# Patient Record
Sex: FEMALE | Race: BLACK OR AFRICAN AMERICAN | NOT HISPANIC OR LATINO | Employment: FULL TIME | URBAN - METROPOLITAN AREA
[De-identification: names, ages, dates, MRNs, and addresses within clinical notes are randomized per-mention and may not be internally consistent; named-entity substitution may affect disease eponyms.]

---

## 2017-02-20 ENCOUNTER — ALLSCRIPTS OFFICE VISIT (OUTPATIENT)
Dept: OTHER | Facility: OTHER | Age: 37
End: 2017-02-20

## 2017-03-01 ENCOUNTER — ALLSCRIPTS OFFICE VISIT (OUTPATIENT)
Dept: OTHER | Facility: OTHER | Age: 37
End: 2017-03-01

## 2017-03-01 DIAGNOSIS — R10.2 PELVIC AND PERINEAL PAIN: ICD-10-CM

## 2017-03-03 LAB
CHLAMYDIA TRACHOMATIS BY MOL. METHOD (HISTORICAL): NOT DETECTED
GC BY MOL. METHOD (HISTORICAL): NOT DETECTED
TRICHOMONAS (HISTORICAL): NOT DETECTED

## 2017-03-05 LAB — CULT RSLT GENITAL (HISTORICAL): ABNORMAL

## 2017-03-21 ENCOUNTER — HOSPITAL ENCOUNTER (OUTPATIENT)
Dept: RADIOLOGY | Facility: HOSPITAL | Age: 37
Discharge: HOME | End: 2017-03-21
Payer: COMMERCIAL

## 2017-03-21 DIAGNOSIS — R10.2 PELVIC AND PERINEAL PAIN: ICD-10-CM

## 2017-03-21 PROCEDURE — 76830 TRANSVAGINAL US NON-OB: CPT

## 2017-03-21 PROCEDURE — 76856 US EXAM PELVIC COMPLETE: CPT

## 2017-06-03 ENCOUNTER — HOSPITAL ENCOUNTER (EMERGENCY)
Facility: HOSPITAL | Age: 37
Discharge: HOME | End: 2017-06-03
Attending: EMERGENCY MEDICINE | Admitting: EMERGENCY MEDICINE
Payer: COMMERCIAL

## 2017-06-03 ENCOUNTER — APPOINTMENT (EMERGENCY)
Dept: RADIOLOGY | Facility: HOSPITAL | Age: 37
End: 2017-06-03
Payer: COMMERCIAL

## 2017-06-03 VITALS
BODY MASS INDEX: 27.64 KG/M2 | OXYGEN SATURATION: 98 % | SYSTOLIC BLOOD PRESSURE: 132 MMHG | HEART RATE: 80 BPM | TEMPERATURE: 98.1 F | DIASTOLIC BLOOD PRESSURE: 89 MMHG | WEIGHT: 156 LBS | HEIGHT: 63 IN | RESPIRATION RATE: 18 BRPM

## 2017-06-03 DIAGNOSIS — R10.31 RIGHT LOWER QUADRANT ABDOMINAL PAIN: Primary | ICD-10-CM

## 2017-06-03 LAB
ALBUMIN SERPL BCP-MCNC: 3.7 G/DL (ref 3.5–5)
ALP SERPL-CCNC: 61 U/L (ref 46–116)
ALT SERPL W P-5'-P-CCNC: 12 U/L (ref 12–78)
ANION GAP SERPL CALCULATED.3IONS-SCNC: 7 MMOL/L (ref 4–13)
AST SERPL W P-5'-P-CCNC: 9 U/L (ref 5–45)
BASOPHILS # BLD AUTO: 0.02 THOUSANDS/ΜL (ref 0–0.1)
BASOPHILS NFR BLD AUTO: 0 % (ref 0–1)
BILIRUB SERPL-MCNC: 0.21 MG/DL (ref 0.2–1)
BUN SERPL-MCNC: 11 MG/DL (ref 5–25)
CALCIUM SERPL-MCNC: 8.6 MG/DL (ref 8.3–10.1)
CHLORIDE SERPL-SCNC: 107 MMOL/L (ref 100–108)
CO2 SERPL-SCNC: 30 MMOL/L (ref 21–32)
CREAT SERPL-MCNC: 0.81 MG/DL (ref 0.6–1.3)
EOSINOPHIL # BLD AUTO: 0.04 THOUSAND/ΜL (ref 0–0.61)
EOSINOPHIL NFR BLD AUTO: 1 % (ref 0–6)
ERYTHROCYTE [DISTWIDTH] IN BLOOD BY AUTOMATED COUNT: 13.7 % (ref 11.6–15.1)
GFR SERPL CREATININE-BSD FRML MDRD: >60 ML/MIN/1.73SQ M
GLUCOSE SERPL-MCNC: 94 MG/DL (ref 65–140)
HCT VFR BLD AUTO: 33.5 % (ref 34.8–46.1)
HGB BLD-MCNC: 10.8 G/DL (ref 11.5–15.4)
LIPASE SERPL-CCNC: 131 U/L (ref 73–393)
LYMPHOCYTES # BLD AUTO: 2.67 THOUSANDS/ΜL (ref 0.6–4.47)
LYMPHOCYTES NFR BLD AUTO: 49 % (ref 14–44)
MCH RBC QN AUTO: 26.2 PG (ref 26.8–34.3)
MCHC RBC AUTO-ENTMCNC: 32.2 G/DL (ref 31.4–37.4)
MCV RBC AUTO: 81 FL (ref 82–98)
MONOCYTES # BLD AUTO: 0.37 THOUSAND/ΜL (ref 0.17–1.22)
MONOCYTES NFR BLD AUTO: 7 % (ref 4–12)
NEUTROPHILS # BLD AUTO: 2.38 THOUSANDS/ΜL (ref 1.85–7.62)
NEUTS SEG NFR BLD AUTO: 43 % (ref 43–75)
NRBC BLD AUTO-RTO: 0 /100 WBCS
PLATELET # BLD AUTO: 238 THOUSANDS/UL (ref 149–390)
PMV BLD AUTO: 10.2 FL (ref 8.9–12.7)
POTASSIUM SERPL-SCNC: 3.7 MMOL/L (ref 3.5–5.3)
PROT SERPL-MCNC: 7.3 G/DL (ref 6.4–8.2)
RBC # BLD AUTO: 4.12 MILLION/UL (ref 3.81–5.12)
SODIUM SERPL-SCNC: 144 MMOL/L (ref 136–145)
WBC # BLD AUTO: 5.48 THOUSAND/UL (ref 4.31–10.16)

## 2017-06-03 PROCEDURE — 80053 COMPREHEN METABOLIC PANEL: CPT

## 2017-06-03 PROCEDURE — 85025 COMPLETE CBC W/AUTO DIFF WBC: CPT

## 2017-06-03 PROCEDURE — 83690 ASSAY OF LIPASE: CPT | Performed by: EMERGENCY MEDICINE

## 2017-06-03 PROCEDURE — 96361 HYDRATE IV INFUSION ADD-ON: CPT

## 2017-06-03 PROCEDURE — 96374 THER/PROPH/DIAG INJ IV PUSH: CPT

## 2017-06-03 PROCEDURE — 74176 CT ABD & PELVIS W/O CONTRAST: CPT

## 2017-06-03 PROCEDURE — 36415 COLL VENOUS BLD VENIPUNCTURE: CPT

## 2017-06-03 PROCEDURE — 99284 EMERGENCY DEPT VISIT MOD MDM: CPT

## 2017-06-03 RX ORDER — DIPHENHYDRAMINE HYDROCHLORIDE 50 MG/ML
50 INJECTION INTRAMUSCULAR; INTRAVENOUS ONCE
Status: DISCONTINUED | OUTPATIENT
Start: 2017-06-03 | End: 2017-06-03

## 2017-06-03 RX ORDER — KETOROLAC TROMETHAMINE 30 MG/ML
15 INJECTION, SOLUTION INTRAMUSCULAR; INTRAVENOUS ONCE
Status: COMPLETED | OUTPATIENT
Start: 2017-06-03 | End: 2017-06-03

## 2017-06-03 RX ORDER — IBUPROFEN 600 MG/1
600 TABLET ORAL EVERY 6 HOURS PRN
Qty: 40 TABLET | Refills: 0 | Status: SHIPPED | OUTPATIENT
Start: 2017-06-03 | End: 2018-02-23

## 2017-06-03 RX ADMIN — KETOROLAC TROMETHAMINE 15 MG: 30 INJECTION, SOLUTION INTRAMUSCULAR at 03:39

## 2017-06-03 RX ADMIN — SODIUM CHLORIDE 1000 ML: 0.9 INJECTION, SOLUTION INTRAVENOUS at 03:39

## 2018-01-10 NOTE — RESULT NOTES
Verified Results  (1) T4, FREE [id number] 05:24PM Effie Zaldivar     Test Name Result Flag Reference   T4,FREE 0 84 ng/dL  0 76-1 46

## 2018-01-10 NOTE — MISCELLANEOUS
Provider Comments  Provider Comments:   Dear Jhoana Zaldivar,    You missed an appointment on August 24, 2016 at 8:00AM  We understand that many situations arise that occasionally prevents patients from keeping scheduled appointments  It is the policy of [facility] that patients notify us 24 hours in advance if unable to keep a scheduled appointment  Missed appointments jeopardize strong physician-patient relationships  The appointment you missed could have easily been made available to another patient if you had contacted us to cancel  We like to accommodate all of our patients, but when patients miss an appointment it prevents us from being able to help everyone  In the future, we request at least 24 hours notice of cancellation so we can make your appointment available to someone else in need         Sincerely,    The Physicians and Staff of [facility]        Signatures   Electronically signed by : YESSENIA Live ; Aug 24 2016  8:38AM EST                       (Author)

## 2018-01-11 NOTE — PROGRESS NOTES
SEP 8 2016         RE: Eagle Rogers                                 To: [facility]   MR#: [id number]                                    [address]   : 1980                                   [place], [address] [place] [address]   ENC: [id number]                             Fax: [phone]   (Exam #: [id number])      The LMP of this 39year old,  G12, P3-0-9-3 patient was unknown, her   working ABY is MAR 23 2017 and the current gestational age is 16 weeks 4   days by 1st Trimester Sono  A sonographic examination was performed on SEP   8 2016 using real time equipment  The ultrasound examination was performed   using abdominal technique  The patient has a BMI of 33 1  Her blood   pressure today was 119/79  Theodore Ink has no complaints today  She denies recurrence of vaginal bleeding   and denies abdominal pain  Multiple longitudinal and transverse sections   revealed a wills intrauterine pregnancy  Cardiac motion was not observed  INDICATIONS      first trimester bleeding      Exam Types      Level I      MEASUREMENTS (* Included In Average GA)      CRL              5 2 cm        11 weeks 5 days*      THE AVERAGE GESTATIONAL AGE is 11 weeks 5 days +/- 7 days  ANATOMY COMMENTS      Transabdominal sonography reveals a single nonviable fetus  Absence of   fetal heart rate activity is verified on real-time evaluation and pulse   Doppler exam  No obvious fetal abnormality is suspected in a very limited   evaluation secondary to the early gestational age  There is no suspicion   of a subchorionic bleed  The uterine contour appears normal  There is no   suspicion of a uterine myoma  Free fluid is not identified in the   posterior cul-de-sac  There is no adnexal mass seen  The findings are   consistent with a missed   ADNEXA      The left ovary appeared normal and measured 2 9 x 3 3 x 3 2 cm with a   volume of 16 0 cc   The right ovary appeared normal and measured 2 9 x 3 1   x 1 9 cm with a volume of 8 9 cc  IMPRESSION      Cervantes IUP   11 weeks and 5 days by this ultrasound  (ABY=MAR 25 2017)   12 weeks and 4 days by 1st Tri Sono  (ABY=MAR 19 2017)   Heart movement not seen      GENERAL COMMENT      Today's ultrasound findings and suggested follow-up were discussed in   detail with [facility] and by phone with Dr Kiran Carrasco  As per Dr Hamilton Rojas   instructions, [facility] will call today to set up an office appointment to   discuss management options, including the possibility of a D & E procedure  The face to face time, in addition to time spent discussing ultrasound   results, was 10 minutes, greater than 50% of which was spent during   counseling and coordination of care  DEBORAH Gutierrez M D     Maternal-Fetal Medicine   Electronically signed 09/08/16 10:57

## 2018-01-12 NOTE — MISCELLANEOUS
Provider Comments  Provider Comments:   PT NO SHOWED TODAY      Signatures   Electronically signed by : Liv Angelo, ; Oct 17 2016  3:17PM EST                       (Author)

## 2018-01-14 VITALS
HEIGHT: 63 IN | BODY MASS INDEX: 31.01 KG/M2 | WEIGHT: 175 LBS | SYSTOLIC BLOOD PRESSURE: 134 MMHG | DIASTOLIC BLOOD PRESSURE: 82 MMHG

## 2018-01-15 NOTE — MISCELLANEOUS
Provider Comments  Provider Comments:   pt was a no show today      Signatures   Electronically signed by : Rachael Brown, ; Oct 26 2016  1:28PM EST                       (Author)

## 2018-01-15 NOTE — MISCELLANEOUS
Provider Comments  Provider Comments:   pt was a no show for appt today      Signatures   Electronically signed by : Jaida Alejandro, ; Feb 20 2017  2:39PM EST                       (Author)

## 2018-01-15 NOTE — PROGRESS NOTES
AUG 23 2016         RE: Crystal Marcelo                                 To: [facility]   MR#: [id number]                                    [address]   : 1980                                   [place], [address]   ENCRosary Lied: [phone]   (Exam #: [id number])      The LMP of this 39year old,  G12, P3-0-9-3 patient was unknown, her   working ABY is MAR 23 2017 and the current gestational age is 9 weeks 2   days by 1st Trimester Sono  A sonographic examination was performed on AUG   23 2016 using real time equipment  The patient has a BMI of 32 9  Her   blood pressure today was 133/86  Valisa complains of intermittent bright red vaginal bleeding, accompanied   by occasional lower abdominal cramping  Multiple longitudinal and   transverse sections revealed a wills intrauterine pregnancy with the   fetus in variable presentation  The placenta is anterior in implantation  Cardiac motion was observed at 175 bpm       INDICATIONS      first trimester bleeding      Exam Types      Level I      RESULTS      Fetus # 1 of 1   Variable presentation   Fetal growth appeared normal      MEASUREMENTS (* Included In Average GA)      CRL              3 1 cm        9 weeks 6 days *      THE AVERAGE GESTATIONAL AGE is 9 weeks 6 days +/- 5 days  ANATOMY COMMENTS      Anatomic detail is extremely limited at this gestational age  However, a   discrete fetal pole with cardiac and fetal body motion was documented  Limb buds were documented as well  The yolk sac was clearly seen, and the   gestational sac is normal in appearance and located in the fundus of the   uterus  No gross abnormalities were noted on this examination  Free fluid   is not seen in the posterior cul-de-sac  There is no suspicion of a   subchorionic bleed  ADNEXA      The left ovary appeared normal and measured 2 7 x 2 7 x 2 9 cm with a   volume of 11 1 cc   A small anechoic left ovarian cyst is noted  The right   ovary was not visualized  IMPRESSION      Cervantes IUP   9 weeks and 6 days by this ultrasound  (ABY=MAR 22 2017)   10 weeks and 2 days by 1st Tri Sono  (ABY=MAR 19 2017)   Variable presentation   Fetal growth appeared normal   Regular fetal heart rate of 175 bpm   Anterior placenta      GENERAL COMMENT      Today's ultrasound findings and suggested follow-up were discussed in   detail with Mary Ellenregla  She will notify your office with an increased in the   amount of vaginal bleeding or lower abdominal cramping  She will return to   the [facility]  in 2 weeks for late first trimester ultrasound   evaluation and genetic screening  The face to face time, in addition to time spent discussing ultrasound   results, was 10 minutes, greater than 50% of which was spent during   counseling and coordination of care  YESSENIA Berg     Maternal-Fetal Medicine   Electronically signed 08/23/16 11:34

## 2018-02-16 ENCOUNTER — TRANSCRIBE ORDERS (OUTPATIENT)
Dept: LAB | Facility: HOSPITAL | Age: 38
End: 2018-02-16

## 2018-02-16 ENCOUNTER — TRANSCRIBE ORDERS (OUTPATIENT)
Dept: OBGYN CLINIC | Facility: CLINIC | Age: 38
End: 2018-02-16

## 2018-02-16 ENCOUNTER — TELEPHONE (OUTPATIENT)
Dept: OBGYN CLINIC | Facility: CLINIC | Age: 38
End: 2018-02-16

## 2018-02-16 ENCOUNTER — APPOINTMENT (OUTPATIENT)
Dept: LAB | Facility: HOSPITAL | Age: 38
End: 2018-02-16
Payer: COMMERCIAL

## 2018-02-16 DIAGNOSIS — Z34.90 PREGNANCY, UNSPECIFIED GESTATIONAL AGE: Primary | ICD-10-CM

## 2018-02-16 DIAGNOSIS — Z34.90 PREGNANCY, UNSPECIFIED GESTATIONAL AGE: ICD-10-CM

## 2018-02-16 LAB
ABO GROUP BLD: NORMAL
B-HCG SERPL-ACNC: <2 MIU/ML
BLD GP AB SCN SERPL QL: NEGATIVE
RH BLD: POSITIVE
SPECIMEN EXPIRATION DATE: NORMAL

## 2018-02-16 PROCEDURE — 84702 CHORIONIC GONADOTROPIN TEST: CPT

## 2018-02-16 PROCEDURE — 86900 BLOOD TYPING SEROLOGIC ABO: CPT

## 2018-02-16 PROCEDURE — 36415 COLL VENOUS BLD VENIPUNCTURE: CPT

## 2018-02-16 PROCEDURE — 86850 RBC ANTIBODY SCREEN: CPT

## 2018-02-16 PROCEDURE — 86901 BLOOD TYPING SEROLOGIC RH(D): CPT

## 2018-02-16 NOTE — TELEPHONE ENCOUNTER
Per Dr Jonh Deras send for type & screen and HCG quant STAT  King Omero is aware and is going for bw as soon as she gets out of work at 3  Going to [facility] and I will follow up with her before the end of the day

## 2018-02-16 NOTE — TELEPHONE ENCOUNTER
Pt reports pain on R side, pain has gotten really, really bad in last week and half  Did not stay, but know is coming and is more severe  Did not take UPT

## 2018-02-23 ENCOUNTER — HOSPITAL ENCOUNTER (EMERGENCY)
Facility: HOSPITAL | Age: 38
Discharge: HOME | End: 2018-02-23
Attending: EMERGENCY MEDICINE | Admitting: EMERGENCY MEDICINE
Payer: COMMERCIAL

## 2018-02-23 ENCOUNTER — TRANSCRIBE ORDERS (OUTPATIENT)
Dept: OBGYN CLINIC | Facility: CLINIC | Age: 38
End: 2018-02-23

## 2018-02-23 ENCOUNTER — TELEPHONE (OUTPATIENT)
Dept: OBGYN CLINIC | Facility: CLINIC | Age: 38
End: 2018-02-23

## 2018-02-23 ENCOUNTER — APPOINTMENT (EMERGENCY)
Dept: RADIOLOGY | Facility: HOSPITAL | Age: 38
End: 2018-02-23
Payer: COMMERCIAL

## 2018-02-23 VITALS
DIASTOLIC BLOOD PRESSURE: 78 MMHG | RESPIRATION RATE: 12 BRPM | HEART RATE: 48 BPM | SYSTOLIC BLOOD PRESSURE: 148 MMHG | OXYGEN SATURATION: 100 %

## 2018-02-23 DIAGNOSIS — R10.9 ABDOMINAL PAIN: Primary | ICD-10-CM

## 2018-02-23 DIAGNOSIS — R10.2 PELVIC PAIN: Primary | ICD-10-CM

## 2018-02-23 LAB
ALBUMIN SERPL BCP-MCNC: 3.7 G/DL (ref 3.5–5)
ALP SERPL-CCNC: 48 U/L (ref 46–116)
ALT SERPL W P-5'-P-CCNC: 12 U/L (ref 12–78)
ANION GAP SERPL CALCULATED.3IONS-SCNC: 5 MMOL/L (ref 4–13)
APTT PPP: 26 SECONDS (ref 23–35)
AST SERPL W P-5'-P-CCNC: 39 U/L (ref 5–45)
BACTERIA UR QL AUTO: ABNORMAL /HPF
BASOPHILS # BLD AUTO: 0.02 THOUSANDS/ΜL (ref 0–0.1)
BASOPHILS NFR BLD AUTO: 1 % (ref 0–1)
BILIRUB SERPL-MCNC: 0.4 MG/DL (ref 0.2–1)
BILIRUB UR QL STRIP: NEGATIVE
BUN SERPL-MCNC: 9 MG/DL (ref 5–25)
CALCIUM SERPL-MCNC: 8.4 MG/DL (ref 8.3–10.1)
CHLORIDE SERPL-SCNC: 109 MMOL/L (ref 100–108)
CLARITY UR: CLEAR
CO2 SERPL-SCNC: 26 MMOL/L (ref 21–32)
COLOR UR: YELLOW
CREAT SERPL-MCNC: 0.62 MG/DL (ref 0.6–1.3)
EOSINOPHIL # BLD AUTO: 0.03 THOUSAND/ΜL (ref 0–0.61)
EOSINOPHIL NFR BLD AUTO: 1 % (ref 0–6)
ERYTHROCYTE [DISTWIDTH] IN BLOOD BY AUTOMATED COUNT: 16.3 % (ref 11.6–15.1)
GFR SERPL CREATININE-BSD FRML MDRD: 131 ML/MIN/1.73SQ M
GLUCOSE SERPL-MCNC: 73 MG/DL (ref 65–140)
GLUCOSE UR STRIP-MCNC: NEGATIVE MG/DL
HCT VFR BLD AUTO: 29.5 % (ref 34.8–46.1)
HGB BLD-MCNC: 8.9 G/DL (ref 11.5–15.4)
HGB UR QL STRIP.AUTO: ABNORMAL
HYALINE CASTS #/AREA URNS LPF: ABNORMAL /LPF
INR PPP: 1.05 (ref 0.86–1.16)
KETONES UR STRIP-MCNC: ABNORMAL MG/DL
LEUKOCYTE ESTERASE UR QL STRIP: NEGATIVE
LYMPHOCYTES # BLD AUTO: 1.7 THOUSANDS/ΜL (ref 0.6–4.47)
LYMPHOCYTES NFR BLD AUTO: 45 % (ref 14–44)
MCH RBC QN AUTO: 21.9 PG (ref 26.8–34.3)
MCHC RBC AUTO-ENTMCNC: 30.2 G/DL (ref 31.4–37.4)
MCV RBC AUTO: 73 FL (ref 82–98)
MONOCYTES # BLD AUTO: 0.24 THOUSAND/ΜL (ref 0.17–1.22)
MONOCYTES NFR BLD AUTO: 6 % (ref 4–12)
NEUTROPHILS # BLD AUTO: 1.77 THOUSANDS/ΜL (ref 1.85–7.62)
NEUTS SEG NFR BLD AUTO: 47 % (ref 43–75)
NITRITE UR QL STRIP: NEGATIVE
NON-SQ EPI CELLS URNS QL MICRO: ABNORMAL /HPF
NRBC BLD AUTO-RTO: 0 /100 WBCS
PH UR STRIP.AUTO: 7 [PH] (ref 4.5–8)
PLATELET # BLD AUTO: 306 THOUSANDS/UL (ref 149–390)
PMV BLD AUTO: 10 FL (ref 8.9–12.7)
POTASSIUM SERPL-SCNC: 5.1 MMOL/L (ref 3.5–5.3)
PROT SERPL-MCNC: 7.3 G/DL (ref 6.4–8.2)
PROT UR STRIP-MCNC: ABNORMAL MG/DL
PROTHROMBIN TIME: 13.7 SECONDS (ref 12.1–14.4)
RBC # BLD AUTO: 4.06 MILLION/UL (ref 3.81–5.12)
RBC #/AREA URNS AUTO: ABNORMAL /HPF
SODIUM SERPL-SCNC: 140 MMOL/L (ref 136–145)
SP GR UR STRIP.AUTO: 1.02 (ref 1–1.03)
UROBILINOGEN UR QL STRIP.AUTO: 1 E.U./DL
WBC # BLD AUTO: 3.76 THOUSAND/UL (ref 4.31–10.16)
WBC #/AREA URNS AUTO: ABNORMAL /HPF

## 2018-02-23 PROCEDURE — 85025 COMPLETE CBC W/AUTO DIFF WBC: CPT | Performed by: EMERGENCY MEDICINE

## 2018-02-23 PROCEDURE — 74176 CT ABD & PELVIS W/O CONTRAST: CPT

## 2018-02-23 PROCEDURE — 85730 THROMBOPLASTIN TIME PARTIAL: CPT | Performed by: EMERGENCY MEDICINE

## 2018-02-23 PROCEDURE — 96375 TX/PRO/DX INJ NEW DRUG ADDON: CPT

## 2018-02-23 PROCEDURE — 96374 THER/PROPH/DIAG INJ IV PUSH: CPT

## 2018-02-23 PROCEDURE — 80053 COMPREHEN METABOLIC PANEL: CPT | Performed by: EMERGENCY MEDICINE

## 2018-02-23 PROCEDURE — 36415 COLL VENOUS BLD VENIPUNCTURE: CPT | Performed by: EMERGENCY MEDICINE

## 2018-02-23 PROCEDURE — 81001 URINALYSIS AUTO W/SCOPE: CPT

## 2018-02-23 PROCEDURE — 85610 PROTHROMBIN TIME: CPT | Performed by: EMERGENCY MEDICINE

## 2018-02-23 PROCEDURE — 96361 HYDRATE IV INFUSION ADD-ON: CPT

## 2018-02-23 PROCEDURE — 99284 EMERGENCY DEPT VISIT MOD MDM: CPT

## 2018-02-23 RX ORDER — NAPROXEN 500 MG/1
500 TABLET ORAL 2 TIMES DAILY WITH MEALS
Qty: 30 TABLET | Refills: 0 | Status: SHIPPED | OUTPATIENT
Start: 2018-02-23 | End: 2018-07-17

## 2018-02-23 RX ORDER — KETOROLAC TROMETHAMINE 30 MG/ML
15 INJECTION, SOLUTION INTRAMUSCULAR; INTRAVENOUS ONCE
Status: COMPLETED | OUTPATIENT
Start: 2018-02-23 | End: 2018-02-23

## 2018-02-23 RX ADMIN — SODIUM CHLORIDE 1000 ML: 0.9 INJECTION, SOLUTION INTRAVENOUS at 13:16

## 2018-02-23 RX ADMIN — HYDROMORPHONE HYDROCHLORIDE 1 MG: 1 INJECTION, SOLUTION INTRAMUSCULAR; INTRAVENOUS; SUBCUTANEOUS at 15:10

## 2018-02-23 RX ADMIN — KETOROLAC TROMETHAMINE 15 MG: 30 INJECTION, SOLUTION INTRAMUSCULAR at 13:19

## 2018-02-23 NOTE — ED NOTES
PT indicated that she was evaluated "here last Sunday  They game me some medication and told me to take it easy  I was also suppose to see my doctor, but I didn't make it" No notes indicate that pt was seen within the hospital network within the last 7 days        Justo Dimas RN  02/23/18 5109

## 2018-02-23 NOTE — ED NOTES
PT notified that they will have to find a ride home due to receiving the pain medication        Eagle Jimenez RN  02/23/18 3627

## 2018-02-23 NOTE — ED ATTENDING ATTESTATION
Liliane Perla MD, saw and evaluated the patient  I have discussed the patient with the resident/non-physician practitioner and agree with the resident's/non-physician practitioner's findings, Plan of Care, and MDM as documented in the resident's/non-physician practitioner's note, except where noted  All available labs and Radiology studies were reviewed  At this point I agree with the current assessment done in the Emergency Department  I have conducted an independent evaluation of this patient a history and physical is as follows:    Patient presents with several weeks of progressively worsening RLQ/R flank pain  Pain initially intermittent but then became constant  Patient also reports that she lost 35lbs in the last 2 months  No additional complaints  Exam: AAOx3, mild distress, RRR, CTA, RLQ TTP  A/P: RLQ pain  Will check labs, urine, and treat pain  Will CT A/P to evaluate      Critical Care Time  CritCare Time    Procedures

## 2018-02-23 NOTE — TELEPHONE ENCOUNTER
Reviewed with Dr Lali Panda, going for pelvic us and will be calling back to schedule appoint with Dr Major Litten on Monday per her request

## 2018-02-23 NOTE — DISCHARGE INSTRUCTIONS

## 2018-02-23 NOTE — ED PROVIDER NOTES
History  Chief Complaint   Patient presents with    Flank Pain     Pt c/o right sided flank pain which is gwetting worse  Pt states seen here last  for same  Pt also states lost 35 pounds in last two months     39-year-old female presenting to the ER today with a chief complaint of right-sided flank pain  States she has had the symptoms for the past 3 weeks  Start to the right flank and radiates the right lower quadrant  Pain has had intermittent symptoms for 3 weeks  Has taken Tylenol with little relief  Came to the ER today when symptoms worsened          History provided by:  Patient   used: No    Abdominal Pain   Pain location:  RLQ and R flank  Pain quality: sharp    Pain radiates to:  RLQ  Pain severity:  Mild  Onset quality:  Gradual  Duration:  3 weeks  Timing:  Constant  Progression:  Worsening  Chronicity:  New  Relieved by:  Nothing  Worsened by:  Nothing  Associated symptoms: nausea    Associated symptoms: no chills, no constipation, no diarrhea, no dysuria, no fatigue, no fever, no hematuria, no vaginal bleeding, no vaginal discharge and no vomiting        None       Past Medical History:   Diagnosis Date    Fetal growth problem suspected but not found     RESOLVED: 3/1/17    First trimester bleeding     RESOLVED:3/1/17    Miscarriage     Missed      RESOLVED:3/1/17    Poor fetal growth affecting management of mother     RESOLVED:3/1/17    Recurrent pregnancy loss, antepartum condition or complication     LPTDEXMU:47    Spontaneous      RESOLVED:3/1/17    Supraventricular tachycardia ([address] )     by history       Past Surgical History:   Procedure Laterality Date    DILATION AND CURETTAGE OF UTERUS      ,     DILATION AND CURETTAGE OF UTERUS      FOR SPONTANEOUS ; X5 [id number]    INDUCED       LAPAROSCOPIC TUBAL LIGATION Right 2016    Procedure: LAPAROSCOPIC TUBAL LIGATION ;  Surgeon: Vinita Canas MD; Location: [facility];  Service:     VA SURG RX MISSED ABORTN,1ST TRI N/A 9/12/2016    Procedure: DILATATION AND EVACUATION (D&E) (MISSED AB); Surgeon: Luis Eaton MD;  Location: [facility];  Service: Gynecology    SALPINGOOPHORECTOMY Left     TUBAL LIGATION Right        Family History   Problem Relation Age of Onset    Arthritis Mother     Depression Mother     Hypertension Mother     Cancer Father      I have reviewed and agree with the history as documented  Social History   Substance Use Topics    Smoking status: Never Smoker    Smokeless tobacco: Never Used    Alcohol use No      Comment: OCCASIONAL ALCOHOL USE AS PER ALLSCRIPTS        Review of Systems   Constitutional: Negative  Negative for appetite change, chills, diaphoresis, fatigue and fever  HENT: Negative  Eyes: Negative  Respiratory: Negative  Cardiovascular: Negative  Gastrointestinal: Positive for abdominal pain and nausea  Negative for blood in stool, constipation, diarrhea and vomiting  Endocrine: Negative  Genitourinary: Negative for decreased urine volume, difficulty urinating, dyspareunia, dysuria, flank pain, frequency, hematuria, pelvic pain, urgency, vaginal bleeding, vaginal discharge and vaginal pain  Musculoskeletal: Negative  Skin: Negative  Allergic/Immunologic: Negative  Neurological: Negative  Psychiatric/Behavioral: Negative  All other systems reviewed and are negative  Physical Exam  ED Triage Vitals [02/23/18 1230]   Temp Pulse Respirations Blood Pressure SpO2   -- 64 20 170/77 100 %      Temp src Heart Rate Source Patient Position - Orthostatic VS BP Location FiO2 (%)   -- -- -- -- --      Pain Score       --           Orthostatic Vital Signs  Vitals:    02/23/18 1230 02/23/18 1330   BP: 170/77 148/78   Pulse: 64 (!) 48       Physical Exam   Constitutional: She is oriented to person, place, and time  She appears well-developed and well-nourished     HENT:   Head: Normocephalic and atraumatic  Right Ear: External ear normal    Left Ear: External ear normal    Nose: Nose normal    Mouth/Throat: Oropharynx is clear and moist    Eyes: Conjunctivae and EOM are normal  Pupils are equal, round, and reactive to light  Neck: Normal range of motion  Neck supple  No JVD present  No tracheal deviation present  No thyromegaly present  Cardiovascular: Normal rate, regular rhythm, normal heart sounds and intact distal pulses  Exam reveals no gallop and no friction rub  No murmur heard  Pulmonary/Chest: Effort normal and breath sounds normal  No stridor  No respiratory distress  She has no wheezes  She has no rales  She exhibits no tenderness  Abdominal: Soft  Bowel sounds are normal  She exhibits no distension and no mass  There is tenderness in the right lower quadrant  There is CVA tenderness  There is no rebound and no guarding  No hernia  Musculoskeletal: Normal range of motion  She exhibits no edema, tenderness or deformity  Lymphadenopathy:     She has no cervical adenopathy  Neurological: She is alert and oriented to person, place, and time  She has normal reflexes  She displays normal reflexes  No cranial nerve deficit  She exhibits normal muscle tone  Coordination normal    Skin: Skin is warm  No rash noted  No erythema  No pallor  Psychiatric: She has a normal mood and affect  Her behavior is normal  Judgment and thought content normal    Nursing note and vitals reviewed        ED Medications  Medications   sodium chloride 0 9 % bolus 1,000 mL (1,000 mL Intravenous New Bag 2/23/18 1316)   ketorolac (TORADOL) injection 15 mg (15 mg Intravenous Given 2/23/18 1319)   HYDROmorphone (DILAUDID) injection 1 mg (1 mg Intravenous Given 2/23/18 1510)       Diagnostic Studies  Results Reviewed     Procedure Component Value Units Date/Time    Protime-INR [[id number]]  (Normal) Resulted:  02/23/18 1447    Lab Status:  Final result Specimen:  Blood from Arm, Right Updated:  02/23/18 1447     Protime 13 7 seconds      INR 1 05    APTT [[id number]]  (Normal) Resulted:  02/23/18 1447    Lab Status:  Final result Specimen:  Blood from Arm, Right Updated:  02/23/18 1447     PTT 26 seconds     Narrative: Therapeutic Heparin Range = 60-90 seconds    Comprehensive metabolic panel [[id number]]  (Abnormal) Collected:  02/23/18 1319    Lab Status:  Final result Specimen:  Blood from Arm, Right Updated:  02/23/18 1402     Sodium 140 mmol/L      Potassium 5 1 mmol/L      Chloride 109 (H) mmol/L      CO2 26 mmol/L      Anion Gap 5 mmol/L      BUN 9 mg/dL      Creatinine 0 62 mg/dL      Glucose 73 mg/dL      Calcium 8 4 mg/dL      AST 39 U/L      ALT 12 U/L      Alkaline Phosphatase 48 U/L      Total Protein 7 3 g/dL      Albumin 3 7 g/dL      Total Bilirubin 0 40 mg/dL      eGFR 131 ml/min/1 73sq m     Narrative:         National Kidney Disease Education Program recommendations are as follows:  GFR calculation is accurate only with a steady state creatinine  Chronic Kidney disease less than 60 ml/min/1 73 sq  meters  Kidney failure less than 15 ml/min/1 73 sq  meters      CBC and differential [[id number]]  (Abnormal) Collected:  02/23/18 1319    Lab Status:  Final result Specimen:  Blood from Arm, Right Updated:  02/23/18 1338     WBC 3 76 (L) Thousand/uL      RBC 4 06 Million/uL      Hemoglobin 8 9 (L) g/dL      Hematocrit 29 5 (L) %      MCV 73 (L) fL      MCH 21 9 (L) pg      MCHC 30 2 (L) g/dL      RDW 16 3 (H) %      MPV 10 0 fL      Platelets 164 Thousands/uL      nRBC 0 /100 WBCs      Neutrophils Relative 47 %      Lymphocytes Relative 45 (H) %      Monocytes Relative 6 %      Eosinophils Relative 1 %      Basophils Relative 1 %      Neutrophils Absolute 1 77 (L) Thousands/µL      Lymphocytes Absolute 1 70 Thousands/µL      Monocytes Absolute 0 24 Thousand/µL      Eosinophils Absolute 0 03 Thousand/µL      Basophils Absolute 0 02 Thousands/µL     TSH [[id number]]     Lab Status:  No result Specimen: Blood     Urine Microscopic [[id number]]  (Abnormal) Collected:  02/23/18 1305    Lab Status:  Final result Specimen:  Urine from Urine, Clean Catch Updated:  02/23/18 1330     RBC, UA Innumerable (A) /hpf      WBC, UA 2-4 (A) /hpf      Epithelial Cells Moderate (A) /hpf      Bacteria, UA Occasional /hpf      Hyaline Casts, UA None Seen /lpf     POCT urinalysis dipstick [[id number]]     Lab Status:  No result Specimen:  Urine     POCT pregnancy, urine [[id number]]     Lab Status:  No result     ED Urine Macroscopic [[id number]]  (Abnormal) Collected:  02/23/18 1305    Lab Status:  Final result Specimen:  Urine Updated:  02/23/18 1259     Color, UA Yellow     Clarity, UA Clear     pH, UA 7 0     Leukocytes, UA Negative     Nitrite, UA Negative     Protein, UA 30 (1+) (A) mg/dl      Glucose, UA Negative mg/dl      Ketones, UA 15 (1+) (A) mg/dl      Urobilinogen, UA 1 0 E U /dl      Bilirubin, UA Negative     Blood, UA Large (A)     Specific [place], UA 1 025    Narrative:       CLINITEK RESULT                 CT abdomen pelvis wo contrast   Final Result by Graeme Kohler MD (02/23 1529)      Prominent sized right ovary could relate to a corpus luteum  Focal dilatation of small bowel loops in the left upper abdomen measuring up to 3 2 cm with air-fluid levels could relate to enteritis  5-6 mm nodular identified in the right lung base could relate to subsegmental atelectasis  Based on current Fleischner Society 2017 Guidelines on incidental pulmonary nodule, no routine follow-up is needed if the patient is considered low risk for lung    cancer  If the patient is considered high risk for lung cancer, 12 month follow-up non-contrast chest CT is recommended           Workstation performed: [id number]               Procedures  Procedures      Phone Consults  ED Phone Contact    ED Course  ED Course as of Feb 23 1637 Fri Feb 23, 2018   1315 Blood, UA: (!) Large   1349 Hemoglobin: (!) 8 9   1349 Bacteria, UA: Occasional MDM  Number of Diagnoses or Management Options  Abdominal pain: new and requires workup     Amount and/or Complexity of Data Reviewed  Clinical lab tests: ordered and reviewed  Tests in the radiology section of CPT®: ordered and reviewed  Tests in the medicine section of CPT®: reviewed and ordered  Decide to obtain previous medical records or to obtain history from someone other than the patient: yes  Independent visualization of images, tracings, or specimens: yes    Patient Progress  Patient progress: stable    CritCare Time    Disposition  Final diagnoses:   Abdominal pain     Time reflects when diagnosis was documented in both MDM as applicable and the Disposition within this note     Time User Action Codes Description Comment    2/23/2018  4:07 PM Ani Keene Add [R10 9] Abdominal pain       ED Disposition     ED Disposition Condition Comment    Discharge  Rylee Eduardo discharge to home/self care  Condition at discharge: Good        Follow-up Information     Follow up With Specialties Details Why Contact Info    Arsalan Velasquez PA-C Internal Medicine Schedule an appointment as soon as possible for a visit  [phone]   [address] [facility]  [phone]          Patient's Medications   Discharge Prescriptions    NAPROXEN (NAPROSYN) 500 MG TABLET    Take 1 tablet (500 mg total) by mouth 2 (two) times a day with meals       Start Date: 2/23/2018 End Date: --       Order Dose: 500 mg       Quantity: 30 tablet    Refills: 0     No discharge procedures on file  ED Provider  Attending physically available and evaluated Rylee Eduardo I managed the patient along with the ED Attending      Electronically Signed by         Neal Briscoe DO  02/23/18 3069

## 2018-02-26 ENCOUNTER — TELEPHONE (OUTPATIENT)
Dept: OBGYN CLINIC | Facility: CLINIC | Age: 38
End: 2018-02-26

## 2018-02-26 ENCOUNTER — HOSPITAL ENCOUNTER (OUTPATIENT)
Dept: RADIOLOGY | Facility: HOSPITAL | Age: 38
Discharge: HOME | End: 2018-02-26
Attending: OBSTETRICS & GYNECOLOGY
Payer: COMMERCIAL

## 2018-02-26 DIAGNOSIS — R10.2 PELVIC PAIN: ICD-10-CM

## 2018-02-26 PROCEDURE — 76856 US EXAM PELVIC COMPLETE: CPT

## 2018-03-01 NOTE — TELEPHONE ENCOUNTER
Pt is aware she does not have any ovarian cysts; however, she does have some small fibroids that would not need to be surgically removed    Has appt with [place] to review per her request

## 2018-03-25 ENCOUNTER — HOSPITAL ENCOUNTER (EMERGENCY)
Facility: HOSPITAL | Age: 38
Discharge: HOME | End: 2018-03-25
Attending: EMERGENCY MEDICINE | Admitting: EMERGENCY MEDICINE
Payer: COMMERCIAL

## 2018-03-25 VITALS
DIASTOLIC BLOOD PRESSURE: 85 MMHG | HEART RATE: 81 BPM | SYSTOLIC BLOOD PRESSURE: 143 MMHG | WEIGHT: 150 LBS | OXYGEN SATURATION: 100 % | BODY MASS INDEX: 26.58 KG/M2 | TEMPERATURE: 98.5 F | RESPIRATION RATE: 18 BRPM | HEIGHT: 63 IN

## 2018-03-25 DIAGNOSIS — N93.9 VAGINAL BLEEDING: Primary | ICD-10-CM

## 2018-03-25 DIAGNOSIS — R10.9 ABDOMINAL PAIN: ICD-10-CM

## 2018-03-25 LAB
ANION GAP SERPL CALCULATED.3IONS-SCNC: 6 MMOL/L (ref 4–13)
BACTERIA UR QL AUTO: ABNORMAL /HPF
BASOPHILS # BLD AUTO: 0.02 THOUSANDS/ΜL (ref 0–0.1)
BASOPHILS NFR BLD AUTO: 1 % (ref 0–1)
BILIRUB UR QL STRIP: ABNORMAL
BUN SERPL-MCNC: 9 MG/DL (ref 5–25)
CALCIUM SERPL-MCNC: 8.4 MG/DL (ref 8.3–10.1)
CHLORIDE SERPL-SCNC: 108 MMOL/L (ref 100–108)
CLARITY UR: ABNORMAL
CO2 SERPL-SCNC: 27 MMOL/L (ref 21–32)
COLOR UR: ABNORMAL
CREAT SERPL-MCNC: 0.76 MG/DL (ref 0.6–1.3)
EOSINOPHIL # BLD AUTO: 0.04 THOUSAND/ΜL (ref 0–0.61)
EOSINOPHIL NFR BLD AUTO: 1 % (ref 0–6)
ERYTHROCYTE [DISTWIDTH] IN BLOOD BY AUTOMATED COUNT: 16.5 % (ref 11.6–15.1)
EXT PREG TEST URINE: NEGATIVE
GFR SERPL CREATININE-BSD FRML MDRD: 114 ML/MIN/1.73SQ M
GLUCOSE SERPL-MCNC: 80 MG/DL (ref 65–140)
GLUCOSE UR STRIP-MCNC: NEGATIVE MG/DL
HCT VFR BLD AUTO: 31.9 % (ref 34.8–46.1)
HGB BLD-MCNC: 9.5 G/DL (ref 11.5–15.4)
HGB UR QL STRIP.AUTO: ABNORMAL
HYALINE CASTS #/AREA URNS LPF: ABNORMAL /LPF
KETONES UR STRIP-MCNC: ABNORMAL MG/DL
LEUKOCYTE ESTERASE UR QL STRIP: NEGATIVE
LYMPHOCYTES # BLD AUTO: 1.26 THOUSANDS/ΜL (ref 0.6–4.47)
LYMPHOCYTES NFR BLD AUTO: 44 % (ref 14–44)
MCH RBC QN AUTO: 21.5 PG (ref 26.8–34.3)
MCHC RBC AUTO-ENTMCNC: 29.8 G/DL (ref 31.4–37.4)
MCV RBC AUTO: 72 FL (ref 82–98)
MONOCYTES # BLD AUTO: 0.16 THOUSAND/ΜL (ref 0.17–1.22)
MONOCYTES NFR BLD AUTO: 6 % (ref 4–12)
NEUTROPHILS # BLD AUTO: 1.38 THOUSANDS/ΜL (ref 1.85–7.62)
NEUTS SEG NFR BLD AUTO: 48 % (ref 43–75)
NITRITE UR QL STRIP: NEGATIVE
NON-SQ EPI CELLS URNS QL MICRO: ABNORMAL /HPF
NRBC BLD AUTO-RTO: 0 /100 WBCS
PH UR STRIP.AUTO: 6 [PH] (ref 4.5–8)
PLATELET # BLD AUTO: 324 THOUSANDS/UL (ref 149–390)
PMV BLD AUTO: 9.7 FL (ref 8.9–12.7)
POTASSIUM SERPL-SCNC: 3.4 MMOL/L (ref 3.5–5.3)
PROT UR STRIP-MCNC: ABNORMAL MG/DL
RBC # BLD AUTO: 4.42 MILLION/UL (ref 3.81–5.12)
RBC #/AREA URNS AUTO: ABNORMAL /HPF
SODIUM SERPL-SCNC: 141 MMOL/L (ref 136–145)
SP GR UR STRIP.AUTO: >=1.03 (ref 1–1.03)
TSH SERPL DL<=0.05 MIU/L-ACNC: 0.45 UIU/ML (ref 0.36–3.74)
UROBILINOGEN UR QL STRIP.AUTO: 2 E.U./DL
WBC # BLD AUTO: 2.86 THOUSAND/UL (ref 4.31–10.16)
WBC #/AREA URNS AUTO: ABNORMAL /HPF

## 2018-03-25 PROCEDURE — 81025 URINE PREGNANCY TEST: CPT | Performed by: EMERGENCY MEDICINE

## 2018-03-25 PROCEDURE — 84443 ASSAY THYROID STIM HORMONE: CPT | Performed by: EMERGENCY MEDICINE

## 2018-03-25 PROCEDURE — 80048 BASIC METABOLIC PNL TOTAL CA: CPT | Performed by: EMERGENCY MEDICINE

## 2018-03-25 PROCEDURE — 99284 EMERGENCY DEPT VISIT MOD MDM: CPT

## 2018-03-25 PROCEDURE — 81001 URINALYSIS AUTO W/SCOPE: CPT

## 2018-03-25 PROCEDURE — 81002 URINALYSIS NONAUTO W/O SCOPE: CPT | Performed by: EMERGENCY MEDICINE

## 2018-03-25 PROCEDURE — 87491 CHLMYD TRACH DNA AMP PROBE: CPT | Performed by: EMERGENCY MEDICINE

## 2018-03-25 PROCEDURE — 36415 COLL VENOUS BLD VENIPUNCTURE: CPT | Performed by: EMERGENCY MEDICINE

## 2018-03-25 PROCEDURE — 96361 HYDRATE IV INFUSION ADD-ON: CPT

## 2018-03-25 PROCEDURE — 87591 N.GONORRHOEAE DNA AMP PROB: CPT | Performed by: EMERGENCY MEDICINE

## 2018-03-25 PROCEDURE — 85025 COMPLETE CBC W/AUTO DIFF WBC: CPT | Performed by: EMERGENCY MEDICINE

## 2018-03-25 PROCEDURE — 96374 THER/PROPH/DIAG INJ IV PUSH: CPT

## 2018-03-25 RX ORDER — KETOROLAC TROMETHAMINE 30 MG/ML
15 INJECTION, SOLUTION INTRAMUSCULAR; INTRAVENOUS ONCE
Status: COMPLETED | OUTPATIENT
Start: 2018-03-25 | End: 2018-03-25

## 2018-03-25 RX ADMIN — SODIUM CHLORIDE 1000 ML: 0.9 INJECTION, SOLUTION INTRAVENOUS at 10:50

## 2018-03-25 RX ADMIN — KETOROLAC TROMETHAMINE 15 MG: 30 INJECTION, SOLUTION INTRAMUSCULAR at 10:47

## 2018-03-25 NOTE — ED ATTENDING ATTESTATION
Saman Royal MD, saw and evaluated the patient  I have discussed the patient with the resident/non-physician practitioner and agree with the resident's/non-physician practitioner's findings, Plan of Care, and MDM as documented in the resident's/non-physician practitioner's note, except where noted  All available labs and Radiology studies were reviewed  At this point I agree with the current assessment done in the Emergency Department  I have conducted an independent evaluation of this patient including a focused history and a physical exam       A 39-year-old female, history of uterine fibroids, presenting to the emergency department for evaluation of suprapubic abdominal discomfort and vaginal bleeding  Patient started with crampy suprapubic abdominal discomfort yesterday, and later in the day started to have vaginal bleeding  Patient reports her last menses was 2 weeks ago so this was abnormal for her  Patient reports that has been heavier than a typical menses  Patient reports some intermittent lightheadedness  No fever  No dysuria  Ten systems reviewed and negative except as noted in the history of present illness  The patient is resting comfortably on a stretcher in no acute respiratory distress  The patient appears nontoxic  HEENT reveals moist mucous membranes  Head is normocephalic and atraumatic  Conjunctiva and sclera are normal  Neck is nontender and supple with full range of motion to flexion, extension, lateral rotation  No meningismus appreciated  No masses are appreciated  Lungs are clear to auscultation bilaterally without any wheezes, rales or rhonchi  Heart is regular rate and rhythm without any murmurs, rubs or gallops  Abdomen is soft and nontender without any rebound or guarding  Extremities appear grossly normal without any significant arthropathy  Patient is awake, alert, and oriented x3  The patient has normal interaction  Motor is 5 out of 5      Assessment and plan: 42-year-old female with uterine fibroids presenting with crampy abdominal pain and heavy vaginal bleeding that is dysfunctional uterine bleeding  Patient will undergo evaluation with CBC for anemia, medication management with NSAIDs, and TSH  Labs Reviewed   CBC AND DIFFERENTIAL - Abnormal        Result Value Ref Range Status    WBC 2 86 (*) 4 31 - 10 16 Thousand/uL Final    RBC 4 42  3 81 - 5 12 Million/uL Final    Hemoglobin 9 5 (*) 11 5 - 15 4 g/dL Final    Hematocrit 31 9 (*) 34 8 - 46 1 % Final    MCV 72 (*) 82 - 98 fL Final    MCH 21 5 (*) 26 8 - 34 3 pg Final    MCHC 29 8 (*) 31 4 - 37 4 g/dL Final    RDW 16 5 (*) 11 6 - 15 1 % Final    MPV 9 7  8 9 - 12 7 fL Final    Platelets 621  677 - 390 Thousands/uL Final    nRBC 0  /100 WBCs Final    Neutrophils Relative 48  43 - 75 % Final    Lymphocytes Relative 44  14 - 44 % Final    Monocytes Relative 6  4 - 12 % Final    Eosinophils Relative 1  0 - 6 % Final    Basophils Relative 1  0 - 1 % Final    Neutrophils Absolute 1 38 (*) 1 85 - 7 62 Thousands/µL Final    Lymphocytes Absolute 1 26  0 60 - 4 47 Thousands/µL Final    Monocytes Absolute 0 16 (*) 0 17 - 1 22 Thousand/µL Final    Eosinophils Absolute 0 04  0 00 - 0 61 Thousand/µL Final    Basophils Absolute 0 02  0 00 - 0 10 Thousands/µL Final   BASIC METABOLIC PANEL - Abnormal     Sodium 141  136 - 145 mmol/L Final    Potassium 3 4 (*) 3 5 - 5 3 mmol/L Final    Chloride 108  100 - 108 mmol/L Final    CO2 27  21 - 32 mmol/L Final    Anion Gap 6  4 - 13 mmol/L Final    BUN 9  5 - 25 mg/dL Final    Creatinine 0 76  0 60 - 1 30 mg/dL Final    Comment: Standardized to IDMS reference method    Glucose 80  65 - 140 mg/dL Final    Comment:   If the patient is fasting, the ADA then defines impaired fasting glucose as > 100 mg/dL and diabetes as > or equal to 123 mg/dL  Specimen collection should occur prior to Sulfasalazine administration due to the potential for falsely depressed results   Specimen collection should occur prior to Sulfapyridine administration due to the potential for falsely elevated results  Calcium 8 4  8 3 - 10 1 mg/dL Final    eGFR 114  ml/min/1 73sq m Final    Narrative:     National Kidney Disease Education Program recommendations are as follows:  GFR calculation is accurate only with a steady state creatinine  Chronic Kidney disease less than 60 ml/min/1 73 sq  meters  Kidney failure less than 15 ml/min/1 73 sq  meters  URINE MICROSCOPIC - Abnormal     RBC, UA Innumerable (*) None Seen, 0-5 /hpf Final    WBC, UA 4-10 (*) None Seen, 0-5, 5-55, 5-65 /hpf Final    Epithelial Cells Moderate (*) None Seen, Occasional /hpf Final    Bacteria, UA None Seen  None Seen, Occasional /hpf Final    Hyaline Casts, UA 10-25 (*) None Seen /lpf Final   ED URINE MACROSCOPIC - Abnormal     Color, UA Bloody   Final    Clarity, UA Cloudy   Final    pH, UA 6 0  4 5 - 8 0 Final    Leukocytes, UA Negative  Negative Final    Nitrite, UA Negative  Negative Final    Protein,  (2+) (*) Negative mg/dl Final    Glucose, UA Negative  Negative mg/dl Final    Ketones, UA Trace (*) Negative mg/dl Final    Urobilinogen, UA 2 0 (*) 0 2, 1 0 E U /dl E U /dl Final    Bilirubin, UA Interference- unable to analyze (*) Negative Final    Comment: The dipstick result may be falsely positive do to interfering substances  We recommend reliance upon serum bilirubin, liver & kidney function tests to guide patient care if clinically indicated  Blood, UA Large (*) Negative Final    Specific Gravity, UA >=1 030  1 003 - 1 030 Final    Narrative:     CLINITEK RESULT   TSH, 3RD GENERATION WITH T4 REFLEX - Normal    TSH 3RD GENERATON 0 453  0 358 - 3 740 uIU/mL Final    Narrative:     Patients undergoing fluorescein dye angiography may retain small amounts of fluorescein in the body for 48-72 hours post procedure  Samples containing fluorescein can produce falsely depressed TSH values   If the patient had this procedure,a specimen should be resubmitted post fluorescein clearance            The recommended reference ranges for TSH during pregnancy are as follows:  First trimester 0 1 to 2 5 uIU/mL  Second trimester  0 2 to 3 0 uIU/mL  Third trimester 0 3 to 3 0 uIU/m     POCT URINALYSIS DIPSTICK - Normal   POCT PREGNANCY, URINE - Normal    EXT PREG TEST UR (Ref: Negative) Negative   Final   CHLAMYDIA /GC AMPLIFIED DNA       No orders to display

## 2018-03-25 NOTE — ED PROVIDER NOTES
History  Chief Complaint   Patient presents with    Vaginal Bleeding     Pt reports getting period twice this month and notes heavier bleeding  Pt states bleeding started yesterday     Patient is a 16-year-old female with a history of left ovarian and left lobe into removal presents for vaginal bleeding and lower abdominal pain  Patient says that yesterday afternoon she developed vaginal bleeding that has been heavier than her normal periods  She says she has had to replace her pad 8 times with the last day which is different from her usual 3  Patient had her menstrual period from 3-5 to 3-8  She says that her periods are usually regular in nature  She says that she is having suprapubic as well as right lower quadrant pain  She says that she normally has this cramping pain with her periods, however, this is more intense than usual   Patient was seen at [facility] ED for similar abdominal pain last month  At that time she had negative labs as well as a negative CT of the abdomen  Patient followed up with her Ob who sent her for an ultrasound of her pelvic organs  That ultrasound found what was likely to be subserosal fibroids  She has not been taking anything for the pain  Patient admits to lightheadedness when she stands up  Patient denies fevers, chills, change in vision, headache, chest pain, shortness of breath, nausea, vomiting, vaginal discharge, diarrhea, melena, or hematochezia  Of note patient admits to 30 lb of weight loss" over the past 3 months  Prior to Admission Medications   Prescriptions Last Dose Informant Patient Reported?  Taking?   naproxen (NAPROSYN) 500 mg tablet   No No   Sig: Take 1 tablet (500 mg total) by mouth 2 (two) times a day with meals      Facility-Administered Medications: None       Past Medical History:   Diagnosis Date    Fetal growth problem suspected but not found     RESOLVED: 3/1/17    First trimester bleeding     RESOLVED:3/1/17    Miscarriage     Missed      RESOLVED:3/1/17    Poor fetal growth affecting management of mother     RESOLVED:3/1/17    Recurrent pregnancy loss, antepartum condition or complication     [id number]:02    Spontaneous      RESOLVED:3/1/17    Supraventricular tachycardia ([address] )     by history       Past Surgical History:   Procedure Laterality Date    DILATION AND CURETTAGE OF UTERUS      ,     DILATION AND CURETTAGE OF UTERUS      FOR SPONTANEOUS ; X5 [id number]    INDUCED   2010    LAPAROSCOPIC TUBAL LIGATION Right 2016    Procedure: LAPAROSCOPIC TUBAL LIGATION ;  Surgeon: Gail Dobson MD;  Location: [facility];  Service:     OR SURG RX MISSED ABORTN,1ST TRI N/A 2016    Procedure: DILATATION AND EVACUATION (D&E) (MISSED AB); Surgeon: Gail Dobson MD;  Location: [facility];  Service: Gynecology    SALPINGOOPHORECTOMY Left     TUBAL LIGATION Right        Family History   Problem Relation Age of Onset    Arthritis Mother     Depression Mother     Hypertension Mother     Cancer Father      I have reviewed and agree with the history as documented  Social History   Substance Use Topics    Smoking status: Never Smoker    Smokeless tobacco: Never Used    Alcohol use No        Review of Systems   Constitutional: Positive for appetite change (Decreased appetite over the last few months)  Negative for chills, diaphoresis and fever  HENT: Negative for congestion, sinus pressure, sore throat and trouble swallowing  Eyes: Negative for pain, discharge and itching  Respiratory: Negative for cough, chest tightness, shortness of breath and wheezing  Cardiovascular: Negative for chest pain, palpitations and leg swelling  Gastrointestinal: Positive for abdominal pain ( suprapubic and right lower quadrant)  Negative for abdominal distention, blood in stool, diarrhea, nausea and vomiting  Endocrine: Negative for polyphagia and polyuria     Genitourinary: Positive for vaginal bleeding  Negative for difficulty urinating, dysuria, flank pain, hematuria, pelvic pain and vaginal discharge  Musculoskeletal: Negative for arthralgias and back pain  Skin: Negative for color change and rash  Neurological: Positive for light-headedness  Negative for dizziness, syncope, weakness and headaches  Physical Exam  ED Triage Vitals [03/25/18 1002]   Temperature Pulse Respirations Blood Pressure SpO2   98 5 °F (36 9 °C) 67 19 134/78 100 %      Temp Source Heart Rate Source Patient Position - Orthostatic VS BP Location FiO2 (%)   Oral Monitor Lying Right arm --      Pain Score       7           Orthostatic Vital Signs  Vitals:    03/25/18 1002 03/25/18 1209   BP: 134/78 143/85   Pulse: 67 81   Patient Position - Orthostatic VS: Lying Lying       Physical Exam   Constitutional: She is oriented to person, place, and time  She appears well-developed and well-nourished  No distress  HENT:   Head: Normocephalic and atraumatic  Eyes: Conjunctivae are normal  Pupils are equal, round, and reactive to light  Right eye exhibits no discharge  Left eye exhibits no discharge  No scleral icterus  Neck: Neck supple  Cardiovascular: Normal rate, regular rhythm and normal heart sounds  Exam reveals no gallop and no friction rub  No murmur heard  Pulmonary/Chest: Effort normal and breath sounds normal  No respiratory distress  She has no wheezes  Abdominal: Soft  She exhibits no distension  There is tenderness (Suprapubic and right lower quadrant)  There is no guarding  Genitourinary: There is no rash, tenderness or lesion on the right labia  There is no rash, tenderness or lesion on the left labia  Uterus is tender  Cervix exhibits motion tenderness  Cervix exhibits no discharge and no friability  Right adnexum displays tenderness  Right adnexum displays no mass and no fullness  Left adnexum displays no mass, no tenderness and no fullness  There is bleeding in the vagina   No erythema in the vagina  No foreign body in the vagina  No signs of injury around the vagina  Musculoskeletal: Normal range of motion  She exhibits no edema, tenderness or deformity  Lymphadenopathy:     She has no cervical adenopathy  Neurological: She is alert and oriented to person, place, and time  No cranial nerve deficit or sensory deficit  Skin: Skin is warm and dry  No rash noted  Psychiatric: She has a normal mood and affect  Nursing note and vitals reviewed        ED Medications  Medications   sodium chloride 0 9 % bolus 1,000 mL (1,000 mL Intravenous New Bag 3/25/18 1050)   ketorolac (TORADOL) injection 15 mg (15 mg Intravenous Given 3/25/18 1047)       Diagnostic Studies  Results Reviewed     Procedure Component Value Units Date/Time    Urine Microscopic [[id number]]  (Abnormal) Collected:  03/25/18 1226    Lab Status:  Final result Specimen:  Urine from Urine, Clean Catch Updated:  03/25/18 1250     RBC, UA Innumerable (A) /hpf      WBC, UA 4-10 (A) /hpf      Epithelial Cells Moderate (A) /hpf      Bacteria, UA None Seen /hpf      Hyaline Casts, UA 10-25 (A) /lpf     POCT pregnancy, urine [[id number]]  (Normal) Resulted:  03/25/18 1227    Lab Status:  Final result Updated:  03/25/18 1227     EXT PREG TEST UR (Ref: Negative) Negative    POCT urinalysis dipstick [[id number]]  (Normal) Resulted:  03/25/18 1227    Lab Status:  Final result Specimen:  Urine Updated:  03/25/18 1227    ED Urine Macroscopic [[id number]]  (Abnormal) Collected:  03/25/18 1226    Lab Status:  Final result Specimen:  Urine Updated:  03/25/18 1225     Color, UA Bloody     Clarity, UA Cloudy     pH, UA 6 0     Leukocytes, UA Negative     Nitrite, UA Negative     Protein,  (2+) (A) mg/dl      Glucose, UA Negative mg/dl      Ketones, UA Trace (A) mg/dl      Urobilinogen, UA 2 0 (A) E U /dl      Bilirubin, UA Interference- unable to analyze (A)     Blood, UA Large (A)     Specific Gravity, UA >=1 030    Narrative:       CLINITEK RESULT    Chlamydia/GC amplified DNA by PCR [[id number]] Collected:  03/25/18 1129    Lab Status: In process Specimen:  Cervix Updated:  03/25/18 6792    Basic metabolic panel [[id number]]  (Abnormal) Collected:  03/25/18 1051    Lab Status:  Final result Specimen:  Blood from Arm, Right Updated:  03/25/18 1130     Sodium 141 mmol/L      Potassium 3 4 (L) mmol/L      Chloride 108 mmol/L      CO2 27 mmol/L      Anion Gap 6 mmol/L      BUN 9 mg/dL      Creatinine 0 76 mg/dL      Glucose 80 mg/dL      Calcium 8 4 mg/dL      eGFR 114 ml/min/1 73sq m     Narrative:         National Kidney Disease Education Program recommendations are as follows:  GFR calculation is accurate only with a steady state creatinine  Chronic Kidney disease less than 60 ml/min/1 73 sq  meters  Kidney failure less than 15 ml/min/1 73 sq  meters  TSH, 3rd generation with T4 reflex [[id number]]  (Normal) Collected:  03/25/18 1051    Lab Status:  Final result Specimen:  Blood from Arm, Right Updated:  03/25/18 1130     TSH 3RD GENERATON 0 453 uIU/mL     Narrative:         Patients undergoing fluorescein dye angiography may retain small amounts of fluorescein in the body for 48-72 hours post procedure  Samples containing fluorescein can produce falsely depressed TSH values  If the patient had this procedure,a specimen should be resubmitted post fluorescein clearance            The recommended reference ranges for TSH during pregnancy are as follows:  First trimester 0 1 to 2 5 uIU/mL  Second trimester  0 2 to 3 0 uIU/mL  Third trimester 0 3 to 3 0 uIU/m      CBC and differential [[id number]]  (Abnormal) Collected:  03/25/18 1051    Lab Status:  Final result Specimen:  Blood from Arm, Right Updated:  03/25/18 1114     WBC 2 86 (L) Thousand/uL      RBC 4 42 Million/uL      Hemoglobin 9 5 (L) g/dL      Hematocrit 31 9 (L) %      MCV 72 (L) fL      MCH 21 5 (L) pg      MCHC 29 8 (L) g/dL      RDW 16 5 (H) %      MPV 9 7 fL      Platelets 173 Thousands/uL nRBC 0 /100 WBCs      Neutrophils Relative 48 %      Lymphocytes Relative 44 %      Monocytes Relative 6 %      Eosinophils Relative 1 %      Basophils Relative 1 %      Neutrophils Absolute 1 38 (L) Thousands/µL      Lymphocytes Absolute 1 26 Thousands/µL      Monocytes Absolute 0 16 (L) Thousand/µL      Eosinophils Absolute 0 04 Thousand/µL      Basophils Absolute 0 02 Thousands/µL                  No orders to display         Procedures  Procedures      Phone Consults  ED Phone Contact    ED Course  ED Course as of Mar 25 1325   Sun Mar 25, 2018   1208 Told patient that we are waiting on a urine for disposition                                 MDM  Number of Diagnoses or Management Options  Abdominal pain:   Vaginal bleeding:   Diagnosis management comments: 50-year-old female presents for vaginal bleeding and suprapubic and right lower quadrant abdominal pain  Patient was recently seen in the ED for similar pain with a negative workup including a CT of the abdomen  Patient's OB sent patient for transvaginal ultrasound which showed subserosal fibroids  Patient denies fevers or chills  She admits to some lightheadedness when she stands up  Plan:  CBC, BMP, UA, urine pregnancy, IV fluids, Toradol, pelvic exam  No mass or lesions noted on pelvic exam   No purulent drainage  CMT and Right adnexal tenderness present  Patient's hemoglobin 9  Five which is up from 8 9 on her previous labs  Patient's TSH within normal limits  Patient to be discharged  Told to follow up with her Ob in regards to the vaginal bleeding and lower abdominal pain   Told to continue to take NSAIDs    CritCare Time    Disposition  Final diagnoses:   Vaginal bleeding   Abdominal pain     Time reflects when diagnosis was documented in both MDM as applicable and the Disposition within this note     Time User Action Codes Description Comment    3/25/2018  1:03 PM Wiley Mark Add [N93 9] Vaginal bleeding     3/25/2018  1:03 PM Koki Galan Ricardo Lisa Add [R10 9] Abdominal pain       ED Disposition     ED Disposition Condition Comment    Discharge  Jamar Nichole discharge to home/self care  Condition at discharge: Stable        Follow-up Information     Follow up With Specialties Details Why Contact Info    Deepthi Hobson PA-C Internal Medicine, Physician Assistant  for follow up  [phone] [address]      Flex Campuzano MD Obstetrics and Gynecology  for follow up  [address]  [phone]          Patient's Medications   Discharge Prescriptions    No medications on file     No discharge procedures on file  ED Provider  Attending physically available and evaluated Jamar Nichole I managed the patient along with the ED Attending      Electronically Signed by         Willy Worthy DO  03/25/18 8437

## 2018-03-25 NOTE — DISCHARGE INSTRUCTIONS
Continue to take advil or motrin for your abdominal pain and vaginal bleeding  A Gonococcal/Chlamydia test was performed  You will be called with the results if it is possible  Follow up with your OBGYN for your vaginal bleeding and abdominal pain  Menorrhagia   WHAT YOU NEED TO KNOW:   Menorrhagia is heavy menstrual bleeding for more than 7 days or severe menstrual bleeding for less than 7 days  Your menstrual bleeding and cramping are so heavy that you have trouble doing your usual daily activities  Your monthly period may also occur more often, and you may bleed between periods  Menorrhagia is common in adolescence and around menopause  DISCHARGE INSTRUCTIONS:   Call 911 for any of the following:   · You have chest pain and shortness of breath  · Your heart is fluttering or beating faster than usual for you  Return to the emergency department if:   · You feel dizzy when you stand  · You feel confused  · You have severe abdominal pain, nausea, and vomiting  · Your skin or the whites of your eyes turn yellow  Contact your healthcare provider if:   · You need to change your pad or tampon more than 1 time per hour, for several hours in a row  · You feel more weak and tired than usual      · You have new coldness in your hands and feet  · You have questions or concerns about your condition or care  Medicines: You may need any of the following:  · Iron supplements  may be given if your blood iron level decreases because of heavy bleeding  · NSAIDs , such as ibuprofen, treat menstrual cramps  This medicine is available with or without a doctor's order  NSAIDs can cause stomach bleeding or kidney problems in certain people  If you take blood thinner medicine, always ask your healthcare provider if NSAIDs are safe for you  Always read the medicine label and follow directions  · Hormones  help slow or stop your bleeding and make your monthly periods more regular   This medicine may be given as birth control pills or an intrauterine device (IUD)  · Take your medicine as directed  Contact your healthcare provider if you think your medicine is not helping or if you have side effects  Tell him of her if you are allergic to any medicine  Keep a list of the medicines, vitamins, and herbs you take  Include the amounts, and when and why you take them  Bring the list or the pill bottles to follow-up visits  Carry your medicine list with you in case of an emergency  Manage your symptoms:   · Keep a supply of pads or tampons with you at all times  If possible, stay close to a bathroom  · Apply heat  on your abdomen for 20 to 30 minutes every 2 hours for as many days as directed  Heat helps decrease pain and cramps  Follow up with your healthcare provider as directed: You may need regular pelvic exams with Pap smears to monitor your condition  Write down your questions so you remember to ask them during your visits  © 2017 [employer] Information is for End User's use only and may not be sold, redistributed or otherwise used for commercial purposes  All illustrations and images included in CareNotes® are the copyrighted property of [employer]  or [employer]  The above information is an  only  It is not intended as medical advice for individual conditions or treatments  Talk to your doctor, nurse or pharmacist before following any medical regimen to see if it is safe and effective for you

## 2018-03-26 LAB
CHLAMYDIA DNA CVX QL NAA+PROBE: NORMAL
N GONORRHOEA DNA GENITAL QL NAA+PROBE: NORMAL

## 2018-07-17 ENCOUNTER — ANNUAL EXAM (OUTPATIENT)
Dept: OBGYN CLINIC | Facility: CLINIC | Age: 38
End: 2018-07-17
Payer: COMMERCIAL

## 2018-07-17 VITALS
WEIGHT: 141 LBS | BODY MASS INDEX: 24.98 KG/M2 | SYSTOLIC BLOOD PRESSURE: 122 MMHG | HEIGHT: 63 IN | DIASTOLIC BLOOD PRESSURE: 76 MMHG

## 2018-07-17 DIAGNOSIS — R53.83 FATIGUE, UNSPECIFIED TYPE: ICD-10-CM

## 2018-07-17 DIAGNOSIS — Z01.419 WELL WOMAN EXAM: Primary | ICD-10-CM

## 2018-07-17 PROCEDURE — 99395 PREV VISIT EST AGE 18-39: CPT | Performed by: PHYSICIAN ASSISTANT

## 2018-07-17 RX ORDER — MULTIVITAMIN
1 TABLET ORAL DAILY
COMMUNITY

## 2018-07-17 NOTE — PATIENT INSTRUCTIONS
Negative UPT in the office today as pt was a few days late for her menses  Will plan BW and if all WNL pt will plan to see GI to evaluate her weight loss  Pt does not have good relationship w her PCP and does not feel like he listens to her  Will try to find a new PCP as well

## 2018-07-17 NOTE — PROGRESS NOTES
Assessment/Plan   Diagnoses and all orders for this visit:    Well woman exam    Fatigue, unspecified type  -     TSH, 3rd generation; Future  -     T4, free; Future  -     CBC and differential; Future  -     Comprehensive metabolic panel; Future    Other orders  -     Multiple Vitamin (MULTIVITAMIN) tablet; Take 1 tablet by mouth daily        Discussion    Encourage safe sexual practices; STD testing - declines  Contraception - TL  The current ASCCP guidelines were reviewed  The low risk patient will receive pap smear screening every 3 years or pap with HPV co-testing every 5 years  High risk patients will be triaged based on previous pap smear results, which have been reviewed; I emphasized the importance of an annual pelvic and breast exam  Patient opts to not have a pap done today  Breast cancer screening is not indicated at this time  All questions have been answered to her satisfaction  RTO for APE or sooner if needed      Subjective     Pt for annual GYN exam  Pt TL last year w RSO  Since surgery she complains of weight loss  Has gone down multiple sizes in the last year  Pt with complaints of wt loss approx 45 lbs  No appetite, increased fatigue, also w increased vomiting in the last week  Does not have signifcant nausea or vomiting regularly, just the lack of desire to eat after a few bites  No changes in medication or lifestyle at all  Does also complain of occasional intermittent pain right LQ, very mild, comes and goes spontaneously  Pt states periods usually pretty normal for her, they have also been heavier since last year, but they are still tolerable  In monogamous relationship x 12 years, declines desire for STD work up  No vaginal d/c or Gyn complaints  Massiel Galindo is a 43 y o  female who presents for annual well woman exam    Menarche - 10; LMP - 6/17/18; Periods are reg q 28 days and last 3-4 days; No excessive bleeding;  No intermenstrual bleeding or spotting; Cramps are tolerable  No vulvar itch/burn; No vaginal itch/burn; No abn discharge or odor; No urinary sx - burning/pain/frequency/hematuria  (+) SBEs - no breast masses, asymmetry, nipple discharge or bleeding, changes in skin of breast, or breast tenderness bilaterally    Pt is sexually active in a mutually monog/ sexual relationship; No issues with intercourse; She declines std/hiv/hep testing; Feels safe at home  Current contraception: TL    (+) PCP for routine Bw/care; Last Pap -  WNL pap and HPV  History of abnormal Pap smear:     Review of Systems   Constitutional: Negative  Respiratory: Negative  Gastrointestinal: Negative  Endocrine: Negative  Genitourinary: Negative          The following portions of the patient's history were reviewed and updated as appropriate: current medications, past family history, past medical history, past social history, past surgical history and problem list     Period Cycle (Days): 30  Period Duration (Days): 3-4  Period Pattern: Regular  Menstrual Flow: Heavy, Moderate    OB History      Para Term  AB Living    13 3 3   9 6    SAB TAB Ectopic Multiple Live Births    9       3          Past Medical History:   Diagnosis Date    Fetal growth problem suspected but not found     RESOLVED: 3/1/17    First trimester bleeding     RESOLVED:3/1/17    Miscarriage     Missed      RESOLVED:3/1/17    Poor fetal growth affecting management of mother     RESOLVED:3/1/17    Recurrent pregnancy loss, antepartum condition or complication     [id number]    Spontaneous      RESOLVED:3/1/17    Supraventricular tachycardia ([address] )     by history       Past Surgical History:   Procedure Laterality Date    DILATION AND CURETTAGE OF UTERUS      ,     DILATION AND CURETTAGE OF UTERUS      FOR SPONTANEOUS ; X5 [id number]    INDUCED       LAPAROSCOPIC TUBAL LIGATION Right 2016    Procedure: LAPAROSCOPIC TUBAL LIGATION ;  Surgeon: Trena Parker MD;  [facility];  Service:     ND SURG RX MISSED ABORTN,1ST TRI N/A 9/12/2016    Procedure: DILATATION AND EVACUATION (D&E) (MISSED AB); Surgeon: Trena Parker MD;  [facility];  Service: Gynecology    SALPINGOOPHORECTOMY Left     TUBAL LIGATION Right        Family History   Problem Relation Age of Onset    Arthritis Mother     Depression Mother     Hypertension Mother     Cancer Father        Social History     Social History    Marital status: /Civil Union     Spouse name: N/A    Number of children: N/A    Years of education: N/A     Occupational History    Not on file  Social History Main Topics    Smoking status: Never Smoker    Smokeless tobacco: Never Used    Alcohol use No    Drug use: No    Sexual activity: Yes     Partners: Male     Other Topics Concern    Not on file     Social History Narrative    FEELS SAFE AT HOME             Current Outpatient Prescriptions:     Multiple Vitamin (MULTIVITAMIN) tablet, Take 1 tablet by mouth daily, Disp: , Rfl:     Allergies   Allergen Reactions    Iodinated Diagnostic Agents Anaphylaxis     Facial swelling and hives       Objective   Vitals:    07/17/18 1126   BP: 122/76   BP Location: Left arm   Patient Position: Sitting   Cuff Size: Adult   Weight: 64 kg (141 lb)   Height: 5' 3" (1 6 m)     Physical Exam   Constitutional: She is oriented to person, place, and time  She appears well-developed and well-nourished  HENT:   Head: Normocephalic and atraumatic  Cardiovascular: Normal rate and regular rhythm  Pulmonary/Chest: Effort normal and breath sounds normal  Right breast exhibits no inverted nipple, no mass, no nipple discharge, no skin change and no tenderness  Left breast exhibits no inverted nipple, no mass, no nipple discharge, no skin change and no tenderness  Breasts are symmetrical    Abdominal: Soft  She exhibits no distension and no mass   There is no rebound and no guarding  Neurological: She is alert and oriented to person, place, and time  Skin: Skin is warm and dry  Psychiatric: She has a normal mood and affect  Patient Instructions   Negative UPT in the office today as pt was a few days late for her menses  Will plan BW and if all WNL pt will plan to see GI to evaluate her weight loss  Pt does not have good relationship w her PCP and does not feel like he listens to her  Will try to find a new PCP as well

## 2018-07-19 ENCOUNTER — APPOINTMENT (OUTPATIENT)
Dept: LAB | Facility: HOSPITAL | Age: 38
End: 2018-07-19
Payer: COMMERCIAL

## 2018-07-19 DIAGNOSIS — R53.83 FATIGUE, UNSPECIFIED TYPE: ICD-10-CM

## 2018-07-19 LAB
ALBUMIN SERPL BCP-MCNC: 3.8 G/DL (ref 3.5–5)
ALP SERPL-CCNC: 49 U/L (ref 46–116)
ALT SERPL W P-5'-P-CCNC: 11 U/L (ref 12–78)
ANION GAP SERPL CALCULATED.3IONS-SCNC: 5 MMOL/L (ref 4–13)
AST SERPL W P-5'-P-CCNC: 8 U/L (ref 5–45)
BASOPHILS # BLD AUTO: 0.03 THOUSANDS/ΜL (ref 0–0.1)
BASOPHILS NFR BLD AUTO: 1 % (ref 0–1)
BILIRUB SERPL-MCNC: 0.45 MG/DL (ref 0.2–1)
BUN SERPL-MCNC: 11 MG/DL (ref 5–25)
CALCIUM SERPL-MCNC: 8.4 MG/DL (ref 8.3–10.1)
CHLORIDE SERPL-SCNC: 108 MMOL/L (ref 100–108)
CO2 SERPL-SCNC: 26 MMOL/L (ref 21–32)
CREAT SERPL-MCNC: 0.81 MG/DL (ref 0.6–1.3)
EOSINOPHIL # BLD AUTO: 0.07 THOUSAND/ΜL (ref 0–0.61)
EOSINOPHIL NFR BLD AUTO: 2 % (ref 0–6)
ERYTHROCYTE [DISTWIDTH] IN BLOOD BY AUTOMATED COUNT: 17.3 % (ref 11.6–15.1)
GFR SERPL CREATININE-BSD FRML MDRD: 106 ML/MIN/1.73SQ M
GLUCOSE SERPL-MCNC: 74 MG/DL (ref 65–140)
HCT VFR BLD AUTO: 32 % (ref 34.8–46.1)
HGB BLD-MCNC: 9 G/DL (ref 11.5–15.4)
IMM GRANULOCYTES # BLD AUTO: 0.01 THOUSAND/UL (ref 0–0.2)
IMM GRANULOCYTES NFR BLD AUTO: 0 % (ref 0–2)
LYMPHOCYTES # BLD AUTO: 1.8 THOUSANDS/ΜL (ref 0.6–4.47)
LYMPHOCYTES NFR BLD AUTO: 41 % (ref 14–44)
MCH RBC QN AUTO: 21 PG (ref 26.8–34.3)
MCHC RBC AUTO-ENTMCNC: 28.1 G/DL (ref 31.4–37.4)
MCV RBC AUTO: 75 FL (ref 82–98)
MONOCYTES # BLD AUTO: 0.31 THOUSAND/ΜL (ref 0.17–1.22)
MONOCYTES NFR BLD AUTO: 7 % (ref 4–12)
NEUTROPHILS # BLD AUTO: 2.18 THOUSANDS/ΜL (ref 1.85–7.62)
NEUTS SEG NFR BLD AUTO: 49 % (ref 43–75)
NRBC BLD AUTO-RTO: 0 /100 WBCS
PLATELET # BLD AUTO: 295 THOUSANDS/UL (ref 149–390)
PMV BLD AUTO: 10.5 FL (ref 8.9–12.7)
POTASSIUM SERPL-SCNC: 3.6 MMOL/L (ref 3.5–5.3)
PROT SERPL-MCNC: 7.5 G/DL (ref 6.4–8.2)
RBC # BLD AUTO: 4.29 MILLION/UL (ref 3.81–5.12)
SODIUM SERPL-SCNC: 139 MMOL/L (ref 136–145)
T4 FREE SERPL-MCNC: 0.92 NG/DL (ref 0.76–1.46)
TSH SERPL DL<=0.05 MIU/L-ACNC: 0.58 UIU/ML (ref 0.36–3.74)
WBC # BLD AUTO: 4.4 THOUSAND/UL (ref 4.31–10.16)

## 2018-07-19 PROCEDURE — 80053 COMPREHEN METABOLIC PANEL: CPT

## 2018-07-19 PROCEDURE — 85025 COMPLETE CBC W/AUTO DIFF WBC: CPT

## 2018-07-19 PROCEDURE — 36415 COLL VENOUS BLD VENIPUNCTURE: CPT

## 2018-07-19 PROCEDURE — 84439 ASSAY OF FREE THYROXINE: CPT

## 2018-07-19 PROCEDURE — 84443 ASSAY THYROID STIM HORMONE: CPT

## 2018-07-30 ENCOUNTER — APPOINTMENT (OUTPATIENT)
Dept: LAB | Facility: HOSPITAL | Age: 38
End: 2018-07-30
Payer: COMMERCIAL

## 2018-07-30 ENCOUNTER — TRANSCRIBE ORDERS (OUTPATIENT)
Dept: LAB | Facility: HOSPITAL | Age: 38
End: 2018-07-30

## 2018-07-30 DIAGNOSIS — N92.0 MENORRHAGIA WITH REGULAR CYCLE: ICD-10-CM

## 2018-07-30 DIAGNOSIS — D64.9 ANEMIA, UNSPECIFIED TYPE: Primary | ICD-10-CM

## 2018-07-30 DIAGNOSIS — D64.9 ANEMIA, UNSPECIFIED TYPE: ICD-10-CM

## 2018-07-30 LAB — HEMOCCULT STL QL IA: NEGATIVE

## 2018-07-30 PROCEDURE — G0328 FECAL BLOOD SCRN IMMUNOASSAY: HCPCS

## 2018-07-31 ENCOUNTER — OFFICE VISIT (OUTPATIENT)
Dept: OBGYN CLINIC | Facility: CLINIC | Age: 38
End: 2018-07-31
Payer: COMMERCIAL

## 2018-07-31 VITALS
BODY MASS INDEX: 24.63 KG/M2 | HEIGHT: 63 IN | DIASTOLIC BLOOD PRESSURE: 78 MMHG | SYSTOLIC BLOOD PRESSURE: 108 MMHG | WEIGHT: 139 LBS

## 2018-07-31 DIAGNOSIS — N92.0 MENORRHAGIA WITH REGULAR CYCLE: Primary | ICD-10-CM

## 2018-07-31 PROCEDURE — 99214 OFFICE O/P EST MOD 30 MIN: CPT | Performed by: PHYSICIAN ASSISTANT

## 2018-07-31 NOTE — PATIENT INSTRUCTIONS
Will plan fe studies and hgb electrophoresis  Will make appt w doc to discuss surgical options for treatment  We will hold off on the pelvic US for now as it was 5 mths ago  Aware doc may want to have it repeated prior to surgery  Aware will also need endo bx done prior to surgery as well and can schedule that w mid level provider  Advised making sure she is taking MVI regularly and will add Fe supplement daily

## 2018-07-31 NOTE — PROGRESS NOTES
Assessment/Plan:    No problem-specific Assessment & Plan notes found for this encounter  Diagnoses and all orders for this visit:    Menorrhagia with regular cycle  -     CBC and differential; Future  -     Iron, TIBC and Ferritin Panel; Future  -     Hemoglobin Electrophoresis; Future          Subjective:      Patient ID: Joann Neff is a 43 y o  female  Pt to discuss anemia and menorhhagia  Periods are heavy since she had her TL, has frequent fatigue  Has gotten to the point where she feels she may pass out multiple times  Does not complain of pain or cramping w cycles  US in Feb did show few fibroids  Planning endoscope and colonoscopy w GI doc tomorrow to eval continued weight loss  I did r/w pt tx options available for menorrhagia  At this point pt declines desire to trial any hormonal options, she has done pills previously and has been horrible at taking them  Pt done childbearing and desires surgical tx at this point  Will make appt w doc to discuss surgical options, I did review ablation vs hysterectomy as possible options  The following portions of the patient's history were reviewed and updated as appropriate: current medications, past family history, past medical history, past social history, past surgical history and problem list     Review of Systems   Constitutional: Positive for fatigue and unexpected weight change (loss)  Negative for fever  Gastrointestinal: Positive for nausea  Genitourinary: Positive for menstrual problem and vaginal bleeding  Psychiatric/Behavioral: Negative  Objective:      /78 (BP Location: Left arm, Patient Position: Sitting, Cuff Size: Standard)   Ht 5' 3" (1 6 m)   Wt 63 kg (139 lb)   BMI 24 62 kg/m²          Physical Exam   Constitutional: She appears well-developed and well-nourished  Skin: Skin is warm and dry  Psychiatric: She has a normal mood and affect   Her behavior is normal  Judgment and thought content normal

## 2018-08-04 ENCOUNTER — APPOINTMENT (OUTPATIENT)
Dept: LAB | Facility: HOSPITAL | Age: 38
End: 2018-08-04
Payer: COMMERCIAL

## 2018-08-04 DIAGNOSIS — N92.0 MENORRHAGIA WITH REGULAR CYCLE: ICD-10-CM

## 2018-08-04 DIAGNOSIS — D64.9 ANEMIA, UNSPECIFIED TYPE: ICD-10-CM

## 2018-08-04 LAB
BASOPHILS # BLD AUTO: 0.02 THOUSANDS/ΜL (ref 0–0.1)
BASOPHILS NFR BLD AUTO: 1 % (ref 0–1)
EOSINOPHIL # BLD AUTO: 0.02 THOUSAND/ΜL (ref 0–0.61)
EOSINOPHIL NFR BLD AUTO: 1 % (ref 0–6)
ERYTHROCYTE [DISTWIDTH] IN BLOOD BY AUTOMATED COUNT: 17.3 % (ref 11.6–15.1)
FERRITIN SERPL-MCNC: 3 NG/ML (ref 8–388)
HCT VFR BLD AUTO: 30.5 % (ref 34.8–46.1)
HGB BLD-MCNC: 8.6 G/DL (ref 11.5–15.4)
IMM GRANULOCYTES # BLD AUTO: 0 THOUSAND/UL (ref 0–0.2)
IMM GRANULOCYTES NFR BLD AUTO: 0 % (ref 0–2)
IRON SERPL-MCNC: 17 UG/DL (ref 50–170)
LYMPHOCYTES # BLD AUTO: 1.51 THOUSANDS/ΜL (ref 0.6–4.47)
LYMPHOCYTES NFR BLD AUTO: 46 % (ref 14–44)
MCH RBC QN AUTO: 20.9 PG (ref 26.8–34.3)
MCHC RBC AUTO-ENTMCNC: 28.2 G/DL (ref 31.4–37.4)
MCV RBC AUTO: 74 FL (ref 82–98)
MONOCYTES # BLD AUTO: 0.21 THOUSAND/ΜL (ref 0.17–1.22)
MONOCYTES NFR BLD AUTO: 6 % (ref 4–12)
NEUTROPHILS # BLD AUTO: 1.53 THOUSANDS/ΜL (ref 1.85–7.62)
NEUTS SEG NFR BLD AUTO: 46 % (ref 43–75)
NRBC BLD AUTO-RTO: 0 /100 WBCS
PLATELET # BLD AUTO: 233 THOUSANDS/UL (ref 149–390)
PMV BLD AUTO: 10.6 FL (ref 8.9–12.7)
RBC # BLD AUTO: 4.11 MILLION/UL (ref 3.81–5.12)
TIBC SERPL-MCNC: 381 UG/DL (ref 250–450)
WBC # BLD AUTO: 3.29 THOUSAND/UL (ref 4.31–10.16)

## 2018-08-04 PROCEDURE — 83020 HEMOGLOBIN ELECTROPHORESIS: CPT

## 2018-08-04 PROCEDURE — 82728 ASSAY OF FERRITIN: CPT

## 2018-08-04 PROCEDURE — 85025 COMPLETE CBC W/AUTO DIFF WBC: CPT

## 2018-08-04 PROCEDURE — 83550 IRON BINDING TEST: CPT

## 2018-08-04 PROCEDURE — 36415 COLL VENOUS BLD VENIPUNCTURE: CPT

## 2018-08-04 PROCEDURE — 83540 ASSAY OF IRON: CPT

## 2018-08-06 ENCOUNTER — TELEPHONE (OUTPATIENT)
Dept: INTERNAL MEDICINE CLINIC | Facility: CLINIC | Age: 38
End: 2018-08-06

## 2018-08-07 DIAGNOSIS — D50.0 IRON DEFICIENCY ANEMIA DUE TO CHRONIC BLOOD LOSS: ICD-10-CM

## 2018-08-07 DIAGNOSIS — N92.0 MENORRHAGIA WITH REGULAR CYCLE: Primary | ICD-10-CM

## 2018-08-07 LAB
DEPRECATED HGB OTHER BLD-IMP: 0 %
HGB A MFR BLD: 1.9 % (ref 1.8–3.2)
HGB A MFR BLD: 98.1 % (ref 96.4–98.8)
HGB C MFR BLD: 0 %
HGB F MFR BLD: 0 % (ref 0–2)
HGB FRACT BLD-IMP: NORMAL
HGB S BLD QL SOLY: NEGATIVE
HGB S MFR BLD: 0 %

## 2018-08-07 RX ORDER — MEDROXYPROGESTERONE ACETATE 150 MG/ML
150 INJECTION, SUSPENSION INTRAMUSCULAR
Qty: 1 ML | Refills: 0 | Status: SHIPPED | OUTPATIENT
Start: 2018-08-07 | End: 2018-08-24 | Stop reason: SDUPTHER

## 2018-08-07 RX ORDER — FACIAL-BODY WIPES
EACH TOPICAL
COMMUNITY
Start: 2018-08-01 | End: 2018-08-26

## 2018-08-07 RX ORDER — METOCLOPRAMIDE 10 MG/1
TABLET ORAL
COMMUNITY
Start: 2018-08-01 | End: 2018-09-13

## 2018-08-07 RX ORDER — OMEPRAZOLE 40 MG/1
CAPSULE, DELAYED RELEASE ORAL
COMMUNITY
Start: 2018-08-01 | End: 2018-09-13

## 2018-08-07 NOTE — TELEPHONE ENCOUNTER
Called [id number] spoke to Caitlin Garsia who stated she will look into it and will take care of it

## 2018-08-13 ENCOUNTER — CLINICAL SUPPORT (OUTPATIENT)
Dept: OBGYN CLINIC | Facility: CLINIC | Age: 38
End: 2018-08-13
Payer: COMMERCIAL

## 2018-08-13 VITALS
SYSTOLIC BLOOD PRESSURE: 122 MMHG | WEIGHT: 141 LBS | BODY MASS INDEX: 24.98 KG/M2 | HEIGHT: 63 IN | DIASTOLIC BLOOD PRESSURE: 80 MMHG

## 2018-08-13 DIAGNOSIS — Z30.013 ENCOUNTER FOR INITIAL PRESCRIPTION OF INJECTABLE CONTRACEPTIVE: Primary | ICD-10-CM

## 2018-08-13 LAB — SL AMB POCT URINE HCG: NEGATIVE

## 2018-08-13 PROCEDURE — 81025 URINE PREGNANCY TEST: CPT

## 2018-08-13 PROCEDURE — 96372 THER/PROPH/DIAG INJ SC/IM: CPT | Performed by: OBSTETRICS & GYNECOLOGY

## 2018-08-13 RX ORDER — MEDROXYPROGESTERONE ACETATE 150 MG/ML
150 INJECTION, SUSPENSION INTRAMUSCULAR ONCE
Status: COMPLETED | OUTPATIENT
Start: 2018-08-13 | End: 2018-08-13

## 2018-08-13 RX ADMIN — MEDROXYPROGESTERONE ACETATE 150 MG: 150 INJECTION, SUSPENSION INTRAMUSCULAR at 16:40

## 2018-08-15 RX ORDER — SODIUM CHLORIDE 9 MG/ML
20 INJECTION, SOLUTION INTRAVENOUS CONTINUOUS
Status: DISCONTINUED | OUTPATIENT
Start: 2018-08-17 | End: 2018-08-20 | Stop reason: HOSPADM

## 2018-08-17 ENCOUNTER — HOSPITAL ENCOUNTER (OUTPATIENT)
Dept: INFUSION CENTER | Facility: CLINIC | Age: 38
Discharge: HOME | End: 2018-08-17
Payer: COMMERCIAL

## 2018-08-17 VITALS
OXYGEN SATURATION: 99 % | DIASTOLIC BLOOD PRESSURE: 74 MMHG | HEART RATE: 86 BPM | SYSTOLIC BLOOD PRESSURE: 138 MMHG | TEMPERATURE: 99.2 F | RESPIRATION RATE: 20 BRPM

## 2018-08-17 PROCEDURE — 96365 THER/PROPH/DIAG IV INF INIT: CPT

## 2018-08-17 RX ADMIN — IRON SUCROSE 200 MG: 20 INJECTION, SOLUTION INTRAVENOUS at 08:47

## 2018-08-17 RX ADMIN — SODIUM CHLORIDE 20 ML/HR: 9 INJECTION, SOLUTION INTRAVENOUS at 08:45

## 2018-08-17 NOTE — PROGRESS NOTES
Pt resting with no complaints, vitals stable, Pt first time in unit  Orientation/Emotional support given and all questions answered, call bell within reach  Will continue to monitor

## 2018-08-22 RX ORDER — SODIUM CHLORIDE 9 MG/ML
20 INJECTION, SOLUTION INTRAVENOUS CONTINUOUS
Status: DISCONTINUED | OUTPATIENT
Start: 2018-08-25 | End: 2018-08-28 | Stop reason: HOSPADM

## 2018-08-23 ENCOUNTER — TELEPHONE (OUTPATIENT)
Dept: OBGYN CLINIC | Facility: CLINIC | Age: 38
End: 2018-08-23

## 2018-08-23 NOTE — TELEPHONE ENCOUNTER
Pt states bleeding heavily, fatigued, weak  Denies dizziness, SOB, tachycardia  Scheduled appt tomorrow to evaluate  Per dr Adal Pagan, pt should have endo biopsy  May offer depo IM prn until bleeding stops  Pt has appt sched w dr Adal Pagan on 8/30 for surgical consult

## 2018-08-24 ENCOUNTER — OFFICE VISIT (OUTPATIENT)
Dept: OBGYN CLINIC | Facility: CLINIC | Age: 38
End: 2018-08-24
Payer: COMMERCIAL

## 2018-08-24 VITALS
HEIGHT: 63 IN | WEIGHT: 140 LBS | BODY MASS INDEX: 24.8 KG/M2 | DIASTOLIC BLOOD PRESSURE: 84 MMHG | SYSTOLIC BLOOD PRESSURE: 138 MMHG

## 2018-08-24 DIAGNOSIS — N92.0 MENORRHAGIA WITH REGULAR CYCLE: Primary | ICD-10-CM

## 2018-08-24 DIAGNOSIS — D50.0 IRON DEFICIENCY ANEMIA DUE TO CHRONIC BLOOD LOSS: ICD-10-CM

## 2018-08-24 PROCEDURE — 99213 OFFICE O/P EST LOW 20 MIN: CPT | Performed by: PHYSICIAN ASSISTANT

## 2018-08-24 RX ORDER — MEDROXYPROGESTERONE ACETATE 150 MG/ML
150 INJECTION, SUSPENSION INTRAMUSCULAR
Qty: 1 ML | Refills: 0 | Status: SHIPPED | OUTPATIENT
Start: 2018-08-24 | End: 2018-08-26

## 2018-08-24 RX ORDER — MEDROXYPROGESTERONE ACETATE 10 MG/1
10 TABLET ORAL DAILY
Qty: 10 TABLET | Refills: 0 | Status: SHIPPED | OUTPATIENT
Start: 2018-08-24 | End: 2018-09-26 | Stop reason: HOSPADM

## 2018-08-24 RX ORDER — MEDROXYPROGESTERONE ACETATE 150 MG/ML
150 INJECTION, SUSPENSION INTRAMUSCULAR
Qty: 1 ML | Refills: 0 | Status: SHIPPED | OUTPATIENT
Start: 2018-08-24 | End: 2018-09-26 | Stop reason: HOSPADM

## 2018-08-24 NOTE — ASSESSMENT & PLAN NOTE
Reviewed options with patient to help stop heavy bleeding  Planned to inject another Depo shot today but patient's insurance will not cover a second shot within 25 days of first shot  Reviewed with patient trial of Provera 10mg x 10 days to help stop the bleeding  Patient agrees  Script sent to pharmacy  Will call office if bleeding is not slowing down

## 2018-08-24 NOTE — PROGRESS NOTES
Assessment/Plan:    Menorrhagia with regular cycle  Reviewed options with patient to help stop heavy bleeding  Planned to inject another Depo shot today but patient's insurance will not cover a second shot within 25 days of first shot  Reviewed with patient trial of Provera 10mg x 10 days to help stop the bleeding  Patient agrees  Script sent to pharmacy  Will call office if bleeding is not slowing down  Problem List Items Addressed This Visit     Menorrhagia with regular cycle - Primary     Reviewed options with patient to help stop heavy bleeding  Planned to inject another Depo shot today but patient's insurance will not cover a second shot within 25 days of first shot  Reviewed with patient trial of Provera 10mg x 10 days to help stop the bleeding  Patient agrees  Script sent to pharmacy  Will call office if bleeding is not slowing down  Relevant Medications    medroxyPROGESTERone (DEPO-PROVERA) 150 mg/mL injection    medroxyPROGESTERone (DEPO-PROVERA) 150 mg/mL injection    medroxyPROGESTERone (PROVERA) 10 mg tablet      Other Visit Diagnoses     Iron deficiency anemia due to chronic blood loss        Relevant Medications    medroxyPROGESTERone (DEPO-PROVERA) 150 mg/mL injection    medroxyPROGESTERone (PROVERA) 10 mg tablet            Subjective:      Patient ID: Carol Ann Mayorga is a 43 y o  female  HPI  42 yo seen for persistent vaginal bleeding  Got Depo shot 8/13/2018 for prolonged bleeding  Reports still bleeding, changing a pad two times in an hour at most but also has times when able to go more hours in between  Has appointment with Dr Ami Nails to further discuss options but would like another method to stop bleeding at this time  Dr Ami Nails recommend patient have second Depo shot to see if helped stop bleeding  Patient seen today to further discuss  Patient is very agitated at visit, is in a wedding at 4pm and does not have much time for visit     The following portions of the patient's history were reviewed and updated as appropriate:   She  has a past medical history of Fetal growth problem suspected but not found; First trimester bleeding; Miscarriage; Missed ; Poor fetal growth affecting management of mother; Recurrent pregnancy loss, antepartum condition or complication; Spontaneous ; and Supraventricular tachycardia ([facility] [address] )  She   Patient Active Problem List    Diagnosis Date Noted    Menorrhagia with regular cycle 2018    Well woman exam 2018    Supraventricular tachycardia ([facility] [address] ) 2013     She  has a past surgical history that includes Dilation and curettage of uterus; LAPAROSCOPIC TUBAL LIGATION (Right, 2016); pr surg rx missed abortn,1st tri (N/A, 2016); Salpingoophorectomy (Left); Dilation and curettage of uterus; Induced  (); and Tubal ligation (Right)  Her family history includes Arthritis in her mother; Cancer in her father; Depression in her mother; Hypertension in her mother  She  reports that she has never smoked  She has never used smokeless tobacco  She reports that she does not drink alcohol or use drugs  Current Outpatient Prescriptions   Medication Sig Dispense Refill    [facility] BISACODYL 5 MG EC tablet       medroxyPROGESTERone (DEPO-PROVERA) 150 mg/mL injection Inject 1 mL (150 mg total) into a muscle every 3 (three) months for 90 days 1 mL 0    medroxyPROGESTERone (DEPO-PROVERA) 150 mg/mL injection Inject 1 mL (150 mg total) into a muscle every 3 (three) months 1 mL 0    medroxyPROGESTERone (PROVERA) 10 mg tablet Take 1 tablet (10 mg total) by mouth daily 10 tablet 0    metoclopramide (REGLAN) 10 mg tablet       Multiple Vitamin (MULTIVITAMIN) tablet Take 1 tablet by mouth daily      omeprazole (PriLOSEC) 40 MG capsule        No current facility-administered medications for this visit        Facility-Administered Medications Ordered in Other Visits   Medication Dose Route Frequency Provider Last Rate Last Dose    [START ON 8/25/2018] iron sucrose (VENOFER) 200 mg in sodium chloride 0 9% 100 ml IVPB 200 mg  200 mg Intravenous Once Juliana Mcarthur MD       Galen Rod ON 8/25/2018] sodium chloride 0 9 % infusion  20 mL/hr Intravenous Continuous Juliana Mcarthur MD         She is allergic to iodinated diagnostic agents       Review of Systems   Constitutional: Negative for fatigue, fever and unexpected weight change  HENT: Negative for dental problem and sinus pressure  Eyes: Negative for visual disturbance  Respiratory: Negative for cough, shortness of breath and wheezing  Cardiovascular: Negative for chest pain  Gastrointestinal: Negative for abdominal pain, blood in stool, constipation, diarrhea, nausea and vomiting  Endocrine: Negative for polydipsia  Genitourinary: Negative for difficulty urinating, dyspareunia, dysuria, frequency, hematuria, pelvic pain and urgency  Musculoskeletal: Negative for arthralgias and back pain  Neurological: Negative for dizziness, seizures, light-headedness and headaches  Psychiatric/Behavioral: Negative for suicidal ideas  The patient is not nervous/anxious  Objective:      /84 (BP Location: Left arm, Patient Position: Sitting, Cuff Size: Adult)   Ht 5' 3" (1 6 m)   Wt 63 5 kg (140 lb)   BMI 24 80 kg/m²          Physical Exam   Constitutional: She appears well-developed and well-nourished  She appears distressed (Patient disgruntled at visit  Is in a wedding at 4pm and strapped for time  )  HENT:   Head: Normocephalic and atraumatic  Skin: No rash noted  No erythema  No pallor  Psychiatric: Thought content normal  Her affect is angry  She is agitated       Patient refuses physical exam

## 2018-08-25 ENCOUNTER — HOSPITAL ENCOUNTER (OUTPATIENT)
Dept: INFUSION CENTER | Facility: CLINIC | Age: 38
Discharge: HOME | End: 2018-08-25
Payer: COMMERCIAL

## 2018-08-25 VITALS
TEMPERATURE: 98.5 F | OXYGEN SATURATION: 95 % | SYSTOLIC BLOOD PRESSURE: 110 MMHG | RESPIRATION RATE: 18 BRPM | HEART RATE: 70 BPM | DIASTOLIC BLOOD PRESSURE: 78 MMHG

## 2018-08-25 PROCEDURE — 96365 THER/PROPH/DIAG IV INF INIT: CPT

## 2018-08-25 RX ADMIN — SODIUM CHLORIDE 20 ML/HR: 0.9 INJECTION, SOLUTION INTRAVENOUS at 08:50

## 2018-08-25 RX ADMIN — IRON SUCROSE 200 MG: 20 INJECTION, SOLUTION INTRAVENOUS at 08:55

## 2018-08-25 NOTE — PROGRESS NOTES
Patient to Arthur for Venofer: Offers no complaints at present time: Lab work ( 08/04/18 ) reviewed: Iron - 17: Left FA PIV initiated without incident

## 2018-08-26 ENCOUNTER — APPOINTMENT (EMERGENCY)
Dept: CT IMAGING | Facility: HOSPITAL | Age: 38
End: 2018-08-26
Payer: COMMERCIAL

## 2018-08-26 ENCOUNTER — HOSPITAL ENCOUNTER (EMERGENCY)
Facility: HOSPITAL | Age: 38
Discharge: HOME | End: 2018-08-26
Attending: EMERGENCY MEDICINE | Admitting: EMERGENCY MEDICINE
Payer: COMMERCIAL

## 2018-08-26 VITALS
RESPIRATION RATE: 18 BRPM | OXYGEN SATURATION: 100 % | DIASTOLIC BLOOD PRESSURE: 79 MMHG | BODY MASS INDEX: 25.62 KG/M2 | WEIGHT: 144.62 LBS | TEMPERATURE: 99 F | HEART RATE: 80 BPM | SYSTOLIC BLOOD PRESSURE: 130 MMHG

## 2018-08-26 DIAGNOSIS — R51.9 HEADACHE: Primary | ICD-10-CM

## 2018-08-26 DIAGNOSIS — R59.0 OCCIPITAL LYMPHADENOPATHY: ICD-10-CM

## 2018-08-26 LAB
ALBUMIN SERPL BCP-MCNC: 3.4 G/DL (ref 3.5–5)
ALP SERPL-CCNC: 56 U/L (ref 46–116)
ALT SERPL W P-5'-P-CCNC: 21 U/L (ref 12–78)
ANION GAP SERPL CALCULATED.3IONS-SCNC: 7 MMOL/L (ref 4–13)
AST SERPL W P-5'-P-CCNC: 31 U/L (ref 5–45)
BASOPHILS # BLD AUTO: 0.01 THOUSANDS/ΜL (ref 0–0.1)
BASOPHILS NFR BLD AUTO: 0 % (ref 0–1)
BILIRUB SERPL-MCNC: 0.4 MG/DL (ref 0.2–1)
BUN SERPL-MCNC: 9 MG/DL (ref 5–25)
CALCIUM SERPL-MCNC: 8.4 MG/DL (ref 8.3–10.1)
CHLORIDE SERPL-SCNC: 107 MMOL/L (ref 100–108)
CO2 SERPL-SCNC: 26 MMOL/L (ref 21–32)
CREAT SERPL-MCNC: 0.77 MG/DL (ref 0.6–1.3)
EOSINOPHIL # BLD AUTO: 0.04 THOUSAND/ΜL (ref 0–0.61)
EOSINOPHIL NFR BLD AUTO: 2 % (ref 0–6)
ERYTHROCYTE [DISTWIDTH] IN BLOOD BY AUTOMATED COUNT: 20.4 % (ref 11.6–15.1)
GFR SERPL CREATININE-BSD FRML MDRD: 112 ML/MIN/1.73SQ M
GLUCOSE SERPL-MCNC: 84 MG/DL (ref 65–140)
HCT VFR BLD AUTO: 33.1 % (ref 34.8–46.1)
HGB BLD-MCNC: 9.9 G/DL (ref 11.5–15.4)
IMM GRANULOCYTES # BLD AUTO: 0 THOUSAND/UL (ref 0–0.2)
IMM GRANULOCYTES NFR BLD AUTO: 0 % (ref 0–2)
LYMPHOCYTES # BLD AUTO: 0.71 THOUSANDS/ΜL (ref 0.6–4.47)
LYMPHOCYTES NFR BLD AUTO: 31 % (ref 14–44)
MCH RBC QN AUTO: 22 PG (ref 26.8–34.3)
MCHC RBC AUTO-ENTMCNC: 29.9 G/DL (ref 31.4–37.4)
MCV RBC AUTO: 74 FL (ref 82–98)
MONOCYTES # BLD AUTO: 0.18 THOUSAND/ΜL (ref 0.17–1.22)
MONOCYTES NFR BLD AUTO: 8 % (ref 4–12)
NEUTROPHILS # BLD AUTO: 1.39 THOUSANDS/ΜL (ref 1.85–7.62)
NEUTS SEG NFR BLD AUTO: 59 % (ref 43–75)
NRBC BLD AUTO-RTO: 0 /100 WBCS
PLATELET # BLD AUTO: 246 THOUSANDS/UL (ref 149–390)
PMV BLD AUTO: 10.4 FL (ref 8.9–12.7)
POTASSIUM SERPL-SCNC: 4.3 MMOL/L (ref 3.5–5.3)
PROT SERPL-MCNC: 7.2 G/DL (ref 6.4–8.2)
RBC # BLD AUTO: 4.5 MILLION/UL (ref 3.81–5.12)
SODIUM SERPL-SCNC: 140 MMOL/L (ref 136–145)
WBC # BLD AUTO: 2.33 THOUSAND/UL (ref 4.31–10.16)

## 2018-08-26 PROCEDURE — 96375 TX/PRO/DX INJ NEW DRUG ADDON: CPT

## 2018-08-26 PROCEDURE — 80053 COMPREHEN METABOLIC PANEL: CPT | Performed by: PHYSICIAN ASSISTANT

## 2018-08-26 PROCEDURE — 36415 COLL VENOUS BLD VENIPUNCTURE: CPT | Performed by: PHYSICIAN ASSISTANT

## 2018-08-26 PROCEDURE — 96374 THER/PROPH/DIAG INJ IV PUSH: CPT

## 2018-08-26 PROCEDURE — 70450 CT HEAD/BRAIN W/O DYE: CPT

## 2018-08-26 PROCEDURE — 96361 HYDRATE IV INFUSION ADD-ON: CPT

## 2018-08-26 PROCEDURE — 85025 COMPLETE CBC W/AUTO DIFF WBC: CPT | Performed by: PHYSICIAN ASSISTANT

## 2018-08-26 PROCEDURE — 99284 EMERGENCY DEPT VISIT MOD MDM: CPT

## 2018-08-26 RX ORDER — DIPHENHYDRAMINE HYDROCHLORIDE 50 MG/ML
25 INJECTION INTRAMUSCULAR; INTRAVENOUS ONCE
Status: COMPLETED | OUTPATIENT
Start: 2018-08-26 | End: 2018-08-26

## 2018-08-26 RX ORDER — KETOROLAC TROMETHAMINE 30 MG/ML
15 INJECTION, SOLUTION INTRAMUSCULAR; INTRAVENOUS ONCE
Status: COMPLETED | OUTPATIENT
Start: 2018-08-26 | End: 2018-08-26

## 2018-08-26 RX ORDER — METOCLOPRAMIDE HYDROCHLORIDE 5 MG/ML
10 INJECTION INTRAMUSCULAR; INTRAVENOUS ONCE
Status: COMPLETED | OUTPATIENT
Start: 2018-08-26 | End: 2018-08-26

## 2018-08-26 RX ORDER — CEPHALEXIN 500 MG/1
500 CAPSULE ORAL EVERY 8 HOURS SCHEDULED
Qty: 30 CAPSULE | Refills: 0 | Status: SHIPPED | OUTPATIENT
Start: 2018-08-26 | End: 2018-09-05

## 2018-08-26 RX ADMIN — METOCLOPRAMIDE 10 MG: 5 INJECTION, SOLUTION INTRAMUSCULAR; INTRAVENOUS at 09:41

## 2018-08-26 RX ADMIN — DIPHENHYDRAMINE HYDROCHLORIDE 25 MG: 50 INJECTION, SOLUTION INTRAMUSCULAR; INTRAVENOUS at 09:43

## 2018-08-26 RX ADMIN — SODIUM CHLORIDE 1000 ML: 0.9 INJECTION, SOLUTION INTRAVENOUS at 09:37

## 2018-08-26 RX ADMIN — KETOROLAC TROMETHAMINE 15 MG: 30 INJECTION, SOLUTION INTRAMUSCULAR at 09:40

## 2018-08-26 NOTE — ED PROVIDER NOTES
History  Chief Complaint   Patient presents with    Headache - New Onset or New Symptoms     Pt presents to the ED with c/o of headache onset last night, pt also reports noticing tender lump over the base of her neck  Reports pain as severe, denies any weakness or changes in vision  Patient is a 42 y/o female with a PMH of anemia from menorrhagia who presents for evaluation of headache and lump to the back of her head  She states that she woke up this morning around 2am with headache to the left frontal region  She states that the headache is a throbbing pain that is constant in nature  She states that she felt the back of her head/neck this morning and she felt a small lump there  She states this area was tender to palpation  She does not recall feeling this before  She has not taken any medications for her symptoms today  She denies head injury or LOC  She denies taking blood thinners  She does received iron infusions for her anemia (most recently yesterday)  She states she is on the depo shot and provera for her chronic vaginal bleeding  She has an appointment with GYN this week to discuss a hysterectomy  She denies fever, chills, nausea, vomiting, diarrhea, chest pain, shortness of breath, abdominal pain, soret throat, nasal congestion, ear pain, cough, dysuria, hematuria, rash, vision changes, facial droop, speech change, numbness, weakness, bladder or bowel dysfunction  No recent illnesses  Prior to Admission Medications   Prescriptions Last Dose Informant Patient Reported? Taking?    Multiple Vitamin (MULTIVITAMIN) tablet   Yes Yes   Sig: Take 1 tablet by mouth daily   medroxyPROGESTERone (DEPO-PROVERA) 150 mg/mL injection   No Yes   Sig: Inject 1 mL (150 mg total) into a muscle every 3 (three) months   medroxyPROGESTERone (PROVERA) 10 mg tablet   No Yes   Sig: Take 1 tablet (10 mg total) by mouth daily   metoclopramide (REGLAN) 10 mg tablet   Yes Yes   omeprazole (PriLOSEC) 40 MG capsule Yes Yes      Facility-Administered Medications: None       Past Medical History:   Diagnosis Date    Fetal growth problem suspected but not found     RESOLVED: 3/1/17    First trimester bleeding     RESOLVED:3/1/17    Miscarriage     Missed      RESOLVED:3/1/17    Poor fetal growth affecting management of mother     RESOLVED:3/1/17    Recurrent pregnancy loss, antepartum condition or complication     PRNUBHZT:29    Spontaneous      RESOLVED:3/1/17    Supraventricular tachycardia ([address] )     by history       Past Surgical History:   Procedure Laterality Date    DILATION AND CURETTAGE OF UTERUS      ,     DILATION AND CURETTAGE OF UTERUS      FOR SPONTANEOUS ; X5 [id number]    INDUCED   2010    LAPAROSCOPIC TUBAL LIGATION Right 2016    Procedure: LAPAROSCOPIC TUBAL LIGATION ;  Surgeon: Claude Demarco MD;  Location: [facility];  Service:     AL SURG RX MISSED ABORTN,1ST TRI N/A 2016    Procedure: DILATATION AND EVACUATION (D&E) (MISSED AB); Surgeon: Claude Demarco MD;  Location[facility];  Service: Gynecology    SALPINGOOPHORECTOMY Left     TUBAL LIGATION Right        Family History   Problem Relation Age of Onset    Arthritis Mother     Depression Mother     Hypertension Mother     Cancer Father      I have reviewed and agree with the history as documented  Social History   Substance Use Topics    Smoking status: Never Smoker    Smokeless tobacco: Never Used    Alcohol use No        Review of Systems   Constitutional: Negative for chills and fever  HENT: Negative for congestion, ear pain and sore throat  Eyes: Negative for visual disturbance  Respiratory: Negative for cough and shortness of breath  Cardiovascular: Negative for chest pain  Gastrointestinal: Negative for abdominal pain, diarrhea and vomiting  Genitourinary: Positive for vaginal bleeding (chronic vaginal bleeding, seeing GYN)   Negative for dysuria, flank pain and hematuria  Musculoskeletal: Positive for neck pain  Negative for neck stiffness  Skin: Negative for rash  Neurological: Positive for headaches  Negative for dizziness, syncope, weakness and numbness  All other systems reviewed and are negative  Physical Exam  Physical Exam   Constitutional: She is oriented to person, place, and time  Vital signs are normal  She appears well-developed and well-nourished  She is active  Non-toxic appearance  No distress  HENT:   Head: Normocephalic and atraumatic  Right Ear: Hearing, tympanic membrane, external ear and ear canal normal    Left Ear: Hearing, tympanic membrane, external ear and ear canal normal    Nose: Nose normal    Mouth/Throat: Uvula is midline and oropharynx is clear and moist  No oropharyngeal exudate  Eyes: Conjunctivae and EOM are normal  Pupils are equal, round, and reactive to light  Neck: Normal range of motion  Neck supple  No spinous process tenderness and no muscular tenderness present  No neck rigidity  No edema and no erythema present  Left occipital scalp with a small tender lymph node palpated ~1 5cm  Mobile, soft  No erythema or warmth  No fluctuance  No surrounding induration  No drainage or bleeding  FROM of neck with mild discomfort  Cardiovascular: Normal rate, regular rhythm and normal heart sounds  Exam reveals no gallop and no friction rub  No murmur heard  Pulmonary/Chest: Effort normal and breath sounds normal  No respiratory distress  She has no wheezes  She has no rales  Abdominal: Soft  Bowel sounds are normal  She exhibits no distension  There is no tenderness  There is no guarding  Musculoskeletal: Normal range of motion  Lymphadenopathy:        Head (left side): Occipital adenopathy present  She has no cervical adenopathy  No other abnormal, tender LAD palpated to cervical, auricular, inguinal region  Neurological: She is alert and oriented to person, place, and time   She has normal strength  She is not disoriented  No sensory deficit  GCS eye subscore is 4  GCS verbal subscore is 5  GCS motor subscore is 6  CN II-XII grossly intact  Skin: Skin is warm and dry  She is not diaphoretic  Psychiatric: She has a normal mood and affect  Her behavior is normal    Nursing note and vitals reviewed        Vital Signs  ED Triage Vitals [08/26/18 0857]   Temperature Pulse Respirations Blood Pressure SpO2   99 °F (37 2 °C) 85 18 142/96 99 %      Temp Source Heart Rate Source Patient Position - Orthostatic VS BP Location FiO2 (%)   Oral Monitor Sitting Left arm --      Pain Score       Worst Possible Pain           Vitals:    08/26/18 0857 08/26/18 1000 08/26/18 1130 08/26/18 1334   BP: 142/96 138/80 126/71 130/79   Pulse: 85 62 74 80   Patient Position - Orthostatic VS: Sitting Lying Lying Sitting       Visual Acuity      ED Medications  Medications   sodium chloride 0 9 % bolus 1,000 mL (0 mL Intravenous Stopped 8/26/18 1153)   diphenhydrAMINE (BENADRYL) injection 25 mg (25 mg Intravenous Given 8/26/18 0943)   ketorolac (TORADOL) injection 15 mg (15 mg Intravenous Given 8/26/18 0940)   metoclopramide (REGLAN) injection 10 mg (10 mg Intravenous Given 8/26/18 0941)       Diagnostic Studies  Results Reviewed     Procedure Component Value Units Date/Time    Comprehensive metabolic panel [[id number]]  (Abnormal) Collected:  08/26/18 0936    Lab Status:  Final result Specimen:  Blood from Arm, Right Updated:  08/26/18 1042     Sodium 140 mmol/L      Potassium 4 3 mmol/L      Chloride 107 mmol/L      CO2 26 mmol/L      Anion Gap 7 mmol/L      BUN 9 mg/dL      Creatinine 0 77 mg/dL      Glucose 84 mg/dL      Calcium 8 4 mg/dL      AST 31 U/L      ALT 21 U/L      Alkaline Phosphatase 56 U/L      Total Protein 7 2 g/dL      Albumin 3 4 (L) g/dL      Total Bilirubin 0 40 mg/dL      eGFR 112 ml/min/1 73sq m     Narrative:         National Kidney Disease Education Program recommendations are as follows:  GFR calculation is accurate only with a steady state creatinine  Chronic Kidney disease less than 60 ml/min/1 73 sq  meters  Kidney failure less than 15 ml/min/1 73 sq  meters  CBC and differential [[id number]]  (Abnormal) Collected:  08/26/18 0936    Lab Status:  Final result Specimen:  Blood from Arm, Right Updated:  08/26/18 0959     WBC 2 33 (L) Thousand/uL      RBC 4 50 Million/uL      Hemoglobin 9 9 (L) g/dL      Hematocrit 33 1 (L) %      MCV 74 (L) fL      MCH 22 0 (L) pg      MCHC 29 9 (L) g/dL      RDW 20 4 (H) %      MPV 10 4 fL      Platelets 999 Thousands/uL      nRBC 0 /100 WBCs      Neutrophils Relative 59 %      Immat GRANS % 0 %      Lymphocytes Relative 31 %      Monocytes Relative 8 %      Eosinophils Relative 2 %      Basophils Relative 0 %      Neutrophils Absolute 1 39 (L) Thousands/µL      Immature Grans Absolute 0 00 Thousand/uL      Lymphocytes Absolute 0 71 Thousands/µL      Monocytes Absolute 0 18 Thousand/µL      Eosinophils Absolute 0 04 Thousand/µL      Basophils Absolute 0 01 Thousands/µL                  CT head without contrast   Final Result by Clau Collins MD (08/26 1042)      No acute intracranial abnormality  Workstation performed: [id number]                    Procedures  Procedures       Phone Contacts  ED Phone Contact    ED Course                               MDM  Number of Diagnoses or Management Options  Headache:   Occipital lymphadenopathy:   Diagnosis management comments: Plan will order labs, CT head and give IVF, benadryl, reglan, and toradol  Patient states she can get a ride home  Lab evaluation shows chronic anemia (hgb 9 9) and leukopenia (WBC 2 33)  Review of prior labs shows this has been chronic since at least Feb/March 2018  She is being seen and treated  She received an iron transfusion yesterday  CT head without acute intracranial abnormality     Pt reassessed at 10:35a- states she no longer has a headache but still has discomfort over the lump to the occipital region with palpation and movement  Discussed lymphadenopathy with the patient  Will start on keflex prophylactically  Discussed warm compresses and tylenol or motrin for pain  Instructed to follow up with her PCP in 1 day  Explained strict return precautions if symptoms worsen or new symptoms arise  Patient states understanding and agrees with plan  Amount and/or Complexity of Data Reviewed  Clinical lab tests: ordered and reviewed  Tests in the radiology section of CPT®: ordered and reviewed    Patient Progress  Patient progress: stable    CritCare Time    Disposition  Final diagnoses:   Headache   Occipital lymphadenopathy     Time reflects when diagnosis was documented in both MDM as applicable and the Disposition within this note     Time User Action Codes Description Comment    8/26/2018  1:19 PM Berdine Bitter Add [R51] Headache     8/26/2018  1:24 PM Berdine Bitter Add [R59 0] Occipital lymphadenopathy       ED Disposition     ED Disposition Condition Comment    Discharge  Ximena Jain discharge to home/self care  Condition at discharge: Good        Follow-up Information     Follow up With Specialties Details Why Contact Info Additional Information    With your primary care doctor in 1 day, call tomorrow to schedule a follow up appointment   Inform the office you were seen in the ED   Schedule an appointment as soon as possible for a visit       [facility] Emergency [facility]  If symptoms worsen or new symptoms arise as discussed  [address] [facility]  AN ED, [address], [place], [place], [address]          Discharge Medication List as of 8/26/2018  1:25 PM      START taking these medications    Details   cephalexin (KEFLEX) 500 mg capsule Take 1 capsule (500 mg total) by mouth every 8 (eight) hours for 10 days, Starting Sun 8/26/2018, Until Wed 9/5/2018, Print CONTINUE these medications which have NOT CHANGED    Details   medroxyPROGESTERone (DEPO-PROVERA) 150 mg/mL injection Inject 1 mL (150 mg total) into a muscle every 3 (three) months, Starting Fri 8/24/2018, Normal      medroxyPROGESTERone (PROVERA) 10 mg tablet Take 1 tablet (10 mg total) by mouth daily, Starting Fri 8/24/2018, Normal      metoclopramide (REGLAN) 10 mg tablet Starting Wed 8/1/2018, Historical Med      Multiple Vitamin (MULTIVITAMIN) tablet Take 1 tablet by mouth daily, Historical Med      omeprazole (PriLOSEC) 40 MG capsule Starting Wed 8/1/2018, Historical Med           No discharge procedures on file      ED Provider  Electronically Signed by           Kieran Gonzalez PA-C  08/26/18 6312

## 2018-08-26 NOTE — ED NOTES
Patient provided with pillow, lights turned off in room for comfort     Gallito Dates  08/26/18 2374

## 2018-08-26 NOTE — DISCHARGE INSTRUCTIONS
Acute Headache   WHAT YOU NEED TO KNOW:   An acute headache is pain or discomfort that starts suddenly and gets worse quickly  You may have an acute headache only when you feel stress or eat certain foods  Other acute headache pain can happen every day, and sometimes several times a day  DISCHARGE INSTRUCTIONS:   Return to the emergency department if:   · You have severe pain  · You have numbness or weakness on one side of your face or body  · You have a headache that occurs after a blow to the head, a fall, or other trauma  · You have a headache, are forgetful or confused, or have trouble speaking  · You have a headache, stiff neck, and a fever  Contact your healthcare provider if:   · You have a constant headache and are vomiting  · You have a headache each day that does not get better, even after treatment  · You have changes in your headaches, or new symptoms that occur when you have a headache  · You have questions or concerns about your condition or care  Medicines: You may need any of the following:  · Prescription pain medicine  may be given  The medicine your healthcare provider recommends will depend on the kind of headaches you have  You will need to take prescription headache medicines as directed to prevent a problem called rebound headache  These headaches happen with regular use of pain relievers for headache disorders  · NSAIDs , such as ibuprofen, help decrease swelling, pain, and fever  This medicine is available with or without a doctor's order  NSAIDs can cause stomach bleeding or kidney problems in certain people  If you take blood thinner medicine, always ask your healthcare provider if NSAIDs are safe for you  Always read the medicine label and follow directions  · Acetaminophen  decreases pain and fever  It is available without a doctor's order  Ask how much to take and how often to take it  Follow directions   Read the labels of all other medicines you are using to see if they also contain acetaminophen, or ask your doctor or pharmacist  Acetaminophen can cause liver damage if not taken correctly  Do not use more than 3 grams (3,000 milligrams) total of acetaminophen in one day  · Antidepressants  may be given for some kinds of headaches  · Take your medicine as directed  Contact your healthcare provider if you think your medicine is not helping or if you have side effects  Tell him or her if you are allergic to any medicine  Keep a list of the medicines, vitamins, and herbs you take  Include the amounts, and when and why you take them  Bring the list or the pill bottles to follow-up visits  Carry your medicine list with you in case of an emergency  Manage your symptoms:   · Apply heat or ice  on the headache area  Use a heat or ice pack  For an ice pack, you can also put crushed ice in a plastic bag  Cover the pack or bag with a towel before you apply it to your skin  Ice and heat both help decrease pain, and heat also helps decrease muscle spasms  Apply heat for 20 to 30 minutes every 2 hours  Apply ice for 15 to 20 minutes every hour  Apply heat or ice for as long and for as many days as directed  You may alternate heat and ice  · Relax your muscles  Lie down in a comfortable position and close your eyes  Relax your muscles slowly  Start at your toes and work your way up your body  · Keep a record of your headaches  Write down when your headaches start and stop  Include your symptoms and what you were doing when the headache began  Record what you ate or drank for 24 hours before the headache started  Describe the pain and where it hurts  Keep track of what you did to treat your headache and if it worked  Prevent an acute headache:   · Avoid anything that triggers an acute headache  Examples include exposure to chemicals, going to high altitude, or not getting enough sleep  Create a regular sleep routine   Go to sleep at the same time and wake up at the same time each day  Do not use electronic devices before bedtime  These may trigger a headache or prevent you from sleeping well  · Do not smoke  Nicotine and other chemicals in cigarettes and cigars can trigger an acute headache or make it worse  Ask your healthcare provider for information if you currently smoke and need help to quit  E-cigarettes or smokeless tobacco still contain nicotine  Talk to your healthcare provider before you use these products  · Limit alcohol as directed  Alcohol can trigger an acute headache or make it worse  If you have cluster headaches, do not drink alcohol during an episode  For other types of headaches, ask your healthcare provider if it is safe for you to drink alcohol  Ask how much is safe for you to drink, and how often  · Exercise as directed  Exercise can reduce tension and help with headache pain  Aim for 30 minutes of physical activity on most days of the week  Your healthcare provider can help you create an exercise plan  · Eat a variety of healthy foods  Healthy foods include fruits, vegetables, low-fat dairy products, lean meats, fish, whole grains, and cooked beans  Your healthcare provider or dietitian can help you create meals plans if you need to avoid foods that trigger headaches  Follow up with your healthcare provider as directed:  Bring your headache record with you when you see your healthcare provider  Write down your questions so you remember to ask them during your visits  © 2017 [address] [facility] Information is for End User's use only and may not be sold, redistributed or otherwise used for commercial purposes  All illustrations and images included in CareNotes® are the copyrighted property of A D [employer] M , [employer]  or [employer]  The above information is an  only  It is not intended as medical advice for individual conditions or treatments   Talk to your doctor, nurse or pharmacist before following any medical regimen to see if it is safe and effective for you  Lymphadenopathy   WHAT YOU NEED TO KNOW:   Lymphadenopathy is swelling of your lymph nodes  Lymph nodes are small organs that are part of your immune system  The lymph nodes are found throughout your body  They are most easily felt in your neck, under your arms, and near your groin  Lymphadenopathy can occur in one or more areas of your body  It is usually caused by an infection  DISCHARGE INSTRUCTIONS:   Return to the emergency department if:   · The swollen lymph nodes bleed  · You have swollen lymph nodes in your neck that affect your breathing or swallowing  Contact your healthcare provider if:   · You have a fever  · You have a new swollen and painful lymph node  · You have a skin rash  · Your lymph node remains swollen or painful, or it gets bigger  · Your lymph node has red streaks around it, or the skin around the lymph node is red  · You have questions or concerns about your condition or care  Follow up with your healthcare provider as directed:  Write down your questions so you remember to ask them during your visits  Self-care:   · Do not poke or squeeze  the swollen lymph nodes  · Apply heat to the swollen glands  You may use warm compresses, or an electric heating pad set on low  · Rest as needed  If you have a fever, rest until your temperature returns to normal  Return to your normal daily activities slowly after your fever is gone  © 2017 [address] Information is for End User's use only and may not be sold, redistributed or otherwise used for commercial purposes  All illustrations and images included in CareNotes® are the copyrighted property of A D [employer] M [employer]  or [employer]  The above information is an  only  It is not intended as medical advice for individual conditions or treatments   Talk to your doctor, nurse or pharmacist before following any medical regimen to see if it is safe and effective for you

## 2018-08-26 NOTE — ED NOTES
Pt asked for urine sample unable to provide  Pt aware cannot get CT scan until verified negative pregnancy  Pt states she has had a left oophorectomy and right tubal ligation  PAC ok with no verified negative preg, CT aware and will come get pt for head CT       Jose Morales RN  08/26/18 9248

## 2018-08-26 NOTE — ED NOTES
Patient aware that urine specimen is needed, specimen container provided to patient     Daneyashira Mona  08/26/18 0913

## 2018-08-30 ENCOUNTER — OFFICE VISIT (OUTPATIENT)
Dept: OBGYN CLINIC | Facility: CLINIC | Age: 38
End: 2018-08-30
Payer: COMMERCIAL

## 2018-08-30 VITALS
BODY MASS INDEX: 25.52 KG/M2 | WEIGHT: 144 LBS | DIASTOLIC BLOOD PRESSURE: 90 MMHG | HEIGHT: 63 IN | SYSTOLIC BLOOD PRESSURE: 120 MMHG

## 2018-08-30 DIAGNOSIS — D25.9 UTERINE LEIOMYOMA, UNSPECIFIED LOCATION: ICD-10-CM

## 2018-08-30 DIAGNOSIS — D50.0 IRON DEFICIENCY ANEMIA DUE TO CHRONIC BLOOD LOSS: ICD-10-CM

## 2018-08-30 DIAGNOSIS — N92.0 MENORRHAGIA WITH REGULAR CYCLE: Primary | ICD-10-CM

## 2018-08-30 PROBLEM — D21.9 FIBROID: Status: ACTIVE | Noted: 2018-08-30

## 2018-08-30 PROBLEM — Z01.419 WELL WOMAN EXAM: Status: RESOLVED | Noted: 2018-07-17 | Resolved: 2018-08-30

## 2018-08-30 PROCEDURE — 99213 OFFICE O/P EST LOW 20 MIN: CPT | Performed by: OBSTETRICS & GYNECOLOGY

## 2018-08-30 RX ORDER — SODIUM CHLORIDE 9 MG/ML
20 INJECTION, SOLUTION INTRAVENOUS CONTINUOUS
Status: DISCONTINUED | OUTPATIENT
Start: 2018-09-01 | End: 2018-09-04 | Stop reason: HOSPADM

## 2018-08-30 NOTE — PROGRESS NOTES
Assessment/Plan:    Menorrhagia with regular and irregular cycle  Fibroid uterus  Anemia  Requesting hysterectomy, will proceed with laparoscopic-assisted vaginal hysterectomy and removal of the remaining right fallopian tube with conservation of the right ovary       Problem List Items Addressed This Visit     Menorrhagia with regular cycle - Primary    Fibroid    Iron deficiency anemia due to chronic blood loss            Subjective:      Patient ID: Luanne Aquino is a 43 y o  female  Armando Laird is here today to see her the end the  She regular cycles with some and currently is suffering bleeding without her regular cycle  She was given Depo-Provera without much relief and started on oral Provera to try help discontinue some of bleeding  She has had a long history of anemia  She is done with childbearing having had a left ovary and tube removed previously and had the right tube sterilized  She is interested in proceeding with hysterectomy to stop the bleeding  We reviewed all the risks and benefits associated with such as well as the alternatives  We reviewed the anticipated postoperative course and she is aware that there could be complications  We reviewed her anemia as well as the pelvic ultrasound  And we reviewed the consent which she signed        The following portions of the patient's history were reviewed and updated as appropriate:   She  has a past medical history of Fetal growth problem suspected but not found; First trimester bleeding; Iron deficiency anemia due to chronic blood loss (2018); Miscarriage; Missed ; Poor fetal growth affecting management of mother; Recurrent pregnancy loss, antepartum condition or complication; Spontaneous ; and Supraventricular tachycardia ([facility] [address] )    She   Patient Active Problem List    Diagnosis Date Noted    Fibroid 2018    Iron deficiency anemia due to chronic blood loss 2018    Menorrhagia with regular cycle 2018    Supraventricular tachycardia ([facility] [address] ) 2013     She  has a past surgical history that includes Dilation and curettage of uterus; LAPAROSCOPIC TUBAL LIGATION (Right, 2016); pr surg rx missed abortn,1st tri (N/A, 2016); Salpingoophorectomy (Left); Dilation and curettage of uterus; Induced  (2010); and Tubal ligation (Right)  Her family history includes Arthritis in her mother; Cancer in her father; Depression in her mother; Hypertension in her mother  She  reports that she has never smoked  She has never used smokeless tobacco  She reports that she does not drink alcohol or use drugs  Current Outpatient Prescriptions   Medication Sig Dispense Refill    cephalexin (KEFLEX) 500 mg capsule Take 1 capsule (500 mg total) by mouth every 8 (eight) hours for 10 days 30 capsule 0    medroxyPROGESTERone (DEPO-PROVERA) 150 mg/mL injection Inject 1 mL (150 mg total) into a muscle every 3 (three) months 1 mL 0    medroxyPROGESTERone (PROVERA) 10 mg tablet Take 1 tablet (10 mg total) by mouth daily 10 tablet 0    metoclopramide (REGLAN) 10 mg tablet       Multiple Vitamin (MULTIVITAMIN) tablet Take 1 tablet by mouth daily      omeprazole (PriLOSEC) 40 MG capsule        No current facility-administered medications for this visit  Current Outpatient Prescriptions on File Prior to Visit   Medication Sig    cephalexin (KEFLEX) 500 mg capsule Take 1 capsule (500 mg total) by mouth every 8 (eight) hours for 10 days    medroxyPROGESTERone (DEPO-PROVERA) 150 mg/mL injection Inject 1 mL (150 mg total) into a muscle every 3 (three) months    medroxyPROGESTERone (PROVERA) 10 mg tablet Take 1 tablet (10 mg total) by mouth daily    metoclopramide (REGLAN) 10 mg tablet     Multiple Vitamin (MULTIVITAMIN) tablet Take 1 tablet by mouth daily    omeprazole (PriLOSEC) 40 MG capsule      No current facility-administered medications on file prior to visit        She is allergic to iodinated diagnostic agents       Review of Systems   Constitutional: Positive for fatigue  Negative for fever and unexpected weight change  HENT: Negative for dental problem, mouth sores, nosebleeds, rhinorrhea, sinus pain, sinus pressure and sore throat  Eyes: Negative for pain, discharge and visual disturbance  Respiratory: Negative for cough, chest tightness, shortness of breath and wheezing  Cardiovascular: Negative for chest pain, palpitations and leg swelling  Gastrointestinal: Negative for blood in stool, constipation, diarrhea, nausea and vomiting  Endocrine: Negative for polydipsia  Genitourinary: Negative for difficulty urinating, dyspareunia, dysuria, menstrual problem, pelvic pain, urgency, vaginal discharge and vaginal pain  Musculoskeletal: Negative for arthralgias, back pain and joint swelling  Allergic/Immunologic: Negative for environmental allergies  Neurological: Negative for seizures, light-headedness and headaches  Hematological: Does not bruise/bleed easily  Psychiatric/Behavioral: Negative for sleep disturbance  The patient is not nervous/anxious  Objective:      LMP  (LMP Unknown)          Physical Exam   Constitutional: She is oriented to person, place, and time  She appears well-developed and well-nourished  No distress  [de-identified] black female    HENT:   Head: Normocephalic and atraumatic  Neck: No thyromegaly present  Cardiovascular: Normal rate, regular rhythm and normal heart sounds  No murmur heard  Pulmonary/Chest: Effort normal and breath sounds normal  No respiratory distress  She has no wheezes  Neurological: She is alert and oriented to person, place, and time  Psychiatric: She has a normal mood and affect  Vitals reviewed

## 2018-08-31 DIAGNOSIS — N92.0 MENORRHAGIA WITH REGULAR CYCLE: Primary | ICD-10-CM

## 2018-08-31 PROBLEM — D25.9 UTERINE LEIOMYOMA: Status: ACTIVE | Noted: 2018-08-31

## 2018-08-31 PROBLEM — D50.9 IRON DEFICIENCY ANEMIA: Status: ACTIVE | Noted: 2018-08-31

## 2018-08-31 PROBLEM — N92.1 MENORRHAGIA WITH IRREGULAR CYCLE: Status: ACTIVE | Noted: 2018-08-31

## 2018-09-01 ENCOUNTER — HOSPITAL ENCOUNTER (OUTPATIENT)
Dept: INFUSION CENTER | Facility: CLINIC | Age: 38
Discharge: HOME | End: 2018-09-01
Payer: COMMERCIAL

## 2018-09-01 VITALS
TEMPERATURE: 98.4 F | DIASTOLIC BLOOD PRESSURE: 64 MMHG | SYSTOLIC BLOOD PRESSURE: 118 MMHG | RESPIRATION RATE: 20 BRPM | HEART RATE: 70 BPM

## 2018-09-01 PROCEDURE — 96365 THER/PROPH/DIAG IV INF INIT: CPT

## 2018-09-01 RX ADMIN — IRON SUCROSE 200 MG: 20 INJECTION, SOLUTION INTRAVENOUS at 08:37

## 2018-09-01 RX ADMIN — SODIUM CHLORIDE 20 ML/HR: 0.9 INJECTION, SOLUTION INTRAVENOUS at 08:37

## 2018-09-01 NOTE — PROGRESS NOTES
Pt to clinic for venofer infusion, pt offers no complaints at this time, will continue to monitor, pt aware of next appointment, declines avs

## 2018-09-06 RX ORDER — SODIUM CHLORIDE 9 MG/ML
20 INJECTION, SOLUTION INTRAVENOUS CONTINUOUS
Status: DISCONTINUED | OUTPATIENT
Start: 2018-09-07 | End: 2018-09-06

## 2018-09-06 RX ORDER — SODIUM CHLORIDE 9 MG/ML
20 INJECTION, SOLUTION INTRAVENOUS CONTINUOUS
Status: DISCONTINUED | OUTPATIENT
Start: 2018-09-07 | End: 2018-09-10 | Stop reason: HOSPADM

## 2018-09-07 ENCOUNTER — HOSPITAL ENCOUNTER (OUTPATIENT)
Dept: INFUSION CENTER | Facility: CLINIC | Age: 38
Discharge: HOME | End: 2018-09-07
Payer: COMMERCIAL

## 2018-09-07 VITALS
SYSTOLIC BLOOD PRESSURE: 128 MMHG | OXYGEN SATURATION: 98 % | TEMPERATURE: 98.2 F | RESPIRATION RATE: 20 BRPM | DIASTOLIC BLOOD PRESSURE: 76 MMHG | HEART RATE: 74 BPM

## 2018-09-07 PROCEDURE — 96365 THER/PROPH/DIAG IV INF INIT: CPT

## 2018-09-07 RX ADMIN — IRON SUCROSE 200 MG: 20 INJECTION, SOLUTION INTRAVENOUS at 08:38

## 2018-09-07 RX ADMIN — SODIUM CHLORIDE 20 ML/HR: 0.9 INJECTION, SOLUTION INTRAVENOUS at 08:39

## 2018-09-07 NOTE — PROGRESS NOTES
Patient here for Venofer infusion  Patient reports mild fatigue  VSS  Peripheral IV inserted without incident

## 2018-09-07 NOTE — PROGRESS NOTES
Patient tolerated venofer infusion without incident  Peripheral IV removed  Patient aware of next appointment  AVS printed and given to patient

## 2018-09-12 ENCOUNTER — ANESTHESIA EVENT (OUTPATIENT)
Dept: PERIOP | Facility: HOSPITAL | Age: 38
End: 2018-09-12
Payer: COMMERCIAL

## 2018-09-12 RX ORDER — SODIUM CHLORIDE 9 MG/ML
20 INJECTION, SOLUTION INTRAVENOUS CONTINUOUS
Status: DISCONTINUED | OUTPATIENT
Start: 2018-09-13 | End: 2018-09-16 | Stop reason: HOSPADM

## 2018-09-13 ENCOUNTER — APPOINTMENT (OUTPATIENT)
Dept: LAB | Facility: HOSPITAL | Age: 38
End: 2018-09-13
Attending: OBSTETRICS & GYNECOLOGY
Payer: COMMERCIAL

## 2018-09-13 ENCOUNTER — HOSPITAL ENCOUNTER (OUTPATIENT)
Dept: INFUSION CENTER | Facility: CLINIC | Age: 38
Discharge: HOME | End: 2018-09-13
Payer: COMMERCIAL

## 2018-09-13 ENCOUNTER — LAB REQUISITION (OUTPATIENT)
Dept: LAB | Facility: HOSPITAL | Age: 38
End: 2018-09-13
Payer: COMMERCIAL

## 2018-09-13 VITALS
SYSTOLIC BLOOD PRESSURE: 143 MMHG | TEMPERATURE: 98.5 F | DIASTOLIC BLOOD PRESSURE: 83 MMHG | OXYGEN SATURATION: 97 % | HEART RATE: 70 BPM | RESPIRATION RATE: 16 BRPM

## 2018-09-13 DIAGNOSIS — D25.9 LEIOMYOMA OF UTERUS: ICD-10-CM

## 2018-09-13 DIAGNOSIS — D50.9 IRON DEFICIENCY ANEMIA, UNSPECIFIED IRON DEFICIENCY ANEMIA TYPE: ICD-10-CM

## 2018-09-13 DIAGNOSIS — D25.9 UTERINE LEIOMYOMA, UNSPECIFIED LOCATION: ICD-10-CM

## 2018-09-13 DIAGNOSIS — N92.0 MENORRHAGIA WITH REGULAR CYCLE: ICD-10-CM

## 2018-09-13 DIAGNOSIS — N92.1 MENORRHAGIA WITH IRREGULAR CYCLE: ICD-10-CM

## 2018-09-13 LAB
ALBUMIN SERPL BCP-MCNC: 3.6 G/DL (ref 3.5–5)
ALP SERPL-CCNC: 49 U/L (ref 46–116)
ALT SERPL W P-5'-P-CCNC: 11 U/L (ref 12–78)
ANION GAP SERPL CALCULATED.3IONS-SCNC: 8 MMOL/L (ref 4–13)
AST SERPL W P-5'-P-CCNC: 8 U/L (ref 5–45)
BASOPHILS # BLD AUTO: 0.02 THOUSANDS/ΜL (ref 0–0.1)
BASOPHILS NFR BLD AUTO: 1 % (ref 0–1)
BILIRUB SERPL-MCNC: 0.52 MG/DL (ref 0.2–1)
BUN SERPL-MCNC: 5 MG/DL (ref 5–25)
CALCIUM SERPL-MCNC: 8.7 MG/DL (ref 8.3–10.1)
CHLORIDE SERPL-SCNC: 108 MMOL/L (ref 100–108)
CO2 SERPL-SCNC: 25 MMOL/L (ref 21–32)
CREAT SERPL-MCNC: 0.88 MG/DL (ref 0.6–1.3)
EOSINOPHIL # BLD AUTO: 0.04 THOUSAND/ΜL (ref 0–0.61)
EOSINOPHIL NFR BLD AUTO: 1 % (ref 0–6)
ERYTHROCYTE [DISTWIDTH] IN BLOOD BY AUTOMATED COUNT: 22.8 % (ref 11.6–15.1)
EST. AVERAGE GLUCOSE BLD GHB EST-MCNC: 91 MG/DL
GFR SERPL CREATININE-BSD FRML MDRD: 96 ML/MIN/1.73SQ M
GLUCOSE P FAST SERPL-MCNC: 72 MG/DL (ref 65–99)
HBA1C MFR BLD: 4.8 % (ref 4.2–6.3)
HCT VFR BLD AUTO: 38.3 % (ref 34.8–46.1)
HGB BLD-MCNC: 11.5 G/DL (ref 11.5–15.4)
IMM GRANULOCYTES # BLD AUTO: 0.01 THOUSAND/UL (ref 0–0.2)
IMM GRANULOCYTES NFR BLD AUTO: 0 % (ref 0–2)
LYMPHOCYTES # BLD AUTO: 1.45 THOUSANDS/ΜL (ref 0.6–4.47)
LYMPHOCYTES NFR BLD AUTO: 42 % (ref 14–44)
MCH RBC QN AUTO: 23.9 PG (ref 26.8–34.3)
MCHC RBC AUTO-ENTMCNC: 30 G/DL (ref 31.4–37.4)
MCV RBC AUTO: 80 FL (ref 82–98)
MONOCYTES # BLD AUTO: 0.17 THOUSAND/ΜL (ref 0.17–1.22)
MONOCYTES NFR BLD AUTO: 5 % (ref 4–12)
NEUTROPHILS # BLD AUTO: 1.73 THOUSANDS/ΜL (ref 1.85–7.62)
NEUTS SEG NFR BLD AUTO: 51 % (ref 43–75)
NRBC BLD AUTO-RTO: 0 /100 WBCS
PLATELET # BLD AUTO: 216 THOUSANDS/UL (ref 149–390)
PMV BLD AUTO: 11 FL (ref 8.9–12.7)
POTASSIUM SERPL-SCNC: 3.4 MMOL/L (ref 3.5–5.3)
PROT SERPL-MCNC: 7.2 G/DL (ref 6.4–8.2)
RBC # BLD AUTO: 4.81 MILLION/UL (ref 3.81–5.12)
SODIUM SERPL-SCNC: 141 MMOL/L (ref 136–145)
T4 FREE SERPL-MCNC: 1.01 NG/DL (ref 0.76–1.46)
TSH SERPL DL<=0.05 MIU/L-ACNC: 0.25 UIU/ML (ref 0.36–3.74)
WBC # BLD AUTO: 3.42 THOUSAND/UL (ref 4.31–10.16)

## 2018-09-13 PROCEDURE — 84439 ASSAY OF FREE THYROXINE: CPT

## 2018-09-13 PROCEDURE — 85025 COMPLETE CBC W/AUTO DIFF WBC: CPT

## 2018-09-13 PROCEDURE — 84443 ASSAY THYROID STIM HORMONE: CPT

## 2018-09-13 PROCEDURE — 96365 THER/PROPH/DIAG IV INF INIT: CPT

## 2018-09-13 PROCEDURE — 86850 RBC ANTIBODY SCREEN: CPT | Performed by: OBSTETRICS & GYNECOLOGY

## 2018-09-13 PROCEDURE — 86901 BLOOD TYPING SEROLOGIC RH(D): CPT | Performed by: OBSTETRICS & GYNECOLOGY

## 2018-09-13 PROCEDURE — 86900 BLOOD TYPING SEROLOGIC ABO: CPT | Performed by: OBSTETRICS & GYNECOLOGY

## 2018-09-13 PROCEDURE — 83036 HEMOGLOBIN GLYCOSYLATED A1C: CPT

## 2018-09-13 PROCEDURE — 80053 COMPREHEN METABOLIC PANEL: CPT

## 2018-09-13 PROCEDURE — 36415 COLL VENOUS BLD VENIPUNCTURE: CPT

## 2018-09-13 RX ORDER — METRONIDAZOLE 500 MG/1
TABLET ORAL
COMMUNITY
Start: 2018-08-09 | End: 2018-09-13

## 2018-09-13 RX ORDER — TETRACYCLINE HYDROCHLORIDE 500 MG/1
CAPSULE ORAL
COMMUNITY
Start: 2018-08-09 | End: 2018-09-26 | Stop reason: HOSPADM

## 2018-09-13 RX ADMIN — SODIUM CHLORIDE 20 ML/HR: 0.9 INJECTION, SOLUTION INTRAVENOUS at 08:05

## 2018-09-13 RX ADMIN — IRON SUCROSE 200 MG: 20 INJECTION, SOLUTION INTRAVENOUS at 08:20

## 2018-09-13 NOTE — PROGRESS NOTES
Patient to [facility] for Venofer: Offers no complaints at present time: Lab work ( 08/26/18 ) reviewed: Right FA PIV initiated without incident

## 2018-09-13 NOTE — PRE-PROCEDURE INSTRUCTIONS
Pre-Surgery Instructions:   Medication Instructions    medroxyPROGESTERone (DEPO-PROVERA) 150 mg/mL injection Instructed patient per Anesthesia Guidelines   medroxyPROGESTERone (PROVERA) 10 mg tablet Instructed patient per Anesthesia Guidelines   Multiple Vitamin (MULTIVITAMIN) tablet Instructed patient per Anesthesia Guidelines   tetracycline (ACHROMYCIN,SUMYCIN) 500 MG capsule Instructed patient per Anesthesia Guidelines  REVIEWED  PRINTED SURGICAL INSTRUCTIONS WITH PATIENT , PATIENT VERBALIZED UNDERSTANDING   MEDICATIONS REVIEWED

## 2018-09-14 LAB
ABO GROUP BLD: NORMAL
BLD GP AB SCN SERPL QL: NEGATIVE
RH BLD: POSITIVE
SPECIMEN EXPIRATION DATE: NORMAL

## 2018-09-25 ENCOUNTER — HOSPITAL ENCOUNTER (OUTPATIENT)
Facility: HOSPITAL | Age: 38
Setting detail: OUTPATIENT SURGERY
Discharge: HOME | End: 2018-09-26
Attending: OBSTETRICS & GYNECOLOGY | Admitting: OBSTETRICS & GYNECOLOGY
Payer: COMMERCIAL

## 2018-09-25 ENCOUNTER — ANESTHESIA (OUTPATIENT)
Dept: PERIOP | Facility: HOSPITAL | Age: 38
End: 2018-09-25
Payer: COMMERCIAL

## 2018-09-25 DIAGNOSIS — D50.9 IRON DEFICIENCY ANEMIA, UNSPECIFIED IRON DEFICIENCY ANEMIA TYPE: ICD-10-CM

## 2018-09-25 DIAGNOSIS — Z90.710 S/P LAPAROSCOPIC ASSISTED VAGINAL HYSTERECTOMY (LAVH): Primary | ICD-10-CM

## 2018-09-25 DIAGNOSIS — N92.1 MENORRHAGIA WITH IRREGULAR CYCLE: ICD-10-CM

## 2018-09-25 DIAGNOSIS — D25.9 UTERINE LEIOMYOMA, UNSPECIFIED LOCATION: ICD-10-CM

## 2018-09-25 LAB
EXT PREGNANCY TEST URINE: NEGATIVE
GLUCOSE SERPL-MCNC: 95 MG/DL (ref 65–140)

## 2018-09-25 PROCEDURE — 58552 LAPARO-VAG HYST INCL T/O: CPT | Performed by: OBSTETRICS & GYNECOLOGY

## 2018-09-25 PROCEDURE — 88307 TISSUE EXAM BY PATHOLOGIST: CPT | Performed by: PATHOLOGY

## 2018-09-25 PROCEDURE — 82948 REAGENT STRIP/BLOOD GLUCOSE: CPT

## 2018-09-25 PROCEDURE — 81025 URINE PREGNANCY TEST: CPT | Performed by: OBSTETRICS & GYNECOLOGY

## 2018-09-25 RX ORDER — ACETAMINOPHEN 325 MG/1
975 TABLET ORAL ONCE
Status: COMPLETED | OUTPATIENT
Start: 2018-09-25 | End: 2018-09-25

## 2018-09-25 RX ORDER — ACETAMINOPHEN 325 MG/1
650 TABLET ORAL EVERY 4 HOURS PRN
Status: DISCONTINUED | OUTPATIENT
Start: 2018-09-25 | End: 2018-09-26

## 2018-09-25 RX ORDER — MIDAZOLAM HYDROCHLORIDE 1 MG/ML
INJECTION INTRAMUSCULAR; INTRAVENOUS AS NEEDED
Status: DISCONTINUED | OUTPATIENT
Start: 2018-09-25 | End: 2018-09-25 | Stop reason: SURG

## 2018-09-25 RX ORDER — ONDANSETRON 2 MG/ML
4 INJECTION INTRAMUSCULAR; INTRAVENOUS ONCE AS NEEDED
Status: DISCONTINUED | OUTPATIENT
Start: 2018-09-25 | End: 2018-09-25 | Stop reason: HOSPADM

## 2018-09-25 RX ORDER — ROCURONIUM BROMIDE 10 MG/ML
INJECTION, SOLUTION INTRAVENOUS AS NEEDED
Status: DISCONTINUED | OUTPATIENT
Start: 2018-09-25 | End: 2018-09-25 | Stop reason: SURG

## 2018-09-25 RX ORDER — KETOROLAC TROMETHAMINE 30 MG/ML
30 INJECTION, SOLUTION INTRAMUSCULAR; INTRAVENOUS EVERY 6 HOURS SCHEDULED
Status: DISCONTINUED | OUTPATIENT
Start: 2018-09-25 | End: 2018-09-26 | Stop reason: HOSPADM

## 2018-09-25 RX ORDER — KETOROLAC TROMETHAMINE 30 MG/ML
INJECTION, SOLUTION INTRAMUSCULAR; INTRAVENOUS AS NEEDED
Status: DISCONTINUED | OUTPATIENT
Start: 2018-09-25 | End: 2018-09-25 | Stop reason: SURG

## 2018-09-25 RX ORDER — SODIUM CHLORIDE, SODIUM LACTATE, POTASSIUM CHLORIDE, CALCIUM CHLORIDE 600; 310; 30; 20 MG/100ML; MG/100ML; MG/100ML; MG/100ML
125 INJECTION, SOLUTION INTRAVENOUS CONTINUOUS
Status: DISCONTINUED | OUTPATIENT
Start: 2018-09-25 | End: 2018-09-25

## 2018-09-25 RX ORDER — BUPIVACAINE HYDROCHLORIDE AND EPINEPHRINE 2.5; 5 MG/ML; UG/ML
INJECTION, SOLUTION EPIDURAL; INFILTRATION; INTRACAUDAL; PERINEURAL AS NEEDED
Status: DISCONTINUED | OUTPATIENT
Start: 2018-09-25 | End: 2018-09-25 | Stop reason: HOSPADM

## 2018-09-25 RX ORDER — OXYCODONE HYDROCHLORIDE 10 MG/1
10 TABLET ORAL EVERY 4 HOURS PRN
Status: DISCONTINUED | OUTPATIENT
Start: 2018-09-25 | End: 2018-09-26 | Stop reason: HOSPADM

## 2018-09-25 RX ORDER — PANTOPRAZOLE SODIUM 20 MG/1
20 TABLET, DELAYED RELEASE ORAL
Status: DISCONTINUED | OUTPATIENT
Start: 2018-09-26 | End: 2018-09-26 | Stop reason: HOSPADM

## 2018-09-25 RX ORDER — SODIUM CHLORIDE 9 MG/ML
INJECTION, SOLUTION INTRAVENOUS CONTINUOUS PRN
Status: DISCONTINUED | OUTPATIENT
Start: 2018-09-25 | End: 2018-09-25 | Stop reason: SURG

## 2018-09-25 RX ORDER — LIDOCAINE HYDROCHLORIDE 10 MG/ML
INJECTION, SOLUTION INFILTRATION; PERINEURAL AS NEEDED
Status: DISCONTINUED | OUTPATIENT
Start: 2018-09-25 | End: 2018-09-25 | Stop reason: SURG

## 2018-09-25 RX ORDER — ONDANSETRON 2 MG/ML
INJECTION INTRAMUSCULAR; INTRAVENOUS AS NEEDED
Status: DISCONTINUED | OUTPATIENT
Start: 2018-09-25 | End: 2018-09-25 | Stop reason: SURG

## 2018-09-25 RX ORDER — SODIUM CHLORIDE 9 MG/ML
INJECTION, SOLUTION INTRAVENOUS AS NEEDED
Status: DISCONTINUED | OUTPATIENT
Start: 2018-09-25 | End: 2018-09-25 | Stop reason: HOSPADM

## 2018-09-25 RX ORDER — PROPOFOL 10 MG/ML
INJECTION, EMULSION INTRAVENOUS AS NEEDED
Status: DISCONTINUED | OUTPATIENT
Start: 2018-09-25 | End: 2018-09-25 | Stop reason: SURG

## 2018-09-25 RX ORDER — FENTANYL CITRATE/PF 50 MCG/ML
50 SYRINGE (ML) INJECTION
Status: COMPLETED | OUTPATIENT
Start: 2018-09-25 | End: 2018-09-25

## 2018-09-25 RX ORDER — METOCLOPRAMIDE HYDROCHLORIDE 5 MG/ML
10 INJECTION INTRAMUSCULAR; INTRAVENOUS ONCE AS NEEDED
Status: DISCONTINUED | OUTPATIENT
Start: 2018-09-25 | End: 2018-09-25 | Stop reason: HOSPADM

## 2018-09-25 RX ORDER — OXYCODONE HYDROCHLORIDE AND ACETAMINOPHEN 5; 325 MG/1; MG/1
1 TABLET ORAL EVERY 4 HOURS PRN
Status: DISCONTINUED | OUTPATIENT
Start: 2018-09-25 | End: 2018-09-26

## 2018-09-25 RX ORDER — FENTANYL CITRATE 50 UG/ML
INJECTION, SOLUTION INTRAMUSCULAR; INTRAVENOUS AS NEEDED
Status: DISCONTINUED | OUTPATIENT
Start: 2018-09-25 | End: 2018-09-25 | Stop reason: SURG

## 2018-09-25 RX ORDER — DOCUSATE SODIUM 100 MG/1
100 CAPSULE, LIQUID FILLED ORAL 2 TIMES DAILY
Status: DISCONTINUED | OUTPATIENT
Start: 2018-09-25 | End: 2018-09-26 | Stop reason: HOSPADM

## 2018-09-25 RX ORDER — DEXTROSE, SODIUM CHLORIDE, AND POTASSIUM CHLORIDE 5; .45; .15 G/100ML; G/100ML; G/100ML
125 INJECTION INTRAVENOUS CONTINUOUS
Status: DISCONTINUED | OUTPATIENT
Start: 2018-09-25 | End: 2018-09-26

## 2018-09-25 RX ORDER — ONDANSETRON 2 MG/ML
4 INJECTION INTRAMUSCULAR; INTRAVENOUS EVERY 6 HOURS PRN
Status: DISCONTINUED | OUTPATIENT
Start: 2018-09-25 | End: 2018-09-26 | Stop reason: HOSPADM

## 2018-09-25 RX ADMIN — OXYCODONE HYDROCHLORIDE 10 MG: 10 TABLET ORAL at 17:58

## 2018-09-25 RX ADMIN — HYDROMORPHONE HYDROCHLORIDE 0.5 MG: 1 INJECTION, SOLUTION INTRAMUSCULAR; INTRAVENOUS; SUBCUTANEOUS at 14:27

## 2018-09-25 RX ADMIN — MIDAZOLAM 2 MG: 1 INJECTION INTRAMUSCULAR; INTRAVENOUS at 12:54

## 2018-09-25 RX ADMIN — FENTANYL CITRATE 50 MCG: 50 INJECTION INTRAMUSCULAR; INTRAVENOUS at 16:39

## 2018-09-25 RX ADMIN — FENTANYL CITRATE 50 MCG: 50 INJECTION INTRAMUSCULAR; INTRAVENOUS at 16:49

## 2018-09-25 RX ADMIN — DEXTROSE, SODIUM CHLORIDE, AND POTASSIUM CHLORIDE 125 ML/HR: 5; .45; .15 INJECTION INTRAVENOUS at 17:29

## 2018-09-25 RX ADMIN — CEFAZOLIN SODIUM 2000 MG: 2 SOLUTION INTRAVENOUS at 13:11

## 2018-09-25 RX ADMIN — FENTANYL CITRATE 50 MCG: 50 INJECTION, SOLUTION INTRAMUSCULAR; INTRAVENOUS at 13:35

## 2018-09-25 RX ADMIN — ROCURONIUM BROMIDE 10 MG: 10 INJECTION INTRAVENOUS at 14:37

## 2018-09-25 RX ADMIN — KETOROLAC TROMETHAMINE 30 MG: 30 INJECTION, SOLUTION INTRAMUSCULAR at 22:07

## 2018-09-25 RX ADMIN — HYDROMORPHONE HYDROCHLORIDE 0.5 MG: 1 INJECTION, SOLUTION INTRAMUSCULAR; INTRAVENOUS; SUBCUTANEOUS at 20:32

## 2018-09-25 RX ADMIN — PROPOFOL 200 MG: 10 INJECTION, EMULSION INTRAVENOUS at 12:58

## 2018-09-25 RX ADMIN — ONDANSETRON 4 MG: 2 INJECTION INTRAMUSCULAR; INTRAVENOUS at 15:47

## 2018-09-25 RX ADMIN — ROCURONIUM BROMIDE 40 MG: 10 INJECTION INTRAVENOUS at 12:58

## 2018-09-25 RX ADMIN — DEXAMETHASONE SODIUM PHOSPHATE 5 MG: 10 INJECTION INTRAMUSCULAR; INTRAVENOUS at 13:07

## 2018-09-25 RX ADMIN — SODIUM CHLORIDE, SODIUM LACTATE, POTASSIUM CHLORIDE, AND CALCIUM CHLORIDE: .6; .31; .03; .02 INJECTION, SOLUTION INTRAVENOUS at 12:32

## 2018-09-25 RX ADMIN — SODIUM CHLORIDE, SODIUM LACTATE, POTASSIUM CHLORIDE, AND CALCIUM CHLORIDE 125 ML/HR: .6; .31; .03; .02 INJECTION, SOLUTION INTRAVENOUS at 11:55

## 2018-09-25 RX ADMIN — PROPOFOL 50 MG: 10 INJECTION, EMULSION INTRAVENOUS at 14:16

## 2018-09-25 RX ADMIN — HYDROMORPHONE HYDROCHLORIDE 0.5 MG: 1 INJECTION, SOLUTION INTRAMUSCULAR; INTRAVENOUS; SUBCUTANEOUS at 15:47

## 2018-09-25 RX ADMIN — ACETAMINOPHEN 975 MG: 325 TABLET, FILM COATED ORAL at 11:36

## 2018-09-25 RX ADMIN — FENTANYL CITRATE 100 MCG: 50 INJECTION, SOLUTION INTRAMUSCULAR; INTRAVENOUS at 12:58

## 2018-09-25 RX ADMIN — FENTANYL CITRATE 50 MCG: 50 INJECTION, SOLUTION INTRAMUSCULAR; INTRAVENOUS at 13:47

## 2018-09-25 RX ADMIN — SODIUM CHLORIDE: 0.9 INJECTION, SOLUTION INTRAVENOUS at 13:01

## 2018-09-25 RX ADMIN — ONDANSETRON 4 MG: 2 INJECTION INTRAMUSCULAR; INTRAVENOUS at 13:07

## 2018-09-25 RX ADMIN — KETOROLAC TROMETHAMINE 30 MG: 30 INJECTION, SOLUTION INTRAMUSCULAR at 15:47

## 2018-09-25 RX ADMIN — FENTANYL CITRATE 50 MCG: 50 INJECTION INTRAMUSCULAR; INTRAVENOUS at 16:25

## 2018-09-25 RX ADMIN — FENTANYL CITRATE 50 MCG: 50 INJECTION INTRAMUSCULAR; INTRAVENOUS at 17:00

## 2018-09-25 RX ADMIN — METHYLENE BLUE 10 ML: 5 INJECTION INTRAVENOUS at 13:51

## 2018-09-25 RX ADMIN — LIDOCAINE HYDROCHLORIDE 50 MG: 10 INJECTION, SOLUTION INFILTRATION; PERINEURAL at 12:58

## 2018-09-25 RX ADMIN — DOCUSATE SODIUM 100 MG: 100 CAPSULE, LIQUID FILLED ORAL at 17:58

## 2018-09-25 RX ADMIN — HYDROMORPHONE HYDROCHLORIDE 0.5 MG: 1 INJECTION, SOLUTION INTRAMUSCULAR; INTRAVENOUS; SUBCUTANEOUS at 17:20

## 2018-09-25 NOTE — H&P (VIEW-ONLY)
Assessment/Plan:    Menorrhagia with regular and irregular cycle  Fibroid uterus  Anemia  Requesting hysterectomy, will proceed with laparoscopic-assisted vaginal hysterectomy and removal of the remaining right fallopian tube with conservation of the right ovary       Problem List Items Addressed This Visit     Menorrhagia with regular cycle - Primary    Fibroid    Iron deficiency anemia due to chronic blood loss            Subjective:      Patient ID: Vahid Rosado is a 43 y o  female  Marene [place] is here today to see her the end the  She regular cycles with some and currently is suffering bleeding without her regular cycle  She was given Depo-Provera without much relief and started on oral Provera to try help discontinue some of bleeding  She has had a long history of anemia  She is done with childbearing having had a left ovary and tube removed previously and had the right tube sterilized  She is interested in proceeding with hysterectomy to stop the bleeding  We reviewed all the risks and benefits associated with such as well as the alternatives  We reviewed the anticipated postoperative course and she is aware that there could be complications  We reviewed her anemia as well as the pelvic ultrasound  And we reviewed the consent which she signed        The following portions of the patient's history were reviewed and updated as appropriate:   She  has a past medical history of Fetal growth problem suspected but not found; First trimester bleeding; Iron deficiency anemia due to chronic blood loss (2018); Miscarriage; Missed ; Poor fetal growth affecting management of mother; Recurrent pregnancy loss, antepartum condition or complication; Spontaneous ; and Supraventricular tachycardia ([facility] [address] )    She   Patient Active Problem List    Diagnosis Date Noted    Fibroid 2018    Iron deficiency anemia due to chronic blood loss 2018    Menorrhagia with regular cycle 2018    Supraventricular tachycardia ([facility] [address] ) 2013     She  has a past surgical history that includes Dilation and curettage of uterus; LAPAROSCOPIC TUBAL LIGATION (Right, 2016); pr surg rx missed abortn,1st tri (N/A, 2016); Salpingoophorectomy (Left); Dilation and curettage of uterus; Induced  (2010); and Tubal ligation (Right)  Her family history includes Arthritis in her mother; Cancer in her father; Depression in her mother; Hypertension in her mother  She  reports that she has never smoked  She has never used smokeless tobacco  She reports that she does not drink alcohol or use drugs  Current Outpatient Prescriptions   Medication Sig Dispense Refill    cephalexin (KEFLEX) 500 mg capsule Take 1 capsule (500 mg total) by mouth every 8 (eight) hours for 10 days 30 capsule 0    medroxyPROGESTERone (DEPO-PROVERA) 150 mg/mL injection Inject 1 mL (150 mg total) into a muscle every 3 (three) months 1 mL 0    medroxyPROGESTERone (PROVERA) 10 mg tablet Take 1 tablet (10 mg total) by mouth daily 10 tablet 0    metoclopramide (REGLAN) 10 mg tablet       Multiple Vitamin (MULTIVITAMIN) tablet Take 1 tablet by mouth daily      omeprazole (PriLOSEC) 40 MG capsule        No current facility-administered medications for this visit  Current Outpatient Prescriptions on File Prior to Visit   Medication Sig    cephalexin (KEFLEX) 500 mg capsule Take 1 capsule (500 mg total) by mouth every 8 (eight) hours for 10 days    medroxyPROGESTERone (DEPO-PROVERA) 150 mg/mL injection Inject 1 mL (150 mg total) into a muscle every 3 (three) months    medroxyPROGESTERone (PROVERA) 10 mg tablet Take 1 tablet (10 mg total) by mouth daily    metoclopramide (REGLAN) 10 mg tablet     Multiple Vitamin (MULTIVITAMIN) tablet Take 1 tablet by mouth daily    omeprazole (PriLOSEC) 40 MG capsule      No current facility-administered medications on file prior to visit        She is allergic to iodinated diagnostic agents       Review of Systems   Constitutional: Positive for fatigue  Negative for fever and unexpected weight change  HENT: Negative for dental problem, mouth sores, nosebleeds, rhinorrhea, sinus pain, sinus pressure and sore throat  Eyes: Negative for pain, discharge and visual disturbance  Respiratory: Negative for cough, chest tightness, shortness of breath and wheezing  Cardiovascular: Negative for chest pain, palpitations and leg swelling  Gastrointestinal: Negative for blood in stool, constipation, diarrhea, nausea and vomiting  Endocrine: Negative for polydipsia  Genitourinary: Negative for difficulty urinating, dyspareunia, dysuria, menstrual problem, pelvic pain, urgency, vaginal discharge and vaginal pain  Musculoskeletal: Negative for arthralgias, back pain and joint swelling  Allergic/Immunologic: Negative for environmental allergies  Neurological: Negative for seizures, light-headedness and headaches  Hematological: Does not bruise/bleed easily  Psychiatric/Behavioral: Negative for sleep disturbance  The patient is not nervous/anxious  Objective:      LMP  (LMP Unknown)          Physical Exam   Constitutional: She is oriented to person, place, and time  She appears well-developed and well-nourished  No distress  [de-identified] black female    HENT:   Head: Normocephalic and atraumatic  Neck: No thyromegaly present  Cardiovascular: Normal rate, regular rhythm and normal heart sounds  No murmur heard  Pulmonary/Chest: Effort normal and breath sounds normal  No respiratory distress  She has no wheezes  Neurological: She is alert and oriented to person, place, and time  Psychiatric: She has a normal mood and affect  Vitals reviewed

## 2018-09-25 NOTE — ANESTHESIA PREPROCEDURE EVALUATION
Review of Systems/Medical History  Patient summary reviewed  Chart reviewed  No history of anesthetic complications     Cardiovascular  Negative cardio ROS Exercise tolerance (METS): good, Exercise comment: METS>4    Pulmonary  Negative pulmonary ROS        GI/Hepatic            Endo/Other  Negative endo/other ROS   Comment: Abnormal TSH level but normal T4; Pt denies any symptoms of thyroid abnormalities    GYN       Hematology  Anemia ,     Musculoskeletal       Neurology   Psychology           Physical Exam    Airway    Mallampati score: II  TM Distance: >3 FB  Neck ROM: full     Dental       Cardiovascular  Comment: Negative ROS,     Pulmonary      Other Findings        Anesthesia Plan  ASA Score- 2     Anesthesia Type- general with ASA Monitors  Additional Monitors:   Airway Plan: ETT  Plan Factors-    Induction- intravenous  Postoperative Plan-     Informed Consent- Anesthetic plan and risks discussed with patient  I personally reviewed this patient with the CRNA  Discussed and agreed on the Anesthesia Plan with the CRNA  Marj Mcdermott

## 2018-09-25 NOTE — DISCHARGE INSTRUCTIONS
Post-Gynecologic Surgery Discharge Instructions:  1  No heavy lifting more than one full gallon of milk (about 8 lbs) for 1 week  2  Nothing in the vagina for 6 weeks  3  You may take stairs one at a time, touching each step with both feet for the first few days, then as tolerated  4  Call the office for fever greater than 100  4'F, heavy vaginal bleeding, or increasing pain  5  Activity as tolerated  6  Avoid driving if taking narcotic pain medications (Percocet)  Post Operative Pain Management:  If you have cramping or mild pain you may take 600 mg Ibuprofen every 6 hours to relieve  If you continue to have residual mild pain not entirely relieved by Ibuprofen then you may take 650 mg of tylenol every 6 hours  If you have moderate pain then please take 1 tab of Percocet every 4 hours for relief  Do not combine with tylenol and please wait at least 4 hours after your last dose of Tylenol  If you have severe pain then please take 2 tabs of Percocet every 6 hours for relief  Do not combine with tylenol and please wait at least 4 hours after your last dose of Tylenol  If you have any questions regarding your prescriptions please call your doctor  Laparoscopically Assisted Vaginal Hysterectomy   WHAT YOU SHOULD KNOW:   Laparoscopically assisted vaginal hysterectomy (LAVH) is surgery to remove your uterus  Other organs, such as your ovaries and fallopian tubes, may also be removed  AFTER YOU LEAVE:   Medicines:   · NSAIDs  help decrease swelling, pain, and fever  This medicine is available with or without a doctor's order  NSAIDs can cause stomach bleeding or kidney problems in certain people  If you take blood thinner medicine, always ask your primary healthcare provider (PHP) if NSAIDs are safe for you  Always read the medicine label and follow directions  · Acetaminophen  decreases pain and fever  It is available without a doctor's order   Ask how much to take and how often to take it  Follow directions  Acetaminophen can cause liver damage if not taken correctly  · Prescription pain medicine  may be given  Ask your PHP how to take this medicine safely  · Take your medicine as directed  Contact your PHP if you think your medicine is not helping or if you have side effects  Tell him if you are allergic to any medicine  Keep a list of the medicines, vitamins, and herbs you take  Include the amounts, and when and why you take them  Bring the list or the pill bottles to follow-up visits  Carry your medicine list with you in case of an emergency  Follow up with your PHP or gynecologist as directed: You may need to return for more tests  Write down your questions so you remember to ask them during your visits  Rest as needed: You may feel like resting more after surgery  Slowly start to do more each day  Ask when you can return to your usual activities  Contact your PHP or gynecologist if:   · You have heavy vaginal bleeding that fills 1 or more sanitary pads in 1 hour  · You have a fever  · You have new or increasing bright red blood coming from your vagina or your incisions  · You have trouble urinating, burning when you urinate, or feel a need to urinate often  · You have trouble having a bowel movement  · You have yellow, green, or foul-smelling discharge coming from your vagina  · Your incision is red, swollen, or has pus or foul-smelling drainage coming from it  · You have pain that gets worse instead of better, or that is not controlled with your pain medicine  · Your skin is itchy, swollen, or has a rash  · You have questions or concerns about your condition or care  Seek care immediately or call 911 if:   · Your arm or leg feels warm, tender, and painful  It may look swollen and red  · You feel lightheaded, short of breath, and have chest pain  · You cough up blood    © 2014 [address] [employer] is for End User's use only and may not be sold, redistributed or otherwise used for commercial purposes  All illustrations and images included in CareNotes® are the copyrighted property of [employer]  or [employer]  The above information is an  only  It is not intended as medical advice for individual conditions or treatments  Talk to your doctor, nurse or pharmacist before following any medical regimen to see if it is safe and effective for you  Salpingectomy   WHAT YOU NEED TO KNOW:   A salpingectomy is surgery to remove one or both of your fallopian tubes  The fallopian tubes carry eggs from the ovaries to the uterus  They are part of a woman's reproductive system  A salpingectomy may be done to treat an ectopic pregnancy, cancer, endometriosis, or an infection  It may also be done to prevent pregnancy or some types of cancer  DISCHARGE INSTRUCTIONS:   Call 911 for any of the following:   · You feel lightheaded, short of breath, and have chest pain  · You cough up blood  · You have trouble breathing  Seek care immediately if:   · Your arm or leg feels warm, tender, and painful  It may look swollen and red  · Blood soaks through your bandage  · Your stitches come apart  · You soak through 1 sanitary pad in 1 hour  · You have trouble urinating or cannot urinate at all  Contact your healthcare provider if:   · You have a fever or chills  · Your wound is red, swollen, or draining pus  · You have pus or a foul-smelling odor coming from your vagina  · Your pain does not get better after you take your medicine  · You have nausea or are vomiting  · Your skin is itchy, swollen, or you have a rash  · You have questions or concerns about your condition or care  Medicines: You may need any of the following:  · Prescription pain medicine  may be given  Ask your healthcare provider how to take this medicine safely      · NSAIDs , such as ibuprofen, help decrease swelling, pain, and fever  NSAIDs can cause stomach bleeding or kidney problems in certain people  If you take blood thinner medicine, always ask your healthcare provider if NSAIDs are safe for you  Always read the medicine label and follow directions  · Take your medicine as directed  Contact your healthcare provider if you think your medicine is not helping or if you have side effects  Tell him or her if you are allergic to any medicine  Keep a list of the medicines, vitamins, and herbs you take  Include the amounts, and when and why you take them  Bring the list or the pill bottles to follow-up visits  Carry your medicine list with you in case of an emergency  Care for your wound as directed:  Ask your healthcare provider when your wound can get wet  Do not take a bath until your healthcare provider says it is okay  Take a shower only  Carefully wash around the wound with soap and water  Let the soap and water gently run over your incision  Do not  scrub your incision  Dry the area and put on new, clean bandages as directed  Change your bandages when they get wet or dirty  If you have strips of medical tape, let them fall off on their own  Activity:  Ask your healthcare provider when you can return to your normal activities  Do not douche, use tampons, or have sex until your healthcare provider says it is okay  These activities may cause infection  Do not exercise or lift anything heavy until your healthcare provider says it is okay  This may put too much stress on your incision  Follow up with your healthcare provider as directed:  Write down your questions so you remember to ask them during your visits  © 2017 [facility] Information is for End User's use only and may not be sold, redistributed or otherwise used for commercial purposes  All illustrations and images included in CareNotes® are the copyrighted property of [employer]  or [employer]    The above information is an  only  It is not intended as medical advice for individual conditions or treatments  Talk to your doctor, nurse or pharmacist before following any medical regimen to see if it is safe and effective for you

## 2018-09-25 NOTE — OP NOTE
OPERATIVE REPORT  PATIENT NAME: Frantz Neil    :  1980  MRN: [id number]  Pt Location: [facility] OR ROOM 15    SURGERY DATE: 2018    Surgeon(s) and Role:     * Mark Samuels MD - Primary     * Jeanmarie Madrid MD - Assisting     * Natalie Aguilar MD - Assisting    Preop Diagnosis:  Menorrhagia with irregular cycle [N92 1]  Uterine leiomyoma, unspecified location [D25 9]  Iron deficiency anemia, unspecified iron deficiency anemia type [D50 9]    Post-Op Diagnosis Codes:     * Menorrhagia with irregular cycle [N92 1]     * Uterine leiomyoma, unspecified location [D25 9]     * Iron deficiency anemia, unspecified iron deficiency anemia type [D50 9]    Procedure(s) (LRB):  HYSTERECTOMY LAPAROSCOPIC ASSISTED VAGINAL (LAVH); RIGHT LAPAROSCOPIC SALPINGECTOMY (N/A)    Specimen(s):  ID Type Source Tests Collected by Time Destination   1 : with right tube Tissue Uterus TISSUE EXAM Mark Samuels MD 2018 1425        Estimated Blood Loss:   300 mL    Drains:  Urethral Catheter Non-latex 16 Fr  (Active)   Collection Container Standard drainage bag 2018  1:23 PM   Number of days: 0       Anesthesia Type:   General    Operative Indications:  Menorrhagia with irregular cycle [N92 1]  Uterine leiomyoma, unspecified location [D25 9]  Iron deficiency anemia, unspecified iron deficiency anemia type [D50 9]    Operative Findings:    Supplemental Intraoperative Analgesia: IV toradol 30 mg    Vessel sealing device: Ethicon Enseal G2    Pelvic Exam Findings:   Anteverted, 9 week sized, mobile uterus   Adequate descensus for vaginal hysterectomy   Adequate vaginal outlet for vaginal hysterectomy   R adnexal fullness  Diagnostic Laparoscopy Findings:   Fibroid appearing uterus; Left pedunculated subserosal fibroid   Normal appearing R fallopian tube with Falope ring in place   Normal appearing R ovary    No evidence of endometriosis   No evidence of adhesive disease      Complications:   None    Procedure and Technique:      The patient was taken to the operating room and positioned on the operating table in the dorsal lithotomy position, with the buttocks extending slightly over the table  Sequential compression devices were placed on the bilateral lower extremities  General endotracheal anesthesia was administered  The patient's arms were tucked  All pressure points were padded and a savage hugger was placed to maintain control of core body temperature  A bimanual exam was performed  The patient's abdomen was prepped with a Chloraprep applicator  The perineum and vagina were prepped with Betadine  Preoperative intravenous antimicrobial prophylaxis was administered  A Bourgeois catheter was introduced into the bladder, which started to drain clear yellow urine  A laparotomy drape was placed  A time out was performed to confirm the correct patient and correct procedure  A weighted speculum was inserted into the vagina and a Mansi retractor was used to visualize the anterior lip of the cervix, which was grasped with a single toothed tenaculum  A cervical dilator was inserted  The tenaculum was secured onto the dilator  The weighted speculum was removed  A Bourgeois catheter was introduced into the bladder, which was drained of clear yellow urine and left in place for the procedure  Attention was turned to the abdomen  A solution of 0 50% was injected into the subcutaneous infraumbilicus  A 10 mm incision was made into the inferior aspect of the umbilicus  A 10 millimeter trocar and sleeve were inserted through the incision into the peritoneum via direct visualization  Pneumoperitoneum was established and maintained using carbon dioxide to a maximum intraperitoneal pressure of 15 mmHg  The entire abdomen and pelvis were surveyed with the above-noted findings  There was no evidence of injury to bowel, bladder, vasculature, or other structures      A second port site was selected 3 cm cephalad and 3 cm medial to the right anterior superior iliac spine  It was similarly injected subcutaneously with analgesic solution  A 5 mm incision was made for introduction of a 5 mm trocar under direct visualization  A blunt probe was inserted through this port and used to gently mobilize the fimbriated ends of the tubes to facilitate visualization of all adnexal surfaces  The above mentioned findings were noted  A third port site was selected 3 cm cephalad and 3 cm medial to the left anterior superior iliac spine  It was similarly injected subcutaneously with analgesic solution  A 5 mm incision was made for introduction of a 5 mm trocar under direct visualization  Patient was placed in Trendelenburg  Attention was turned to the pelvis  The uterus was placed on tension to the right  The left round ligament was elevated using a [place] grasper  The ureter was identified and found to be clear of the surgical field  The vessel sealing device was used to coagulate and divide the left round ligament at the midline  The broad ligament was opened by  the anterior and posterior leaves of the peritoneum using the vessel sealing device  The anterior leaf of the broad ligament was dissected away from the loose areolar tissue and was incised, continuing along the line of the vesicouterine peritoneal reflection approximately custodial across the anterior uterus at the level of the lower uterine segment  The procedure was repeated on the contralateral side  Attention was turned to the adnexa  The end of the right fallopian tube was grasped with a [place] and multiple bites were taken with the vessel sealing device to transect the mesosalpinx inferior to the tube along a path that marched up to the cornua  The right ovary was left in-situ with intact blood supply  The uterine vessels were identified and skeletonized by incising the posterior broad ligament peritoneum and dissecting away surrounding adventitia   The uterine vessels were desiccated at the level of the internal cervical os  The abdomen was desufflated  Attention was turned to the vagina  A weighted speculum was placed in the vagina and the uterine dilator and tenaculum were removed  The anterior and posterior lips of the cervix were separately grasped with double toothed tenaculums  A perpendicular superficial cold knife incision was made circumferentially at the level of the cervicovaginal junction from 6 o'clock to 9 o'clock, 6 o'clock to 3 o'clock, and from 12 o'clock to meet the lower incisions  The vaginal mucosa was dissected off the cervix and the anterior and then posterior lower uterine segment with blunt dissection  The posterior cul-de-sac was entered sharply with Metzenbaum scissors  The weighted speculum was replaced over the posterior peritoneum  The left uterosacral ligament was grasped with a Nito clamp posteriorly and the clamp was wondered over anteriorly  Mansi retractors were placed to protect the vaginal wall anteriorly and laterally  The ligament was divided with heavy gyn scissors and suture ligated with an 0 Vicryl pop-off suture using a Nito fixation stich  This step was repeated on the contralateral side  Excellent hemostasis was observed  The cardinal ligaments were grasped, cut, and their pedicles suture ligated serially in a similar fashion  The bladder was identified and the anterior peritoneum was entered  The bladder was retracted away with a malleable retractor  Additional portions of adventitia surrounding the uterine vessels were stripped away from the uterus  The uterine vessels were grasped, cut and their pedicles suture ligated  Tenaculums were applied to the uterine corpus and were used to walk the specimen cephalad  The remaining bilateral uterine attachments were clamped, cut and tied  The specimen was retrieved vaginally  The anterior, posterior, and bilateral lips of the cuff were separately grasped with Daria clamps   The cuff was closed with several interrupted [id number] using 0 Vicryl pop-off suture  Good hemostasis was observed  Attention was turned to the abdomen  Gloves were changed  The abdomen was insufflated  Using the laparoscopic irrigation device, the abdomen was carefully irrigated and inspected for hemostasis  Crissy bipolar electrocautery was used on the pedicles to ensure they were hemostatic and secure  Camacho-seal was applied at all pedicle sites  On final inspection, there was no evidence of injury to bowel, ureters, or bowel  The trochars were removed under direct visualization  Pneumoperitoneum was evacuated and the skin incisions were reapproximated using 4-0 monocryl  Histoacryl was applied as a surgical dressing  All needle, sponge, instrument counts were noted to be correct ×2 at the end of the procedure  Dr Yoselin Mccain was present and participated in all key portions of the procedure       I was present for the entire procedure    Patient Disposition:  PACU     SIGNATURE: Cleopatra Pulido MD  DATE: September 25, 2018  TIME: 4:25 PM

## 2018-09-25 NOTE — DISCHARGE SUMMARY
Discharge Summary - Gynecology  Joann Neff MRN: [id number]  Unit/Bed#: OR POOL Encounter: [id number]      Admission Date: 9/25/2018     Discharge Date: 09/26/18    Attending at Time of Admission: Dr Doris Zhao  Attending at Time of Procedure: Dr Doris Zhao  Attending at Time of Discharge: Dr Alejandra Gamze Diagnosis:  Menorrhagia with irregular cycle [N92 1]  Uterine leiomyoma, unspecified location [D25 9]  Iron deficiency anemia, unspecified iron deficiency anemia type [D50 9]    Procedures:  Laparoscopic-assisted vaginal hysterectomy, right salpingectomy    Hospital course: Patient underwent the above described procedure for the above principal diagnosis  She was admitted for observation overnight  Her tripp catheter was discontinued POD# 1 and she was able to void spontaneously  She was discharged on POD 11   At this time she was tolerating PO intake, her pain was well controlled, she was ambulating, voiding, and had appropriate bowel function  She will f/u in the office for post-operative care  The patient's prescription narcotic record was reviewed in the [facility] prior to prescribing her opioids for post-operative pain control  Complications: none apparent    Condition at discharge: stable     Discharge instructions/Information to patient and family:   See After Visit Summary for information provided to patient and family  Provisions for Follow-Up Care:  See After Visit Summary for information related to follow-up care and any pertinent home health orders  Disposition: See After Visit Summary for discharge disposition information      Planned Readmission: No    Discharge Medications:  Current Discharge Medication List      CONTINUE these medications which have NOT CHANGED    Details   medroxyPROGESTERone (DEPO-PROVERA) 150 mg/mL injection Inject 1 mL (150 mg total) into a muscle every 3 (three) months  Qty: 1 mL, Refills: 0    Associated Diagnoses: Menorrhagia with regular cycle; Iron deficiency anemia due to chronic blood loss      medroxyPROGESTERone (PROVERA) 10 mg tablet Take 1 tablet (10 mg total) by mouth daily  Qty: 10 tablet, Refills: 0    Associated Diagnoses: Menorrhagia with regular cycle; Iron deficiency anemia due to chronic blood loss      Multiple Vitamin (MULTIVITAMIN) tablet Take 1 tablet by mouth daily      tetracycline (ACHROMYCIN,SUMYCIN) 500 MG capsule            Shabnam Arroyo MD

## 2018-09-26 VITALS
RESPIRATION RATE: 18 BRPM | BODY MASS INDEX: 24.1 KG/M2 | SYSTOLIC BLOOD PRESSURE: 130 MMHG | TEMPERATURE: 98 F | WEIGHT: 136 LBS | OXYGEN SATURATION: 100 % | HEIGHT: 63 IN | HEART RATE: 72 BPM | DIASTOLIC BLOOD PRESSURE: 65 MMHG

## 2018-09-26 LAB
ERYTHROCYTE [DISTWIDTH] IN BLOOD BY AUTOMATED COUNT: 21.5 % (ref 11.6–15.1)
HCT VFR BLD AUTO: 29.7 % (ref 34.8–46.1)
HGB BLD-MCNC: 9.1 G/DL (ref 11.5–15.4)
MCH RBC QN AUTO: 25.4 PG (ref 26.8–34.3)
MCHC RBC AUTO-ENTMCNC: 30.6 G/DL (ref 31.4–37.4)
MCV RBC AUTO: 83 FL (ref 82–98)
PLATELET # BLD AUTO: 174 THOUSANDS/UL (ref 149–390)
PMV BLD AUTO: 11.1 FL (ref 8.9–12.7)
RBC # BLD AUTO: 3.58 MILLION/UL (ref 3.81–5.12)
WBC # BLD AUTO: 6.22 THOUSAND/UL (ref 4.31–10.16)

## 2018-09-26 PROCEDURE — 99024 POSTOP FOLLOW-UP VISIT: CPT | Performed by: OBSTETRICS & GYNECOLOGY

## 2018-09-26 PROCEDURE — 85027 COMPLETE CBC AUTOMATED: CPT | Performed by: OBSTETRICS & GYNECOLOGY

## 2018-09-26 RX ORDER — OXYCODONE HYDROCHLORIDE 5 MG/1
5 TABLET ORAL EVERY 4 HOURS PRN
Qty: 18 TABLET | Refills: 0 | Status: SHIPPED | OUTPATIENT
Start: 2018-09-26 | End: 2018-09-29 | Stop reason: ALTCHOICE

## 2018-09-26 RX ORDER — IBUPROFEN 600 MG/1
600 TABLET ORAL EVERY 6 HOURS PRN
Qty: 45 TABLET | Refills: 0 | Status: SHIPPED | OUTPATIENT
Start: 2018-09-26 | End: 2018-11-21

## 2018-09-26 RX ORDER — OXYCODONE HYDROCHLORIDE 10 MG/1
10 TABLET ORAL EVERY 4 HOURS PRN
Status: DISCONTINUED | OUTPATIENT
Start: 2018-09-26 | End: 2018-09-26 | Stop reason: HOSPADM

## 2018-09-26 RX ORDER — ACETAMINOPHEN 325 MG/1
975 TABLET ORAL EVERY 8 HOURS SCHEDULED
Status: DISCONTINUED | OUTPATIENT
Start: 2018-09-26 | End: 2018-09-26 | Stop reason: HOSPADM

## 2018-09-26 RX ADMIN — OXYCODONE HYDROCHLORIDE 10 MG: 10 TABLET ORAL at 00:18

## 2018-09-26 RX ADMIN — KETOROLAC TROMETHAMINE 30 MG: 30 INJECTION, SOLUTION INTRAMUSCULAR at 05:20

## 2018-09-26 RX ADMIN — ACETAMINOPHEN 975 MG: 325 TABLET, FILM COATED ORAL at 07:54

## 2018-09-26 RX ADMIN — OXYCODONE HYDROCHLORIDE 10 MG: 10 TABLET ORAL at 05:20

## 2018-09-26 RX ADMIN — OXYCODONE HYDROCHLORIDE 10 MG: 10 TABLET ORAL at 14:30

## 2018-09-26 RX ADMIN — ACETAMINOPHEN 975 MG: 325 TABLET, FILM COATED ORAL at 14:29

## 2018-09-26 RX ADMIN — HYDROMORPHONE HYDROCHLORIDE 0.5 MG: 1 INJECTION, SOLUTION INTRAMUSCULAR; INTRAVENOUS; SUBCUTANEOUS at 02:02

## 2018-09-26 RX ADMIN — KETOROLAC TROMETHAMINE 30 MG: 30 INJECTION, SOLUTION INTRAMUSCULAR at 12:01

## 2018-09-26 RX ADMIN — PANTOPRAZOLE SODIUM 20 MG: 20 TABLET, DELAYED RELEASE ORAL at 05:20

## 2018-09-26 RX ADMIN — HYDROMORPHONE HYDROCHLORIDE 0.5 MG: 1 INJECTION, SOLUTION INTRAMUSCULAR; INTRAVENOUS; SUBCUTANEOUS at 09:00

## 2018-09-26 RX ADMIN — DOCUSATE SODIUM 100 MG: 100 CAPSULE, LIQUID FILLED ORAL at 09:00

## 2018-09-26 RX ADMIN — DEXTROSE, SODIUM CHLORIDE, AND POTASSIUM CHLORIDE 125 ML/HR: 5; .45; .15 INJECTION INTRAVENOUS at 01:58

## 2018-09-26 NOTE — CASE MANAGEMENT
Initial Clinical Review    Admission: Date/Time/Statement: 2018 @ 09:02  Outpatient No Bed Charge    History of Illness: A 44 y/o female stayed in the hospital, post elective procedure  Procedure: HYSTERECTOMY LAPAROSCOPIC ASSISTED VAGINAL (LAVH); RIGHT LAPAROSCOPIC SALPINGECTOMY        Temperature Pulse Respirations Blood Pressure SpO2   18 1051 18 1051 18 1051 18 1051 18 1051   98 1 °F (36 7 °C) 70 16 145/91 100 %      Temp Source Heart Rate Source Patient Position - Orthostatic VS BP Location FiO2 (%)   18 1051 18 1051 18 1806 18 1806 --   Tymp Core Monitor Lying Left arm       Pain Score       18 1625       8        Wt Readings from Last 1 Encounters:   18 61 7 kg (136 lb)       Past Medical/Surgical History:   Diagnosis    Fetal growth problem suspected but not found    First trimester bleeding    Iron deficiency anemia due to chronic blood loss    Miscarriage    Missed     Poor fetal growth affecting management of mother    Recurrent pregnancy loss, antepartum condition or complication    Spontaneous     Supraventricular tachycardia ([facility] [address] )       Admitting Diagnosis: Menorrhagia with irregular cycle [N92 1]  Uterine leiomyoma, unspecified location [D25 9]  Iron deficiency anemia, unspecified iron deficiency anemia type [D50 9]    Age/Sex: 43 y o  female    Assessment/Plan:     A/P: POD # 0 LAVH, BS for AUB-L doing well postoperatively  #1 AUB-L: status post surgery as mentioned above  #2 Postoperative Care:  - FEN: Regular, IV d5 1/2 NS + 20 Kcl @ 125mL/hr  - Pain: Scheduled toradol; Tylenol, Percocery, Oxy PRN; Dilaudid breakthrough  - DVT ppx: SCDs  - Encouraged incentive spirometry to reduce atelectasis and pneumonia risk  - Encouraged ambulation as tolerated  Follow up CBC in AM, Bourgeois out in AM w/ voiding trial  #3 Disposition  Anticipate discharge 18    Admission Orders:   Outpatient No Bed Charge/MedSurg  Post OP Care per Protocol  Encourage Ambulation  Abd Binder    Scheduled Meds:   Current Facility-Administered Medications:  acetaminophen 975 mg Oral Q8H [facility] & long term   docusate sodium 100 mg Oral BID   ketorolac 30 mg Intravenous Q6H GILBERT   pantoprazole 20 mg Oral Early Morning     IV Dilaudid 0 5 mg x 3 thus far  Percocet 5 mg po x 3 thus far

## 2018-09-26 NOTE — PROGRESS NOTES
Progress Note - OB/GYN  Eagle Rogers 43 y o  female MRN: [id number]  Unit/Bed#: [facility] 808-01 Encounter: [id number]      Subjective: The patient is resting comfortably without complaints  She reports that the abdominal binder is uncomfortable and requested it to be removed  Pain is well controlled with PO medications  She is tolerating a regular diet  She denies nausea or vomiting  She is passing flatus  Tripp is in place draining blue/yellow urine, urine output adequate  The patient reports vaginal spotting when wiping, but denies any bleeding since  She denies chest pain, shortness of breath, calf pain, fever/chills  Vitals:   /84 (BP Location: Right arm)   Pulse 73   Temp 98 2 °F (36 8 °C) (Oral)   Resp 18   Ht 5' 3" (1 6 m)   Wt 61 7 kg (136 lb)   SpO2 100%   BMI 24 09 kg/m²       Intake/Output Summary (Last 24 hours) at 09/25/18 2244  Last data filed at 09/25/18 1730   Gross per 24 hour   Intake             2200 ml   Output              950 ml   Net             1250 ml       Invasive Devices     Peripheral Intravenous Line            Peripheral IV 09/25/18 Left Forearm less than 1 day          Drain            Urethral Catheter Non-latex 16 Fr  less than 1 day          Airway            ETT  Cuffed;Oral;Inflated 7 mm less than 1 day                Physical Exam:   Gen: NAD, AAOx3, Pleasant & Cooperative  Cv: RRR, No M/R/G  Resp: CTAB, No W/R/R  Abdomen: Soft, non-distended, non-tender, no rebound or guarding   Incisions clean/dry/intact  Ext: Symmetric, non-tender; SCDs in place bilaterally  : NO vaginal bleeding noted, tripp catheter in palce        Labs:   Admission on 09/25/2018   Component Date Value    EXT Preg Test, Ur 09/25/2018 Negative     POC Glucose 09/25/2018 95          Patient Active Problem List   Diagnosis    Supraventricular tachycardia ([address] )    Menorrhagia with regular cycle    Fibroid    Iron deficiency anemia due to chronic blood loss    Menorrhagia with irregular cycle    Uterine leiomyoma    Iron deficiency anemia        A/P: POD # 0 AZUCENAH, BS for AUB-L doing well postoperatively    #1 AUB-L: status post surgery as mentioned above  #2 Postoperative Care:  - FEN: Regular, IV d5 1/2 NS + 20 Kcl @ 125mL/hr  - Pain: Scheduled toradol; Tylenol, Percocery, Oxy PRN; Dilaudid breakthrough  - DVT ppx: SCDs  - Encouraged incentive spirometry to reduce atelectasis and pneumonia risk  - Encouraged ambulation as tolerated  Follow up CBC in AM, Bourgeois out in AM w/ voiding trial  #3 Disposition  Anticipate discharge 9/26/18    Joy Us MD  9/25/2018  10:44 PM

## 2018-09-26 NOTE — PROGRESS NOTES
Gynecology Progress note   Luanne Aquino 43 y o  female MRN: [id number]  Unit/Bed#: [facility] 808-01 Encounter: [id number]    A/P: 43 y o  female with abnormal uterine bleeding - leiomyoma s/p LAVH, RS, POD #1, stable    1  AUB-L: status post LAVH, RS  2  Post-operative care   Pain: continue scheduled toradol x 4 doses; consider changing tylenol from PRN to scheduled, oxycodone prn   Bourgeois d/c this morning, follow up voiding trial    GI PPx: colace, protonix   FEN: regular diet, d/c IVF this morning   Hb: 11 5 -> CBC pending    DVT PPx: SCDs   Encouraged ambulation as tolerated  Dispo: Anticipate discharge today after voiding trial     No acute events overnight  Patient states she is having breakthrough pain when she sleeps because she will not ask the medication in time  Pt is tolerating PO  Pt is passing flatus  Pt is ambulating  Denies fevers/chills  /67 (BP Location: Right arm)   Pulse 69   Temp 98 3 °F (36 8 °C) (Oral)   Resp 18   Ht 5' 3" (1 6 m)   Wt 61 7 kg (136 lb)   SpO2 98%   BMI 24 09 kg/m²     I/O last 3 completed shifts:   In: 2200 [I V :2200]  Out: 950 [Urine:650; Blood:300]  I/O this shift:  In: 1000 [I V :1000]  Out: 850 [Urine:850]    Lab Results   Component Value Date    WBC 3 42 (L) 09/13/2018    HGB 11 5 09/13/2018    HCT 38 3 09/13/2018    MCV 80 (L) 09/13/2018     09/13/2018       Lab Results   Component Value Date    GLUCOSE 86 10/10/2016    CALCIUM 8 7 09/13/2018     09/13/2018    K 3 4 (L) 09/13/2018    CO2 25 09/13/2018     09/13/2018    BUN 5 09/13/2018    CREATININE 0 88 09/13/2018       Lab Results   Component Value Date/Time    POCGLU 95 09/25/2018 04:16 PM       Physical Exam  Gen: NAD  CVS: RRR, S1S2  Lungs: CTABL, good effort, no w/r/r  Abdomen: soft, NT, ND, +BS, incision c/d/i with histocryl  Extremities: NT, +SCDs

## 2018-09-27 ENCOUNTER — TELEPHONE (OUTPATIENT)
Dept: OBGYN CLINIC | Facility: CLINIC | Age: 38
End: 2018-09-27

## 2018-09-27 DIAGNOSIS — Z48.816 AFTERCARE FOLLOWING SURGERY OF THE GENITOURINARY SYSTEM: Primary | ICD-10-CM

## 2018-09-27 RX ORDER — OXYCODONE AND ACETAMINOPHEN 10; 325 MG/1; MG/1
1 TABLET ORAL EVERY 6 HOURS PRN
Qty: 12 TABLET | Refills: 0 | Status: SHIPPED | OUTPATIENT
Start: 2018-09-27 | End: 2018-10-02 | Stop reason: ALTCHOICE

## 2018-09-27 NOTE — TELEPHONE ENCOUNTER
Called patient to check on her post operatively  States "feels like she has been hit by a truck" said pain is a 9 5 out of 10  Having pain in her belly button and on left side  States is passing stools and urine fine  Denies any fever, chills or odors  Taking motrin and 5mg percocet alternating and not helping  Reviewed with Dr Ami Nails  Patient to get rx for 10mg percocet and take every 6 hours with 2 ibuprofen  Pt aware and will have BF Devang pickup script for her  appt scheduled for 9/29 for post op

## 2018-09-29 ENCOUNTER — OFFICE VISIT (OUTPATIENT)
Dept: OBGYN CLINIC | Facility: CLINIC | Age: 38
End: 2018-09-29

## 2018-09-29 VITALS
HEIGHT: 63 IN | WEIGHT: 139 LBS | BODY MASS INDEX: 24.63 KG/M2 | DIASTOLIC BLOOD PRESSURE: 86 MMHG | SYSTOLIC BLOOD PRESSURE: 138 MMHG

## 2018-09-29 DIAGNOSIS — Z48.816 AFTERCARE FOLLOWING SURGERY OF THE GENITOURINARY SYSTEM: Primary | ICD-10-CM

## 2018-09-29 DIAGNOSIS — Z90.710 S/P LAPAROSCOPIC ASSISTED VAGINAL HYSTERECTOMY (LAVH): ICD-10-CM

## 2018-09-29 PROBLEM — N92.1 MENORRHAGIA WITH IRREGULAR CYCLE: Status: RESOLVED | Noted: 2018-08-31 | Resolved: 2018-09-29

## 2018-09-29 PROBLEM — D50.9 IRON DEFICIENCY ANEMIA: Status: RESOLVED | Noted: 2018-08-31 | Resolved: 2018-09-29

## 2018-09-29 PROBLEM — N92.0 MENORRHAGIA WITH REGULAR CYCLE: Status: RESOLVED | Noted: 2018-07-31 | Resolved: 2018-09-29

## 2018-09-29 PROBLEM — D25.9 UTERINE LEIOMYOMA: Status: RESOLVED | Noted: 2018-08-31 | Resolved: 2018-09-29

## 2018-09-29 PROCEDURE — 99024 POSTOP FOLLOW-UP VISIT: CPT | Performed by: OBSTETRICS & GYNECOLOGY

## 2018-09-29 NOTE — PROGRESS NOTES
Assessment/Plan:    Normal postoperative check, status post laparoscopic-assisted vaginal hysterectomy approximately 5 days ago  Continue increase activity, maintain       Problem List Items Addressed This Visit     S/P laparoscopic assisted vaginal hysterectomy (LAVH)    Aftercare following surgery of the genitourinary system - Primary            Subjective:      Patient ID: Aren Newby is a 43 y o  female  Linn Huber is here for her 1st postoperative follow-up  She underwent laparoscopic-assisted vaginal hysterectomy without complication earlier in the week  She has had some issues with pain but it seems to be improving  Most pain is around the umbilical site  She has no problems with bowels or bladder  She denies any menopausal complaints at this time  She has had no vaginal discharge or bleeding  She is slowly resuming some normal activity  We spent some time reviewing the procedure as well as the pathology  Her postoperative hemoglobin was 9 and she was pleased with that  We may consider resuming oral iron at a later point  She is encouraged to increase her activity slowly and will see her back in 2-3 weeks for further postoperative care        The following portions of the patient's history were reviewed and updated as appropriate: allergies, current medications, past family history, past medical history, past social history, past surgical history and problem list     Review of Systems   Constitutional: Negative for chills, fatigue, fever and unexpected weight change  HENT: Negative for dental problem, sinus pain and sinus pressure  Eyes: Negative for visual disturbance  Respiratory: Negative for cough, shortness of breath and wheezing  Cardiovascular: Negative for chest pain and leg swelling  Gastrointestinal: Negative for constipation, diarrhea, nausea and vomiting  Genitourinary: Negative for menstrual problem, pelvic pain and urgency  Musculoskeletal: Positive for back pain  Allergic/Immunologic: Negative for environmental allergies  Neurological: Negative for dizziness and headaches  Objective: There were no vitals taken for this visit  Physical Exam   Constitutional: She is oriented to person, place, and time  She appears well-developed and well-nourished  No distress  [de-identified] black female   HENT:   Head: Normocephalic and atraumatic  Abdominal: Soft  She exhibits no distension and no mass  There is tenderness  There is no rebound and no guarding  Incisions healing well no erythema or induration or any discharge  There is  slight tenderness around the conchis umbilical incision but no other abnormalities   Neurological: She is alert and oriented to person, place, and time  Psychiatric: She has a normal mood and affect  Vitals reviewed

## 2018-10-02 ENCOUNTER — OFFICE VISIT (OUTPATIENT)
Dept: FAMILY MEDICINE CLINIC | Facility: CLINIC | Age: 38
End: 2018-10-02
Payer: COMMERCIAL

## 2018-10-02 VITALS
BODY MASS INDEX: 23.74 KG/M2 | HEIGHT: 63 IN | RESPIRATION RATE: 18 BRPM | DIASTOLIC BLOOD PRESSURE: 77 MMHG | HEART RATE: 88 BPM | WEIGHT: 134 LBS | SYSTOLIC BLOOD PRESSURE: 126 MMHG | TEMPERATURE: 99 F

## 2018-10-02 DIAGNOSIS — R79.89 ABNORMAL THYROID BLOOD TEST: ICD-10-CM

## 2018-10-02 DIAGNOSIS — D50.0 IRON DEFICIENCY ANEMIA DUE TO CHRONIC BLOOD LOSS: Primary | ICD-10-CM

## 2018-10-02 DIAGNOSIS — Z23 INFLUENZA VACCINE NEEDED: ICD-10-CM

## 2018-10-02 PROBLEM — D21.9 FIBROID: Status: RESOLVED | Noted: 2018-08-30 | Resolved: 2018-10-02

## 2018-10-02 PROCEDURE — 90471 IMMUNIZATION ADMIN: CPT

## 2018-10-02 PROCEDURE — 90686 IIV4 VACC NO PRSV 0.5 ML IM: CPT

## 2018-10-02 PROCEDURE — 99204 OFFICE O/P NEW MOD 45 MIN: CPT | Performed by: FAMILY MEDICINE

## 2018-10-02 RX ORDER — FERROUS SULFATE TAB EC 324 MG (65 MG FE EQUIVALENT) 324 (65 FE) MG
324 TABLET DELAYED RESPONSE ORAL
Qty: 60 TABLET | Refills: 0 | Status: SHIPPED | OUTPATIENT
Start: 2018-10-02 | End: 2018-11-21

## 2018-10-02 NOTE — PROGRESS NOTES
Assessment/Plan:         Diagnoses and all orders for this visit:    Iron deficiency anemia due to chronic blood loss  -     CBC and differential; Future  -     Sickle cell screen; Future  -     ferrous sulfate 324 (65 Fe) mg; Take 1 tablet (324 mg total) by mouth 2 (two) times a day before meals    Abnormal thyroid blood test  -     TSH, 3rd generation with Free T4 reflex  -     Comprehensive metabolic panel  -     Thyroid Antibodies Panel; Future  -     Thyroglobulin Panel; Future  -     T3; Future  -     US thyroid; Future    Influenza vaccine needed  -     SYRINGE/SINGLE-DOSE VIAL: influenza vaccine, [id number], quadrivalent, 0 5 mL, preservative-free, for patients 3+ yr (FLUZONE)      suspect early grave's disease  Added iron supplement   To repeat blood work   F/u in 2-3 weeks       Subjective:      Patient ID: Rebekah Gurrola is a 43 y o  female  HPI  Lost 40 pounds in 3 month without diet or exercise ( 160 lbs base line weight for many years)  Had "thyroid problem" in the past during her pregnancy  H/o iron deficiency anemia due to menorrhagia   Had hysterectomy 2 weeks ago  Colonoscopy recently was normal   Recent TSH 9/13/18 was 0 251 T4 1 01  The following portions of the patient's history were reviewed and updated as appropriate: allergies, current medications, past family history, past medical history, past social history, past surgical history and problem list     Review of Systems   Constitutional: Negative  HENT: Negative  Eyes: Negative  Respiratory: Negative  Cardiovascular: Negative  Gastrointestinal: Negative  Endocrine: Positive for heat intolerance  Weight loss   Genitourinary: Negative  Musculoskeletal: Negative  Allergic/Immunologic: Negative  Hematological: Negative  Psychiatric/Behavioral: Negative                Past Medical History:   Diagnosis Date    Fetal growth problem suspected but not found     RESOLVED: 3/1/17    First trimester bleeding RESOLVED:3/1/17    Iron deficiency anemia due to chronic blood loss 2018    Miscarriage     Missed      RESOLVED:3/1/17    Poor fetal growth affecting management of mother     RESOLVED:3/1/17    Recurrent pregnancy loss, antepartum condition or complication     XNGEXFJV:3/0/79    Spontaneous      RESOLVED:3/1/17    Supraventricular tachycardia ([address] )     by history     Past Surgical History:   Procedure Laterality Date    COLONOSCOPY      DILATION AND CURETTAGE OF UTERUS      ,     DILATION AND CURETTAGE OF UTERUS      FOR SPONTANEOUS ; X5 [id number]    HYSTERECTOMY  2018    Dr Barclay Dies INDUCED       LAPAROSCOPIC TUBAL LIGATION Right 2016    Procedure: LAPAROSCOPIC TUBAL LIGATION ;  Surgeon: Nati Julien MD;  Location: [facility];  Service:    [place] LAP,VAG HYST,UTERUS 250GMS/<,SALP-OOPH N/A 2018    Procedure: HYSTERECTOMY LAPAROSCOPIC ASSISTED VAGINAL (LAVH); RIGHT LAPAROSCOPIC SALPINGECTOMY;  Surgeon: Nati Julien MD;  Location: [facility];  Service: Gynecology    GA SURG RX MISSED ABORTN,1ST TRI N/A 2016    Procedure: DILATATION AND EVACUATION (D&E) (MISSED AB); Surgeon: Nati Julien MD;  Location: [facility];  Service: Gynecology    SALPINGOOPHORECTOMY Left     TUBAL LIGATION Right      Social History     Social History    Marital status: /Civil Union     Spouse name: N/A    Number of children: N/A    Years of education: N/A     Occupational History    Not on file       Social History Main Topics    Smoking status: Never Smoker    Smokeless tobacco: Never Used    Alcohol use No    Drug use: No    Sexual activity: Yes     Partners: Male     Other Topics Concern    Not on file     Social History Narrative    FEELS SAFE AT HOME         Allergies   Allergen Reactions    Iodinated Diagnostic Agents Anaphylaxis     Facial swelling and hives         Family History   Problem Relation Age of Onset    Arthritis Mother  Depression Mother     Hypertension Mother     Cancer Father            Current Outpatient Prescriptions:     ibuprofen (MOTRIN) 600 mg tablet, Take 1 tablet (600 mg total) by mouth every 6 (six) hours as needed for mild pain (cramping), Disp: 45 tablet, Rfl: 0    Multiple Vitamin (MULTIVITAMIN) tablet, Take 1 tablet by mouth daily, Disp: , Rfl:     ferrous sulfate 324 (65 Fe) mg, Take 1 tablet (324 mg total) by mouth 2 (two) times a day before meals, Disp: 60 tablet, Rfl: 0    Objective:      /77 (BP Location: Left arm, Patient Position: Sitting, Cuff Size: Standard)   Pulse 88   Temp 99 °F (37 2 °C)   Resp 18   Ht 5' 3" (1 6 m)   Wt 60 8 kg (134 lb)   BMI 23 74 kg/m²          Physical Exam   Constitutional: She is oriented to person, place, and time  She appears well-developed and well-nourished  Eyes: Pupils are equal, round, and reactive to light  EOM are normal    Neck: Normal range of motion  Neck supple  No tracheal deviation present  No thyromegaly present  Mild tenderness over left lower thyroid    Cardiovascular: Normal rate and regular rhythm  Pulmonary/Chest: Effort normal and breath sounds normal    Musculoskeletal: She exhibits no edema, tenderness or deformity  Neurological: She is alert and oriented to person, place, and time  Psychiatric: She has a normal mood and affect   Her behavior is normal

## 2018-10-03 ENCOUNTER — APPOINTMENT (OUTPATIENT)
Dept: LAB | Facility: HOSPITAL | Age: 38
End: 2018-10-03
Payer: COMMERCIAL

## 2018-10-03 DIAGNOSIS — D50.0 IRON DEFICIENCY ANEMIA DUE TO CHRONIC BLOOD LOSS: ICD-10-CM

## 2018-10-03 DIAGNOSIS — R79.89 ABNORMAL THYROID BLOOD TEST: ICD-10-CM

## 2018-10-03 LAB
ALBUMIN SERPL BCP-MCNC: 3.7 G/DL (ref 3.5–5)
ALP SERPL-CCNC: 46 U/L (ref 46–116)
ALT SERPL W P-5'-P-CCNC: 13 U/L (ref 12–78)
ANION GAP SERPL CALCULATED.3IONS-SCNC: 5 MMOL/L (ref 4–13)
AST SERPL W P-5'-P-CCNC: 8 U/L (ref 5–45)
BASOPHILS # BLD AUTO: 0.02 THOUSANDS/ΜL (ref 0–0.1)
BASOPHILS NFR BLD AUTO: 0 % (ref 0–1)
BILIRUB SERPL-MCNC: 0.62 MG/DL (ref 0.2–1)
BUN SERPL-MCNC: 10 MG/DL (ref 5–25)
CALCIUM SERPL-MCNC: 8.9 MG/DL (ref 8.3–10.1)
CHLORIDE SERPL-SCNC: 108 MMOL/L (ref 100–108)
CO2 SERPL-SCNC: 27 MMOL/L (ref 21–32)
CREAT SERPL-MCNC: 0.81 MG/DL (ref 0.6–1.3)
EOSINOPHIL # BLD AUTO: 0.04 THOUSAND/ΜL (ref 0–0.61)
EOSINOPHIL NFR BLD AUTO: 1 % (ref 0–6)
ERYTHROCYTE [DISTWIDTH] IN BLOOD BY AUTOMATED COUNT: 20.5 % (ref 11.6–15.1)
GFR SERPL CREATININE-BSD FRML MDRD: 106 ML/MIN/1.73SQ M
GLUCOSE P FAST SERPL-MCNC: 80 MG/DL (ref 65–99)
HCT VFR BLD AUTO: 33.2 % (ref 34.8–46.1)
HGB BLD-MCNC: 10.3 G/DL (ref 11.5–15.4)
IMM GRANULOCYTES # BLD AUTO: 0.01 THOUSAND/UL (ref 0–0.2)
IMM GRANULOCYTES NFR BLD AUTO: 0 % (ref 0–2)
LYMPHOCYTES # BLD AUTO: 1.3 THOUSANDS/ΜL (ref 0.6–4.47)
LYMPHOCYTES NFR BLD AUTO: 28 % (ref 14–44)
MCH RBC QN AUTO: 26 PG (ref 26.8–34.3)
MCHC RBC AUTO-ENTMCNC: 31 G/DL (ref 31.4–37.4)
MCV RBC AUTO: 84 FL (ref 82–98)
MONOCYTES # BLD AUTO: 0.19 THOUSAND/ΜL (ref 0.17–1.22)
MONOCYTES NFR BLD AUTO: 4 % (ref 4–12)
NEUTROPHILS # BLD AUTO: 3.12 THOUSANDS/ΜL (ref 1.85–7.62)
NEUTS SEG NFR BLD AUTO: 67 % (ref 43–75)
NRBC BLD AUTO-RTO: 0 /100 WBCS
PLATELET # BLD AUTO: 245 THOUSANDS/UL (ref 149–390)
PMV BLD AUTO: 10.2 FL (ref 8.9–12.7)
POTASSIUM SERPL-SCNC: 3.2 MMOL/L (ref 3.5–5.3)
PROT SERPL-MCNC: 7.1 G/DL (ref 6.4–8.2)
RBC # BLD AUTO: 3.96 MILLION/UL (ref 3.81–5.12)
SICKLE CELLS BLD QL SMEAR: NEGATIVE
SODIUM SERPL-SCNC: 140 MMOL/L (ref 136–145)
T3 SERPL-MCNC: 1 NG/ML (ref 0.6–1.8)
T4 FREE SERPL-MCNC: 1.04 NG/DL (ref 0.76–1.46)
TSH SERPL DL<=0.05 MIU/L-ACNC: 0.2 UIU/ML (ref 0.36–3.74)
WBC # BLD AUTO: 4.68 THOUSAND/UL (ref 4.31–10.16)

## 2018-10-03 PROCEDURE — 36415 COLL VENOUS BLD VENIPUNCTURE: CPT | Performed by: FAMILY MEDICINE

## 2018-10-03 PROCEDURE — 80053 COMPREHEN METABOLIC PANEL: CPT | Performed by: FAMILY MEDICINE

## 2018-10-03 PROCEDURE — 86376 MICROSOMAL ANTIBODY EACH: CPT

## 2018-10-03 PROCEDURE — 85025 COMPLETE CBC W/AUTO DIFF WBC: CPT

## 2018-10-03 PROCEDURE — 86800 THYROGLOBULIN ANTIBODY: CPT

## 2018-10-03 PROCEDURE — 84439 ASSAY OF FREE THYROXINE: CPT | Performed by: FAMILY MEDICINE

## 2018-10-03 PROCEDURE — 85660 RBC SICKLE CELL TEST: CPT

## 2018-10-03 PROCEDURE — 84443 ASSAY THYROID STIM HORMONE: CPT | Performed by: FAMILY MEDICINE

## 2018-10-03 PROCEDURE — 84432 ASSAY OF THYROGLOBULIN: CPT

## 2018-10-03 PROCEDURE — 84480 ASSAY TRIIODOTHYRONINE (T3): CPT

## 2018-10-04 DIAGNOSIS — E87.6 HYPOKALEMIA: Primary | ICD-10-CM

## 2018-10-04 LAB
THYROGLOB AB SERPL-ACNC: <1 IU/ML (ref 0–0.9)
THYROGLOB AB SERPL-ACNC: <1 IU/ML (ref 0–0.9)
THYROGLOB SERPL-MCNC: 39.4 NG/ML (ref 1.5–38.5)
THYROPEROXIDASE AB SERPL-ACNC: 9 IU/ML (ref 0–34)

## 2018-10-04 RX ORDER — POTASSIUM CHLORIDE 750 MG/1
10 TABLET, EXTENDED RELEASE ORAL DAILY
Qty: 30 TABLET | Refills: 0 | Status: SHIPPED | OUTPATIENT
Start: 2018-10-04 | End: 2018-11-21

## 2018-10-09 ENCOUNTER — TRANSCRIBE ORDERS (OUTPATIENT)
Dept: RADIOLOGY | Facility: HOSPITAL | Age: 38
End: 2018-10-09

## 2018-10-09 ENCOUNTER — TELEPHONE (OUTPATIENT)
Dept: OBGYN CLINIC | Facility: CLINIC | Age: 38
End: 2018-10-09

## 2018-10-09 ENCOUNTER — HOSPITAL ENCOUNTER (OUTPATIENT)
Dept: RADIOLOGY | Facility: HOSPITAL | Age: 38
Discharge: HOME | End: 2018-10-09
Payer: COMMERCIAL

## 2018-10-09 DIAGNOSIS — R79.89 ABNORMAL THYROID BLOOD TEST: ICD-10-CM

## 2018-10-09 PROCEDURE — 76536 US EXAM OF HEAD AND NECK: CPT

## 2018-10-11 ENCOUNTER — OFFICE VISIT (OUTPATIENT)
Dept: OBGYN CLINIC | Facility: CLINIC | Age: 38
End: 2018-10-11
Payer: COMMERCIAL

## 2018-10-11 VITALS
SYSTOLIC BLOOD PRESSURE: 122 MMHG | WEIGHT: 139 LBS | DIASTOLIC BLOOD PRESSURE: 76 MMHG | HEIGHT: 63 IN | BODY MASS INDEX: 24.63 KG/M2

## 2018-10-11 DIAGNOSIS — N76.0 BACTERIAL VAGINOSIS: Primary | ICD-10-CM

## 2018-10-11 DIAGNOSIS — B96.89 BACTERIAL VAGINOSIS: Primary | ICD-10-CM

## 2018-10-11 DIAGNOSIS — E05.90 HYPERTHYROIDISM: Primary | ICD-10-CM

## 2018-10-11 DIAGNOSIS — G89.18 POST-OP PAIN: ICD-10-CM

## 2018-10-11 PROCEDURE — 99213 OFFICE O/P EST LOW 20 MIN: CPT | Performed by: OBSTETRICS & GYNECOLOGY

## 2018-10-11 RX ORDER — POLYETHYLENE GLYCOL-3350, SODIUM CHLORIDE, POTASSIUM CHLORIDE AND SODIUM BICARBONATE 420; 11.2; 5.72; 1.48 G/438.4G; G/438.4G; G/438.4G; G/438.4G
POWDER, FOR SOLUTION ORAL
Refills: 0 | COMMUNITY
Start: 2018-07-27 | End: 2018-11-21

## 2018-10-11 RX ORDER — METRONIDAZOLE 500 MG/1
500 TABLET ORAL 2 TIMES DAILY
Qty: 14 TABLET | Refills: 0 | Status: SHIPPED | OUTPATIENT
Start: 2018-10-11 | End: 2018-10-18

## 2018-10-11 RX ORDER — POLYETHYLENE GLYCOL 3350 17 G/17G
POWDER, FOR SOLUTION ORAL
Refills: 0 | COMMUNITY
Start: 2018-08-01 | End: 2018-11-27 | Stop reason: ALTCHOICE

## 2018-10-11 NOTE — PROGRESS NOTES
Chief Complaint   Patient presents with    Pre-op Exam     problem visit          Dorene Reddy presents for post op visit  Patient states she has started having vaginal bleeding yesterday which is sometimes heavy and sometimes light  She also is having some abdominal cramping  Not relieved with heating pad  She is also having some vaginal discharge with odor  No itching or burning  She has not had intercourse since surgery  She says she takes a shower and it comes back within an hour  For the pain she has tried Tylenol without relief  She denies any fevers or chills  She is status post [facility] on 09/25/2018  She is doing well without complaints  She is eating well and passing flatus and having normal Bowel movements  Denies any fevers or chills, no nausea or vomiting  No urinary complaints  PHYSICAL EXAM:   /76 (BP Location: Left arm, Patient Position: Sitting, Cuff Size: Adult)   Ht 5' 3" (1 6 m)   Wt 63 kg (139 lb)   LMP 10/10/2018   BMI 24 62 kg/m²    Constitutional: Well-developed, well-nourished female in no acute distress   Respiratory: Clear to auscultation bilaterally  Good air movement with normal work of    breathing  Cardiovascular: Regular rate and rhythm  Extremities grossly normal, nontender with no edema; pulses regular   Gastrointestinal: Soft, nontender, nondistended  No masses or hernias appreciated  No hepatosplenomegaly  No fluid wave  No rebound or guarding  Wound:  Clean, Dry, and intact, no erythema or induration, healing well   Back: No CVAT Bilaterally   EGBUS - wnl  Vagina - + odor, + brownish discharge, no bright red blood noted    Cuff - intact with sutures and good granulation tissue  Adnexa - No masses and NT bialterally     Chaperone was present for exam      Assessment/Plan:       Dorene Reddy is a 43 y o  female [id number] post-op from [facility]  Vaginal discharge with odor - will treat for presumed BV with flagyl and will send off BV and yeast swabs F/U in 1 week as scheduled  Precautions given to patient and advised to call with worsening pain or any fevers or new symptoms

## 2018-10-15 ENCOUNTER — TELEPHONE (OUTPATIENT)
Dept: OBGYN CLINIC | Facility: CLINIC | Age: 38
End: 2018-10-15

## 2018-10-15 DIAGNOSIS — R10.9 ABDOMINAL CRAMPING: Primary | ICD-10-CM

## 2018-10-15 LAB — SL AMB GENITAL CULTURE: ABNORMAL

## 2018-10-16 DIAGNOSIS — N89.8 VAGINAL DISCHARGE: Primary | ICD-10-CM

## 2018-10-16 RX ORDER — AMOXICILLIN 500 MG/1
500 TABLET, FILM COATED ORAL 2 TIMES DAILY
Qty: 7 TABLET | Refills: 0 | Status: SHIPPED | OUTPATIENT
Start: 2018-10-16 | End: 2018-10-23

## 2018-10-16 RX ORDER — NAPROXEN 500 MG/1
500 TABLET ORAL 2 TIMES DAILY WITH MEALS
Qty: 30 TABLET | Refills: 0 | OUTPATIENT
Start: 2018-10-16 | End: 2018-11-27 | Stop reason: ALTCHOICE

## 2018-10-18 ENCOUNTER — OFFICE VISIT (OUTPATIENT)
Dept: OBGYN CLINIC | Facility: CLINIC | Age: 38
End: 2018-10-18

## 2018-10-18 VITALS
BODY MASS INDEX: 23.74 KG/M2 | SYSTOLIC BLOOD PRESSURE: 132 MMHG | DIASTOLIC BLOOD PRESSURE: 88 MMHG | HEIGHT: 63 IN | WEIGHT: 134 LBS

## 2018-10-18 DIAGNOSIS — Z48.816 AFTERCARE FOLLOWING SURGERY OF THE GENITOURINARY SYSTEM: ICD-10-CM

## 2018-10-18 DIAGNOSIS — Z90.710 S/P LAPAROSCOPIC ASSISTED VAGINAL HYSTERECTOMY (LAVH): Primary | ICD-10-CM

## 2018-10-18 LAB
A VAGINAE DNA VAG NAA+PROBE-LOG#: ABNORMAL
A VAGINAE DNA VAG QL NAA+PROBE: DETECTED
G VAGINALIS DNA SPEC QL NAA+PROBE: DETECTED
G VAGINALIS DNA VAG NAA+PROBE-LOG#: ABNORMAL
SL AMB MOBILIUNCUS (SPECIES) BY RT PCR: NOT DETECTED

## 2018-10-18 PROCEDURE — 99024 POSTOP FOLLOW-UP VISIT: CPT | Performed by: OBSTETRICS & GYNECOLOGY

## 2018-10-18 RX ORDER — NAPROXEN 500 MG/1
TABLET ORAL
Refills: 0 | COMMUNITY
Start: 2018-10-15 | End: 2018-11-21

## 2018-10-18 NOTE — PROGRESS NOTES
Assessment/Plan:    Normal postoperative exam, status post laparoscopic-assisted vaginal hysterectomy with bilateral salpingectomy  Released to increase activity but maintain pelvic rest  Return to office today for continued postop surveillance       Problem List Items Addressed This Visit     S/P laparoscopic assisted vaginal hysterectomy (LAVH) - Primary    Aftercare following surgery of the genitourinary system            Subjective:      Patient ID: Ren Deleon is a 43 y o  female  Lurlene Pointer is here today for postoperative visit  She underwent laparoscopic-assisted vaginal hysterectomy with bilateral salpingectomy of approximately 3-4 weeks ago without complication  She is still complaining of a moderate amount of pain particularly in the lower left side of her stomach  She says it comes and goes and does not seem to have any relation to anything although it may be related to some bowel issues  She is voiding well and has had no other complaints  She has no menopausal complaints  She has been increasing activity in spite of the intermittent pain that she has been experiencing and has been encouraged to continue treating the pain with over-the-counter medications  The following portions of the patient's history were reviewed and updated as appropriate: allergies, current medications, past family history, past medical history, past social history, past surgical history and problem list     Review of Systems   Constitutional: Negative for chills, fatigue, fever and unexpected weight change  HENT: Negative for dental problem, sinus pain and sinus pressure  Eyes: Negative for visual disturbance  Respiratory: Negative for cough, shortness of breath and wheezing  Cardiovascular: Negative for chest pain and leg swelling  Gastrointestinal: Negative for constipation, diarrhea, nausea and vomiting  Genitourinary: Negative for menstrual problem, pelvic pain and urgency     Musculoskeletal: Negative for back pain  Allergic/Immunologic: Negative for environmental allergies  Neurological: Negative for dizziness and headaches  Objective:      LMP 10/10/2018          Physical Exam   Constitutional: She appears well-developed  No distress  Thin black female   HENT:   Head: Normocephalic and atraumatic  Abdominal: Soft  She exhibits no distension and no mass  There is no tenderness  There is no rebound and no guarding  Incisions are healing well, there is no discomfort elicited on palpation of the left lower quadrant or any portions of the abdomen   Genitourinary:   Genitourinary Comments: Normal female, no vulvar lesions the vaginal cuff is intact and appears to be healing well with still not/stitches visible  There is a small amount of vaginal discharge consistent with healing  Bimanual exam reveals no palpable masses or abnormalities at the level of the cuff and the exam is essentially nontender except to deep palpation   Vitals reviewed

## 2018-10-29 ENCOUNTER — TELEPHONE (OUTPATIENT)
Dept: INTERNAL MEDICINE CLINIC | Facility: CLINIC | Age: 38
End: 2018-10-29

## 2018-10-29 ENCOUNTER — TELEPHONE (OUTPATIENT)
Dept: FAMILY MEDICINE CLINIC | Facility: CLINIC | Age: 38
End: 2018-10-29

## 2018-10-29 NOTE — TELEPHONE ENCOUNTER
Paty Abdul from Dr Izzy Worthy office called to see if there is any way we can move up the Endocrinology appointment  Patient is scheduled 5/1/19  According to notes patient lost 40lbs within 3 months  Please review and let me know if we can move up this appointment

## 2018-10-29 NOTE — TELEPHONE ENCOUNTER
She can see any endocrinologist in the area that her insurance allows her to see it doesn't have to be [facility]    Let me know

## 2018-10-30 DIAGNOSIS — E05.90 HYPERTHYROIDISM: Primary | ICD-10-CM

## 2018-10-30 RX ORDER — METHIMAZOLE 5 MG/1
5 TABLET ORAL 3 TIMES DAILY
Qty: 90 TABLET | Refills: 0 | Status: SHIPPED | OUTPATIENT
Start: 2018-10-30 | End: 2018-10-30 | Stop reason: SDUPTHER

## 2018-10-30 RX ORDER — METHIMAZOLE 5 MG/1
5 TABLET ORAL 3 TIMES DAILY
Qty: 90 TABLET | Refills: 0 | Status: SHIPPED | OUTPATIENT
Start: 2018-10-30 | End: 2018-11-27 | Stop reason: SDUPTHER

## 2018-10-30 NOTE — TELEPHONE ENCOUNTER
Per Dr Goncalves Pod we can overbook the November schedule  I spoke with Oswald Kussmaul at Dr Gricelda Stafford office  They were able to get the patient in 10/30/18 at another office  Appointment no longer needed with us

## 2018-11-09 ENCOUNTER — TELEPHONE (OUTPATIENT)
Dept: OBGYN CLINIC | Facility: CLINIC | Age: 38
End: 2018-11-09

## 2018-11-19 ENCOUNTER — HOSPITAL ENCOUNTER (EMERGENCY)
Facility: HOSPITAL | Age: 38
Discharge: HOME | End: 2018-11-19
Attending: EMERGENCY MEDICINE | Admitting: EMERGENCY MEDICINE
Payer: COMMERCIAL

## 2018-11-19 ENCOUNTER — APPOINTMENT (EMERGENCY)
Dept: RADIOLOGY | Facility: HOSPITAL | Age: 38
End: 2018-11-19
Payer: COMMERCIAL

## 2018-11-19 ENCOUNTER — TELEPHONE (OUTPATIENT)
Dept: PHYSICAL THERAPY | Facility: OTHER | Age: 38
End: 2018-11-19

## 2018-11-19 ENCOUNTER — NURSE TRIAGE (OUTPATIENT)
Dept: PHYSICAL THERAPY | Facility: OTHER | Age: 38
End: 2018-11-19

## 2018-11-19 VITALS
BODY MASS INDEX: 24.56 KG/M2 | OXYGEN SATURATION: 98 % | WEIGHT: 138.67 LBS | DIASTOLIC BLOOD PRESSURE: 90 MMHG | HEART RATE: 83 BPM | TEMPERATURE: 98 F | SYSTOLIC BLOOD PRESSURE: 137 MMHG | RESPIRATION RATE: 18 BRPM

## 2018-11-19 DIAGNOSIS — M79.645 PAIN OF LEFT MIDDLE FINGER: ICD-10-CM

## 2018-11-19 DIAGNOSIS — M54.2 NECK AND SHOULDER PAIN: Primary | ICD-10-CM

## 2018-11-19 DIAGNOSIS — M25.531 WRIST PAIN, ACUTE, RIGHT: ICD-10-CM

## 2018-11-19 DIAGNOSIS — M25.512 SHOULDER PAIN, LEFT: Primary | ICD-10-CM

## 2018-11-19 DIAGNOSIS — M25.519 NECK AND SHOULDER PAIN: Primary | ICD-10-CM

## 2018-11-19 DIAGNOSIS — R07.81 RIB PAIN ON LEFT SIDE: ICD-10-CM

## 2018-11-19 PROCEDURE — 73110 X-RAY EXAM OF WRIST: CPT

## 2018-11-19 PROCEDURE — 72040 X-RAY EXAM NECK SPINE 2-3 VW: CPT

## 2018-11-19 PROCEDURE — 71101 X-RAY EXAM UNILAT RIBS/CHEST: CPT

## 2018-11-19 PROCEDURE — 73030 X-RAY EXAM OF SHOULDER: CPT

## 2018-11-19 PROCEDURE — 99284 EMERGENCY DEPT VISIT MOD MDM: CPT

## 2018-11-19 RX ORDER — NAPROXEN 500 MG/1
500 TABLET ORAL 2 TIMES DAILY WITH MEALS
Qty: 30 TABLET | Refills: 0 | Status: SHIPPED | OUTPATIENT
Start: 2018-11-19 | End: 2018-11-19

## 2018-11-19 RX ORDER — TRAMADOL HYDROCHLORIDE 50 MG/1
50 TABLET ORAL ONCE
Status: COMPLETED | OUTPATIENT
Start: 2018-11-19 | End: 2018-11-19

## 2018-11-19 RX ORDER — NAPROXEN 500 MG/1
500 TABLET ORAL 2 TIMES DAILY WITH MEALS
Qty: 30 TABLET | Refills: 0 | Status: SHIPPED | OUTPATIENT
Start: 2018-11-19 | End: 2018-11-21

## 2018-11-19 RX ORDER — METHOCARBAMOL 500 MG/1
500 TABLET, FILM COATED ORAL 2 TIMES DAILY
Qty: 20 TABLET | Refills: 0 | Status: SHIPPED | OUTPATIENT
Start: 2018-11-19 | End: 2018-11-21 | Stop reason: ALTCHOICE

## 2018-11-19 RX ORDER — METHOCARBAMOL 500 MG/1
500 TABLET, FILM COATED ORAL ONCE
Status: COMPLETED | OUTPATIENT
Start: 2018-11-19 | End: 2018-11-19

## 2018-11-19 RX ORDER — METHOCARBAMOL 500 MG/1
500 TABLET, FILM COATED ORAL 2 TIMES DAILY
Qty: 20 TABLET | Refills: 0 | Status: SHIPPED | OUTPATIENT
Start: 2018-11-19 | End: 2018-11-19

## 2018-11-19 RX ORDER — LIDOCAINE 50 MG/G
2 PATCH TOPICAL ONCE
Status: DISCONTINUED | OUTPATIENT
Start: 2018-11-19 | End: 2018-11-19 | Stop reason: HOSPADM

## 2018-11-19 RX ADMIN — TRAMADOL HYDROCHLORIDE 50 MG: 50 TABLET, COATED ORAL at 09:42

## 2018-11-19 RX ADMIN — METHOCARBAMOL TABLETS 500 MG: 500 TABLET, COATED ORAL at 09:42

## 2018-11-19 RX ADMIN — LIDOCAINE 2 PATCH: 50 PATCH CUTANEOUS at 11:08
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
## 2018-11-19 NOTE — DISCHARGE INSTRUCTIONS
Arthralgia   WHAT YOU NEED TO KNOW:   Arthralgia is pain in one or more joints, with no inflammation  It may be short-term and get better within 6 to 8 weeks  Arthralgia can be an early sign of arthritis  Arthralgia may be caused by a medical condition, such as a hormone disorder or a tumor  It may also be caused by an infection or injury  DISCHARGE INSTRUCTIONS:   Medicines: The following medicines may  be ordered for you:  · Acetaminophen  decreases pain  Ask how much to take and how often to take it  Follow directions  Acetaminophen can cause liver damage if not taken correctly  · NSAIDs  decrease pain and prevent swelling  Ask your healthcare provider which medicine is right for you  Ask how much to take and when to take it  Take as directed  NSAIDs can cause stomach bleeding and kidney problems if not taken correctly  · Pain relief cream  decreases pain  Use this cream as directed  · Take your medicine as directed  Contact your healthcare provider if you think your medicine is not helping or if you have side effects  Tell him of her if you are allergic to any medicine  Keep a list of the medicines, vitamins, and herbs you take  Include the amounts, and when and why you take them  Bring the list or the pill bottles to follow-up visits  Carry your medicine list with you in case of an emergency  Follow up with your healthcare provider or specialist as directed:  Write down your questions so you remember to ask them during your visits  Self-care:   · Apply heat  to help decrease pain  Use a heating pad or heat wrap  Apply heat for 20 to 30 minutes every 2 hours for as many days as directed  · Rest  as much as possible  Avoid activities that cause joint pain  · Apply ice  to help decrease swelling and pain  Ice may also help prevent tissue damage  Use an ice pack, or put crushed ice in a plastic bag   Cover it with a towel and place it on your painful joint for 15 to 20 minutes every hour or as directed  · Support  the joint with a brace or elastic wrap as directed  · Elevate  your joint above the level of your heart as often as you can to help decrease swelling and pain  Prop your painful joint on pillows or blankets to keep it elevated comfortably  · Lose weight  if you are overweight  Extra weight can put pressure on your joints and cause more pain  Ask your healthcare provider how much you should weigh  Ask him to help you create a weight loss plan  · Exercise  regularly to help improve joint movement and to decrease pain  Ask about the best exercise plan for you  Low-impact exercises can help take the pressure off your joints  Examples are walking, swimming, and water aerobics  Physical therapy:  A physical therapist teaches you exercises to help improve movement and strength, and to decrease pain  Ask your healthcare provider if physical therapy is right for you  Contact your healthcare provider or specialist if:   · You have a fever  · You continue to have joint pain that cannot be relieved with heat, ice, or medicine  · You have pain and inflammation around your joint  · You have questions or concerns about your condition or care  Return to the emergency department if:   · You have sudden, severe pain when you move your joint  · You have a fever and shaking chills  · You cannot move your joint  · You lose feeling on the side of your body where you have the painful joint  © 2017 [employer]  Information is for End User's use only and may not be sold, redistributed or otherwise used for commercial purposes  All illustrations and images included in CareNotes® are the copyrighted property of [employer]  or [employer]  The above information is an  only  It is not intended as medical advice for individual conditions or treatments   Talk to your doctor, nurse or pharmacist before following any medical regimen to see if it is safe and effective for you  Upper Back Exercises   WHAT YOU NEED TO KNOW:   What do I need to know about upper back exercises? Upper back exercises help heal and strengthen your back muscles and prevent another injury  Ask your healthcare provider if you need to see a physical therapist for more advanced exercises  · Do the exercises on a mat or firm surface  (not on a bed) to support your spine  · Move slowly and smoothly  Avoid fast or jerky motions  · Breathe normally  Do not hold your breath  · Stop if you feel pain  It is normal to feel some discomfort at first  Regular exercise will help decrease your discomfort over time  How do I perform upper back exercises safely? Ask your healthcare provider which of the following exercises are best for you and how often to do them  · Head rolls:  Sit in a chair or stand  Bring your chin toward your chest and roll your head to the right  Your ear should be over your shoulder  Hold this position for 5 seconds  Roll your head back toward your chest and to the left  Your ear should be over your left shoulder  Hold this position for 5 seconds  Next, roll your head back slowly in a clockwise [place] and repeat 3 times  Do 3 sets of head rolls  · Scapular squeeze:  Sit or stand with your arms at your sides  Squeeze your shoulder blades together and hold for 3 seconds  Relax and repeat 3 times  · Pectoralis stretch:   a doorway  Lift your hands and place them on each side of the door frame or wall slightly higher than your head  Lean forward slowly until you feel a gentle stretch  Hold for 15 seconds  Repeat 3 times, or as directed  · Cat and camel exercise:  Place your hands and knees on the floor  Arch your back upward toward the ceiling and lower your head  Round out your spine as much as you can  Hold for 5 seconds  Lift your head upward and push your chest downward toward the floor  Hold for 5 seconds  Do 3 sets or as directed  · Bird dog:  Place your hands and knees on the floor  Keep your wrists directly below your shoulders and your knees directly below your hips  Pull your belly button in toward your spine  Do not flatten or arch your back  Tighten your abdominal muscles  Raise one arm straight out so that it is aligned with your head  Next, raise the leg opposite your arm  Hold this position for 15 seconds  Lower your arm and leg slowly and change sides  Do 5 sets  When should I seek immediate care? · You have severe pain that prevents you from moving  When should I contact my healthcare provider? · Your pain becomes worse  · You have new pain  · You have questions or concerns about your condition, care, or exercise program   CARE AGREEMENT:   You have the right to help plan your care  Learn about your health condition and how it may be treated  Discuss treatment options with your caregivers to decide what care you want to receive  You always have the right to refuse treatment  The above information is an  only  It is not intended as medical advice for individual conditions or treatments  Talk to your doctor, nurse or pharmacist before following any medical regimen to see if it is safe and effective for you  © 2017 [employer]  Information is for End User's use only and may not be sold, redistributed or otherwise used for commercial purposes  All illustrations and images included in CareNotes® are the copyrighted property of A D [employer] M [employer] Inc  or [employer]

## 2018-11-20 ENCOUNTER — TELEPHONE (OUTPATIENT)
Dept: PSYCHIATRY | Facility: CLINIC | Age: 38
End: 2018-11-20

## 2018-11-20 ENCOUNTER — EVALUATION (OUTPATIENT)
Dept: PHYSICAL THERAPY | Facility: CLINIC | Age: 38
End: 2018-11-20
Payer: COMMERCIAL

## 2018-11-20 ENCOUNTER — TELEPHONE (OUTPATIENT)
Dept: FAMILY MEDICINE CLINIC | Facility: CLINIC | Age: 38
End: 2018-11-20

## 2018-11-20 DIAGNOSIS — M54.2 NECK AND SHOULDER PAIN: Primary | ICD-10-CM

## 2018-11-20 DIAGNOSIS — M54.2 NECK PAIN: ICD-10-CM

## 2018-11-20 DIAGNOSIS — G89.29 OTHER CHRONIC PAIN: Primary | ICD-10-CM

## 2018-11-20 DIAGNOSIS — M25.519 NECK AND SHOULDER PAIN: Primary | ICD-10-CM

## 2018-11-20 PROCEDURE — G8991 OTHER PT/OT GOAL STATUS: HCPCS | Performed by: PHYSICAL THERAPIST

## 2018-11-20 PROCEDURE — G8990 OTHER PT/OT CURRENT STATUS: HCPCS | Performed by: PHYSICAL THERAPIST

## 2018-11-20 PROCEDURE — 97162 PT EVAL MOD COMPLEX 30 MIN: CPT | Performed by: PHYSICAL THERAPIST

## 2018-11-20 RX ORDER — TRAMADOL HYDROCHLORIDE 50 MG/1
50 TABLET ORAL EVERY 6 HOURS PRN
Qty: 30 TABLET | Refills: 0 | Status: SHIPPED | OUTPATIENT
Start: 2018-11-20 | End: 2018-11-28 | Stop reason: SDUPTHER

## 2018-11-20 RX ORDER — TRAMADOL HYDROCHLORIDE 50 MG/1
50 TABLET ORAL EVERY 6 HOURS PRN
Qty: 30 TABLET | Refills: 0 | Status: SHIPPED | OUTPATIENT
Start: 2018-11-20 | End: 2018-11-20 | Stop reason: SDUPTHER

## 2018-11-20 NOTE — TELEPHONE ENCOUNTER
I got a call from PT that she is in a lot of pain- did she try muscle relaxer and naproxen and lidocaine patches given by the ER? Does she need something stronger?

## 2018-11-20 NOTE — TELEPHONE ENCOUNTER
Behavorial Health Outpatient Intake Questions    Referred by: [address]    Check with provider before scheduling    Are there any developmental disabilities? No    Does the patient have hearing impairment? No    Does the patient have ICM or CTT? No    Taking injectable psychiatric medications? NoIf yes, patient can not be seen here  Has the patient ever seen or currently see a psychiatrist? No If yes who/when? Has the patient ever seen or currently see a therapist? No If yes who/when? How many visits did the pt have for previous psychiatric treatment?  History    Has the patient served in the [place] [address]? No    If yes, have you had combat services? No    Was the patient activated into federal active duty as a member of the national guard or reserve? No    Minor Child    Who has custody of the child? Is there a custody agreement? If there is a custody agreement remind parent that they must bring a copy to the first appt or they will not be seen  [facility]     Presenting Problem (in patient's words) REFERRED DUE TO CHRONIC PAIN  DOES WANT TO TALK TO A THERAPIST  REPORTS NO MENTAL HEALTH HISTORY    Substance Abuse:No concerns of substance abuse are reported  Has the patient been seen here previously, either inpatient or outpatient? No outpatient    If seen as outpatient, what provider(s) did the patient see? N/A    A member of the patient's family has been in therapy here with NO    ACCEPTED as a patient Yes Appointment Date: 12/11/18 @ 10:00AM  GREER JONES    Referred Elsewhere?  No    Primary Care Physician: Alea Wood MD    PCP telephone number: [phone]    SUB: [facility]  INS: [facility]  ID: [id number]
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
## 2018-11-20 NOTE — TELEPHONE ENCOUNTER
Spoke to patient and she said she is in a lot of pain and wanted to know if you really want to see her today   Please advise

## 2018-11-21 ENCOUNTER — OFFICE VISIT (OUTPATIENT)
Dept: PAIN MEDICINE | Facility: CLINIC | Age: 38
End: 2018-11-21
Payer: COMMERCIAL

## 2018-11-21 VITALS
BODY MASS INDEX: 24.45 KG/M2 | WEIGHT: 138 LBS | SYSTOLIC BLOOD PRESSURE: 130 MMHG | HEIGHT: 63 IN | DIASTOLIC BLOOD PRESSURE: 100 MMHG | HEART RATE: 88 BPM

## 2018-11-21 DIAGNOSIS — H54.62 VISION LOSS OF LEFT EYE: ICD-10-CM

## 2018-11-21 DIAGNOSIS — S19.80XD TRAUMA OF SOFT TISSUE OF NECK, SUBSEQUENT ENCOUNTER: Primary | ICD-10-CM

## 2018-11-21 DIAGNOSIS — M62.838 CERVICAL PARASPINAL MUSCLE SPASM: ICD-10-CM

## 2018-11-21 PROCEDURE — 99244 OFF/OP CNSLTJ NEW/EST MOD 40: CPT | Performed by: PHYSICIAN ASSISTANT

## 2018-11-21 RX ORDER — BACLOFEN 10 MG/1
10 TABLET ORAL 3 TIMES DAILY
Qty: 30 TABLET | Refills: 0 | Status: SHIPPED | OUTPATIENT
Start: 2018-11-21 | End: 2019-06-10
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
## 2018-11-27 ENCOUNTER — OFFICE VISIT (OUTPATIENT)
Dept: FAMILY MEDICINE CLINIC | Facility: CLINIC | Age: 38
End: 2018-11-27
Payer: COMMERCIAL

## 2018-11-27 VITALS
BODY MASS INDEX: 24.8 KG/M2 | HEART RATE: 71 BPM | DIASTOLIC BLOOD PRESSURE: 80 MMHG | RESPIRATION RATE: 12 BRPM | SYSTOLIC BLOOD PRESSURE: 120 MMHG | OXYGEN SATURATION: 99 % | WEIGHT: 140 LBS

## 2018-11-27 DIAGNOSIS — M89.8X1 CLAVICLE PAIN: ICD-10-CM

## 2018-11-27 DIAGNOSIS — M25.512 ACUTE PAIN OF LEFT SHOULDER: Primary | ICD-10-CM

## 2018-11-27 DIAGNOSIS — D50.0 IRON DEFICIENCY ANEMIA DUE TO CHRONIC BLOOD LOSS: ICD-10-CM

## 2018-11-27 DIAGNOSIS — E05.90 HYPERTHYROIDISM: ICD-10-CM

## 2018-11-27 PROCEDURE — 99214 OFFICE O/P EST MOD 30 MIN: CPT | Performed by: FAMILY MEDICINE

## 2018-11-27 RX ORDER — METHIMAZOLE 5 MG/1
5 TABLET ORAL 3 TIMES DAILY
Qty: 90 TABLET | Refills: 0 | Status: SHIPPED | OUTPATIENT
Start: 2018-11-27 | End: 2019-02-07 | Stop reason: SDUPTHER

## 2018-11-27 RX ORDER — FERROUS SULFATE TAB EC 324 MG (65 MG FE EQUIVALENT) 324 (65 FE) MG
324 TABLET DELAYED RESPONSE ORAL
Qty: 60 TABLET | Refills: 0 | Status: SHIPPED | OUTPATIENT
Start: 2018-11-27 | End: 2018-12-10 | Stop reason: SDUPTHER

## 2018-11-27 RX ORDER — CYCLOBENZAPRINE HCL 10 MG
10 TABLET ORAL 3 TIMES DAILY PRN
Qty: 30 TABLET | Refills: 0 | Status: SHIPPED | OUTPATIENT
Start: 2018-11-27 | End: 2019-03-25 | Stop reason: ALTCHOICE
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
## 2018-11-28 ENCOUNTER — OFFICE VISIT (OUTPATIENT)
Dept: OBGYN CLINIC | Facility: CLINIC | Age: 38
End: 2018-11-28
Payer: COMMERCIAL

## 2018-11-28 ENCOUNTER — APPOINTMENT (OUTPATIENT)
Dept: RADIOLOGY | Facility: CLINIC | Age: 38
End: 2018-11-28
Payer: COMMERCIAL

## 2018-11-28 VITALS — SYSTOLIC BLOOD PRESSURE: 136 MMHG | HEART RATE: 70 BPM | DIASTOLIC BLOOD PRESSURE: 89 MMHG

## 2018-11-28 DIAGNOSIS — M25.512 LEFT SHOULDER PAIN, UNSPECIFIED CHRONICITY: Primary | ICD-10-CM

## 2018-11-28 DIAGNOSIS — M25.512 LEFT SHOULDER PAIN, UNSPECIFIED CHRONICITY: ICD-10-CM

## 2018-11-28 DIAGNOSIS — S42.025A CLOSED NONDISPLACED FRACTURE OF SHAFT OF LEFT CLAVICLE, INITIAL ENCOUNTER: ICD-10-CM

## 2018-11-28 DIAGNOSIS — M54.2 NECK PAIN: ICD-10-CM

## 2018-11-28 PROCEDURE — 99204 OFFICE O/P NEW MOD 45 MIN: CPT | Performed by: ORTHOPAEDIC SURGERY

## 2018-11-28 PROCEDURE — 73030 X-RAY EXAM OF SHOULDER: CPT

## 2018-11-28 RX ORDER — TRAMADOL HYDROCHLORIDE 50 MG/1
50 TABLET ORAL EVERY 6 HOURS PRN
Qty: 30 TABLET | Refills: 0 | Status: SHIPPED | OUTPATIENT
Start: 2018-11-28 | End: 2019-02-07 | Stop reason: ALTCHOICE

## 2018-12-05 ENCOUNTER — APPOINTMENT (EMERGENCY)
Dept: CT IMAGING | Facility: HOSPITAL | Age: 38
End: 2018-12-05
Payer: COMMERCIAL

## 2018-12-05 ENCOUNTER — HOSPITAL ENCOUNTER (EMERGENCY)
Facility: HOSPITAL | Age: 38
Discharge: LEFT BEFORE DISCHARGE | End: 2018-12-05
Attending: EMERGENCY MEDICINE
Payer: COMMERCIAL

## 2018-12-05 VITALS
DIASTOLIC BLOOD PRESSURE: 94 MMHG | SYSTOLIC BLOOD PRESSURE: 140 MMHG | RESPIRATION RATE: 17 BRPM | TEMPERATURE: 98.4 F | WEIGHT: 141.09 LBS | HEART RATE: 94 BPM | OXYGEN SATURATION: 97 % | BODY MASS INDEX: 24.99 KG/M2

## 2018-12-05 DIAGNOSIS — R51.9 FACIAL PAIN: Primary | ICD-10-CM

## 2018-12-05 PROCEDURE — 99284 EMERGENCY DEPT VISIT MOD MDM: CPT

## 2018-12-06 NOTE — ED NOTES
PT refused CT scan  Reports "I have been here over three hours this is ridiculous I am leaving"  PT refused d/c paperwork when provider offered       Amanda Caicedo RN  12/05/18 2048

## 2018-12-06 NOTE — ED NOTES
PT awake and alert, PT had steady gait  No distress noted       April Mackenzie Mayers RN  12/05/18 1424

## 2018-12-10 DIAGNOSIS — D50.0 IRON DEFICIENCY ANEMIA DUE TO CHRONIC BLOOD LOSS: ICD-10-CM

## 2018-12-10 RX ORDER — FERROUS SULFATE TAB EC 324 MG (65 MG FE EQUIVALENT) 324 (65 FE) MG
324 TABLET DELAYED RESPONSE ORAL
Qty: 60 TABLET | Refills: 0 | Status: SHIPPED | OUTPATIENT
Start: 2018-12-10 | End: 2019-02-07 | Stop reason: ALTCHOICE

## 2018-12-12 ENCOUNTER — TELEPHONE (OUTPATIENT)
Dept: OBGYN CLINIC | Facility: CLINIC | Age: 38
End: 2018-12-12

## 2018-12-13 ENCOUNTER — OFFICE VISIT (OUTPATIENT)
Dept: OBGYN CLINIC | Facility: CLINIC | Age: 38
End: 2018-12-13
Payer: COMMERCIAL

## 2018-12-13 VITALS
WEIGHT: 141 LBS | SYSTOLIC BLOOD PRESSURE: 138 MMHG | DIASTOLIC BLOOD PRESSURE: 84 MMHG | BODY MASS INDEX: 24.98 KG/M2 | HEIGHT: 63 IN

## 2018-12-13 DIAGNOSIS — Z90.710 S/P LAPAROSCOPIC ASSISTED VAGINAL HYSTERECTOMY (LAVH): ICD-10-CM

## 2018-12-13 DIAGNOSIS — Z48.816 AFTERCARE FOLLOWING SURGERY OF THE GENITOURINARY SYSTEM: ICD-10-CM

## 2018-12-13 DIAGNOSIS — N92.4 PREMENOPAUSAL POSTCOITAL BLEEDING: ICD-10-CM

## 2018-12-13 DIAGNOSIS — N93.0 PREMENOPAUSAL POSTCOITAL BLEEDING: ICD-10-CM

## 2018-12-13 DIAGNOSIS — N89.8 FOUL SMELLING VAGINAL DISCHARGE: ICD-10-CM

## 2018-12-13 DIAGNOSIS — N89.8 GRANULATION TISSUE OF VAGINAL CUFF: Primary | ICD-10-CM

## 2018-12-13 PROCEDURE — 57061 DESTRUCTION VAG LESIONS SMPL: CPT | Performed by: OBSTETRICS & GYNECOLOGY

## 2018-12-13 NOTE — PROGRESS NOTES
Assessment/Plan:    Postcoital bleeding, status post LAVH, due to small area of granulation tissue on the vaginal cuff  Granulation tissue is treated using silver nitrate sticks  Will reassess in 1-2 weeks for further treatment if needed  Patient tolerated the procedure well       Problem List Items Addressed This Visit     S/P laparoscopic assisted vaginal hysterectomy (LAVH)    Aftercare following surgery of the genitourinary system    Premenopausal postcoital bleeding    Granulation tissue of vaginal cuff - Primary      Other Visit Diagnoses     Foul smelling vaginal discharge        Relevant Orders    GP Trichomonas By Multiplex PCR (Completed)    GP Culture, Genital            Subjective:      Patient ID: Jonathan Oden is a 43 y o  female  Jud Velásquez is here today with complaints of vaginal bleeding after intercourse  Initially it was bright red and then turn to brown she has had none after the few days postcoital   She is about 2 months status post LAVH without any other complaints or complications  She had had no other bleeding or discharge until this episode  She denies any bowel or bladder complaints  She denies any abdominal pain and/or cramping  The following portions of the patient's history were reviewed and updated as appropriate: allergies, current medications, past family history, past medical history, past social history, past surgical history and problem list     Review of Systems   Constitutional: Negative for chills, fatigue, fever and unexpected weight change  HENT: Negative for dental problem, sinus pain and sinus pressure  Eyes: Negative for visual disturbance  Respiratory: Negative for cough, shortness of breath and wheezing  Cardiovascular: Negative for chest pain and leg swelling  Gastrointestinal: Negative for constipation, diarrhea, nausea and vomiting  Genitourinary: Negative for menstrual problem, pelvic pain and urgency     Musculoskeletal: Negative for back pain    Allergic/Immunologic: Negative for environmental allergies  Neurological: Negative for dizziness and headaches  Objective:      LMP 09/18/2018          Physical Exam   Constitutional: She appears well-developed and well-nourished  No distress  Black female   Abdominal: Soft  She exhibits no distension and no mass  There is no tenderness  There is no rebound and no guarding  Genitourinary:   Genitourinary Comments: Normal female, no vulvar or vaginal lesions there is a vulvar piercing intact  The vaginal walls are well healed but at the cuff there is a very small approximately 1 cm area of granulation tissue which is friable to the touch  Bimanual exam reveals no palpable masses with the cervix and uterus being surgically absent  There are no adnexal masses  The exam is nontender  Vitals reviewed  Lesion Destruction  Date/Time: 12/24/2018 1:50 PM  Performed by: Michelle Burnett  Authorized by: Michelle Burnett     Procedure Details - Lesion Destruction:     Number of Lesions:  1  Lesion 1:     Skin lesion 1 location: vaginal cuff  Malignancy: granulation tissue      Destruction method: chemical removal      Destruction method comment:  Silver nitrate   Lesion 6:      The small 1 cm area of granulation tissue is noted at the top left side of the well-healed vaginal cuff  It was initially friable  It was touched with 2 sticks of silver nitrate with appropriate cauterization and no further bleeding  The patient tolerated the procedure well

## 2018-12-15 LAB — T VAGINALIS RRNA SPEC QL NAA+PROBE: NOT DETECTED

## 2018-12-17 LAB — SL AMB GENITAL CULTURE: NO GROWTH
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
## 2018-12-21 ENCOUNTER — OFFICE VISIT (OUTPATIENT)
Dept: OBGYN CLINIC | Facility: CLINIC | Age: 38
End: 2018-12-21
Payer: COMMERCIAL

## 2018-12-21 VITALS
WEIGHT: 143 LBS | DIASTOLIC BLOOD PRESSURE: 82 MMHG | BODY MASS INDEX: 25.34 KG/M2 | HEIGHT: 63 IN | SYSTOLIC BLOOD PRESSURE: 126 MMHG

## 2018-12-21 DIAGNOSIS — N89.8 GRANULATION TISSUE OF VAGINAL CUFF: Primary | ICD-10-CM

## 2018-12-21 PROCEDURE — 99213 OFFICE O/P EST LOW 20 MIN: CPT | Performed by: OBSTETRICS & GYNECOLOGY

## 2018-12-21 NOTE — PROGRESS NOTES
Assessment/Plan:    No further granulation tissue noted at the vaginal cuff  Released for normal activity  Return to office for annual exam earlier as needed       Problem List Items Addressed This Visit     Granulation tissue of vaginal cuff - Primary            Subjective:      Patient ID: Jonna Jamil is a 43 y o  female  Audrey Buffy returns for re-evaluation of vaginal cuff  She was in previously with an episode of vaginal bleeding after intercourse and the evaluation there revealed a small area of granulation tissue  This was treated her last appoint with silver nitrate  She says she has had no discharge or problems since that time  She denies any other issues at this time  The following portions of the patient's history were reviewed and updated as appropriate: allergies, current medications, past family history, past medical history, past social history, past surgical history and problem list     Review of Systems   Constitutional: Negative for chills, fatigue, fever and unexpected weight change  HENT: Negative for dental problem, sinus pain and sinus pressure  Eyes: Negative for visual disturbance  Respiratory: Negative for cough, shortness of breath and wheezing  Cardiovascular: Negative for chest pain and leg swelling  Gastrointestinal: Negative for constipation, diarrhea, nausea and vomiting  Genitourinary: Negative for menstrual problem, pelvic pain and urgency  Musculoskeletal: Negative for back pain  Allergic/Immunologic: Negative for environmental allergies  Neurological: Negative for dizziness and headaches  Objective:             Physical Exam   Constitutional: She appears well-developed and well-nourished  No distress  Black female   Genitourinary:   Genitourinary Comments: Normal female no vulvar or vaginal lesions a vaginal piercing with bar is intact  Vaginal walls are unremarkable and the cuff is intact and well healed    There is no further granulation tissue noted  There is no friability noted upon rubbing with a procto swab  On exam there is no palpable tenderness and as noted before cervix and uterus are surgically absent  Vitals reviewed

## 2018-12-24 PROBLEM — N92.4 PREMENOPAUSAL POSTCOITAL BLEEDING: Status: RESOLVED | Noted: 2018-12-24 | Resolved: 2018-12-24

## 2018-12-24 PROBLEM — N93.0 PREMENOPAUSAL POSTCOITAL BLEEDING: Status: RESOLVED | Noted: 2018-12-24 | Resolved: 2018-12-24

## 2018-12-24 PROBLEM — N89.8 GRANULATION TISSUE OF VAGINAL CUFF: Status: ACTIVE | Noted: 2018-12-24

## 2018-12-24 PROBLEM — N92.4 PREMENOPAUSAL POSTCOITAL BLEEDING: Status: ACTIVE | Noted: 2018-12-24

## 2018-12-24 PROBLEM — Z48.816 AFTERCARE FOLLOWING SURGERY OF THE GENITOURINARY SYSTEM: Status: RESOLVED | Noted: 2018-09-29 | Resolved: 2018-12-24

## 2018-12-24 PROBLEM — Z90.710 S/P LAPAROSCOPIC ASSISTED VAGINAL HYSTERECTOMY (LAVH): Status: RESOLVED | Noted: 2018-09-29 | Resolved: 2018-12-24

## 2018-12-24 PROBLEM — N93.0 PREMENOPAUSAL POSTCOITAL BLEEDING: Status: ACTIVE | Noted: 2018-12-24

## 2018-12-24 PROCEDURE — 17250 CHEM CAUT OF GRANLTJ TISSUE: CPT | Performed by: OBSTETRICS & GYNECOLOGY

## 2019-02-07 ENCOUNTER — OFFICE VISIT (OUTPATIENT)
Dept: FAMILY MEDICINE CLINIC | Facility: CLINIC | Age: 39
End: 2019-02-07
Payer: COMMERCIAL

## 2019-02-07 VITALS
HEIGHT: 63 IN | SYSTOLIC BLOOD PRESSURE: 124 MMHG | OXYGEN SATURATION: 100 % | RESPIRATION RATE: 16 BRPM | TEMPERATURE: 98 F | BODY MASS INDEX: 25.52 KG/M2 | DIASTOLIC BLOOD PRESSURE: 80 MMHG | HEART RATE: 80 BPM | WEIGHT: 144 LBS

## 2019-02-07 DIAGNOSIS — D50.0 IRON DEFICIENCY ANEMIA DUE TO CHRONIC BLOOD LOSS: Primary | ICD-10-CM

## 2019-02-07 DIAGNOSIS — I47.1 SUPRAVENTRICULAR TACHYCARDIA (HCC): ICD-10-CM

## 2019-02-07 DIAGNOSIS — E05.90 HYPERTHYROIDISM: ICD-10-CM

## 2019-02-07 DIAGNOSIS — H54.62 VISION LOSS OF LEFT EYE: ICD-10-CM

## 2019-02-07 PROCEDURE — 99214 OFFICE O/P EST MOD 30 MIN: CPT | Performed by: FAMILY MEDICINE

## 2019-02-07 PROCEDURE — 3008F BODY MASS INDEX DOCD: CPT | Performed by: FAMILY MEDICINE

## 2019-02-07 RX ORDER — METHIMAZOLE 5 MG/1
5 TABLET ORAL 3 TIMES DAILY
Qty: 90 TABLET | Refills: 0 | Status: SHIPPED | OUTPATIENT
Start: 2019-02-07 | End: 2019-03-25 | Stop reason: DRUGHIGH

## 2019-02-07 NOTE — PROGRESS NOTES
Assessment/Plan:         Diagnoses and all orders for this visit:    Iron deficiency anemia due to chronic blood loss  -     Iron; Future  -     CBC and differential; Future    Hyperthyroidism  -     Ambulatory referral to Endocrinology  -     methimazole (TAPAZOLE) 5 mg tablet; Take 1 tablet (5 mg total) by mouth 3 (three) times a day  -     Comprehensive metabolic panel  -     Thyroglobulin Panel; Future  -     TSH, 3rd generation with Free T4 reflex; Future    Supraventricular tachycardia ([address] )  -     Ambulatory referral to Cardiology    Vision loss of left eye  -     MRI brain wo contrast; Future      to see an eye doctor   MRI brain as scheduled       Subjective:      Patient ID: Ren Deleon is a 35 y o  female  Palpitations   This is a chronic problem  The current episode started more than 1 month ago  The problem has been gradually worsening  Associated symptoms include a visual change  Pertinent negatives include no abdominal pain, anorexia, arthralgias, change in bowel habit, chest pain, chills, congestion, coughing, fatigue, fever, headaches, joint swelling, rash, sore throat or urinary symptoms  Here to f/u   Stopped taking her thyroid meds since she has a lot of issues at home  Seeing [facility] for her vision problems- cannot see out of her left eye on and off   Cant see Neurophthalmologist due to her insurance   Still having palpitations on and off   Not taking her iron pills due to stomach upset    The following portions of the patient's history were reviewed and updated as appropriate: allergies, current medications, past family history, past medical history, past social history, past surgical history and problem list     Review of Systems   Constitutional: Negative for chills, fatigue and fever  HENT: Negative for congestion and sore throat  Respiratory: Negative for cough  Cardiovascular: Negative for chest pain     Gastrointestinal: Negative for abdominal pain, anorexia and change in bowel habit  Musculoskeletal: Negative for arthralgias and joint swelling  Skin: Negative for rash  Neurological: Negative for headaches  Objective:      /80 (BP Location: Left arm, Patient Position: Sitting, Cuff Size: Standard)   Pulse 80   Temp 98 °F (36 7 °C)   Resp 16   Ht 5' 3" (1 6 m)   Wt 65 3 kg (144 lb)   SpO2 100%   BMI 25 51 kg/m²          Physical Exam   Constitutional: She appears well-developed and well-nourished  Eyes: EOM are normal  Left eye exhibits no discharge  Cardiovascular: Normal rate and regular rhythm  Pulmonary/Chest: Effort normal    Abdominal: She exhibits no distension  There is no tenderness  Psychiatric: She has a normal mood and affect   Her behavior is normal

## 2019-02-13 ENCOUNTER — TRANSCRIBE ORDERS (OUTPATIENT)
Dept: LAB | Facility: HOSPITAL | Age: 39
End: 2019-02-13

## 2019-02-13 ENCOUNTER — APPOINTMENT (OUTPATIENT)
Dept: LAB | Facility: HOSPITAL | Age: 39
End: 2019-02-13
Payer: COMMERCIAL

## 2019-02-13 DIAGNOSIS — E05.90 PRETIBIAL MYXEDEMA: Primary | ICD-10-CM

## 2019-02-13 DIAGNOSIS — D50.0 IRON DEFICIENCY ANEMIA DUE TO CHRONIC BLOOD LOSS: ICD-10-CM

## 2019-02-13 DIAGNOSIS — E05.90 HYPERTHYROIDISM: ICD-10-CM

## 2019-02-13 LAB
ALBUMIN SERPL BCP-MCNC: 3.4 G/DL (ref 3.5–5)
ALP SERPL-CCNC: 62 U/L (ref 46–116)
ALT SERPL W P-5'-P-CCNC: 13 U/L (ref 12–78)
ANION GAP SERPL CALCULATED.3IONS-SCNC: 3 MMOL/L (ref 4–13)
AST SERPL W P-5'-P-CCNC: 11 U/L (ref 5–45)
BASOPHILS # BLD AUTO: 0.03 THOUSANDS/ΜL (ref 0–0.1)
BASOPHILS NFR BLD AUTO: 1 % (ref 0–1)
BILIRUB SERPL-MCNC: 0.26 MG/DL (ref 0.2–1)
BUN SERPL-MCNC: 12 MG/DL (ref 5–25)
CALCIUM SERPL-MCNC: 8.8 MG/DL (ref 8.3–10.1)
CHLORIDE SERPL-SCNC: 106 MMOL/L (ref 100–108)
CO2 SERPL-SCNC: 29 MMOL/L (ref 21–32)
CREAT SERPL-MCNC: 0.86 MG/DL (ref 0.6–1.3)
EOSINOPHIL # BLD AUTO: 0.1 THOUSAND/ΜL (ref 0–0.61)
EOSINOPHIL NFR BLD AUTO: 2 % (ref 0–6)
ERYTHROCYTE [DISTWIDTH] IN BLOOD BY AUTOMATED COUNT: 12.8 % (ref 11.6–15.1)
GFR SERPL CREATININE-BSD FRML MDRD: 98 ML/MIN/1.73SQ M
GLUCOSE P FAST SERPL-MCNC: 81 MG/DL (ref 65–99)
HCT VFR BLD AUTO: 37.9 % (ref 34.8–46.1)
HGB BLD-MCNC: 12.6 G/DL (ref 11.5–15.4)
IMM GRANULOCYTES # BLD AUTO: 0.01 THOUSAND/UL (ref 0–0.2)
IMM GRANULOCYTES NFR BLD AUTO: 0 % (ref 0–2)
IRON SERPL-MCNC: 41 UG/DL (ref 50–170)
LYMPHOCYTES # BLD AUTO: 2.08 THOUSANDS/ΜL (ref 0.6–4.47)
LYMPHOCYTES NFR BLD AUTO: 39 % (ref 14–44)
MCH RBC QN AUTO: 29.4 PG (ref 26.8–34.3)
MCHC RBC AUTO-ENTMCNC: 33.2 G/DL (ref 31.4–37.4)
MCV RBC AUTO: 89 FL (ref 82–98)
MONOCYTES # BLD AUTO: 0.32 THOUSAND/ΜL (ref 0.17–1.22)
MONOCYTES NFR BLD AUTO: 6 % (ref 4–12)
NEUTROPHILS # BLD AUTO: 2.82 THOUSANDS/ΜL (ref 1.85–7.62)
NEUTS SEG NFR BLD AUTO: 52 % (ref 43–75)
NRBC BLD AUTO-RTO: 0 /100 WBCS
PLATELET # BLD AUTO: 212 THOUSANDS/UL (ref 149–390)
PMV BLD AUTO: 10.2 FL (ref 8.9–12.7)
POTASSIUM SERPL-SCNC: 3.6 MMOL/L (ref 3.5–5.3)
PROT SERPL-MCNC: 7 G/DL (ref 6.4–8.2)
RBC # BLD AUTO: 4.28 MILLION/UL (ref 3.81–5.12)
SODIUM SERPL-SCNC: 138 MMOL/L (ref 136–145)
T4 FREE SERPL-MCNC: 0.91 NG/DL (ref 0.76–1.46)
TSH SERPL DL<=0.05 MIU/L-ACNC: 0.27 UIU/ML (ref 0.36–3.74)
WBC # BLD AUTO: 5.36 THOUSAND/UL (ref 4.31–10.16)

## 2019-02-13 PROCEDURE — 84432 ASSAY OF THYROGLOBULIN: CPT

## 2019-02-13 PROCEDURE — 84439 ASSAY OF FREE THYROXINE: CPT

## 2019-02-13 PROCEDURE — 86800 THYROGLOBULIN ANTIBODY: CPT

## 2019-02-13 PROCEDURE — 85025 COMPLETE CBC W/AUTO DIFF WBC: CPT

## 2019-02-13 PROCEDURE — 80053 COMPREHEN METABOLIC PANEL: CPT | Performed by: FAMILY MEDICINE

## 2019-02-13 PROCEDURE — 83540 ASSAY OF IRON: CPT

## 2019-02-13 PROCEDURE — 84443 ASSAY THYROID STIM HORMONE: CPT

## 2019-02-13 PROCEDURE — 36415 COLL VENOUS BLD VENIPUNCTURE: CPT | Performed by: FAMILY MEDICINE

## 2019-02-14 LAB
THYROGLOB AB SERPL-ACNC: <1 IU/ML (ref 0–0.9)
THYROGLOB SERPL-MCNC: 46 NG/ML (ref 1.5–38.5)

## 2019-03-14 ENCOUNTER — APPOINTMENT (EMERGENCY)
Dept: RADIOLOGY | Facility: HOSPITAL | Age: 39
End: 2019-03-14
Payer: COMMERCIAL

## 2019-03-14 ENCOUNTER — APPOINTMENT (EMERGENCY)
Dept: CT IMAGING | Facility: HOSPITAL | Age: 39
End: 2019-03-14
Payer: COMMERCIAL

## 2019-03-14 ENCOUNTER — HOSPITAL ENCOUNTER (EMERGENCY)
Facility: HOSPITAL | Age: 39
Discharge: HOME | End: 2019-03-14
Attending: EMERGENCY MEDICINE | Admitting: EMERGENCY MEDICINE
Payer: COMMERCIAL

## 2019-03-14 VITALS
DIASTOLIC BLOOD PRESSURE: 100 MMHG | BODY MASS INDEX: 24.98 KG/M2 | OXYGEN SATURATION: 99 % | WEIGHT: 141 LBS | TEMPERATURE: 98 F | SYSTOLIC BLOOD PRESSURE: 156 MMHG | HEART RATE: 94 BPM | RESPIRATION RATE: 18 BRPM

## 2019-03-14 DIAGNOSIS — S20.219A CHEST WALL CONTUSION: ICD-10-CM
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
## 2019-03-14 DIAGNOSIS — S50.10XA: ICD-10-CM

## 2019-03-14 DIAGNOSIS — S00.83XA CONTUSION OF FACE, INITIAL ENCOUNTER: ICD-10-CM

## 2019-03-14 PROCEDURE — 99284 EMERGENCY DEPT VISIT MOD MDM: CPT

## 2019-03-14 PROCEDURE — 73090 X-RAY EXAM OF FOREARM: CPT

## 2019-03-14 PROCEDURE — 71250 CT THORAX DX C-: CPT

## 2019-03-14 PROCEDURE — 73060 X-RAY EXAM OF HUMERUS: CPT

## 2019-03-14 PROCEDURE — 96374 THER/PROPH/DIAG INJ IV PUSH: CPT

## 2019-03-14 RX ORDER — KETOROLAC TROMETHAMINE 30 MG/ML
15 INJECTION, SOLUTION INTRAMUSCULAR; INTRAVENOUS ONCE
Status: COMPLETED | OUTPATIENT
Start: 2019-03-14 | End: 2019-03-14

## 2019-03-14 RX ORDER — TRAMADOL HYDROCHLORIDE 50 MG/1
50 TABLET ORAL EVERY 6 HOURS PRN
Qty: 20 TABLET | Refills: 0 | Status: SHIPPED | OUTPATIENT
Start: 2019-03-14 | End: 2019-03-25

## 2019-03-14 RX ADMIN — KETOROLAC TROMETHAMINE 15 MG: 30 INJECTION, SOLUTION INTRAMUSCULAR; INTRAVENOUS at 12:41
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
## 2019-03-25 ENCOUNTER — OFFICE VISIT (OUTPATIENT)
Dept: FAMILY MEDICINE CLINIC | Facility: CLINIC | Age: 39
End: 2019-03-25
Payer: COMMERCIAL

## 2019-03-25 VITALS
OXYGEN SATURATION: 99 % | HEART RATE: 68 BPM | HEIGHT: 63 IN | DIASTOLIC BLOOD PRESSURE: 82 MMHG | RESPIRATION RATE: 16 BRPM | WEIGHT: 140 LBS | SYSTOLIC BLOOD PRESSURE: 122 MMHG | BODY MASS INDEX: 24.8 KG/M2 | TEMPERATURE: 98.3 F

## 2019-03-25 DIAGNOSIS — E05.90 HYPERTHYROIDISM: Primary | ICD-10-CM

## 2019-03-25 DIAGNOSIS — F41.9 ANXIETY: ICD-10-CM

## 2019-03-25 PROCEDURE — 1036F TOBACCO NON-USER: CPT | Performed by: FAMILY MEDICINE

## 2019-03-25 PROCEDURE — 99214 OFFICE O/P EST MOD 30 MIN: CPT | Performed by: FAMILY MEDICINE

## 2019-03-25 PROCEDURE — 3008F BODY MASS INDEX DOCD: CPT | Performed by: FAMILY MEDICINE

## 2019-03-25 RX ORDER — ESCITALOPRAM OXALATE 10 MG/1
10 TABLET ORAL DAILY
Qty: 30 TABLET | Refills: 0 | Status: SHIPPED | OUTPATIENT
Start: 2019-03-25 | End: 2019-05-15 | Stop reason: SDUPTHER

## 2019-03-25 RX ORDER — METHIMAZOLE 10 MG/1
10 TABLET ORAL 3 TIMES DAILY
Qty: 90 TABLET | Refills: 0 | Status: SHIPPED | OUTPATIENT
Start: 2019-03-25 | End: 2019-05-15 | Stop reason: ALTCHOICE
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
## 2019-05-01 ENCOUNTER — OFFICE VISIT (OUTPATIENT)
Dept: MULTI SPECIALTY CLINIC | Facility: CLINIC | Age: 39
End: 2019-05-01

## 2019-05-01 VITALS
DIASTOLIC BLOOD PRESSURE: 80 MMHG | BODY MASS INDEX: 24.31 KG/M2 | HEART RATE: 72 BPM | SYSTOLIC BLOOD PRESSURE: 102 MMHG | HEIGHT: 64 IN | WEIGHT: 142.42 LBS | TEMPERATURE: 98.2 F

## 2019-05-01 DIAGNOSIS — F41.9 ANXIETY: ICD-10-CM

## 2019-05-01 DIAGNOSIS — E05.90 HYPERTHYROIDISM: Primary | ICD-10-CM

## 2019-05-01 PROCEDURE — 99244 OFF/OP CNSLTJ NEW/EST MOD 40: CPT | Performed by: INTERNAL MEDICINE

## 2019-05-15 DIAGNOSIS — F41.9 ANXIETY: ICD-10-CM

## 2019-05-15 RX ORDER — ESCITALOPRAM OXALATE 10 MG/1
10 TABLET ORAL DAILY
Qty: 30 TABLET | Refills: 3 | Status: SHIPPED | OUTPATIENT
Start: 2019-05-15 | End: 2019-06-10 | Stop reason: SDUPTHER

## 2019-05-16 ENCOUNTER — TELEPHONE (OUTPATIENT)
Dept: FAMILY MEDICINE CLINIC | Facility: CLINIC | Age: 39
End: 2019-05-16

## 2019-06-10 ENCOUNTER — OFFICE VISIT (OUTPATIENT)
Dept: FAMILY MEDICINE CLINIC | Facility: CLINIC | Age: 39
End: 2019-06-10
Payer: COMMERCIAL

## 2019-06-10 VITALS
BODY MASS INDEX: 23.6 KG/M2 | WEIGHT: 138.2 LBS | SYSTOLIC BLOOD PRESSURE: 128 MMHG | HEIGHT: 64 IN | TEMPERATURE: 98.3 F | DIASTOLIC BLOOD PRESSURE: 80 MMHG | RESPIRATION RATE: 16 BRPM

## 2019-06-10 DIAGNOSIS — F41.9 ANXIETY: ICD-10-CM

## 2019-06-10 DIAGNOSIS — Z00.00 ANNUAL PHYSICAL EXAM: Primary | ICD-10-CM

## 2019-06-10 DIAGNOSIS — Z12.31 VISIT FOR SCREENING MAMMOGRAM: ICD-10-CM

## 2019-06-10 DIAGNOSIS — F32.89 OTHER DEPRESSION: ICD-10-CM

## 2019-06-10 PROCEDURE — 99396 PREV VISIT EST AGE 40-64: CPT | Performed by: FAMILY MEDICINE

## 2019-06-10 RX ORDER — ESCITALOPRAM OXALATE 10 MG/1
10 TABLET ORAL DAILY
Qty: 90 TABLET | Refills: 3 | Status: SHIPPED | OUTPATIENT
Start: 2019-06-10 | End: 2019-07-10 | Stop reason: SDUPTHER

## 2019-06-10 RX ORDER — BUPROPION HYDROCHLORIDE 300 MG/1
300 TABLET ORAL EVERY MORNING
Qty: 90 TABLET | Refills: 0 | Status: SHIPPED | OUTPATIENT
Start: 2019-06-10 | End: 2019-07-10 | Stop reason: SDUPTHER

## 2019-06-10 RX ORDER — HYDROXYZINE HYDROCHLORIDE 25 MG/1
25 TABLET, FILM COATED ORAL EVERY 6 HOURS PRN
Qty: 30 TABLET | Refills: 0 | Status: SHIPPED | OUTPATIENT
Start: 2019-06-10 | End: 2019-07-10 | Stop reason: SDUPTHER

## 2019-06-27 ENCOUNTER — TELEPHONE (OUTPATIENT)
Dept: FAMILY MEDICINE CLINIC | Facility: CLINIC | Age: 39
End: 2019-06-27

## 2019-06-27 NOTE — TELEPHONE ENCOUNTER
[id number] [address] she may stop the wellbutrin   She should come and see Shyam Thompson and also she needs to see psych since everything we tried isnt working

## 2019-06-27 NOTE — TELEPHONE ENCOUNTER
Amparo Schwarz asked if she can stop the Wellbutrin  She feels like she can have Panic Attacks & does not think it is helping  She feels the anxiety is worse on the Wellbutrin  Can she stop it & go onto an anxiety med PRN? Thank you!

## 2019-06-28 NOTE — TELEPHONE ENCOUNTER
Tom Quinonez has an appt with Caitlin Davidson but not until August due to her work schedule  I gave her the phone # of [facility]

## 2019-07-10 DIAGNOSIS — F32.89 OTHER DEPRESSION: ICD-10-CM

## 2019-07-10 DIAGNOSIS — F41.9 ANXIETY: ICD-10-CM

## 2019-07-10 RX ORDER — ESCITALOPRAM OXALATE 10 MG/1
10 TABLET ORAL DAILY
Qty: 90 TABLET | Refills: 3 | Status: SHIPPED | OUTPATIENT
Start: 2019-07-10 | End: 2020-08-13 | Stop reason: SDUPTHER

## 2019-07-10 RX ORDER — BUPROPION HYDROCHLORIDE 300 MG/1
300 TABLET ORAL EVERY MORNING
Qty: 90 TABLET | Refills: 3 | Status: SHIPPED | OUTPATIENT
Start: 2019-07-10 | End: 2020-08-13 | Stop reason: SDUPTHER

## 2019-07-10 RX ORDER — HYDROXYZINE HYDROCHLORIDE 25 MG/1
25 TABLET, FILM COATED ORAL EVERY 6 HOURS PRN
Qty: 30 TABLET | Refills: 5 | Status: SHIPPED | OUTPATIENT
Start: 2019-07-10

## 2019-07-10 NOTE — TELEPHONE ENCOUNTER
Patient called and needs refills for:    buPROPion (WELLBUTRIN XL) 300 mg 24 hr tablet    Quantity: 90     Take 1 tablet (300 mg total) by mouth every morning    escitalopram (LEXAPRO) 10 mg tablet    Quantity: 90     Take 1 tablet (10 mg total) by mouth daily      hydrOXYzine HCL (ATARAX) 25 mg tablet    30 tablet    Take 1 tablet (25 mg total) by mouth every 6 (six) hours as needed for itching    Please send to     [id number]

## 2020-01-23 ENCOUNTER — APPOINTMENT (OUTPATIENT)
Dept: URGENT CARE | Age: 40
End: 2020-01-23
Payer: OTHER MISCELLANEOUS

## 2020-01-23 PROCEDURE — 99283 EMERGENCY DEPT VISIT LOW MDM: CPT | Performed by: PHYSICIAN ASSISTANT

## 2020-01-23 PROCEDURE — G0382 LEV 3 HOSP TYPE B ED VISIT: HCPCS | Performed by: PHYSICIAN ASSISTANT

## 2020-04-01 ENCOUNTER — APPOINTMENT (OUTPATIENT)
Dept: LAB | Facility: MEDICAL CENTER | Age: 40
End: 2020-04-01

## 2020-04-01 ENCOUNTER — TRANSCRIBE ORDERS (OUTPATIENT)
Dept: URGENT CARE | Facility: MEDICAL CENTER | Age: 40
End: 2020-04-01

## 2020-04-01 ENCOUNTER — APPOINTMENT (OUTPATIENT)
Dept: URGENT CARE | Facility: MEDICAL CENTER | Age: 40
End: 2020-04-01

## 2020-04-01 DIAGNOSIS — Z02.1 PRE-EMPLOYMENT HEALTH SCREENING EXAMINATION: ICD-10-CM

## 2020-04-01 DIAGNOSIS — Z02.1 PRE-EMPLOYMENT HEALTH SCREENING EXAMINATION: Primary | ICD-10-CM

## 2020-04-01 PROCEDURE — 36415 COLL VENOUS BLD VENIPUNCTURE: CPT

## 2020-04-01 PROCEDURE — 86480 TB TEST CELL IMMUN MEASURE: CPT

## 2020-04-02 LAB
GAMMA INTERFERON BACKGROUND BLD IA-ACNC: 0.23 IU/ML
M TB IFN-G BLD-IMP: NEGATIVE
M TB IFN-G CD4+ BCKGRND COR BLD-ACNC: -0.04 IU/ML
M TB IFN-G CD4+ BCKGRND COR BLD-ACNC: 0.02 IU/ML
MITOGEN IGNF BCKGRD COR BLD-ACNC: >10 IU/ML

## 2020-04-13 ENCOUNTER — APPOINTMENT (EMERGENCY)
Dept: RADIOLOGY | Facility: HOSPITAL | Age: 40
End: 2020-04-13
Payer: COMMERCIAL

## 2020-04-13 ENCOUNTER — HOSPITAL ENCOUNTER (EMERGENCY)
Facility: HOSPITAL | Age: 40
Discharge: HOME | End: 2020-04-13
Attending: EMERGENCY MEDICINE | Admitting: EMERGENCY MEDICINE
Payer: COMMERCIAL

## 2020-04-13 VITALS
SYSTOLIC BLOOD PRESSURE: 157 MMHG | RESPIRATION RATE: 18 BRPM | TEMPERATURE: 98 F | HEART RATE: 70 BPM | OXYGEN SATURATION: 100 % | WEIGHT: 165.34 LBS | BODY MASS INDEX: 28.61 KG/M2 | DIASTOLIC BLOOD PRESSURE: 96 MMHG

## 2020-04-13 DIAGNOSIS — R07.9 CHEST PAIN: Primary | ICD-10-CM

## 2020-04-13 DIAGNOSIS — R50.9 FEVER: ICD-10-CM

## 2020-04-13 LAB
ALBUMIN SERPL BCP-MCNC: 3.3 G/DL (ref 3.5–5)
ALP SERPL-CCNC: 48 U/L (ref 46–116)
ALT SERPL W P-5'-P-CCNC: 23 U/L (ref 12–78)
ANION GAP SERPL CALCULATED.3IONS-SCNC: 4 MMOL/L (ref 4–13)
AST SERPL W P-5'-P-CCNC: 9 U/L (ref 5–45)
BASOPHILS # BLD AUTO: 0.02 THOUSANDS/ΜL (ref 0–0.1)
BASOPHILS NFR BLD AUTO: 0 % (ref 0–1)
BILIRUB SERPL-MCNC: 0.39 MG/DL (ref 0.2–1)
BUN SERPL-MCNC: 8 MG/DL (ref 5–25)
CALCIUM SERPL-MCNC: 8.4 MG/DL (ref 8.3–10.1)
CHLORIDE SERPL-SCNC: 107 MMOL/L (ref 100–108)
CO2 SERPL-SCNC: 29 MMOL/L (ref 21–32)
CREAT SERPL-MCNC: 0.83 MG/DL (ref 0.6–1.3)
EOSINOPHIL # BLD AUTO: 0.04 THOUSAND/ΜL (ref 0–0.61)
EOSINOPHIL NFR BLD AUTO: 1 % (ref 0–6)
ERYTHROCYTE [DISTWIDTH] IN BLOOD BY AUTOMATED COUNT: 11.9 % (ref 11.6–15.1)
GFR SERPL CREATININE-BSD FRML MDRD: 101 ML/MIN/1.73SQ M
GLUCOSE SERPL-MCNC: 81 MG/DL (ref 65–140)
HCT VFR BLD AUTO: 40.2 % (ref 34.8–46.1)
HGB BLD-MCNC: 13.3 G/DL (ref 11.5–15.4)
IMM GRANULOCYTES # BLD AUTO: 0.01 THOUSAND/UL (ref 0–0.2)
IMM GRANULOCYTES NFR BLD AUTO: 0 % (ref 0–2)
LYMPHOCYTES # BLD AUTO: 1.68 THOUSANDS/ΜL (ref 0.6–4.47)
LYMPHOCYTES NFR BLD AUTO: 38 % (ref 14–44)
MCH RBC QN AUTO: 30.2 PG (ref 26.8–34.3)
MCHC RBC AUTO-ENTMCNC: 33.1 G/DL (ref 31.4–37.4)
MCV RBC AUTO: 91 FL (ref 82–98)
MONOCYTES # BLD AUTO: 0.22 THOUSAND/ΜL (ref 0.17–1.22)
MONOCYTES NFR BLD AUTO: 5 % (ref 4–12)
NEUTROPHILS # BLD AUTO: 2.48 THOUSANDS/ΜL (ref 1.85–7.62)
NEUTS SEG NFR BLD AUTO: 56 % (ref 43–75)
NRBC BLD AUTO-RTO: 0 /100 WBCS
PLATELET # BLD AUTO: 191 THOUSANDS/UL (ref 149–390)
PMV BLD AUTO: 9.9 FL (ref 8.9–12.7)
POTASSIUM SERPL-SCNC: 3.7 MMOL/L (ref 3.5–5.3)
PROT SERPL-MCNC: 7 G/DL (ref 6.4–8.2)
RBC # BLD AUTO: 4.41 MILLION/UL (ref 3.81–5.12)
SODIUM SERPL-SCNC: 140 MMOL/L (ref 136–145)
TROPONIN I SERPL-MCNC: <0.02 NG/ML
WBC # BLD AUTO: 4.45 THOUSAND/UL (ref 4.31–10.16)

## 2020-04-13 PROCEDURE — 99284 EMERGENCY DEPT VISIT MOD MDM: CPT | Performed by: EMERGENCY MEDICINE

## 2020-04-13 PROCEDURE — 96374 THER/PROPH/DIAG INJ IV PUSH: CPT

## 2020-04-13 PROCEDURE — 71045 X-RAY EXAM CHEST 1 VIEW: CPT

## 2020-04-13 PROCEDURE — 36415 COLL VENOUS BLD VENIPUNCTURE: CPT | Performed by: EMERGENCY MEDICINE

## 2020-04-13 PROCEDURE — 84484 ASSAY OF TROPONIN QUANT: CPT | Performed by: EMERGENCY MEDICINE

## 2020-04-13 PROCEDURE — 93005 ELECTROCARDIOGRAM TRACING: CPT

## 2020-04-13 PROCEDURE — 80053 COMPREHEN METABOLIC PANEL: CPT | Performed by: EMERGENCY MEDICINE

## 2020-04-13 PROCEDURE — 85025 COMPLETE CBC W/AUTO DIFF WBC: CPT | Performed by: EMERGENCY MEDICINE

## 2020-04-13 PROCEDURE — 99284 EMERGENCY DEPT VISIT MOD MDM: CPT

## 2020-04-13 PROCEDURE — 87635 SARS-COV-2 COVID-19 AMP PRB: CPT | Performed by: EMERGENCY MEDICINE

## 2020-04-13 RX ORDER — KETOROLAC TROMETHAMINE 30 MG/ML
15 INJECTION, SOLUTION INTRAMUSCULAR; INTRAVENOUS ONCE
Status: COMPLETED | OUTPATIENT
Start: 2020-04-13 | End: 2020-04-13

## 2020-04-13 RX ADMIN — KETOROLAC TROMETHAMINE 15 MG: 30 INJECTION, SOLUTION INTRAMUSCULAR at 09:58

## 2020-04-14 LAB
ATRIAL RATE: 68 BPM
P AXIS: 56 DEGREES
PR INTERVAL: 136 MS
QRS AXIS: 41 DEGREES
QRSD INTERVAL: 78 MS
QT INTERVAL: 386 MS
QTC INTERVAL: 410 MS
SARS-COV-2 RNA SPEC QL NAA+PROBE: NOT DETECTED
T WAVE AXIS: 37 DEGREES
VENTRICULAR RATE: 68 BPM

## 2020-04-14 PROCEDURE — 93010 ELECTROCARDIOGRAM REPORT: CPT | Performed by: INTERNAL MEDICINE

## 2020-07-11 ENCOUNTER — HOSPITAL ENCOUNTER (EMERGENCY)
Facility: HOSPITAL | Age: 40
Discharge: HOME | End: 2020-07-11
Attending: EMERGENCY MEDICINE | Admitting: EMERGENCY MEDICINE
Payer: COMMERCIAL

## 2020-07-11 ENCOUNTER — APPOINTMENT (EMERGENCY)
Dept: RADIOLOGY | Facility: HOSPITAL | Age: 40
End: 2020-07-11
Payer: COMMERCIAL

## 2020-07-11 VITALS
DIASTOLIC BLOOD PRESSURE: 93 MMHG | TEMPERATURE: 98 F | HEIGHT: 63 IN | OXYGEN SATURATION: 100 % | WEIGHT: 165 LBS | HEART RATE: 70 BPM | BODY MASS INDEX: 29.23 KG/M2 | RESPIRATION RATE: 17 BRPM | SYSTOLIC BLOOD PRESSURE: 140 MMHG

## 2020-07-11 DIAGNOSIS — R07.89 ATYPICAL CHEST PAIN: Primary | ICD-10-CM

## 2020-07-11 LAB
ALBUMIN SERPL BCP-MCNC: 4.3 G/DL (ref 3.4–4.8)
ALP SERPL-CCNC: 47.9 U/L (ref 35–140)
ALT SERPL W P-5'-P-CCNC: 19 U/L (ref 5–54)
ANION GAP SERPL CALCULATED.3IONS-SCNC: 7 MMOL/L (ref 4–13)
AST SERPL W P-5'-P-CCNC: 15 U/L (ref 15–41)
ATRIAL RATE: 71 BPM
BASOPHILS # BLD AUTO: 0.03 THOUSANDS/ΜL (ref 0–0.1)
BASOPHILS NFR BLD AUTO: 1 % (ref 0–1)
BILIRUB SERPL-MCNC: 0.52 MG/DL (ref 0.3–1.2)
BUN SERPL-MCNC: 9 MG/DL (ref 6–20)
CALCIUM SERPL-MCNC: 9.3 MG/DL (ref 8.4–10.2)
CHLORIDE SERPL-SCNC: 105 MMOL/L (ref 96–108)
CO2 SERPL-SCNC: 29 MMOL/L (ref 22–33)
CREAT SERPL-MCNC: 0.96 MG/DL (ref 0.4–1.1)
EOSINOPHIL # BLD AUTO: 0.08 THOUSAND/ΜL (ref 0–0.61)
EOSINOPHIL NFR BLD AUTO: 1 % (ref 0–6)
ERYTHROCYTE [DISTWIDTH] IN BLOOD BY AUTOMATED COUNT: 11.8 % (ref 11.6–15.1)
GFR SERPL CREATININE-BSD FRML MDRD: 85 ML/MIN/1.73SQ M
GLUCOSE SERPL-MCNC: 97 MG/DL (ref 65–140)
HCT VFR BLD AUTO: 42.7 % (ref 34.8–46.1)
HGB BLD-MCNC: 14.6 G/DL (ref 11.5–15.4)
IMM GRANULOCYTES # BLD AUTO: 0.01 THOUSAND/UL (ref 0–0.2)
IMM GRANULOCYTES NFR BLD AUTO: 0 % (ref 0–2)
LIPASE SERPL-CCNC: 22 U/L (ref 13–60)
LYMPHOCYTES # BLD AUTO: 2.93 THOUSANDS/ΜL (ref 0.6–4.47)
LYMPHOCYTES NFR BLD AUTO: 51 % (ref 14–44)
MCH RBC QN AUTO: 29.6 PG (ref 26.8–34.3)
MCHC RBC AUTO-ENTMCNC: 34.2 G/DL (ref 31.4–37.4)
MCV RBC AUTO: 87 FL (ref 82–98)
MONOCYTES # BLD AUTO: 0.27 THOUSAND/ΜL (ref 0.17–1.22)
MONOCYTES NFR BLD AUTO: 5 % (ref 4–12)
NEUTROPHILS # BLD AUTO: 2.37 THOUSANDS/ΜL (ref 1.85–7.62)
NEUTS SEG NFR BLD AUTO: 42 % (ref 43–75)
P AXIS: 56 DEGREES
PLATELET # BLD AUTO: 204 THOUSANDS/UL (ref 149–390)
PMV BLD AUTO: 10.6 FL (ref 8.9–12.7)
POTASSIUM SERPL-SCNC: 3.5 MMOL/L (ref 3.5–5)
PR INTERVAL: 137 MS
PROT SERPL-MCNC: 7 G/DL (ref 6.4–8.3)
QRS AXIS: 16 DEGREES
QRSD INTERVAL: 88 MS
QT INTERVAL: 387 MS
QTC INTERVAL: 427 MS
RBC # BLD AUTO: 4.93 MILLION/UL (ref 3.81–5.12)
SODIUM SERPL-SCNC: 141 MMOL/L (ref 133–145)
T WAVE AXIS: 42 DEGREES
TROPONIN I SERPL-MCNC: 0.05 NG/ML (ref 0–0.07)
TROPONIN I SERPL-MCNC: <0.03 NG/ML (ref 0–0.07)
VENTRICULAR RATE: 73 BPM
WBC # BLD AUTO: 5.69 THOUSAND/UL (ref 4.31–10.16)

## 2020-07-11 PROCEDURE — 84484 ASSAY OF TROPONIN QUANT: CPT | Performed by: EMERGENCY MEDICINE

## 2020-07-11 PROCEDURE — NC001 PR NO CHARGE: Performed by: EMERGENCY MEDICINE

## 2020-07-11 PROCEDURE — 85025 COMPLETE CBC W/AUTO DIFF WBC: CPT | Performed by: EMERGENCY MEDICINE

## 2020-07-11 PROCEDURE — 80053 COMPREHEN METABOLIC PANEL: CPT | Performed by: EMERGENCY MEDICINE

## 2020-07-11 PROCEDURE — 36415 COLL VENOUS BLD VENIPUNCTURE: CPT | Performed by: EMERGENCY MEDICINE

## 2020-07-11 PROCEDURE — 71046 X-RAY EXAM CHEST 2 VIEWS: CPT

## 2020-07-11 PROCEDURE — 99284 EMERGENCY DEPT VISIT MOD MDM: CPT | Performed by: EMERGENCY MEDICINE

## 2020-07-11 PROCEDURE — 93010 ELECTROCARDIOGRAM REPORT: CPT | Performed by: INTERNAL MEDICINE

## 2020-07-11 PROCEDURE — 83690 ASSAY OF LIPASE: CPT | Performed by: EMERGENCY MEDICINE

## 2020-07-11 PROCEDURE — 99285 EMERGENCY DEPT VISIT HI MDM: CPT

## 2020-07-11 PROCEDURE — 96374 THER/PROPH/DIAG INJ IV PUSH: CPT

## 2020-07-11 PROCEDURE — 93005 ELECTROCARDIOGRAM TRACING: CPT

## 2020-07-11 RX ORDER — KETOROLAC TROMETHAMINE 30 MG/ML
15 INJECTION, SOLUTION INTRAMUSCULAR; INTRAVENOUS ONCE
Status: COMPLETED | OUTPATIENT
Start: 2020-07-11 | End: 2020-07-11

## 2020-07-11 RX ORDER — SODIUM CHLORIDE 9 MG/ML
3 INJECTION INTRAVENOUS
Status: DISCONTINUED | OUTPATIENT
Start: 2020-07-11 | End: 2020-07-11 | Stop reason: HOSPADM

## 2020-07-11 RX ADMIN — KETOROLAC TROMETHAMINE 15 MG: 30 INJECTION, SOLUTION INTRAMUSCULAR at 06:57

## 2020-07-11 NOTE — DISCHARGE INSTRUCTIONS
Please start taking the anxiety medications that you have been prescribed    Your cardiac work-up was negative for cardiac origin

## 2020-07-11 NOTE — ED CARE HANDOFF
Emergency Department Sign Out Note        Sign out and transfer of care from Dr Michele Nava  See Separate Emergency Department note  The patient, Sergey Mcknight, was evaluated by the previous provider for left sided chest pain of a sudden onset  Workup Completed:   CBC,CMP,Trop all within normal limits  No acute findings on chest x-ray  ED Course / Workup Pending (followup):  Pain free at present  VSS  Alert and oriented  Awaiting to obtain 3 hour trop for dispo  Repeat troponin also negative  Pt remain pain free with stable VSS  Pt has been prescribed several medications for anxiety which she has not been taking - she does have them at home - discussed the effects of uncontrolled anxiety - suggested resuming these meds and follow up with PCP - verbalized agreement    HEART Risk Score      Most Recent Value   Heart Score Risk Calculator   History  0 Filed at: 07/11/2020 1051   ECG  0 Filed at: 07/11/2020 1051   Age  0 Filed at: 07/11/2020 1051   Risk Factors  1 Filed at: 07/11/2020 1051   Troponin  0 Filed at: 07/11/2020 1051   HEART Score  1 Filed at: 07/11/2020 1051                             Procedures  MDM    Disposition  Final diagnoses:   Atypical chest pain     Time reflects when diagnosis was documented in both MDM as applicable and the Disposition within this note     Time User Action Codes Description Comment    7/11/2020 10:53 AM Ad Domingo Add [R07 89] Atypical chest pain       ED Disposition     ED Disposition Condition Date/Time Comment    Discharge Stable Sat Jul 11, 2020 10:53 AM Sergey Mcknight discharge to home/self care              Follow-up Information     Follow up With Specialties Details Why Contact Info    Kareem Luke MD Family Medicine Schedule an appointment as soon as possible for a visit in 2 days  [address]  [address]  [phone]          Discharge Medication List as of 7/11/2020 10:54 AM      CONTINUE these medications which have NOT CHANGED    Details   Multiple Vitamin (MULTIVITAMIN) tablet Take 1 tablet by mouth daily, Historical Med      buPROPion (WELLBUTRIN XL) 300 mg 24 hr tablet Take 1 tablet (300 mg total) by mouth every morning, Starting Wed 7/10/2019, Normal      escitalopram (LEXAPRO) 10 mg tablet Take 1 tablet (10 mg total) by mouth daily, Starting Wed 7/10/2019, Normal      hydrOXYzine HCL (ATARAX) 25 mg tablet Take 1 tablet (25 mg total) by mouth every 6 (six) hours as needed for itching, Starting Wed 7/10/2019, Normal           No discharge procedures on file         ED Provider  Electronically Signed by     Lashonda Alvarez MD  07/11/20 6604

## 2020-07-11 NOTE — ED PROVIDER NOTES
History  Chief Complaint   Patient presents with    Chest Pain     Pt presents to the ED with c/o svere chest pain and pressure that started about 30 minutes     HPI    Prior to Admission Medications   Prescriptions Last Dose Informant Patient Reported? Taking?    Multiple Vitamin (MULTIVITAMIN) tablet 2020 at Unknown time  Yes Yes   Sig: Take 1 tablet by mouth daily   buPROPion (WELLBUTRIN XL) 300 mg 24 hr tablet Not Taking at Unknown time  No No   Sig: Take 1 tablet (300 mg total) by mouth every morning   Patient not taking: Reported on 2020   escitalopram (LEXAPRO) 10 mg tablet Not Taking at Unknown time  No No   Sig: Take 1 tablet (10 mg total) by mouth daily   Patient not taking: Reported on 2020   hydrOXYzine HCL (ATARAX) 25 mg tablet Not Taking at Unknown time  No No   Sig: Take 1 tablet (25 mg total) by mouth every 6 (six) hours as needed for itching   Patient not taking: Reported on 2020      Facility-Administered Medications: None       Past Medical History:   Diagnosis Date    Fetal growth problem suspected but not found     RESOLVED: 3/1/17    First trimester bleeding     RESOLVED:3/1/17    Iron deficiency anemia due to chronic blood loss 2018    Miscarriage     Missed      RESOLVED:3/1/17    Poor fetal growth affecting management of mother     RESOLVED:3/1/17    Recurrent pregnancy loss, antepartum condition or complication     [id number]:91    Spontaneous      RESOLVED:3/1/17    Supraventricular tachycardia ([address] )     by history       Past Surgical History:   Procedure Laterality Date    COLONOSCOPY      DILATION AND CURETTAGE OF UTERUS      ,     DILATION AND CURETTAGE OF UTERUS      FOR SPONTANEOUS ; X5 3789-8816    HYSTERECTOMY  2018    Dr Victorino Wallis INDUCED   2010    LAPAROSCOPIC TUBAL LIGATION Right 2016    Procedure: LAPAROSCOPIC TUBAL LIGATION ;  Surgeon: Charles Benjamin MD;  [facility];  Service:  [place] LAP,VAG HYST,UTERUS 250GMS/<,SALP-OOPH N/A 9/25/2018    Procedure: HYSTERECTOMY LAPAROSCOPIC ASSISTED VAGINAL (LAVH); RIGHT LAPAROSCOPIC SALPINGECTOMY;  Surgeon: Luis Eaton MD;  [facility];  Service: Gynecology    [place] SURG RX MISSED ABORTN,1ST TRI N/A 9/12/2016    Procedure: DILATATION AND EVACUATION (D&E) (MISSED AB); Surgeon: Luis Eaton MD;  [facility];  Service: Gynecology    SALPINGOOPHORECTOMY Left     TUBAL LIGATION Right        Family History   Problem Relation Age of Onset    Arthritis Mother     Depression Mother     Hypertension Mother     Cancer Father      I have reviewed and agree with the history as documented  E-Cigarette/Vaping     E-Cigarette/Vaping Substances     Social History     Tobacco Use    Smoking status: Never Smoker    Smokeless tobacco: Never Used   Substance Use Topics    Alcohol use: No    Drug use: No       Review of Systems    Physical Exam  Physical Exam    Vital Signs  ED Triage Vitals   Temperature Pulse Respirations Blood Pressure SpO2   07/11/20 0621 07/11/20 0621 07/11/20 0621 07/11/20 0621 07/11/20 0621   98 °F (36 7 °C) 78 (!) 23 151/95 99 %      Temp src Heart Rate Source Patient Position - Orthostatic VS BP Location FiO2 (%)   -- 07/11/20 1015 -- -- --    Monitor         Pain Score       07/11/20 0657       7           Vitals:    07/11/20 0621 07/11/20 0820 07/11/20 1015   BP: 151/95 150/84 140/93   Pulse: 78 78 70         Visual Acuity      ED Medications  Medications   sodium chloride (PF) 0 9 % injection 3 mL (has no administration in time range)   ketorolac (TORADOL) injection 15 mg (15 mg Intravenous Given 7/11/20 1152)       Diagnostic Studies  Results Reviewed     None                 X-ray chest 2 views   Final Result by Arley Alaniz MD (07/11 3530)      No acute cardiopulmonary disease              Workstation performed: [id number]                    Procedures  Procedures         ED Course       US AUDIT      Most Recent Value   Initial Alcohol Screen: US AUDIT-C    1  How often do you have a drink containing alcohol?  0 Filed at: 07/11/2020 0648   2  How many drinks containing alcohol do you have on a typical day you are drinking? 0 Filed at: 07/11/2020 0648   3a  Male UNDER 65: How often do you have five or more drinks on one occasion? 0 Filed at: 07/11/2020 0648   3b  FEMALE Any Age, or MALE 65+: How often do you have 4 or more drinks on one occassion? 0 Filed at: 07/11/2020 0648   Audit-C Score  0 Filed at: 07/11/2020 5318            HEART Risk Score      Most Recent Value   Heart Score Risk Calculator   History  0 Filed at: 07/11/2020 1051   ECG  0 Filed at: 07/11/2020 1051   Age  0 Filed at: 07/11/2020 1051   Risk Factors  1 Filed at: 07/11/2020 1051   Troponin  0 Filed at: 07/11/2020 1051   HEART Score  1 Filed at: 07/11/2020 1051            URBANO/DAST-10      Most Recent Value   How many times in the past year have you    Used an illegal drug or used a prescription medication for non-medical reasons? Never Filed at: 07/11/2020 0352                                MDM      Disposition  Final diagnoses:   Atypical chest pain     Time reflects when diagnosis was documented in both MDM as applicable and the Disposition within this note     Time User Action Codes Description Comment    7/11/2020 10:53 AM Elinor Enrique Add [R07 89] Atypical chest pain       ED Disposition     ED Disposition Condition Date/Time Comment    Discharge Stable Sat Jul 11, 2020 1053 Mino Dys discharge to home/self care              Follow-up Information     Follow up With Specialties Details Why Contact Info    Anton Miller MD Family Medicine Schedule an appointment as soon as possible for a visit in 2 days  [address]  [address]  [phone]            Discharge Medication List as of 7/11/2020 10:54 AM      CONTINUE these medications which have NOT CHANGED    Details   Multiple Vitamin (MULTIVITAMIN) tablet Take 1 tablet by mouth daily, Historical Med      buPROPion (WELLBUTRIN XL) 300 mg 24 hr tablet Take 1 tablet (300 mg total) by mouth every morning, Starting Wed 7/10/2019, Normal      escitalopram (LEXAPRO) 10 mg tablet Take 1 tablet (10 mg total) by mouth daily, Starting Wed 7/10/2019, Normal      hydrOXYzine HCL (ATARAX) 25 mg tablet Take 1 tablet (25 mg total) by mouth every 6 (six) hours as needed for itching, Starting Wed 7/10/2019, Normal           No discharge procedures on file      PDMP Review     None          ED Provider  Electronically Signed by           Ez Casanova MD  07/11/20 7277

## 2020-07-11 NOTE — ED PROVIDER NOTES
History  Chief Complaint   Patient presents with    Chest Pain     Pt presents to the ED with c/o svere chest pain and pressure that started about 30 minutes     This is a 41-year-old female who presents to the emergency department complaining of chest pain  Patient states it began approximately 45 minutes prior to arrival   The patient states that woke her up from her sleep  The patient states it is located on the left side of her chest   It radiates to her left arm  Lying flat makes it worse  The patient denies coughing  Patient denies fevers or chills  The patient denies coughing up blood, estrogen use, recent immobilization or long travel, recent trauma, or a history of cancer or clots in their legs or lungs  The patient states she has never had similar pain  Prior to Admission Medications   Prescriptions Last Dose Informant Patient Reported? Taking?    Multiple Vitamin (MULTIVITAMIN) tablet 2020 at Unknown time  Yes Yes   Sig: Take 1 tablet by mouth daily   buPROPion (WELLBUTRIN XL) 300 mg 24 hr tablet Not Taking at Unknown time  No No   Sig: Take 1 tablet (300 mg total) by mouth every morning   Patient not taking: Reported on 2020   escitalopram (LEXAPRO) 10 mg tablet Not Taking at Unknown time  No No   Sig: Take 1 tablet (10 mg total) by mouth daily   Patient not taking: Reported on 2020   hydrOXYzine HCL (ATARAX) 25 mg tablet Not Taking at Unknown time  No No   Sig: Take 1 tablet (25 mg total) by mouth every 6 (six) hours as needed for itching   Patient not taking: Reported on 2020      Facility-Administered Medications: None       Past Medical History:   Diagnosis Date    Fetal growth problem suspected but not found     RESOLVED: 3/1/17    First trimester bleeding     RESOLVED:3/1/17    Iron deficiency anemia due to chronic blood loss 2018    Miscarriage     Missed      RESOLVED:3/1/17    Poor fetal growth affecting management of mother RESOLVED:3/1/17    Recurrent pregnancy loss, antepartum condition or complication     ACSXFPYZ:42    Spontaneous      RESOLVED:3/1/17    Supraventricular tachycardia ([address] )     by history       Past Surgical History:   Procedure Laterality Date    COLONOSCOPY      DILATION AND CURETTAGE OF UTERUS      ,     DILATION AND CURETTAGE OF UTERUS      FOR SPONTANEOUS ; X5 7747-4551    HYSTERECTOMY  2018    Dr Rosa Low INDUCED   2010    LAPAROSCOPIC TUBAL LIGATION Right 2016    Procedure: LAPAROSCOPIC TUBAL LIGATION ;  Surgeon: Chay Foster MD;  [facility];  Service:    [place] LAP,VAG HYST,UTERUS 250GMS/<,SALP-OOPH N/A 2018    Procedure: HYSTERECTOMY LAPAROSCOPIC ASSISTED VAGINAL (LAVH); RIGHT LAPAROSCOPIC SALPINGECTOMY;  Surgeon: Chay Foster MD;  [facility];  Service: Gynecology    [place] SURG RX MISSED ABORTN,1ST TRI N/A 2016    Procedure: DILATATION AND EVACUATION (D&E) (MISSED AB); Surgeon: Chay Foster MD;  [facility];  Service: Gynecology    SALPINGOOPHORECTOMY Left     TUBAL LIGATION Right        Family History   Problem Relation Age of Onset    Arthritis Mother     Depression Mother     Hypertension Mother     Cancer Father      I have reviewed and agree with the history as documented  E-Cigarette/Vaping     E-Cigarette/Vaping Substances     Social History     Tobacco Use    Smoking status: Never Smoker    Smokeless tobacco: Never Used   Substance Use Topics    Alcohol use: No    Drug use: No       Review of Systems   All other systems reviewed and are negative        Physical Exam  Physical Exam  Constitutional:  Vital signs reviewed, patient appears nontoxic, appears uncomfortable  Eyes: Pupils equal round reactive to light and accommodation, extraocular muscles intact  HEENT: trachea midline, no JVD, moist mucous membranes  Respiratory: lung sounds clear throughout, no rhonchi, no rales  Cardiovascular: regular rate rhythm, no murmurs or rubs  Abdomen: soft, nontender, nondistended, no rebound or guarding  Back: no CVA tenderness, normal inspection  Extremities: no edema, pulses equal in all 4 extremities  Neuro: awake, alert, oriented, no focal weakness  Skin: warm, dry, intact, no rashes noted    Vital Signs  ED Triage Vitals   Temperature Pulse Respirations Blood Pressure SpO2   07/11/20 0621 07/11/20 0621 07/11/20 0621 07/11/20 0621 07/11/20 0621   98 °F (36 7 °C) 78 (!) 23 151/95 99 %      Temp src Heart Rate Source Patient Position - Orthostatic VS BP Location FiO2 (%)   -- 07/11/20 1015 -- -- --    Monitor         Pain Score       07/11/20 0657       7           Vitals:    07/11/20 0621 07/11/20 0820 07/11/20 1015   BP: 151/95 150/84 140/93   Pulse: 78 78 70         Visual Acuity      ED Medications  Medications   ketorolac (TORADOL) injection 15 mg (15 mg Intravenous Given 7/11/20 6657)       Diagnostic Studies  Results Reviewed     None                 X-ray chest 2 views   Final Result by Kady Price MD (07/11 6753)      No acute cardiopulmonary disease  Workstation performed: [id number]                    Procedures  ECG 12 Lead Documentation Only  Date/Time: 7/11/2020 6:33 AM  Performed by: Daniel Melendez DO  Authorized by: Daniel Melendez DO     ECG reviewed by me, the ED Provider: yes    Patient location:  ED  Comments:      Normal sinus rhythm, rate 73, normal UT, normal QTC, no STEMI             ED Course       US AUDIT      Most Recent Value   Initial Alcohol Screen: US AUDIT-C    1  How often do you have a drink containing alcohol?  0 Filed at: 07/11/2020 0648   2  How many drinks containing alcohol do you have on a typical day you are drinking? 0 Filed at: 07/11/2020 0648   3a  Male UNDER 65: How often do you have five or more drinks on one occasion? 0 Filed at: 07/11/2020 0648   3b  FEMALE Any Age, or MALE 65+:  How often do you have 4 or more drinks on one occassion?  0 Filed at: 07/11/2020 0648   Audit-C Score  0 Filed at: 07/11/2020 8324            HEART Risk Score      Most Recent Value   Heart Score Risk Calculator   History  0 Filed at: 07/11/2020 1051   ECG  0 Filed at: 07/11/2020 1051   Age  0 Filed at: 07/11/2020 1051   Risk Factors  1 Filed at: 07/11/2020 1051   Troponin  0 Filed at: 07/11/2020 1051   HEART Score  1 Filed at: 07/11/2020 1051            URBANO/DAST-10      Most Recent Value   How many times in the past year have you    Used an illegal drug or used a prescription medication for non-medical reasons? Never Filed at: 07/11/2020 6074                                MDM  Number of Diagnoses or Management Options  Diagnosis management comments: This is a 25-year-old female presents to emergency department complaining of chest pain  I considered ACS, pneumonia, pneumothorax, PE  These and other diagnoses were considered  Disposition  Final diagnoses:   Atypical chest pain     Time reflects when diagnosis was documented in both MDM as applicable and the Disposition within this note     Time User Action Codes Description Comment    7/11/2020 10:53 AM Levi Loredo Add [R07 89] Atypical chest pain       ED Disposition     ED Disposition Condition Date/Time Comment    Discharge Stable Sat Jul 11, 2020 10:53 AM Jonna Jamil discharge to home/self care              Follow-up Information     Follow up With Specialties Details Why Contact Info    Esther Hawthorne MD Family Medicine Schedule an appointment as soon as possible for a visit in 2 days  [address]  [address] [place] [address]  [phone]            Discharge Medication List as of 7/11/2020 10:54 AM      CONTINUE these medications which have NOT CHANGED    Details   Multiple Vitamin (MULTIVITAMIN) tablet Take 1 tablet by mouth daily, Historical Med      buPROPion (WELLBUTRIN XL) 300 mg 24 hr tablet Take 1 tablet (300 mg total) by mouth every morning, Starting Wed 7/10/2019, Normal escitalopram (LEXAPRO) 10 mg tablet Take 1 tablet (10 mg total) by mouth daily, Starting Wed 7/10/2019, Normal      hydrOXYzine HCL (ATARAX) 25 mg tablet Take 1 tablet (25 mg total) by mouth every 6 (six) hours as needed for itching, Starting Wed 7/10/2019, Normal           No discharge procedures on file      PDMP Review     None          ED Provider  Electronically Signed by           Kendal Canada DO  07/13/20 9510

## 2020-08-13 ENCOUNTER — TELEMEDICINE (OUTPATIENT)
Dept: FAMILY MEDICINE CLINIC | Facility: CLINIC | Age: 40
End: 2020-08-13
Payer: COMMERCIAL

## 2020-08-13 ENCOUNTER — TELEPHONE (OUTPATIENT)
Dept: FAMILY MEDICINE CLINIC | Facility: CLINIC | Age: 40
End: 2020-08-13

## 2020-08-13 VITALS — WEIGHT: 160 LBS | HEIGHT: 63 IN | BODY MASS INDEX: 28.35 KG/M2

## 2020-08-13 DIAGNOSIS — F32.89 OTHER DEPRESSION: ICD-10-CM

## 2020-08-13 DIAGNOSIS — F41.9 ANXIETY: ICD-10-CM

## 2020-08-13 DIAGNOSIS — E05.90 HYPERTHYROIDISM: ICD-10-CM

## 2020-08-13 DIAGNOSIS — G43.701 CHRONIC MIGRAINE WITHOUT AURA WITH STATUS MIGRAINOSUS, NOT INTRACTABLE: Primary | ICD-10-CM

## 2020-08-13 DIAGNOSIS — Z12.31 VISIT FOR SCREENING MAMMOGRAM: ICD-10-CM

## 2020-08-13 PROCEDURE — 3008F BODY MASS INDEX DOCD: CPT | Performed by: FAMILY MEDICINE

## 2020-08-13 PROCEDURE — 99214 OFFICE O/P EST MOD 30 MIN: CPT | Performed by: FAMILY MEDICINE

## 2020-08-13 PROCEDURE — 1036F TOBACCO NON-USER: CPT | Performed by: FAMILY MEDICINE

## 2020-08-13 PROCEDURE — 3725F SCREEN DEPRESSION PERFORMED: CPT | Performed by: FAMILY MEDICINE

## 2020-08-13 RX ORDER — ESCITALOPRAM OXALATE 10 MG/1
10 TABLET ORAL DAILY
Qty: 90 TABLET | Refills: 3 | Status: SHIPPED | OUTPATIENT
Start: 2020-08-13 | End: 2021-06-15 | Stop reason: SDUPTHER

## 2020-08-13 RX ORDER — BUPROPION HYDROCHLORIDE 300 MG/1
300 TABLET ORAL EVERY MORNING
Qty: 90 TABLET | Refills: 3 | Status: SHIPPED | OUTPATIENT
Start: 2020-08-13 | End: 2021-06-15 | Stop reason: SDUPTHER

## 2020-08-13 RX ORDER — RIZATRIPTAN BENZOATE 10 MG/1
10 TABLET ORAL ONCE AS NEEDED
Qty: 9 TABLET | Refills: 0 | Status: SHIPPED | OUTPATIENT
Start: 2020-08-13 | End: 2021-03-31 | Stop reason: SDUPTHER

## 2020-08-13 NOTE — PROGRESS NOTES
Virtual Regular Visit      Assessment/Plan:    Cristian Hernandez was seen today for virtual regular visit  Diagnoses and all orders for this visit:    Chronic migraine without aura with status migrainosus, not intractable  Comments:  magnesium and B2 supplements recommended   Orders:  -     rizatriptan (MAXALT) 10 MG tablet; Take 1 tablet (10 mg total) by mouth once as needed for migraine for up to 1 dose May repeat in 2 hours if needed    Hyperthyroidism  -     TSH, 3rd generation with Free T4 reflex; Future  -     T3, free; Future    Anxiety  Comments:  restarted her meds today  Orders:  -     escitalopram (LEXAPRO) 10 mg tablet; Take 1 tablet (10 mg total) by mouth daily    Other depression  -     buPROPion (WELLBUTRIN XL) 300 mg 24 hr tablet; Take 1 tablet (300 mg total) by mouth every morning    Visit for screening mammogram  -     Mammo screening bilateral w cad; Future        BMI Counseling: Body mass index is 28 34 kg/m²  The BMI is above normal  Nutrition recommendations include decreasing portion sizes and encouraging healthy choices of fruits and vegetables  Exercise recommendations include moderate physical activity 150 minutes/week  No pharmacotherapy was ordered  Reason for visit is   Chief Complaint   Patient presents with    Virtual Regular Visit        Encounter provider Edgar Roe MD    Provider located at [address] [address]      Recent Visits  No visits were found meeting these conditions  Showing recent visits within past 7 days and meeting all other requirements     Today's Visits  Date Type Provider Dept   08/13/20 Telemedicine Edgar Roe MD [address]   08/13/20 Telephone Kayla Bronico [facility]   Showing today's visits and meeting all other requirements     Future Appointments  No visits were found meeting these conditions     Showing future appointments within next 150 days and meeting all other requirements        The patient was identified by name and date of birth  Caryl Joiner was informed that this is a telemedicine visit and that the visit is being conducted through [facility] and patient was informed that this is a secure, HIPAA-compliant platform  She agrees to proceed     My office door was closed  No one else was in the room  She acknowledged consent and understanding of privacy and security of the video platform  The patient has agreed to participate and understands they can discontinue the visit at any time  Patient is aware this is a billable service  Subjective  Caryl Joiner is a 38 y o  female headaches for the last 1-2 months   3-4 times a week for the last 2 weeks   She was supposed to see neuro -ophthalmologist in the past for her eye issues which she never did due to car rides issues   She stopped all her anxiety pills since she ran out and never called for refills     Headache    This is a new problem  The pain is located in the left unilateral (forhead and left side of her head) region  The pain does not radiate  The pain quality is not similar to prior headaches  The quality of the pain is described as sharp  The pain is mild  Associated symptoms include blurred vision, photophobia and a visual change  Pertinent negatives include no abdominal pain, abnormal behavior, anorexia, back pain, coughing, dizziness, drainage, ear pain, eye pain, eye redness, eye watering, facial sweating, fever, hearing loss, insomnia, loss of balance, muscle aches, nausea, neck pain, numbness, phonophobia, rhinorrhea, scalp tenderness, seizures, sinus pressure, sore throat, swollen glands, tingling, tinnitus, vomiting, weakness or weight loss  The symptoms are aggravated by emotional stress, activity and bright light  She has tried NSAIDs and acetaminophen for the symptoms  Her past medical history is significant for migraine headaches   There is no history of cancer, cluster headaches, hypertension, immunosuppression, migraines in the family, obesity, pseudotumor cerebri, recent head traumas, sinus disease or TMJ  Past Medical History:   Diagnosis Date    Fetal growth problem suspected but not found     RESOLVED: 3/1/17    First trimester bleeding     RESOLVED:3/1/17    Iron deficiency anemia due to chronic blood loss 2018    Miscarriage     Missed      RESOLVED:3/1/17    Poor fetal growth affecting management of mother     RESOLVED:3/1/17    Recurrent pregnancy loss, antepartum condition or complication     SQKXUAKU:17    Spontaneous      RESOLVED:3/1/17    Supraventricular tachycardia ([address] )     by history       Past Surgical History:   Procedure Laterality Date    COLONOSCOPY      DILATION AND CURETTAGE OF UTERUS      ,     DILATION AND CURETTAGE OF UTERUS      FOR SPONTANEOUS ; X5 1599-2622    HYSTERECTOMY  2018    Dr Davi Still INDUCED       LAPAROSCOPIC TUBAL LIGATION Right 2016    Procedure: LAPAROSCOPIC TUBAL LIGATION ;  Surgeon: Melissa Moy MD;  [facility];  Service:    [facility] [place] LAP,VAG HYST,UTERUS 250GMS/<,SALP-OOPH N/A 2018    Procedure: HYSTERECTOMY LAPAROSCOPIC ASSISTED VAGINAL (LAVH); RIGHT LAPAROSCOPIC SALPINGECTOMY;  Surgeon: Melissa Moy MD;  [facility];  Service: Gynecology    [place] SURG RX MISSED ABORTN,1ST TRI N/A 2016    Procedure: DILATATION AND EVACUATION (D&E) (MISSED AB);   Surgeon: Melissa Moy MD;  [facility];  Service: Gynecology    SALPINGOOPHORECTOMY Left     TUBAL LIGATION Right        Current Outpatient Medications   Medication Sig Dispense Refill    buPROPion (WELLBUTRIN XL) 300 mg 24 hr tablet Take 1 tablet (300 mg total) by mouth every morning 90 tablet 3    escitalopram (LEXAPRO) 10 mg tablet Take 1 tablet (10 mg total) by mouth daily 90 tablet 3    hydrOXYzine HCL (ATARAX) 25 mg tablet Take 1 tablet (25 mg total) by mouth every 6 (six) hours as needed for itching (Patient not taking: Reported on 7/11/2020) 30 tablet 5    Multiple Vitamin (MULTIVITAMIN) tablet Take 1 tablet by mouth daily      rizatriptan (MAXALT) 10 MG tablet Take 1 tablet (10 mg total) by mouth once as needed for migraine for up to 1 dose May repeat in 2 hours if needed 9 tablet 0     No current facility-administered medications for this visit  Allergies   Allergen Reactions    Iodinated Diagnostic Agents Anaphylaxis     Facial swelling and hives       Review of Systems   Constitutional: Negative for fever and weight loss  HENT: Negative for ear pain, hearing loss, rhinorrhea, sinus pressure, sore throat and tinnitus  Eyes: Positive for blurred vision and photophobia  Negative for pain and redness  Respiratory: Negative for cough  Gastrointestinal: Negative for abdominal pain, anorexia, nausea and vomiting  Musculoskeletal: Negative for back pain and neck pain  Neurological: Positive for headaches  Negative for dizziness, tingling, seizures, weakness, numbness and loss of balance  Psychiatric/Behavioral: The patient does not have insomnia  Video Exam    Vitals:    08/13/20 1037   Weight: 72 6 kg (160 lb)   Height: 5' 3" (1 6 m)       Physical Exam  Constitutional:       Appearance: Normal appearance  Eyes:      Pupils: Pupils are equal, round, and reactive to light  Pulmonary:      Effort: Pulmonary effort is normal    Neurological:      Mental Status: She is alert and oriented to person, place, and time  I spent 20 minutes directly with the patient during this visit      VIRTUAL VISIT DISCLAIMER    Jamar Nichole acknowledges that she has consented to an online visit or consultation  She understands that the online visit is based solely on information provided by her, and that, in the absence of a face-to-face physical evaluation by the physician, the diagnosis she receives is both limited and provisional in terms of accuracy and completeness   This is not intended to replace a full medical face-to-face evaluation by the physician  Zelalem Magaña understands and accepts these terms

## 2020-08-19 ENCOUNTER — APPOINTMENT (OUTPATIENT)
Dept: LAB | Facility: HOSPITAL | Age: 40
End: 2020-08-19
Payer: COMMERCIAL

## 2020-08-19 DIAGNOSIS — E05.90 HYPERTHYROIDISM: ICD-10-CM

## 2020-08-19 LAB
T3FREE SERPL-MCNC: 2.25 PG/ML (ref 2.3–4.2)
T4 FREE SERPL-MCNC: 0.83 NG/DL (ref 0.76–1.46)
TSH SERPL DL<=0.05 MIU/L-ACNC: 0.18 UIU/ML (ref 0.36–3.74)

## 2020-08-19 PROCEDURE — 84443 ASSAY THYROID STIM HORMONE: CPT

## 2020-08-19 PROCEDURE — 36415 COLL VENOUS BLD VENIPUNCTURE: CPT

## 2020-08-19 PROCEDURE — 84481 FREE ASSAY (FT-3): CPT

## 2020-08-19 PROCEDURE — 84439 ASSAY OF FREE THYROXINE: CPT

## 2020-08-25 DIAGNOSIS — E05.90 HYPERTHYROIDISM: Primary | ICD-10-CM

## 2020-10-04 ENCOUNTER — APPOINTMENT (OUTPATIENT)
Dept: LAB | Facility: HOSPITAL | Age: 40
End: 2020-10-04
Payer: COMMERCIAL

## 2020-10-04 DIAGNOSIS — E05.90 HYPERTHYROIDISM: ICD-10-CM

## 2020-10-04 LAB
T3FREE SERPL-MCNC: 2.54 PG/ML (ref 2.3–4.2)
T4 FREE SERPL-MCNC: 0.89 NG/DL (ref 0.76–1.46)
TSH SERPL DL<=0.05 MIU/L-ACNC: 0.29 UIU/ML (ref 0.36–3.74)

## 2020-10-04 PROCEDURE — 84439 ASSAY OF FREE THYROXINE: CPT

## 2020-10-04 PROCEDURE — 84481 FREE ASSAY (FT-3): CPT

## 2020-10-04 PROCEDURE — 84443 ASSAY THYROID STIM HORMONE: CPT

## 2020-10-04 PROCEDURE — 36415 COLL VENOUS BLD VENIPUNCTURE: CPT

## 2020-10-07 ENCOUNTER — OFFICE VISIT (OUTPATIENT)
Dept: FAMILY MEDICINE CLINIC | Facility: CLINIC | Age: 40
End: 2020-10-07
Payer: COMMERCIAL

## 2020-10-07 VITALS
WEIGHT: 182.2 LBS | OXYGEN SATURATION: 98 % | HEIGHT: 63 IN | SYSTOLIC BLOOD PRESSURE: 118 MMHG | TEMPERATURE: 98.8 F | DIASTOLIC BLOOD PRESSURE: 84 MMHG | HEART RATE: 64 BPM | RESPIRATION RATE: 18 BRPM | BODY MASS INDEX: 32.28 KG/M2

## 2020-10-07 DIAGNOSIS — Z23 FLU VACCINE NEED: ICD-10-CM

## 2020-10-07 DIAGNOSIS — R07.82 INTERCOSTAL PAIN: ICD-10-CM

## 2020-10-07 DIAGNOSIS — Z00.00 ANNUAL PHYSICAL EXAM: Primary | ICD-10-CM

## 2020-10-07 PROBLEM — F41.9 ANXIETY: Status: ACTIVE | Noted: 2020-10-07

## 2020-10-07 PROCEDURE — 90471 IMMUNIZATION ADMIN: CPT

## 2020-10-07 PROCEDURE — 99396 PREV VISIT EST AGE 40-64: CPT | Performed by: FAMILY MEDICINE

## 2020-10-07 PROCEDURE — 90686 IIV4 VACC NO PRSV 0.5 ML IM: CPT

## 2020-10-07 PROCEDURE — 1036F TOBACCO NON-USER: CPT | Performed by: FAMILY MEDICINE

## 2020-10-07 RX ORDER — MELOXICAM 15 MG/1
15 TABLET ORAL DAILY
Qty: 30 TABLET | Refills: 0 | Status: SHIPPED | OUTPATIENT
Start: 2020-10-07 | End: 2020-11-02

## 2020-11-01 DIAGNOSIS — R07.82 INTERCOSTAL PAIN: ICD-10-CM

## 2020-11-02 RX ORDER — MELOXICAM 15 MG/1
TABLET ORAL
Qty: 30 TABLET | Refills: 0 | Status: SHIPPED | OUTPATIENT
Start: 2020-11-02 | End: 2021-06-15 | Stop reason: ALTCHOICE

## 2020-12-15 ENCOUNTER — OFFICE VISIT (OUTPATIENT)
Dept: DERMATOLOGY | Facility: CLINIC | Age: 40
End: 2020-12-15
Payer: COMMERCIAL

## 2020-12-15 VITALS — TEMPERATURE: 97.9 F | BODY MASS INDEX: 32.4 KG/M2 | WEIGHT: 182.9 LBS

## 2020-12-15 DIAGNOSIS — R21 RASH: Primary | ICD-10-CM

## 2020-12-15 PROCEDURE — 99204 OFFICE O/P NEW MOD 45 MIN: CPT | Performed by: STUDENT IN AN ORGANIZED HEALTH CARE EDUCATION/TRAINING PROGRAM

## 2021-03-31 ENCOUNTER — TELEMEDICINE (OUTPATIENT)
Dept: FAMILY MEDICINE CLINIC | Facility: CLINIC | Age: 41
End: 2021-03-31
Payer: COMMERCIAL

## 2021-03-31 VITALS — BODY MASS INDEX: 32.25 KG/M2 | WEIGHT: 182 LBS | HEIGHT: 63 IN

## 2021-03-31 DIAGNOSIS — F41.9 ANXIETY: ICD-10-CM

## 2021-03-31 DIAGNOSIS — G43.701 CHRONIC MIGRAINE WITHOUT AURA WITH STATUS MIGRAINOSUS, NOT INTRACTABLE: ICD-10-CM

## 2021-03-31 DIAGNOSIS — G43.509 PERSISTENT MIGRAINE AURA WITHOUT CEREBRAL INFARCTION AND WITHOUT STATUS MIGRAINOSUS, NOT INTRACTABLE: Primary | ICD-10-CM

## 2021-03-31 PROCEDURE — 99214 OFFICE O/P EST MOD 30 MIN: CPT | Performed by: FAMILY MEDICINE

## 2021-03-31 PROCEDURE — 3008F BODY MASS INDEX DOCD: CPT | Performed by: FAMILY MEDICINE

## 2021-03-31 PROCEDURE — 3725F SCREEN DEPRESSION PERFORMED: CPT | Performed by: FAMILY MEDICINE

## 2021-03-31 PROCEDURE — 1036F TOBACCO NON-USER: CPT | Performed by: FAMILY MEDICINE

## 2021-03-31 RX ORDER — RIZATRIPTAN BENZOATE 10 MG/1
10 TABLET ORAL ONCE AS NEEDED
Qty: 9 TABLET | Refills: 0 | Status: SHIPPED | OUTPATIENT
Start: 2021-03-31 | End: 2021-04-26

## 2021-03-31 RX ORDER — TOPIRAMATE 25 MG/1
25 TABLET ORAL DAILY
Qty: 30 TABLET | Refills: 0 | Status: SHIPPED | OUTPATIENT
Start: 2021-03-31 | End: 2021-04-26

## 2021-03-31 NOTE — PROGRESS NOTES
Virtual Regular Visit      Assessment/Plan:    Edyta Urena was seen today for virtual regular visit  Diagnoses and all orders for this visit:    Persistent migraine aura without cerebral infarction and without status migrainosus, not intractable  Comments:  added topamax today for better control   to f/u with neuro ophthalmalogist   Orders:  -     topiramate (Topamax) 25 mg tablet; Take 1 tablet (25 mg total) by mouth daily    Anxiety  Comments:  doing well on current meds    Chronic migraine without aura with status migrainosus, not intractable  Comments:  magnesium and B2 supplements recommended   Orders:  -     rizatriptan (MAXALT) 10 MG tablet; Take 1 tablet (10 mg total) by mouth once as needed for migraine for up to 1 dose May repeat in 2 hours if needed        BMI Counseling: Body mass index is 32 24 kg/m²  The BMI is above normal  Nutrition recommendations include decreasing portion sizes and encouraging healthy choices of fruits and vegetables  Exercise recommendations include moderate physical activity 150 minutes/week  No pharmacotherapy was ordered  Reason for visit is   Chief Complaint   Patient presents with    Virtual Regular Visit        Encounter provider Jayce Fleming MD    Provider located at [address] [address]      Recent Visits  No visits were found meeting these conditions  Showing recent visits within past 7 days and meeting all other requirements     Today's Visits  Date Type Provider Dept   03/31/21 Telemedicine MD Rolan Burger today's visits and meeting all other requirements     Future Appointments  No visits were found meeting these conditions  Showing future appointments within next 150 days and meeting all other requirements        The patient was identified by name and date of birth   Rian Molina was informed that this is a telemedicine visit and that the visit is being conducted through [facility] and patient was informed that this is a secure, HIPAA-compliant platform  She agrees to proceed     My office door was closed  No one else was in the room  She acknowledged consent and understanding of privacy and security of the video platform  The patient has agreed to participate and understands they can discontinue the visit at any time  Patient is aware this is a billable service  Subjective  Avani Jara is a 42 y o  female for worsening headaches for the 1 month    Last week was the worst  More light sensitivity    Last eye exam was July 2020 which was normal  She was told to see neuroophthalmologist but she never did       Migraine   This is a recurrent problem  The problem has been rapidly worsening  The pain is located in the bilateral region  Radiates to: back of the scalp  The pain quality is similar to prior headaches  The pain is moderate  Associated symptoms include eye pain  Pertinent negatives include no abdominal pain, abnormal behavior, anorexia, back pain, blurred vision, coughing, dizziness, drainage, ear pain, eye redness, eye watering, facial sweating, fever, hearing loss, insomnia, loss of balance, muscle aches, nausea, neck pain, numbness, phonophobia, photophobia, rhinorrhea, scalp tenderness, seizures, sinus pressure, sore throat, swollen glands, tingling, tinnitus, visual change, vomiting, weakness or weight loss  The symptoms are aggravated by bright light  She has tried triptans, NSAIDs, cold packs and acetaminophen for the symptoms  The treatment provided mild relief  Her past medical history is significant for migraine headaches  There is no history of cancer, cluster headaches, hypertension, migraines in the family, obesity, pseudotumor cerebri, recent head traumas, sinus disease or TMJ          Past Medical History:   Diagnosis Date    Fetal growth problem suspected but not found     RESOLVED: 3/1/17    First trimester bleeding     RESOLVED:3/1/17  Iron deficiency anemia due to chronic blood loss 2018    Miscarriage     Missed      RESOLVED:3/1/17    Poor fetal growth affecting management of mother     RESOLVED:3/1/17    Recurrent pregnancy loss, antepartum condition or complication     OGBBNEFZ:8/3/45    Spontaneous      RESOLVED:3/1/17    Supraventricular tachycardia ([place])     by history       Past Surgical History:   Procedure Laterality Date    COLONOSCOPY      DILATION AND CURETTAGE OF UTERUS      ,     DILATION AND CURETTAGE OF UTERUS      FOR SPONTANEOUS ; X5 0312-6755    HYSTERECTOMY  2018    Dr Salas Gear INDUCED   2010    LAPAROSCOPIC TUBAL LIGATION Right 2016    Procedure: LAPAROSCOPIC TUBAL LIGATION ;  Surgeon: Alla Roberts MD;  [facility];  Service:    J[place] H[place] LAP,VAG HYST,UTERUS 250GMS/<,SALP-OOPH N/A 2018    Procedure: HYSTERECTOMY LAPAROSCOPIC ASSISTED VAGINAL (LAVH); RIGHT LAPAROSCOPIC SALPINGECTOMY;  Surgeon: Alla Roberts MD;  [facility];  Service: Gynecology    [place] SURG RX MISSED ABORTN,1ST TRI N/A 2016    Procedure: DILATATION AND EVACUATION (D&E) (MISSED AB); Surgeon: Alla Roberts MD;  [facility];  Service: Gynecology    SALPINGOOPHORECTOMY Left     TUBAL LIGATION Right        Current Outpatient Medications   Medication Sig Dispense Refill    buPROPion (WELLBUTRIN XL) 300 mg 24 hr tablet Take 1 tablet (300 mg total) by mouth every morning 90 tablet 3    escitalopram (LEXAPRO) 10 mg tablet Take 1 tablet (10 mg total) by mouth daily 90 tablet 3    Multiple Vitamin (MULTIVITAMIN) tablet Take 1 tablet by mouth daily      rizatriptan (MAXALT) 10 MG tablet Take 1 tablet (10 mg total) by mouth once as needed for migraine for up to 1 dose May repeat in 2 hours if needed 9 tablet 0    triamcinolone (KENALOG) 0 1 % ointment Apply topically 2 (two) times a day Do not apply on face, groin  Overuse can thin skin   453 6 g 1    hydrOXYzine HCL (ATARAX) 25 mg tablet Take 1 tablet (25 mg total) by mouth every 6 (six) hours as needed for itching (Patient not taking: Reported on 7/11/2020) 30 tablet 5    meloxicam (MOBIC) 15 mg tablet TAKE 1 TABLET BY MOUTH EVERY DAY (Patient not taking: Reported on 3/31/2021) 30 tablet 0    topiramate (Topamax) 25 mg tablet Take 1 tablet (25 mg total) by mouth daily 30 tablet 0     No current facility-administered medications for this visit  Allergies   Allergen Reactions    Iodinated Diagnostic Agents Anaphylaxis     Facial swelling and hives       Review of Systems   Constitutional: Negative for fever and weight loss  HENT: Negative for ear pain, hearing loss, rhinorrhea, sinus pressure, sore throat and tinnitus  Eyes: Positive for pain  Negative for blurred vision, photophobia and redness  Respiratory: Negative for cough  Gastrointestinal: Negative for abdominal pain, anorexia, nausea and vomiting  Musculoskeletal: Negative for back pain and neck pain  Neurological: Negative for dizziness, tingling, seizures, weakness, numbness and loss of balance  Psychiatric/Behavioral: The patient does not have insomnia  Video Exam    Vitals:    03/31/21 1001   Weight: 82 6 kg (182 lb)   Height: 5' 3" (1 6 m)       Physical Exam  Constitutional:       Appearance: Normal appearance  Pulmonary:      Effort: Pulmonary effort is normal  No respiratory distress  Neurological:      General: No focal deficit present  Mental Status: She is alert and oriented to person, place, and time  Psychiatric:         Mood and Affect: Mood normal          Behavior: Behavior normal           I spent 15 minutes directly with the patient during this visit      VIRTUAL VISIT DISCLAIMER    Pina Reveles acknowledges that she has consented to an online visit or consultation   She understands that the online visit is based solely on information provided by her, and that, in the absence of a face-to-face physical evaluation by the physician, the diagnosis she receives is both limited and provisional in terms of accuracy and completeness  This is not intended to replace a full medical face-to-face evaluation by the physician  Neema Mccauley understands and accepts these terms

## 2021-04-24 DIAGNOSIS — G43.701 CHRONIC MIGRAINE WITHOUT AURA WITH STATUS MIGRAINOSUS, NOT INTRACTABLE: ICD-10-CM

## 2021-04-24 DIAGNOSIS — G43.509 PERSISTENT MIGRAINE AURA WITHOUT CEREBRAL INFARCTION AND WITHOUT STATUS MIGRAINOSUS, NOT INTRACTABLE: ICD-10-CM

## 2021-04-26 RX ORDER — RIZATRIPTAN BENZOATE 10 MG/1
10 TABLET ORAL ONCE AS NEEDED
Qty: 9 TABLET | Refills: 0 | Status: SHIPPED | OUTPATIENT
Start: 2021-04-26

## 2021-04-26 RX ORDER — TOPIRAMATE 25 MG/1
TABLET ORAL
Qty: 30 TABLET | Refills: 0 | Status: SHIPPED | OUTPATIENT
Start: 2021-04-26 | End: 2021-06-15

## 2021-06-15 ENCOUNTER — OFFICE VISIT (OUTPATIENT)
Dept: FAMILY MEDICINE CLINIC | Facility: CLINIC | Age: 41
End: 2021-06-15
Payer: COMMERCIAL

## 2021-06-15 VITALS
OXYGEN SATURATION: 99 % | TEMPERATURE: 97.9 F | BODY MASS INDEX: 32.28 KG/M2 | HEIGHT: 63 IN | DIASTOLIC BLOOD PRESSURE: 80 MMHG | WEIGHT: 182.2 LBS | SYSTOLIC BLOOD PRESSURE: 120 MMHG | HEART RATE: 77 BPM | RESPIRATION RATE: 16 BRPM

## 2021-06-15 DIAGNOSIS — E05.90 HYPERTHYROIDISM: ICD-10-CM

## 2021-06-15 DIAGNOSIS — G43.509 PERSISTENT MIGRAINE AURA WITHOUT CEREBRAL INFARCTION AND WITHOUT STATUS MIGRAINOSUS, NOT INTRACTABLE: ICD-10-CM

## 2021-06-15 DIAGNOSIS — F41.9 ANXIETY: ICD-10-CM

## 2021-06-15 DIAGNOSIS — F32.89 OTHER DEPRESSION: Primary | ICD-10-CM

## 2021-06-15 PROCEDURE — 3008F BODY MASS INDEX DOCD: CPT | Performed by: FAMILY MEDICINE

## 2021-06-15 PROCEDURE — 99214 OFFICE O/P EST MOD 30 MIN: CPT | Performed by: FAMILY MEDICINE

## 2021-06-15 PROCEDURE — 1036F TOBACCO NON-USER: CPT | Performed by: FAMILY MEDICINE

## 2021-06-15 RX ORDER — RIBOFLAVIN (VITAMIN B2) 100 MG
400 TABLET ORAL DAILY
Qty: 90 TABLET | Refills: 0 | Status: SHIPPED | OUTPATIENT
Start: 2021-06-15

## 2021-06-15 RX ORDER — ESCITALOPRAM OXALATE 10 MG/1
10 TABLET ORAL DAILY
Qty: 90 TABLET | Refills: 3 | Status: SHIPPED | OUTPATIENT
Start: 2021-06-15

## 2021-06-15 RX ORDER — TOPIRAMATE 50 MG/1
50 TABLET, FILM COATED ORAL DAILY
Qty: 90 TABLET | Refills: 0 | Status: SHIPPED | OUTPATIENT
Start: 2021-06-15

## 2021-06-15 RX ORDER — BUPROPION HYDROCHLORIDE 300 MG/1
300 TABLET ORAL EVERY MORNING
Qty: 90 TABLET | Refills: 3 | Status: SHIPPED | OUTPATIENT
Start: 2021-06-15

## 2021-06-15 NOTE — PROGRESS NOTES
Assessment/Plan:    No problem-specific Assessment & Plan notes found for this encounter  Diagnoses and all orders for this visit:    Other depression  -     buPROPion (WELLBUTRIN XL) 300 mg 24 hr tablet; Take 1 tablet (300 mg total) by mouth every morning    Anxiety  Comments:  restarted her meds today  Orders:  -     escitalopram (LEXAPRO) 10 mg tablet; Take 1 tablet (10 mg total) by mouth daily    Persistent migraine aura without cerebral infarction and without status migrainosus, not intractable  -     Magnesium 400 MG CAPS; Take 1 capsule (400 mg total) by mouth daily  -     topiramate (TOPAMAX) 50 MG tablet; Take 1 tablet (50 mg total) by mouth daily  -     Riboflavin (B-2) 100 MG TABS; Take 4 tablets (400 mg total) by mouth daily  -     MRI brain wo contrast; Future  -     Ambulatory referral to Neurology  -     CBC and differential  -     Vitamin B12; Future  -     Comprehensive metabolic panel  -     Iron Panel (Includes Ferritin, Iron Sat%, Iron, and TIBC); Future    Hyperthyroidism  -     Lipid Panel with Direct LDL reflex; Future  -     TSH, 3rd generation with Free T4 reflex; Future  -     T3, free; Future  -     T4, free; Future  -     Iron Panel (Includes Ferritin, Iron Sat%, Iron, and TIBC); Future          Subjective:      Patient ID: Samuel Kaplan is a 42 y o  female  Here for follow up  Tried Topamax 25 mg for 30 days which didn't help with her headaches   Headaches are located left side of her temple to top her head   Happens every other day and lasts for days at times   Not eating all day makes the headache worse  Nothing else makes it better or worse      Headache   This is a recurrent problem  The problem has been waxing and waning  The pain is located in the left unilateral region  The pain does not radiate  The pain quality is similar to prior headaches  The pain is mild   Pertinent negatives include no abdominal pain, abnormal behavior, anorexia, back pain, blurred vision, coughing, dizziness, drainage, ear pain, facial sweating, fever, hearing loss, insomnia, loss of balance, muscle aches, nausea, neck pain, numbness, scalp tenderness, seizures, sinus pressure, sore throat, swollen glands, tingling, tinnitus, visual change or vomiting  The symptoms are aggravated by bright light and emotional stress  She has tried acetaminophen and NSAIDs for the symptoms  The treatment provided no relief  The following portions of the patient's history were reviewed and updated as appropriate: allergies, current medications, past family history, past medical history, past social history, past surgical history and problem list     Review of Systems   Constitutional: Negative for fever  HENT: Negative for ear pain, hearing loss, sinus pressure, sore throat and tinnitus  Eyes: Negative for blurred vision  Respiratory: Negative for cough  Gastrointestinal: Negative for abdominal pain, anorexia, nausea and vomiting  Musculoskeletal: Negative for back pain and neck pain  Neurological: Positive for headaches  Negative for dizziness, tingling, seizures, numbness and loss of balance  Psychiatric/Behavioral: The patient does not have insomnia  Objective:      /80 (BP Location: Left arm, Patient Position: Sitting, Cuff Size: Adult)   Pulse 77   Temp 97 9 °F (36 6 °C) (Tympanic)   Resp 16   Ht 5' 3 23" (1 606 m)   Wt 82 6 kg (182 lb 3 2 oz)   LMP 10/10/2018   SpO2 99%   BMI 32 04 kg/m²          Physical Exam  Constitutional:       Appearance: Normal appearance  HENT:      Right Ear: Tympanic membrane normal       Left Ear: Tympanic membrane normal    Cardiovascular:      Rate and Rhythm: Normal rate and regular rhythm  Pulses: Normal pulses  Heart sounds: Normal heart sounds  Pulmonary:      Effort: Pulmonary effort is normal       Breath sounds: Normal breath sounds  Neurological:      General: No focal deficit present        Mental Status: She is alert and oriented to person, place, and time     Psychiatric:         Mood and Affect: Mood normal          Behavior: Behavior normal

## 2021-06-18 DIAGNOSIS — G43.509 PERSISTENT MIGRAINE AURA WITHOUT CEREBRAL INFARCTION AND WITHOUT STATUS MIGRAINOSUS, NOT INTRACTABLE: ICD-10-CM

## 2021-06-21 RX ORDER — CALCIUM CARBONATE/VITAMIN D3 500-10/5ML
LIQUID (ML) ORAL
Qty: 90 CAPSULE | Refills: 0 | Status: SHIPPED | OUTPATIENT
Start: 2021-06-21

## 2021-07-20 ENCOUNTER — TELEPHONE (OUTPATIENT)
Dept: FAMILY MEDICINE CLINIC | Facility: CLINIC | Age: 41
End: 2021-07-20

## 2021-07-20 NOTE — TELEPHONE ENCOUNTER
Pt called and wanted to inform you that she went for her MRI today and declined to take out her piercings in her nipples as well as her private area to get the imaging done; therefore, MRI was not done

## 2021-07-20 NOTE — TELEPHONE ENCOUNTER
Above noted      She has to make an appointment to see neurology ASAP for her migraines - ordered in chart

## 2022-03-30 ENCOUNTER — APPOINTMENT (OUTPATIENT)
Dept: URGENT CARE | Age: 42
End: 2022-03-30

## 2022-03-30 ENCOUNTER — APPOINTMENT (OUTPATIENT)
Dept: LAB | Age: 42
End: 2022-03-30

## 2022-03-30 DIAGNOSIS — Z11.52 ENCOUNTER FOR SCREENING FOR COVID-19: ICD-10-CM

## 2022-03-30 PROCEDURE — U0003 INFECTIOUS AGENT DETECTION BY NUCLEIC ACID (DNA OR RNA); SEVERE ACUTE RESPIRATORY SYNDROME CORONAVIRUS 2 (SARS-COV-2) (CORONAVIRUS DISEASE [COVID-19]), AMPLIFIED PROBE TECHNIQUE, MAKING USE OF HIGH THROUGHPUT TECHNOLOGIES AS DESCRIBED BY CMS-2020-01-R: HCPCS

## 2022-03-30 PROCEDURE — U0005 INFEC AGEN DETEC AMPLI PROBE: HCPCS

## 2022-03-31 LAB — SARS-COV-2 RNA RESP QL NAA+PROBE: NEGATIVE

## 2022-07-23 ENCOUNTER — APPOINTMENT (EMERGENCY)
Dept: RADIOLOGY | Facility: HOSPITAL | Age: 42
End: 2022-07-23

## 2022-07-23 ENCOUNTER — HOSPITAL ENCOUNTER (EMERGENCY)
Facility: HOSPITAL | Age: 42
Discharge: HOME | End: 2022-07-23
Attending: EMERGENCY MEDICINE

## 2022-07-23 VITALS
DIASTOLIC BLOOD PRESSURE: 72 MMHG | TEMPERATURE: 97.8 F | HEART RATE: 78 BPM | RESPIRATION RATE: 16 BRPM | OXYGEN SATURATION: 100 % | SYSTOLIC BLOOD PRESSURE: 133 MMHG

## 2022-07-23 DIAGNOSIS — M79.641 RIGHT HAND PAIN: ICD-10-CM

## 2022-07-23 DIAGNOSIS — M25.531 RIGHT WRIST PAIN: Primary | ICD-10-CM

## 2022-07-23 PROCEDURE — 73110 X-RAY EXAM OF WRIST: CPT

## 2022-07-23 PROCEDURE — 99283 EMERGENCY DEPT VISIT LOW MDM: CPT

## 2022-07-23 PROCEDURE — 99284 EMERGENCY DEPT VISIT MOD MDM: CPT | Performed by: STUDENT IN AN ORGANIZED HEALTH CARE EDUCATION/TRAINING PROGRAM

## 2022-07-23 PROCEDURE — 96372 THER/PROPH/DIAG INJ SC/IM: CPT

## 2022-07-23 PROCEDURE — 73130 X-RAY EXAM OF HAND: CPT

## 2022-07-23 RX ORDER — KETOROLAC TROMETHAMINE 30 MG/ML
30 INJECTION, SOLUTION INTRAMUSCULAR; INTRAVENOUS ONCE
Status: COMPLETED | OUTPATIENT
Start: 2022-07-23 | End: 2022-07-23

## 2022-07-23 RX ADMIN — KETOROLAC TROMETHAMINE 30 MG: 30 INJECTION, SOLUTION INTRAMUSCULAR at 02:37

## 2022-07-23 NOTE — DISCHARGE INSTRUCTIONS
Can take ibuprofen (600mg every 8 hours, take with food) and tylenol (every 6 hours, max 3g/24 hours) as needed for pain  Please wear brace to assist with pain  Please schedule a follow up appointment with the hand surgeon, Dr Noemi Cid  Please return to the ED with any new or worsening symptoms 
Breath sounds clear and equal bilaterally.

## 2022-07-23 NOTE — ED PROVIDER NOTES
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 Taking?    Magnesium Oxide 400 MG CAPS   No No   Sig: TAKE 1 CAPSULE (400 MG TOTAL) BY MOUTH DAILY   Multiple Vitamin (MULTIVITAMIN) tablet   Yes No   Sig: Take 1 tablet by mouth daily   Riboflavin (B-2) 100 MG TABS   No No   Sig: Take 4 tablets (400 mg total) by mouth daily   buPROPion (WELLBUTRIN XL) 300 mg 24 hr tablet Not Taking at Unknown time  No No   Sig: Take 1 tablet (300 mg total) by mouth every morning   Patient not taking: Reported on 7/23/2022   escitalopram (LEXAPRO) 10 mg tablet   No No   Sig: Take 1 tablet (10 mg total) by mouth daily   hydrOXYzine HCL (ATARAX) 25 mg tablet   No No   Sig: Take 1 tablet (25 mg total) by mouth every 6 (six) hours as needed for itching   Patient not taking: Reported on 7/11/2020   rizatriptan (MAXALT) 10 MG tablet   No No   Sig: TAKE 1 TABLET (10 MG TOTAL) BY MOUTH ONCE AS NEEDED FOR MIGRAINE FOR UP TO 1 DOSE MAY REPEAT IN 2 HOURS IF NEEDED topiramate (TOPAMAX) 50 MG tablet Not Taking at Unknown time  No No   Sig: Take 1 tablet (50 mg total) by mouth daily   Patient not taking: Reported on 2022   triamcinolone (KENALOG) 0 1 % ointment Not Taking at Unknown time  No No   Sig: Apply topically 2 (two) times a day Do not apply on face, groin  Overuse can thin skin  Patient not taking: Reported on 2022      Facility-Administered Medications: None       Past Medical History:   Diagnosis Date    Fetal growth problem suspected but not found     RESOLVED: 3/1/17    First trimester bleeding     RESOLVED:3/1/17    Iron deficiency anemia due to chronic blood loss 2018    Miscarriage     Missed      RESOLVED:3/1/17    Poor fetal growth affecting management of mother     RESOLVED:3/1/17    Recurrent pregnancy loss, antepartum condition or complication     YJOFZHIO:17    Spontaneous      RESOLVED:3/1/17    Supraventricular tachycardia ([address] )     by history       Past Surgical History:   Procedure Laterality Date    COLONOSCOPY      DILATION AND CURETTAGE OF UTERUS      ,     DILATION AND CURETTAGE OF UTERUS      FOR SPONTANEOUS ; [id number]    HYSTERECTOMY  2018    Dr Davi Still INDUCED   2010    LAPAROSCOPIC TUBAL LIGATION Right 2016    Procedure: LAPAROSCOPIC TUBAL LIGATION ;  Surgeon: Melissa Moy MD;  [facility];  Service:    Jovana Elvin OR LAP,VAG HYST,UTERUS 250GMS/<,SALP-OOPH N/A 2018    Procedure: HYSTERECTOMY LAPAROSCOPIC ASSISTED VAGINAL (LAVH); RIGHT LAPAROSCOPIC SALPINGECTOMY;  Surgeon: Melissa Moy MD;  [facility];  Service: Gynecology    OR SURG RX MISSED ABORTN,1ST TRI N/A 2016    Procedure: DILATATION AND EVACUATION (D&E) (MISSED AB);   Surgeon: Melissa Moy MD;  [facility];  Service: Gynecology    SALPINGOOPHORECTOMY Left     TUBAL LIGATION Right        Family History   Problem Relation Age of Onset    Arthritis Mother    Jovana Oconnor Depression Mother     Hypertension Mother     Cancer Father      I have reviewed and agree with the history as documented  E-Cigarette/Vaping    E-Cigarette Use Never User      E-Cigarette/Vaping Substances     Social History     Tobacco Use    Smoking status: Never Smoker    Smokeless tobacco: Never Used   Vaping Use    Vaping Use: Never used   Substance Use Topics    Alcohol use: No    Drug use: No       Review of Systems   Constitutional: Negative for chills and fever  HENT: Negative for congestion, drooling, ear pain, sore throat and trouble swallowing  Eyes: Negative for pain and visual disturbance  Respiratory: Negative for cough and shortness of breath  Cardiovascular: Negative for chest pain and palpitations  Gastrointestinal: Negative for abdominal pain, diarrhea, nausea and vomiting  Genitourinary: Negative for dysuria and frequency  Musculoskeletal: Positive for arthralgias  Negative for back pain, gait problem, joint swelling, myalgias, neck pain and neck stiffness  Skin: Negative for color change, rash and wound  Neurological: Negative for syncope, light-headedness, numbness and headaches  All other systems reviewed and are negative  Physical Exam  Physical Exam  Vitals and nursing note reviewed  Constitutional:       General: She is not in acute distress  Appearance: Normal appearance  She is well-developed  She is not ill-appearing  Comments: Well appearing, speaking in full sentences, resting comfortably on stretcher, VSS   HENT:      Head: Normocephalic and atraumatic  Right Ear: External ear normal       Left Ear: External ear normal       Nose: Nose normal  No congestion or rhinorrhea  Mouth/Throat:      Mouth: Mucous membranes are moist    Eyes:      General:         Right eye: No discharge  Left eye: No discharge  Conjunctiva/sclera: Conjunctivae normal    Cardiovascular:      Rate and Rhythm: Normal rate and regular rhythm  Heart sounds: No murmur heard  No friction rub  No gallop  Pulmonary:      Effort: Pulmonary effort is normal  No respiratory distress  Breath sounds: Normal breath sounds  No stridor  No wheezing, rhonchi or rales  Genitourinary:     Comments: Deferred  Musculoskeletal:         General: Tenderness present  No swelling, deformity or signs of injury  Cervical back: Normal range of motion and neck supple  Comments: Right UE: TTP to 1st & 2nd digits and metacarpals, TTP to radial aspect of wrist, ROM limited 2/2 pain  Positive tinel's sign  Remainder of hand and wrist diffusely non-tender with no anatomical snuff box TTP  Sensation intact, cap refill <2s  Unable to perform [facility] test 2/2 pain  No swelling, erythema, bruising, wounds, or deformity  Skin:     General: Skin is warm and dry  Capillary Refill: Capillary refill takes less than 2 seconds  Findings: No bruising, erythema or rash  Neurological:      General: No focal deficit present  Mental Status: She is alert and oriented to person, place, and time     Psychiatric:         Mood and Affect: Mood normal          Vital Signs  ED Triage Vitals   Temperature Pulse Respirations Blood Pressure SpO2   07/23/22 0208 07/23/22 0208 07/23/22 0208 07/23/22 0208 07/23/22 0208   97 8 °F (36 6 °C) 84 18 141/91 100 %      Temp Source Heart Rate Source Patient Position - Orthostatic VS BP Location FiO2 (%)   07/23/22 0208 07/23/22 0208 07/23/22 0208 07/23/22 0208 --   Oral Monitor Lying Left arm       Pain Score       07/23/22 0237       10 - Worst Possible Pain           Vitals:    07/23/22 0208   BP: 141/91   Pulse: 84   Patient Position - Orthostatic VS: Lying         Visual Acuity      ED Medications  Medications   ketorolac (TORADOL) injection 30 mg (30 mg Intramuscular Given 7/23/22 0237)       Diagnostic Studies  Results Reviewed     None                 XR hand 3+ views RIGHT   ED Interpretation by Wanda Nicholson MANGO (07/23 0300)   No acute fx  XR wrist 3+ views RIGHT   ED Interpretation by Sonia Franco PA-C (07/23 0300)   No acute fx  Procedures  Procedures         ED Course                               SBIRT 22yo+    Flowsheet Row Most Recent Value   SBIRT (25 yo +)    In order to provide better care to our patients, we are screening all of our patients for alcohol and drug use  Would it be okay to ask you these screening questions? No Filed at: 07/23/2022 0247                    MDM  Number of Diagnoses or Management Options  Right hand pain  Right wrist pain  Diagnosis management comments: Evaluation not consistent with septic joint and hemarthrosis  Right UE remains n/v intact  XR with no acute fx  No anatomical snuff box TTP to suggest scaphoid fx  Positive tinel's sign to suggest carpal tunnel syndrome  Unable to perform [facility] test to evaluate for [employer]; however, given H&P carpal tunnel most likely  Patient well appearing, VSS, toradol given for pain, stable for discharge      -motrin/tylenol PRN  -wrist brace  -f/u with hand  -strict ED return precautions discussed     Results discussed with patient, who verbalized understanding and agreement with the management plan  Strict ED return instructions were discussed at bedside and all questions were answered  Prior to discharge, I provided both verbal and written instructions of the management plan and the signs and symptoms that should prompt the patient to return to the ED  All questions were answered and the patient was comfortable with the plan of care and discharged home  The patient agrees to return to the Emergency Department for concerns and/or progression of illness           Amount and/or Complexity of Data Reviewed  Tests in the radiology section of CPT®: ordered and reviewed    Patient Progress  Patient progress: stable      Disposition  Final diagnoses:   Right wrist pain   Right hand pain     Time reflects when diagnosis was documented in both MDM as applicable and the Disposition within this note     Time User Action Codes Description Comment    7/23/2022  3:00 AM Itzel Noun Add [M25 531] Right wrist pain     7/23/2022  3:00 AM Itzel Noun Add [L88 135] Right hand pain       ED Disposition     ED Disposition   Discharge    Condition   Stable    Date/Time   Sat Jul 23, 2022  3:08 AM    Comment   Rosana Begin discharge to home/self care                 Follow-up Information     Follow up With Specialties Details Why Contact Info Additional Information    Yamel Noe MD Orthopedic Surgery, Hand Surgery Schedule an appointment as soon as possible for a visit in 3 days  [address] Emergency Department Emergency Medicine  If symptoms worsen [address] [facility],[phone]  [address] Emergency Department, [address], [address] [place] [address]          Patient's Medications   Discharge Prescriptions    No medications on file           PDMP Review     None          ED Provider  Electronically Signed by           Alida Ramsey PA-C  07/23/22 1440

## 2022-07-23 NOTE — Clinical Note
Caryl Joiner was seen and treated in our emergency department on 7/23/2022  Diagnosis:     Vibha Joya  may return to work on return date  She may return on this date: 07/26/2022         If you have any questions or concerns, please don't hesitate to call        Ramonita Olsen PA-C    ______________________________           _______________          _______________  Hospital Representative                              Date                                Time

## 2022-10-27 ENCOUNTER — OFFICE VISIT (OUTPATIENT)
Dept: FAMILY MEDICINE CLINIC | Facility: CLINIC | Age: 42
End: 2022-10-27

## 2022-10-27 VITALS
BODY MASS INDEX: 31.04 KG/M2 | SYSTOLIC BLOOD PRESSURE: 120 MMHG | TEMPERATURE: 97.8 F | HEIGHT: 64 IN | HEART RATE: 75 BPM | WEIGHT: 181.8 LBS | RESPIRATION RATE: 16 BRPM | OXYGEN SATURATION: 97 % | DIASTOLIC BLOOD PRESSURE: 80 MMHG

## 2022-10-27 DIAGNOSIS — G43.509 PERSISTENT MIGRAINE AURA WITHOUT CEREBRAL INFARCTION AND WITHOUT STATUS MIGRAINOSUS, NOT INTRACTABLE: ICD-10-CM

## 2022-10-27 DIAGNOSIS — Z11.59 NEED FOR HEPATITIS C SCREENING TEST: ICD-10-CM

## 2022-10-27 DIAGNOSIS — N62 MACROMASTIA: ICD-10-CM

## 2022-10-27 DIAGNOSIS — F41.9 ANXIETY: ICD-10-CM

## 2022-10-27 DIAGNOSIS — Z11.4 SCREENING FOR HIV (HUMAN IMMUNODEFICIENCY VIRUS): ICD-10-CM

## 2022-10-27 DIAGNOSIS — M54.9 MID BACK PAIN: ICD-10-CM

## 2022-10-27 DIAGNOSIS — Z12.31 ENCOUNTER FOR SCREENING MAMMOGRAM FOR BREAST CANCER: ICD-10-CM

## 2022-10-27 DIAGNOSIS — F32.89 OTHER DEPRESSION: ICD-10-CM

## 2022-10-27 DIAGNOSIS — Z00.00 ANNUAL PHYSICAL EXAM: Primary | ICD-10-CM

## 2022-10-27 RX ORDER — BUPROPION HYDROCHLORIDE 300 MG/1
300 TABLET ORAL EVERY MORNING
Qty: 90 TABLET | Refills: 3 | Status: SHIPPED | OUTPATIENT
Start: 2022-10-27

## 2022-10-27 RX ORDER — TOPIRAMATE 50 MG/1
50 TABLET, FILM COATED ORAL DAILY
Qty: 90 TABLET | Refills: 0 | Status: SHIPPED | OUTPATIENT
Start: 2022-10-27

## 2022-10-27 RX ORDER — CALCIUM CARBONATE/VITAMIN D3 500-10/5ML
400 LIQUID (ML) ORAL DAILY
Qty: 90 CAPSULE | Refills: 0 | Status: SHIPPED | OUTPATIENT
Start: 2022-10-27

## 2022-10-27 RX ORDER — HYDROXYZINE HYDROCHLORIDE 25 MG/1
25 TABLET, FILM COATED ORAL EVERY 6 HOURS PRN
Qty: 30 TABLET | Refills: 5 | Status: SHIPPED | OUTPATIENT
Start: 2022-10-27

## 2022-10-27 RX ORDER — ESCITALOPRAM OXALATE 10 MG/1
10 TABLET ORAL DAILY
Qty: 90 TABLET | Refills: 3 | Status: SHIPPED | OUTPATIENT
Start: 2022-10-27

## 2022-10-27 RX ORDER — RIBOFLAVIN (VITAMIN B2) 100 MG
400 TABLET ORAL DAILY
Qty: 90 TABLET | Refills: 0 | Status: SHIPPED | OUTPATIENT
Start: 2022-10-27

## 2022-10-27 NOTE — PROGRESS NOTES
[address] [facility] [address]    NAME: Jose Landry  AGE: 36 y o  SEX: female  : 1980     DATE: 10/27/2022     Assessment and Plan:     Problem List Items Addressed This Visit        Other    Anxiety    Relevant Medications    escitalopram (LEXAPRO) 10 mg tablet      Other Visit Diagnoses     Annual physical exam    -  Primary    Relevant Orders    Comprehensive metabolic panel    CBC and differential    Lipid panel    Need for hepatitis C screening test        Relevant Orders    Hepatitis C Antibody ([facility])    Screening for HIV (human immunodeficiency virus)        Relevant Orders    HIV 1/2 Antigen/Antibody (4th Generation) w Reflex [facility]    Encounter for screening mammogram for breast cancer        Relevant Orders    Mammo screening bilateral w 3d & cad    Macromastia        Relevant Orders    Ambulatory Referral to Physical Therapy    Ambulatory Referral to Plastic Surgery    Mid back pain        Other depression        Relevant Medications    escitalopram (LEXAPRO) 10 mg tablet    buPROPion (WELLBUTRIN XL) 300 mg 24 hr tablet    hydrOXYzine HCL (ATARAX) 25 mg tablet    Persistent migraine aura without cerebral infarction and without status migrainosus, not intractable        Relevant Medications    escitalopram (LEXAPRO) 10 mg tablet    buPROPion (WELLBUTRIN XL) 300 mg 24 hr tablet    Magnesium Oxide 400 MG CAPS    Riboflavin (B-2) 100 MG TABS    topiramate (TOPAMAX) 50 MG tablet          Immunizations and preventive care screenings were discussed with patient today  Appropriate education was printed on patient's after visit summary  Counseling:  Alcohol/drug use: discussed moderation in alcohol intake, the recommendations for healthy alcohol use, and avoidance of illicit drug use  Dental Health: discussed importance of regular tooth brushing, flossing, and dental visits    Injury prevention: discussed safety/seat belts, safety helmets, smoke detectors, carbon dioxide detectors, and smoking near bedding or upholstery  Sexual health: discussed sexually transmitted diseases, partner selection, use of condoms, avoidance of unintended pregnancy, and contraceptive alternatives  Exercise: the importance of regular exercise/physical activity was discussed  Recommend exercise 3-5 times per week for at least 30 minutes  No follow-ups on file  Chief Complaint:     Chief Complaint   Patient presents with   • Physical Exam     Patient is here today for an annual exam       History of Present Illness:     Adult Annual Physical   Patient here for a comprehensive physical exam  The patient reports problems - started  symptoms of covid 5 day ago and tested positive the same day  She is feeling better today  Moved back from [place] 9 months ago   Stopped all her meds and lost her meds in process of moving since 1/2022    Diet and Physical Activity  Diet/Nutrition: well balanced diet and consuming 3-5 servings of fruits/vegetables daily  Exercise: walking  Depression Screening  PHQ-2/9 Depression Screening    Little interest or pleasure in doing things: 0 - not at all  Feeling down, depressed, or hopeless: 0 - not at all  PHQ-2 Score: 0  PHQ-2 Interpretation: Negative depression screen       General Health  Sleep: sleeps well and gets 7-8 hours of sleep on average  Hearing: normal - bilateral   Vision: goes for regular eye exams  Dental: regular dental visits and brushes teeth twice daily  /GYN Health  Patient is: premenopausal  Last menstrual period: hysterectomy 2018  Contraceptive method: none  Review of Systems:     Review of Systems   Constitutional: Negative  HENT: Negative  Eyes: Negative  Respiratory: Negative  Cardiovascular: Negative  Gastrointestinal: Negative  Endocrine: Negative  Genitourinary: Negative  Musculoskeletal: Negative  Skin: Negative      Allergic/Immunologic: Negative  Neurological: Negative  Hematological: Negative  Psychiatric/Behavioral: Negative  Past Medical History:     Past Medical History:   Diagnosis Date   • Fetal growth problem suspected but not found     RESOLVED: 3/1/17   • First trimester bleeding     RESOLVED:3/1/17   • Iron deficiency anemia due to chronic blood loss 2018   • Miscarriage    • Missed      RESOLVED:3/1/17   • Poor fetal growth affecting management of mother     RESOLVED:3/1/17   • Recurrent pregnancy loss, antepartum condition or complication     FWVGBQRM:01   • Spontaneous      RESOLVED:3/1/17   • Supraventricular tachycardia ([address] )     by history      Past Surgical History:     Past Surgical History:   Procedure Laterality Date   • COLONOSCOPY     • DILATION AND CURETTAGE OF UTERUS      ,    • DILATION AND CURETTAGE OF UTERUS      FOR SPONTANEOUS ; X5 [id number]   • HYSTERECTOMY  2018    Dr Isidro Hobson   • INDUCED      • LAPAROSCOPIC TUBAL LIGATION Right 2016    Procedure: LAPAROSCOPIC TUBAL LIGATION ;  Surgeon: Eugenio Allen MD;  [facility];  Service:    • LA LAP,VAG HYST,UTERUS 250GMS/<,SALP-OOPH N/A 2018    Procedure: HYSTERECTOMY LAPAROSCOPIC ASSISTED VAGINAL (LAVH); RIGHT LAPAROSCOPIC SALPINGECTOMY;  Surgeon: Eugenio Allen MD;  Location[facility];  Service: Gynecology   • LA SURG RX MISSED ABORTN,1ST TRI N/A 2016    Procedure: DILATATION AND EVACUATION (D&E) (MISSED AB);   Surgeon: Eugenio Allen MD;  Location: [facility];  Service: Gynecology   • SALPINGOOPHORECTOMY Left    • TUBAL LIGATION Right       Social History:     Social History     Socioeconomic History   • Marital status: Single     Spouse name: None   • Number of children: None   • Years of education: None   • Highest education level: None   Occupational History   • None   Tobacco Use   • Smoking status: Never Smoker   • Smokeless tobacco: Never Used   Vaping Use   • Vaping Use: Never used   Substance and Sexual Activity   • Alcohol use: No   • Drug use: No   • Sexual activity: Yes     Partners: Male   Other Topics Concern   • None   Social History Narrative    FEELS SAFE AT HOME        Checked [place]     Social Determinants of Health     Financial Resource Strain: Not on file   Food Insecurity: Not on file   Transportation Needs: Not on file   Physical Activity: Not on file   Stress: Not on file   Social Connections: Not on file   Intimate Partner Violence: Not on file   Housing Stability: Not on file      Family History:     Family History   Problem Relation Age of Onset   • Arthritis Mother    • Depression Mother    • Hypertension Mother    • Cancer Father       Current Medications:     Current Outpatient Medications   Medication Sig Dispense Refill   • buPROPion (WELLBUTRIN XL) 300 mg 24 hr tablet Take 1 tablet (300 mg total) by mouth every morning 90 tablet 3   • escitalopram (LEXAPRO) 10 mg tablet Take 1 tablet (10 mg total) by mouth daily 90 tablet 3   • hydrOXYzine HCL (ATARAX) 25 mg tablet Take 1 tablet (25 mg total) by mouth every 6 (six) hours as needed for itching 30 tablet 5   • Magnesium Oxide 400 MG CAPS Take 1 tablet (400 mg total) by mouth in the morning 90 capsule 0   • Riboflavin (B-2) 100 MG TABS Take 4 tablets (400 mg total) by mouth daily 90 tablet 0   • topiramate (TOPAMAX) 50 MG tablet Take 1 tablet (50 mg total) by mouth daily 90 tablet 0   • Multiple Vitamin (MULTIVITAMIN) tablet Take 1 tablet by mouth daily (Patient not taking: Reported on 10/27/2022)     • rizatriptan (MAXALT) 10 MG tablet TAKE 1 TABLET (10 MG TOTAL) BY MOUTH ONCE AS NEEDED FOR MIGRAINE FOR UP TO 1 DOSE MAY REPEAT IN 2 HOURS IF NEEDED (Patient not taking: Reported on 10/27/2022) 9 tablet 0   • triamcinolone (KENALOG) 0 1 % ointment Apply topically 2 (two) times a day Do not apply on face, groin  Overuse can thin skin   (Patient not taking: No sig reported) 453 6 g 1     No current facility-administered medications for this visit  Allergies: Allergies   Allergen Reactions   • Iodinated Diagnostic Agents Anaphylaxis     Facial swelling and hives      Physical Exam:     /80 (BP Location: Left arm, Patient Position: Sitting, Cuff Size: Adult)   Pulse 75   Temp 97 8 °F (36 6 °C) (Tympanic)   Resp 16   Ht 5' 3 58" (1 615 m)   Wt 82 5 kg (181 lb 12 8 oz)   LMP  (LMP Unknown)   SpO2 97%   BMI 31 62 kg/m²     Physical Exam  Vitals and nursing note reviewed  Constitutional:       Appearance: Normal appearance  She is well-developed  HENT:      Head: Normocephalic and atraumatic  Right Ear: Tympanic membrane normal       Left Ear: Tympanic membrane normal       Nose: Nose normal       Mouth/Throat:      Mouth: Mucous membranes are moist    Eyes:      Pupils: Pupils are equal, round, and reactive to light  Cardiovascular:      Rate and Rhythm: Normal rate and regular rhythm  Pulses: Normal pulses  Heart sounds: Normal heart sounds  Pulmonary:      Effort: Pulmonary effort is normal  No respiratory distress  Breath sounds: Normal breath sounds  No wheezing  Abdominal:      General: Bowel sounds are normal       Palpations: Abdomen is soft  Musculoskeletal:         General: No deformity  Normal range of motion  Cervical back: Normal range of motion and neck supple  Skin:     General: Skin is warm  Capillary Refill: Capillary refill takes less than 2 seconds  Neurological:      General: No focal deficit present  Mental Status: She is alert and oriented to person, place, and time     Psychiatric:         Mood and Affect: Mood normal          Behavior: Behavior normal           Benigno Hanley MD  [id number] [facility]

## 2022-11-01 ENCOUNTER — TELEPHONE (OUTPATIENT)
Dept: PLASTIC SURGERY | Facility: CLINIC | Age: 42
End: 2022-11-01

## 2022-11-01 ENCOUNTER — APPOINTMENT (OUTPATIENT)
Dept: LAB | Facility: CLINIC | Age: 42
End: 2022-11-01

## 2022-11-01 DIAGNOSIS — Z11.4 SCREENING FOR HIV (HUMAN IMMUNODEFICIENCY VIRUS): ICD-10-CM

## 2022-11-01 DIAGNOSIS — Z11.59 NEED FOR HEPATITIS C SCREENING TEST: ICD-10-CM

## 2022-11-01 DIAGNOSIS — Z00.00 ROUTINE PHYSICAL EXAMINATION: Primary | ICD-10-CM

## 2022-11-01 DIAGNOSIS — E87.6 HYPOKALEMIA: Primary | ICD-10-CM

## 2022-11-01 LAB
ALBUMIN SERPL BCP-MCNC: 3.6 G/DL (ref 3.5–5)
ALP SERPL-CCNC: 48 U/L (ref 46–116)
ALT SERPL W P-5'-P-CCNC: 16 U/L (ref 12–78)
ANION GAP SERPL CALCULATED.3IONS-SCNC: 3 MMOL/L (ref 4–13)
AST SERPL W P-5'-P-CCNC: 10 U/L (ref 5–45)
BASOPHILS # BLD AUTO: 0.03 THOUSANDS/ÂΜL (ref 0–0.1)
BASOPHILS NFR BLD AUTO: 1 % (ref 0–1)
BILIRUB SERPL-MCNC: 0.75 MG/DL (ref 0.2–1)
BUN SERPL-MCNC: 8 MG/DL (ref 5–25)
CALCIUM SERPL-MCNC: 9.3 MG/DL (ref 8.3–10.1)
CHLORIDE SERPL-SCNC: 107 MMOL/L (ref 96–108)
CHOLEST SERPL-MCNC: 156 MG/DL
CO2 SERPL-SCNC: 31 MMOL/L (ref 21–32)
CREAT SERPL-MCNC: 0.92 MG/DL (ref 0.6–1.3)
EOSINOPHIL # BLD AUTO: 0.09 THOUSAND/ÂΜL (ref 0–0.61)
EOSINOPHIL NFR BLD AUTO: 2 % (ref 0–6)
ERYTHROCYTE [DISTWIDTH] IN BLOOD BY AUTOMATED COUNT: 11.5 % (ref 11.6–15.1)
GFR SERPL CREATININE-BSD FRML MDRD: 76 ML/MIN/1.73SQ M
GLUCOSE P FAST SERPL-MCNC: 82 MG/DL (ref 65–99)
HCT VFR BLD AUTO: 39.9 % (ref 34.8–46.1)
HCV AB SER QL: NORMAL
HDLC SERPL-MCNC: 57 MG/DL
HGB BLD-MCNC: 13.4 G/DL (ref 11.5–15.4)
IMM GRANULOCYTES # BLD AUTO: 0.01 THOUSAND/UL (ref 0–0.2)
IMM GRANULOCYTES NFR BLD AUTO: 0 % (ref 0–2)
LDLC SERPL CALC-MCNC: 84 MG/DL (ref 0–100)
LYMPHOCYTES # BLD AUTO: 2.07 THOUSANDS/ÂΜL (ref 0.6–4.47)
LYMPHOCYTES NFR BLD AUTO: 48 % (ref 14–44)
MCH RBC QN AUTO: 29.6 PG (ref 26.8–34.3)
MCHC RBC AUTO-ENTMCNC: 33.6 G/DL (ref 31.4–37.4)
MCV RBC AUTO: 88 FL (ref 82–98)
MONOCYTES # BLD AUTO: 0.22 THOUSAND/ÂΜL (ref 0.17–1.22)
MONOCYTES NFR BLD AUTO: 5 % (ref 4–12)
NEUTROPHILS # BLD AUTO: 1.91 THOUSANDS/ÂΜL (ref 1.85–7.62)
NEUTS SEG NFR BLD AUTO: 44 % (ref 43–75)
NONHDLC SERPL-MCNC: 99 MG/DL
NRBC BLD AUTO-RTO: 0 /100 WBCS
PLATELET # BLD AUTO: 251 THOUSANDS/UL (ref 149–390)
PMV BLD AUTO: 10.1 FL (ref 8.9–12.7)
POTASSIUM SERPL-SCNC: 3.2 MMOL/L (ref 3.5–5.3)
PROT SERPL-MCNC: 7.2 G/DL (ref 6.4–8.4)
RBC # BLD AUTO: 4.52 MILLION/UL (ref 3.81–5.12)
SODIUM SERPL-SCNC: 141 MMOL/L (ref 135–147)
TRIGL SERPL-MCNC: 75 MG/DL
WBC # BLD AUTO: 4.33 THOUSAND/UL (ref 4.31–10.16)

## 2022-11-01 RX ORDER — MAGNESIUM OXIDE 400 MG/1
TABLET ORAL
COMMUNITY
Start: 2022-10-27

## 2022-11-01 RX ORDER — POTASSIUM CHLORIDE 750 MG/1
10 CAPSULE, EXTENDED RELEASE ORAL DAILY
Qty: 20 CAPSULE | Refills: 0 | Status: SHIPPED | OUTPATIENT
Start: 2022-11-01

## 2022-11-01 NOTE — TELEPHONE ENCOUNTER
Spoke to patient about criteria for breast reduction  Currently one note 10/28/22  Emailed criteria and patient will let me know when 2 more months of documentation and 3 months of pt are done

## 2022-11-02 DIAGNOSIS — B37.2 CANDIDA INFECTION OF FLEXURAL SKIN: Primary | ICD-10-CM

## 2022-11-02 LAB — HIV 1+2 AB+HIV1 P24 AG SERPL QL IA: NORMAL

## 2022-11-02 RX ORDER — NYSTATIN 100000 [USP'U]/G
POWDER TOPICAL 3 TIMES DAILY
Qty: 30 G | Refills: 0 | Status: SHIPPED | OUTPATIENT
Start: 2022-11-02

## 2022-11-09 ENCOUNTER — EVALUATION (OUTPATIENT)
Dept: PHYSICAL THERAPY | Facility: CLINIC | Age: 42
End: 2022-11-09

## 2022-11-09 DIAGNOSIS — G89.29 CHRONIC BILATERAL THORACIC BACK PAIN: Primary | ICD-10-CM

## 2022-11-09 DIAGNOSIS — N62 MACROMASTIA: ICD-10-CM

## 2022-11-09 DIAGNOSIS — M54.6 CHRONIC BILATERAL THORACIC BACK PAIN: Primary | ICD-10-CM

## 2022-11-21 ENCOUNTER — APPOINTMENT (OUTPATIENT)
Dept: PHYSICAL THERAPY | Facility: CLINIC | Age: 42
End: 2022-11-21

## 2022-11-22 ENCOUNTER — APPOINTMENT (OUTPATIENT)
Dept: PHYSICAL THERAPY | Facility: CLINIC | Age: 42
End: 2022-11-22

## 2022-11-22 ENCOUNTER — OFFICE VISIT (OUTPATIENT)
Dept: PHYSICAL THERAPY | Facility: CLINIC | Age: 42
End: 2022-11-22

## 2022-11-22 DIAGNOSIS — M54.6 CHRONIC BILATERAL THORACIC BACK PAIN: Primary | ICD-10-CM

## 2022-11-22 DIAGNOSIS — G89.29 CHRONIC BILATERAL THORACIC BACK PAIN: Primary | ICD-10-CM

## 2022-11-22 NOTE — PROGRESS NOTES
Daily Note     Today's date: 2022  Patient name: Sid Duckworth  : 1980  MRN: [id number]  Referring provider: Ermelinda Qureshi MD  Dx:   Encounter Diagnosis     ICD-10-CM    1  Chronic bilateral thoracic back pain  M54 6     G89 29                      Subjective:  Patient reports that she has just a little bit of pain in her mid back  Objective: See treatment diary below      Assessment: Tolerated treatment well  Patient would benefit from continued PT      Plan: Continue per plan of care        Precautions: na      Manuals                                                                    Neuro Re-Ed                          T Band Row /Ext  GTB x 10                        SBS  10 x :10           TBand B ER  GTB x 10                                     Ther Ex                          Prone press ups hep 10 x :10           Standing extension hep            Thoracic extension nv 10 x :10           Supine AAROM flexion  10 x :10                                                  Ther Activity                                       Gait Training                                       Modalities

## 2022-11-25 DIAGNOSIS — G43.509 PERSISTENT MIGRAINE AURA WITHOUT CEREBRAL INFARCTION AND WITHOUT STATUS MIGRAINOSUS, NOT INTRACTABLE: ICD-10-CM

## 2022-11-28 ENCOUNTER — APPOINTMENT (OUTPATIENT)
Dept: PHYSICAL THERAPY | Facility: CLINIC | Age: 42
End: 2022-11-28

## 2022-11-30 ENCOUNTER — APPOINTMENT (OUTPATIENT)
Dept: PHYSICAL THERAPY | Facility: CLINIC | Age: 42
End: 2022-11-30

## 2022-11-30 ENCOUNTER — OFFICE VISIT (OUTPATIENT)
Dept: PHYSICAL THERAPY | Facility: CLINIC | Age: 42
End: 2022-11-30

## 2022-11-30 DIAGNOSIS — M54.6 CHRONIC BILATERAL THORACIC BACK PAIN: Primary | ICD-10-CM

## 2022-11-30 DIAGNOSIS — G89.29 CHRONIC BILATERAL THORACIC BACK PAIN: Primary | ICD-10-CM

## 2022-11-30 DIAGNOSIS — N62 MACROMASTIA: ICD-10-CM

## 2022-11-30 NOTE — PROGRESS NOTES
Daily Note     Today's date: 2022  Patient name: Janie Gleason  : 1980  MRN: [id number]  Referring provider: Noberto Fothergill, MD  Dx: No diagnosis found  Subjective: Mild soreness following last treatment session  Objective: See treatment diary below      Assessment: Tolerated treatment well  Patient exhibited good technique with therapeutic exercises   Able to progress with UBE without complaints  Plan: Continue per plan of care        Precautions: na      Manuals                                                                    Neuro Re-Ed                          T Band Row /Ext  GTB x 10 GTB x 10                        SBS  10 x :10           TBand B ER  GTB x 10 GTB x 15          UBE   4 min bwd          Standing bent over rows   2# x 10           Ther Ex                          Prone press ups- elbows Hep 10 x :10 10" x 10            Standing extension hep            Thoracic extension nv 10 x :10           Supine AAROM flexion  10 x :10 10" x 10                                                   Ther Activity                                       Gait Training                                       Modalities

## 2023-02-01 DIAGNOSIS — G43.509 PERSISTENT MIGRAINE AURA WITHOUT CEREBRAL INFARCTION AND WITHOUT STATUS MIGRAINOSUS, NOT INTRACTABLE: ICD-10-CM

## 2023-02-01 RX ORDER — TOPIRAMATE 50 MG/1
TABLET, FILM COATED ORAL
Qty: 90 TABLET | Refills: 0 | Status: SHIPPED | OUTPATIENT
Start: 2023-02-01

## 2023-04-04 ENCOUNTER — TELEPHONE (OUTPATIENT)
Dept: FAMILY MEDICINE CLINIC | Facility: CLINIC | Age: 43
End: 2023-04-04

## 2023-04-04 DIAGNOSIS — M89.8X1 CLAVICLE PAIN: ICD-10-CM

## 2023-04-04 DIAGNOSIS — M25.512 ACUTE PAIN OF LEFT SHOULDER: ICD-10-CM

## 2023-04-04 RX ORDER — CYCLOBENZAPRINE HCL 10 MG
10 TABLET ORAL 3 TIMES DAILY PRN
Qty: 30 TABLET | Refills: 0 | Status: SHIPPED | OUTPATIENT
Start: 2023-04-04

## 2023-04-04 NOTE — TELEPHONE ENCOUNTER
Pt has been having spasms in her shoulder for the past few days  She's asking if she can have a script for cyclobenzaprine (FLEXERIL) 10 mg tablet     Pt was previously on this a few years ago   Please advise if she needs to be seen first  She has an office visit scheduled on 4/27    [id number] [id number] [facility]  Phone: [phone] Fax: [phone]

## 2023-04-27 ENCOUNTER — OFFICE VISIT (OUTPATIENT)
Dept: FAMILY MEDICINE CLINIC | Facility: CLINIC | Age: 43
End: 2023-04-27

## 2023-04-27 VITALS
OXYGEN SATURATION: 100 % | HEART RATE: 83 BPM | RESPIRATION RATE: 16 BRPM | TEMPERATURE: 99 F | SYSTOLIC BLOOD PRESSURE: 118 MMHG | WEIGHT: 198 LBS | BODY MASS INDEX: 34.43 KG/M2 | DIASTOLIC BLOOD PRESSURE: 82 MMHG

## 2023-04-27 DIAGNOSIS — G43.009 MIGRAINE WITHOUT AURA AND WITHOUT STATUS MIGRAINOSUS, NOT INTRACTABLE: Primary | ICD-10-CM

## 2023-04-27 DIAGNOSIS — E05.90 HYPERTHYROIDISM: ICD-10-CM

## 2023-04-27 DIAGNOSIS — G43.509 PERSISTENT MIGRAINE AURA WITHOUT CEREBRAL INFARCTION AND WITHOUT STATUS MIGRAINOSUS, NOT INTRACTABLE: ICD-10-CM

## 2023-04-27 DIAGNOSIS — J30.89 SEASONAL ALLERGIC RHINITIS DUE TO OTHER ALLERGIC TRIGGER: ICD-10-CM

## 2023-04-27 DIAGNOSIS — E66.9 OBESITY (BMI 30-39.9): ICD-10-CM

## 2023-04-27 DIAGNOSIS — Z12.31 VISIT FOR SCREENING MAMMOGRAM: ICD-10-CM

## 2023-04-27 RX ORDER — FLUTICASONE PROPIONATE 50 MCG
2 SPRAY, SUSPENSION (ML) NASAL DAILY
Qty: 16 G | Refills: 0 | Status: SHIPPED | OUTPATIENT
Start: 2023-04-27

## 2023-04-27 RX ORDER — TOPIRAMATE 100 MG/1
100 TABLET, FILM COATED ORAL DAILY
Qty: 90 TABLET | Refills: 0 | Status: SHIPPED | OUTPATIENT
Start: 2023-04-27

## 2023-04-27 NOTE — PROGRESS NOTES
Name: Zee Allen      : 1980      MRN: [id number]  Encounter Provider: Ayanna Webster MD  Encounter Date: 2023   Encounter department: [facility]     1  Migraine without aura and without status migrainosus, not intractable  Assessment & Plan:  Not doing well  Increased her topamax to 100 mg daily   Magnesium and b2 daily     Orders:  -     CBC and differential  -     Comprehensive metabolic panel  -     Ambulatory referral to Neurology    2  Persistent migraine aura without cerebral infarction and without status migrainosus, not intractable  -     topiramate (TOPAMAX) 100 mg tablet; Take 1 tablet (100 mg total) by mouth daily    3  Obesity (BMI 30-39 9)  -     CBC and differential  -     Comprehensive metabolic panel  -     Lipid panel    4  Hyperthyroidism  Assessment & Plan:  No issues   Due for labs     Orders:  -     TSH, 3rd generation with Free T4 reflex; Future    5  Visit for screening mammogram  -     Mammo screening bilateral w 3d & cad; Future; Expected date: 2023    6  Seasonal allergic rhinitis due to other allergic trigger  -     fluticasone (FLONASE) 50 mcg/act nasal spray; 2 sprays into each nostril daily      BMI Counseling: Body mass index is 34 43 kg/m²  The BMI is above normal  Nutrition recommendations include decreasing portion sizes and encouraging healthy choices of fruits and vegetables  Exercise recommendations include moderate physical activity 150 minutes/week  No pharmacotherapy was ordered  Rationale for BMI follow-up plan is due to patient being overweight or obese  Depression Screening and Follow-up Plan: Patient was screened for depression during today's encounter  They screened negative with a PHQ-2 score of 0  Subjective      headaches are not better  Under a lot of stress   Starting LPN program soon   Allergies symptoms for the last 3 weeks     Anxiety  Presents for follow-up visit   Symptoms include nervous/anxious behavior  Patient reports no chest pain, compulsions, confusion, decreased concentration, depressed mood, dizziness, dry mouth, excessive worry, feeling of choking, hyperventilation, impotence, insomnia, irritability, malaise, muscle tension, nausea, obsessions, palpitations, panic, restlessness, shortness of breath or suicidal ideas  Symptoms occur constantly  The severity of symptoms is mild  The quality of sleep is good  Compliance with medications is %  Review of Systems   Constitutional: Negative  Negative for irritability  HENT: Negative  Respiratory: Negative  Negative for shortness of breath  Cardiovascular: Negative  Negative for chest pain and palpitations  Gastrointestinal: Negative for nausea  Genitourinary: Negative for impotence  Neurological: Negative for dizziness  Psychiatric/Behavioral: Negative for confusion, decreased concentration and suicidal ideas  The patient is nervous/anxious  The patient does not have insomnia  Current Outpatient Medications on File Prior to Visit   Medication Sig   • buPROPion (WELLBUTRIN XL) 300 mg 24 hr tablet Take 1 tablet (300 mg total) by mouth every morning   • cyclobenzaprine (FLEXERIL) 10 mg tablet Take 1 tablet (10 mg total) by mouth 3 (three) times a day as needed for muscle spasms   • escitalopram (LEXAPRO) 10 mg tablet Take 1 tablet (10 mg total) by mouth daily   • hydrOXYzine HCL (ATARAX) 25 mg tablet Take 1 tablet (25 mg total) by mouth every 6 (six) hours as needed for itching   • magnesium oxide (MAG-OX) 400 mg tablet    • Magnesium Oxide 400 MG CAPS Take 1 tablet (400 mg total) by mouth in the morning   • nystatin (MYCOSTATIN) powder Apply topically 3 (three) times a day   • potassium chloride (MICRO-K) 10 MEQ CR capsule Take 1 capsule (10 mEq total) by mouth daily   • Riboflavin (Vitamin B-2) 100 MG TABS TAKE 4 TABLETS BY MOUTH DAILY     • [DISCONTINUED] topiramate (TOPAMAX) 50 MG tablet TAKE 1 TABLET BY MOUTH EVERY DAY   • Multiple Vitamin (MULTIVITAMIN) tablet Take 1 tablet by mouth daily (Patient not taking: Reported on 10/27/2022)   • rizatriptan (MAXALT) 10 MG tablet TAKE 1 TABLET (10 MG TOTAL) BY MOUTH ONCE AS NEEDED FOR MIGRAINE FOR UP TO 1 DOSE MAY REPEAT IN 2 HOURS IF NEEDED (Patient not taking: Reported on 10/27/2022)   • triamcinolone (KENALOG) 0 1 % ointment Apply topically 2 (two) times a day Do not apply on face, groin  Overuse can thin skin  (Patient not taking: Reported on 7/23/2022)       Objective     /82   Pulse 83   Temp 99 °F (37 2 °C)   Resp 16   Wt 89 8 kg (198 lb)   LMP  (LMP Unknown)   SpO2 100%   BMI 34 43 kg/m²     Physical Exam  Constitutional:       Appearance: Normal appearance  HENT:      Right Ear: Tympanic membrane normal       Left Ear: Tympanic membrane normal    Eyes:      Pupils: Pupils are equal, round, and reactive to light  Cardiovascular:      Rate and Rhythm: Normal rate and regular rhythm  Pulses: Normal pulses  Heart sounds: Normal heart sounds  Pulmonary:      Effort: Pulmonary effort is normal       Breath sounds: Normal breath sounds  Musculoskeletal:         General: Normal range of motion  Cervical back: Normal range of motion and neck supple  Neurological:      General: No focal deficit present  Mental Status: She is alert and oriented to person, place, and time     Psychiatric:         Mood and Affect: Mood normal          Behavior: Behavior normal        Biju Case MD

## 2023-04-27 NOTE — PROGRESS NOTES
PT Evaluation     Today's date: 2022  Patient name: Raquel Kaba  : 1980  MRN: [id number]  Referring provider: No ref  provider found  Dx:   Encounter Diagnosis     ICD-10-CM    1  Chronic bilateral thoracic back pain  M54 6     G89 29        Start Time: 330  Stop Time: 430  Total time in clinic (min): 60 minutes    Assessment  Assessment details: Raquel Kaba is a 36 y o  female with  presents with pain, decreased strength, decreased ROM, impaired sensation, ambulatory dysfunction and postural  dysfunction  Due to these impairments, Patient has difficulty performing a/iadls, recreational activities and engaging in social activities  Patient's clinical presentation is consistent with their referring diagnosis  Patient would benefit from skilled physical therapy to address their aforementioned impairments, improve their level of function and to improve their overall quality of life   has been given a home exercise program and is in agreement with the plan of care  Thank you for your referral   Impairments: abnormal or restricted ROM, lacks appropriate home exercise program and poor body mechanics    Symptom irritability: moderate      Subjective Evaluation    History of Present Illness  Mechanism of injury: Patient presents today for thoracic pain which she states that pain has worsened 3 months ago  She feels that the pain is related to her macromastia  CC: pain in the thoracic and lumbar region  She takes tylenol PM which helps with her pain  She denies pain at night  Function:  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  She is currently working per [employer]  She states that she likes to take walks but doesn't any longer due to pain              Recurrent probem    Quality of life: excellent    Pain  Current pain ratin  At worst pain ratin  Relieving factors: medications and heat  Aggravating factors: standing and sitting  Progression: no change      Diagnostic Tests    FCE comments: None takenPatient Goals  Patient goals for therapy: increased strength, return to sport/leisure activities, return to work and decreased pain          Objective     Postural Observations  Seated posture: fair    Additional Postural Observation Details  Patient with an elevated L shoulder      Palpation   Left   Tenderness of the cervical interspinals and rhomboids  Right   Tenderness of the cervical interspinals, intercostals and rhomboids  Tenderness   Cervical Spine   Tenderness in the right scapula and right ribs/costal cartilage  Active Range of Motion   Cervical/Thoracic Spine       Thoracic    Left rotation: 45 degrees   Right rotation: 65 degrees     Lumbar   Flexion: 100 degrees   Extension: 25 degrees   Left lateral flexion: 25 degrees       Right lateral flexion: 30 degrees       ?        Precautions: na      Manuals 11/9                                                                Neuro Re-Ed                          T Band                                                                               Ther Ex                          Prone press ups hep            Standing extension hep            Thoracic extension nv            Supine AAROM flexion                                                    Ther Activity                                       Gait Training                                       Modalities Parent

## 2023-05-10 ENCOUNTER — APPOINTMENT (OUTPATIENT)
Dept: RADIOLOGY | Age: 43
End: 2023-05-10
Attending: PHYSICIAN ASSISTANT
Payer: OTHER MISCELLANEOUS

## 2023-05-10 ENCOUNTER — APPOINTMENT (OUTPATIENT)
Dept: URGENT CARE | Age: 43
End: 2023-05-10
Payer: OTHER MISCELLANEOUS

## 2023-05-10 DIAGNOSIS — S99.921A INJURY OF RIGHT FOOT, INITIAL ENCOUNTER: ICD-10-CM

## 2023-05-10 DIAGNOSIS — S99.921A INJURY OF RIGHT FOOT, INITIAL ENCOUNTER: Primary | ICD-10-CM

## 2023-05-10 PROCEDURE — 73630 X-RAY EXAM OF FOOT: CPT

## 2023-05-10 PROCEDURE — 99283 EMERGENCY DEPT VISIT LOW MDM: CPT | Performed by: PHYSICIAN ASSISTANT

## 2023-05-10 PROCEDURE — G0382 LEV 3 HOSP TYPE B ED VISIT: HCPCS | Performed by: PHYSICIAN ASSISTANT

## 2023-05-21 DIAGNOSIS — J30.89 SEASONAL ALLERGIC RHINITIS DUE TO OTHER ALLERGIC TRIGGER: ICD-10-CM

## 2023-05-21 RX ORDER — FLUTICASONE PROPIONATE 50 MCG
SPRAY, SUSPENSION (ML) NASAL
Qty: 48 ML | Refills: 1 | Status: SHIPPED | OUTPATIENT
Start: 2023-05-21

## 2023-06-12 ENCOUNTER — HOSPITAL ENCOUNTER (EMERGENCY)
Facility: HOSPITAL | Age: 43
Discharge: HOME | End: 2023-06-12
Attending: EMERGENCY MEDICINE
Payer: COMMERCIAL

## 2023-06-12 VITALS
HEART RATE: 90 BPM | DIASTOLIC BLOOD PRESSURE: 122 MMHG | TEMPERATURE: 97.5 F | SYSTOLIC BLOOD PRESSURE: 181 MMHG | OXYGEN SATURATION: 98 % | RESPIRATION RATE: 16 BRPM

## 2023-06-12 DIAGNOSIS — Z20.2 STD EXPOSURE: Primary | ICD-10-CM

## 2023-06-12 DIAGNOSIS — A59.9 TRICHOMONOSIS: ICD-10-CM

## 2023-06-12 LAB
BACTERIA UR QL AUTO: ABNORMAL /HPF
BILIRUB UR QL STRIP: NEGATIVE
CLARITY UR: CLEAR
COLOR UR: ABNORMAL
GLUCOSE UR STRIP-MCNC: NEGATIVE MG/DL
HGB UR QL STRIP.AUTO: ABNORMAL
KETONES UR STRIP-MCNC: NEGATIVE MG/DL
LEUKOCYTE ESTERASE UR QL STRIP: NEGATIVE
MUCOUS THREADS UR QL AUTO: ABNORMAL
NITRITE UR QL STRIP: NEGATIVE
NON-SQ EPI CELLS URNS QL MICRO: ABNORMAL /HPF
PH UR STRIP.AUTO: 6.5 [PH]
PROT UR STRIP-MCNC: NEGATIVE MG/DL
RBC #/AREA URNS AUTO: ABNORMAL /HPF
SP GR UR STRIP.AUTO: 1.02 (ref 1–1.03)
UROBILINOGEN UR STRIP-ACNC: 2 MG/DL
WBC #/AREA URNS AUTO: ABNORMAL /HPF

## 2023-06-12 PROCEDURE — 87591 N.GONORRHOEAE DNA AMP PROB: CPT

## 2023-06-12 PROCEDURE — 81001 URINALYSIS AUTO W/SCOPE: CPT

## 2023-06-12 PROCEDURE — 87491 CHLMYD TRACH DNA AMP PROBE: CPT

## 2023-06-12 RX ORDER — METRONIDAZOLE 500 MG/1
500 TABLET ORAL ONCE
Status: COMPLETED | OUTPATIENT
Start: 2023-06-12 | End: 2023-06-12

## 2023-06-12 RX ORDER — METRONIDAZOLE 500 MG/1
500 TABLET ORAL EVERY 12 HOURS SCHEDULED
Qty: 14 TABLET | Refills: 0 | Status: SHIPPED | OUTPATIENT
Start: 2023-06-12 | End: 2023-06-19

## 2023-06-12 RX ORDER — DOXYCYCLINE HYCLATE 100 MG/1
100 CAPSULE ORAL ONCE
Status: COMPLETED | OUTPATIENT
Start: 2023-06-12 | End: 2023-06-12

## 2023-06-12 RX ORDER — DOXYCYCLINE HYCLATE 100 MG/1
100 CAPSULE ORAL 2 TIMES DAILY
Qty: 14 CAPSULE | Refills: 0 | Status: SHIPPED | OUTPATIENT
Start: 2023-06-12 | End: 2023-06-19

## 2023-06-12 RX ADMIN — DOXYCYCLINE 100 MG: 100 CAPSULE ORAL at 19:24

## 2023-06-12 RX ADMIN — CEFTRIAXONE 500 MG: 1 INJECTION, POWDER, FOR SOLUTION INTRAMUSCULAR; INTRAVENOUS at 19:24

## 2023-06-12 RX ADMIN — METRONIDAZOLE 500 MG: 500 TABLET ORAL at 19:24

## 2023-06-12 NOTE — Clinical Note
Prince Palomo was seen and treated in our emergency department on 6/12/2023  Diagnosis:     Toni Delarosa  may return to work on return date  She may return on this date: 06/14/2023         If you have any questions or concerns, please don't hesitate to call        Amy Monterroso MD    ______________________________           _______________          _______________  Hospital Representative                              Date                                Time

## 2023-06-12 NOTE — ED PROVIDER NOTES
History  Chief Complaint   Patient presents with   • Exposure to STD     Pt presents with vaginal itching, foul smelling discharge, and painful intercourse  Patient is a 23-year-old female with history of hysterectomy who presents with vaginal discharge and pain  Patient states that half weeks ago she started having whitish discharge from her vagina  She used a 7-day course of Monistat but she noticed that her discharge changed to a yellowish-greenish color with foul odor and she started having burning and pain  This has been increasing since that time  She tried another additional 1 day course of Monistat with no improvement  She is now complaining of swelling of her vulva as well as dysuria and frequency  She notes that she recently found out that her boyfriend has been cheating on her  She denies any fevers, chills, abdominal pain, or hematuria  Prior to Admission Medications   Prescriptions Last Dose Informant Patient Reported? Taking? Magnesium Oxide 400 MG CAPS   No No   Sig: Take 1 tablet (400 mg total) by mouth in the morning   Multiple Vitamin (MULTIVITAMIN) tablet   Yes No   Sig: Take 1 tablet by mouth daily   Patient not taking: Reported on 10/27/2022   Riboflavin (Vitamin B-2) 100 MG TABS   No No   Sig: TAKE 4 TABLETS BY MOUTH DAILY     buPROPion (WELLBUTRIN XL) 300 mg 24 hr tablet   No No   Sig: Take 1 tablet (300 mg total) by mouth every morning   cyclobenzaprine (FLEXERIL) 10 mg tablet   No No   Sig: Take 1 tablet (10 mg total) by mouth 3 (three) times a day as needed for muscle spasms   escitalopram (LEXAPRO) 10 mg tablet   No No   Sig: Take 1 tablet (10 mg total) by mouth daily   fluticasone (FLONASE) 50 mcg/act nasal spray   No No   Sig: SPRAY 2 SPRAYS INTO EACH NOSTRIL EVERY DAY   hydrOXYzine HCL (ATARAX) 25 mg tablet   No No   Sig: Take 1 tablet (25 mg total) by mouth every 6 (six) hours as needed for itching   magnesium oxide (MAG-OX) 400 mg tablet   Yes No   nystatin (MYCOSTATIN) powder   No No   Sig: Apply topically 3 (three) times a day   potassium chloride (MICRO-K) 10 MEQ CR capsule   No No   Sig: Take 1 capsule (10 mEq total) by mouth daily   rizatriptan (MAXALT) 10 MG tablet   No No   Sig: TAKE 1 TABLET (10 MG TOTAL) BY MOUTH ONCE AS NEEDED FOR MIGRAINE FOR UP TO 1 DOSE MAY REPEAT IN 2 HOURS IF NEEDED   Patient not taking: Reported on 10/27/2022   topiramate (TOPAMAX) 100 mg tablet   No No   Sig: Take 1 tablet (100 mg total) by mouth daily   triamcinolone (KENALOG) 0 1 % ointment   No No   Sig: Apply topically 2 (two) times a day Do not apply on face, groin  Overuse can thin skin     Patient not taking: Reported on 2022      Facility-Administered Medications: None       Past Medical History:   Diagnosis Date   • Fetal growth problem suspected but not found     RESOLVED: 3/1/17   • First trimester bleeding     RESOLVED:3/1/17   • Iron deficiency anemia due to chronic blood loss 2018   • Miscarriage    • Missed      RESOLVED:3/1/17   • Poor fetal growth affecting management of mother     RESOLVED:3/1/17   • Recurrent pregnancy loss, antepartum condition or complication     EWBHEWQZ:   • Spontaneous      RESOLVED:3/1/17   • Supraventricular tachycardia ([address] )     by history       Past Surgical History:   Procedure Laterality Date   • COLONOSCOPY     • DILATION AND CURETTAGE OF UTERUS      ,    • DILATION AND CURETTAGE OF UTERUS      FOR SPONTANEOUS ; X5 [id number]   • HYSTERECTOMY  2018    Dr Bassam Whitney   • INDUCED      • LAPAROSCOPIC TUBAL LIGATION Right 2016    Procedure: LAPAROSCOPIC TUBAL LIGATION ;  Surgeon: Choco Thacker MD;  Location: [facility];  Service:    • ME LAPS W/VAG HYSTERECT 250 GM/&RMVL TUBE&/OVARIES N/A 2018    Procedure: HYSTERECTOMY LAPAROSCOPIC ASSISTED VAGINAL (LAVH); RIGHT LAPAROSCOPIC SALPINGECTOMY;  Surgeon: Choco Thacker MD;  Location: [facility];  Service: Gynecology   • [address] [place] [address] MISSED  FIRST TRIMESTER SURGICAL N/A 2016    Procedure: DILATATION AND EVACUATION (D&E) (MISSED AB); Surgeon: Judson Trent MD;  Location: [facility];  Service: Gynecology   • SALPINGOOPHORECTOMY Left    • TUBAL LIGATION Right        Family History   Problem Relation Age of Onset   • Arthritis Mother    • Depression Mother    • Hypertension Mother    • Cancer Father      I have reviewed and agree with the history as documented  E-Cigarette/Vaping   • E-Cigarette Use Never User      E-Cigarette/Vaping Substances   • Nicotine No    • THC No    • CBD No    • Flavoring No    • Other No    • Unknown No      Social History     Tobacco Use   • Smoking status: Never   • Smokeless tobacco: Never   Vaping Use   • Vaping Use: Never used   Substance Use Topics   • Alcohol use: No   • Drug use: No        Review of Systems   Constitutional: Negative for chills and fever  HENT: Negative for congestion, rhinorrhea and sore throat  Eyes: Negative for pain and visual disturbance  Respiratory: Negative for cough and shortness of breath  Cardiovascular: Negative for chest pain and palpitations  Gastrointestinal: Negative for abdominal pain, constipation, diarrhea, nausea and vomiting  Genitourinary: Positive for dysuria, frequency, vaginal discharge and vaginal pain  Negative for genital sores and hematuria  Musculoskeletal: Negative for back pain and neck pain  Skin: Negative for color change and rash  Neurological: Negative for weakness and numbness  All other systems reviewed and are negative        Physical Exam  ED Triage Vitals [23 1805]   Temperature Pulse Respirations Blood Pressure SpO2   97 5 °F (36 4 °C) 90 16 (!) 181/122 98 %      Temp Source Heart Rate Source Patient Position - Orthostatic VS BP Location FiO2 (%)   Tympanic Monitor Sitting Left arm --      Pain Score       10 - Worst Possible Pain             Orthostatic Vital Signs  Vitals:    23 1805   BP: (!) 181/122 Pulse: 90   Patient Position - Orthostatic VS: Sitting       Physical Exam  Vitals and nursing note reviewed  Constitutional:       General: She is not in acute distress  Appearance: Normal appearance  She is well-developed and normal weight  She is not ill-appearing, toxic-appearing or diaphoretic  HENT:      Head: Normocephalic and atraumatic  Right Ear: External ear normal       Left Ear: External ear normal       Nose: Nose normal  No congestion or rhinorrhea  Mouth/Throat:      Mouth: Mucous membranes are moist       Pharynx: Oropharynx is clear  No oropharyngeal exudate or posterior oropharyngeal erythema  Eyes:      General: No scleral icterus  Right eye: No discharge  Left eye: No discharge  Extraocular Movements: Extraocular movements intact  Conjunctiva/sclera: Conjunctivae normal       Pupils: Pupils are equal, round, and reactive to light  Cardiovascular:      Rate and Rhythm: Normal rate and regular rhythm  Pulses: Normal pulses  Heart sounds: Normal heart sounds  No murmur heard  No friction rub  No gallop  Pulmonary:      Effort: Pulmonary effort is normal  No respiratory distress  Breath sounds: Normal breath sounds  No stridor  No wheezing, rhonchi or rales  Abdominal:      General: Abdomen is flat  Bowel sounds are normal  There is no distension  Palpations: Abdomen is soft  Tenderness: There is abdominal tenderness (Mild suprapubic)  There is no right CVA tenderness, left CVA tenderness or guarding  Musculoskeletal:         General: No tenderness  Cervical back: Neck supple  Right lower leg: No edema  Left lower leg: No edema  Skin:     General: Skin is warm and dry  Capillary Refill: Capillary refill takes less than 2 seconds  Coloration: Skin is not jaundiced or pale  Neurological:      General: No focal deficit present        Mental Status: She is alert and oriented to person, place, and time  Cranial Nerves: No cranial nerve deficit  Sensory: No sensory deficit  Motor: No weakness  Psychiatric:         Mood and Affect: Mood normal          Behavior: Behavior normal          ED Medications  Medications   doxycycline hyclate (VIBRAMYCIN) capsule 100 mg (100 mg Oral Given 6/12/23 1924)   metroNIDAZOLE (FLAGYL) tablet 500 mg (500 mg Oral Given 6/12/23 1924)   cefTRIAXone (ROCEPHIN) 500 mg in lidocaine (PF) (XYLOCAINE-MPF) 1 % IM only syringe (500 mg Intramuscular Given 6/12/23 1924)       Diagnostic Studies  Results Reviewed     Procedure Component Value Units Date/Time    Urine Microscopic [[id number]]  (Abnormal) Collected: 06/12/23 1848    Lab Status: Final result Specimen: Urine, Other Updated: 06/12/23 2011     RBC, UA 10-20 /hpf      WBC, UA 1-2 /hpf      Epithelial Cells Occasional /hpf      Bacteria, UA None Seen /hpf      MUCUS THREADS Occasional    UA w Reflex to Microscopic w Reflex to Culture [[id number]]  (Abnormal) Collected: 06/12/23 1848    Lab Status: Final result Specimen: Urine, Other Updated: 06/12/23 1951     Color, UA Light Yellow     Clarity, UA Clear     Specific Gravity, UA 1 016     pH, UA 6 5     Leukocytes, UA Negative     Nitrite, UA Negative     Protein, UA Negative mg/dl      Glucose, UA Negative mg/dl      Ketones, UA Negative mg/dl      Urobilinogen, UA 2 0 mg/dl      Bilirubin, UA Negative     Occult Blood, UA Moderate    Chlamydia/GC amplified DNA by PCR [[id number]] Collected: 06/12/23 1848    Lab Status: No result Specimen: Urine, Other                  No orders to display         Procedures  Procedures      ED Course                             SBIRT 22yo+    Flowsheet Row Most Recent Value   Initial Alcohol Screen: US AUDIT-C     1  How often do you have a drink containing alcohol? 0 Filed at: 06/12/2023 1807   2  How many drinks containing alcohol do you have on a typical day you are drinking? 0 Filed at: 06/12/2023 1807   3a   Male UNDER 65: How often do you have five or more drinks on one occasion? 0 Filed at: 06/12/2023 1807   3b  FEMALE Any Age, or MALE 65+: How often do you have 4 or more drinks on one occassion? 0 Filed at: 06/12/2023 1807   Audit-C Score 0 Filed at: 06/12/2023 1807   URBANO: How many times in the past year have you    Used an illegal drug or used a prescription medication for non-medical reasons? Never Filed at: 06/12/2023 1807                Medical Decision Making  Patient is a 60-year-old female with history of hysterectomy who presents with vaginal discharge and burning  DDx includes but not limited to gonorrhea, chlamydia, trichomonas, candida, UTI  Will obtain urinalysis, GC testing, will empirically treat for gonorrhea, chlamydia, and trichomonas given patient's history of grayish and greenish-yellowish discharge with vaginal irritation and pain  Will give Rocephin, doxycycline, and Flagyl  Patient's UA not concerning for urinary tract infection  Prescribe patient additional 5 days of doxycycline and Flagyl  I discussed with patient that we are empirically treating her and that she should follow-up with her primary care doctor to discuss the results of her GC testing as well  Discussed the use of coconut oil for her vaginal pain and instructed her to avoid shaving the area and to avoid sexual contact for the next 2 weeks  Patient was given a work note  Strict return precautions given, all questions answered  Amount and/or Complexity of Data Reviewed  Labs: ordered  Risk  Prescription drug management              Disposition  Final diagnoses:   STD exposure   Trichomonosis     Time reflects when diagnosis was documented in both MDM as applicable and the Disposition within this note     Time User Action Codes Description Comment    6/12/2023  7:04 PM Eduardo Mate Add [Z20 2] STD exposure     6/12/2023  7:04 PM March Rumble, [address] [[id number]] [address]       ED Disposition     ED Disposition Discharge    Condition   Stable    Date/Time   Mon Jun 12, 2023  7:04 PM    Comment   Rome Zuniga discharge to home/self care  Follow-up Information     Follow up With Specialties Details Why Contact Info Additional Information    Jennifer Egan MD Family Medicine Schedule an appointment as soon as possible for a visit   [address]  [address] [facility] [id number]       [address] [place] [address] Emergency Department Emergency Medicine Go to  If symptoms worsen or if you have any other specific concerns [address] [id number]  [address] [place] [address] Emergency Department, [address], [id number] [phone]          Patient's Medications   Discharge Prescriptions    DOXYCYCLINE HYCLATE (VIBRAMYCIN) 100 MG CAPSULE    Take 1 capsule (100 mg total) by mouth 2 (two) times a day for 7 days       Start Date: 6/12/2023 End Date: 6/19/2023       Order Dose: 100 mg       Quantity: 14 capsule    Refills: 0    METRONIDAZOLE (FLAGYL) 500 MG TABLET    Take 1 tablet (500 mg total) by mouth every 12 (twelve) hours for 7 days       Start Date: 6/12/2023 End Date: 6/19/2023       Order Dose: 500 mg       Quantity: 14 tablet    Refills: 0     No discharge procedures on file  PDMP Review     None           ED Provider  Attending physically available and evaluated Rome Nadia QUISPE managed the patient along with the ED Attending      Electronically Signed by         Nancy Modi MD  06/12/23 2024

## 2023-06-12 NOTE — ED ATTENDING ATTESTATION
6/12/2023  I, Rimma Peoples DO, saw and evaluated the patient  I have discussed the patient with the resident/non-physician practitioner and agree with the resident's/non-physician practitioner's findings, Plan of Care, and MDM as documented in the resident's/non-physician practitioner's note, except where noted  All available labs and Radiology studies were reviewed  I was present for key portions of any procedure(s) performed by the resident/non-physician practitioner and I was immediately available to provide assistance  At this point I agree with the current assessment done in the Emergency Department  I have conducted an independent evaluation of this patient a history and physical is as follows:    40 yo female presents for evaluation of vaginal discharge, dysuria, vulvar irritation  Started about 10 days ago initially with white creamy discharge with associated pruritis  She completed monistat x 1 week but symptoms worsened on day 6  She tried a 1 day monistat thinking a more concentrated form would help, but unfortunately it did not  She is now having green/yellow discharge with a fish like smell, still with intense pruritis  No fever, vag dc, flank pain  No hx of STI  Recently found out her formerly monogamous male partner of many years was cheating on her  Imp: vaginal dc ddx gc, trichomonas, BV plan: ceftriaxone 500mg x 1, doxy 100mg PO bid x 1 week  Metronidazole 500mg bid x 1 week  No intercourse for 2 weeks  Test of cure in 4-6 weeks  Recommended coconut oil for vulvar irritation, avoid hair removal in the area for now until infection resolves        ED Course         Critical Care Time  Procedures

## 2023-06-13 NOTE — DISCHARGE INSTRUCTIONS
Please take doxycycline and Flagyl as prescribed  Please follow-up with your primary care doctor to be reevaluated for your symptoms and to review your test results  Please return to the emergency department develop any new or concerning symptoms including severe abdominal pain, high fevers, or severe vomiting

## 2023-06-14 LAB
C TRACH DNA SPEC QL NAA+PROBE: NEGATIVE
N GONORRHOEA DNA SPEC QL NAA+PROBE: NEGATIVE

## 2023-06-21 ENCOUNTER — TELEPHONE (OUTPATIENT)
Dept: FAMILY MEDICINE CLINIC | Facility: CLINIC | Age: 43
End: 2023-06-21

## 2023-06-21 NOTE — TELEPHONE ENCOUNTER
Patient called and was asking for a referral for panniculectomy  The patient stated that she has been seeing GYN for this but when she called they told that they need a referral from PCP  Please advise        [facility]  [address] [facility], [place]   Fax # [phone]

## 2023-06-22 ENCOUNTER — HOSPITAL ENCOUNTER (EMERGENCY)
Facility: HOSPITAL | Age: 43
Discharge: HOME | End: 2023-06-22
Attending: EMERGENCY MEDICINE
Payer: COMMERCIAL

## 2023-06-22 VITALS
TEMPERATURE: 98 F | HEART RATE: 85 BPM | DIASTOLIC BLOOD PRESSURE: 94 MMHG | OXYGEN SATURATION: 100 % | RESPIRATION RATE: 18 BRPM | SYSTOLIC BLOOD PRESSURE: 154 MMHG

## 2023-06-22 DIAGNOSIS — B37.31 VAGINAL CANDIDIASIS: Primary | ICD-10-CM

## 2023-06-22 DIAGNOSIS — E65 PANNICULUS: Primary | ICD-10-CM

## 2023-06-22 PROCEDURE — 99283 EMERGENCY DEPT VISIT LOW MDM: CPT

## 2023-06-22 PROCEDURE — 81514 NFCT DS BV&VAGINITIS DNA ALG: CPT | Performed by: EMERGENCY MEDICINE

## 2023-06-22 RX ORDER — FLUCONAZOLE 150 MG/1
150 TABLET ORAL ONCE
Qty: 1 TABLET | Refills: 0 | Status: SHIPPED | OUTPATIENT
Start: 2023-06-22 | End: 2023-06-22

## 2023-06-22 RX ORDER — FLUCONAZOLE 100 MG/1
200 TABLET ORAL ONCE
Status: COMPLETED | OUTPATIENT
Start: 2023-06-22 | End: 2023-06-22

## 2023-06-22 RX ADMIN — FLUCONAZOLE 200 MG: 100 TABLET ORAL at 20:21

## 2023-06-22 NOTE — Clinical Note
Polina Garcia was seen and treated in our emergency department on 6/22/2023  Diagnosis:     Fredy Gifford  may return to work on return date  She may return on this date: 06/24/2023         If you have any questions or concerns, please don't hesitate to call        Marina Tobar MD    ______________________________           _______________          _______________  Hospital Representative                              Date                                Time

## 2023-06-22 NOTE — Clinical Note
Kirsty Meier was seen and treated in our emergency department on 6/22/2023  Diagnosis:     Cynthia Preston  may return to work on return date  She may return on this date: 06/24/2023         If you have any questions or concerns, please don't hesitate to call        Cory Nunez MD    ______________________________           _______________          _______________  Hospital Representative                              Date                                Time

## 2023-06-23 DIAGNOSIS — N76.0 BV (BACTERIAL VAGINOSIS): Primary | ICD-10-CM

## 2023-06-23 DIAGNOSIS — B96.89 BV (BACTERIAL VAGINOSIS): Primary | ICD-10-CM

## 2023-06-23 LAB
C GLABRATA DNA VAG QL NAA+PROBE: NEGATIVE
C KRUSEI DNA VAG QL NAA+PROBE: NEGATIVE
CANDIDA SP 6 PNL VAG NAA+PROBE: POSITIVE
T VAGINALIS DNA VAG QL NAA+PROBE: NEGATIVE
VAGINOSIS/ITIS DNA PNL VAG PROBE+SIG AMP: POSITIVE

## 2023-06-23 RX ORDER — METRONIDAZOLE 500 MG/1
500 TABLET ORAL EVERY 12 HOURS SCHEDULED
Qty: 14 TABLET | Refills: 0 | Status: SHIPPED | OUTPATIENT
Start: 2023-06-23 | End: 2023-06-30

## 2023-06-23 NOTE — ED PROVIDER NOTES
History  Chief Complaint   Patient presents with   • Vaginal Itching     Pt presents after being treated for yeast infection and STD 1 week ago  Today pt c/o severe itching, pain and pt also states bleeding  43-year-old female with history of hysterectomy presents with persistent vaginal itching  Patient was seen 1 week ago and treated empirically for STDs although her gonorrhea and Chlamydia testing came back negative  She reports completing a course of doxycycline and metronidazole  She previously took 2 home courses of mucosal 7-day and 1 day for yeast infection  Patient states that she is still having some whitish discharge but the vaginal smell is gone after completing antibiotics  However she is still having severe itching  No dysuria or hematuria  Patient notes occasional reddish blood while wiping today and is wondering if it might be related to excoriations from scratching  Prior to Admission Medications   Prescriptions Last Dose Informant Patient Reported? Taking? Magnesium Oxide 400 MG CAPS   No No   Sig: Take 1 tablet (400 mg total) by mouth in the morning   Multiple Vitamin (MULTIVITAMIN) tablet   Yes No   Sig: Take 1 tablet by mouth daily   Patient not taking: Reported on 10/27/2022   Riboflavin (Vitamin B-2) 100 MG TABS   No No   Sig: TAKE 4 TABLETS BY MOUTH DAILY     buPROPion (WELLBUTRIN XL) 300 mg 24 hr tablet   No No   Sig: Take 1 tablet (300 mg total) by mouth every morning   cyclobenzaprine (FLEXERIL) 10 mg tablet   No No   Sig: Take 1 tablet (10 mg total) by mouth 3 (three) times a day as needed for muscle spasms   escitalopram (LEXAPRO) 10 mg tablet   No No   Sig: Take 1 tablet (10 mg total) by mouth daily   fluticasone (FLONASE) 50 mcg/act nasal spray   No No   Sig: SPRAY 2 SPRAYS INTO EACH NOSTRIL EVERY DAY   hydrOXYzine HCL (ATARAX) 25 mg tablet   No No   Sig: Take 1 tablet (25 mg total) by mouth every 6 (six) hours as needed for itching   magnesium oxide (MAG-OX) 400 mg tablet   Yes No   nystatin (MYCOSTATIN) powder   No No   Sig: Apply topically 3 (three) times a day   potassium chloride (MICRO-K) 10 MEQ CR capsule   No No   Sig: Take 1 capsule (10 mEq total) by mouth daily   rizatriptan (MAXALT) 10 MG tablet   No No   Sig: TAKE 1 TABLET (10 MG TOTAL) BY MOUTH ONCE AS NEEDED FOR MIGRAINE FOR UP TO 1 DOSE MAY REPEAT IN 2 HOURS IF NEEDED   Patient not taking: Reported on 10/27/2022   topiramate (TOPAMAX) 100 mg tablet   No No   Sig: Take 1 tablet (100 mg total) by mouth daily   triamcinolone (KENALOG) 0 1 % ointment   No No   Sig: Apply topically 2 (two) times a day Do not apply on face, groin  Overuse can thin skin     Patient not taking: Reported on 2022      Facility-Administered Medications: None       Past Medical History:   Diagnosis Date   • Fetal growth problem suspected but not found     RESOLVED: 3/1/17   • First trimester bleeding     RESOLVED:3/1/17   • Iron deficiency anemia due to chronic blood loss 2018   • Miscarriage    • Missed      RESOLVED:3/1/17   • Poor fetal growth affecting management of mother     RESOLVED:3/1/17   • Recurrent pregnancy loss, antepartum condition or complication     [id number]   • Spontaneous      RESOLVED:3/1/17   • Supraventricular tachycardia ([address] )     by history       Past Surgical History:   Procedure Laterality Date   • COLONOSCOPY     • DILATION AND CURETTAGE OF UTERUS      ,    • DILATION AND CURETTAGE OF UTERUS      FOR SPONTANEOUS ; X5 3090-5686   • HYSTERECTOMY  2018    Dr Rendon   • INDUCED      • LAPAROSCOPIC TUBAL LIGATION Right 2016    Procedure: LAPAROSCOPIC TUBAL LIGATION ;  Surgeon: Savita Pacheco MD;  [facility];  Service:    • [place] LAPS W/VAG HYSTERECT 250 GM/&RMVL TUBE&/OVARIES N/A 2018    Procedure: HYSTERECTOMY LAPAROSCOPIC ASSISTED VAGINAL (LAVH); RIGHT LAPAROSCOPIC SALPINGECTOMY;  Surgeon: Savita Pacheco MD;  [facility]; Service: Gynecology   • [place] TX MISSED  FIRST TRIMESTER SURGICAL N/A 2016    Procedure: DILATATION AND EVACUATION (D&E) (MISSED AB); Surgeon: Leticia Willett MD;  [facility];  Service: Gynecology   • SALPINGOOPHORECTOMY Left    • TUBAL LIGATION Right        Family History   Problem Relation Age of Onset   • Arthritis Mother    • Depression Mother    • Hypertension Mother    • Cancer Father      I have reviewed and agree with the history as documented  E-Cigarette/Vaping   • E-Cigarette Use Never User      E-Cigarette/Vaping Substances   • Nicotine No    • THC No    • CBD No    • Flavoring No    • Other No    • Unknown No      Social History     Tobacco Use   • Smoking status: Never   • Smokeless tobacco: Never   Vaping Use   • Vaping Use: Never used   Substance Use Topics   • Alcohol use: No   • Drug use: No       Review of Systems   Constitutional: Negative for fever  Gastrointestinal: Negative for abdominal pain, nausea and vomiting  Genitourinary: Positive for vaginal bleeding and vaginal discharge  Negative for dysuria, flank pain and pelvic pain  Physical Exam  Physical Exam  Constitutional:       General: She is not in acute distress  Appearance: Normal appearance  HENT:      Mouth/Throat:      Mouth: Mucous membranes are moist    Eyes:      Conjunctiva/sclera: Conjunctivae normal    Cardiovascular:      Rate and Rhythm: Normal rate  Pulmonary:      Effort: Pulmonary effort is normal    Abdominal:      Palpations: Abdomen is soft  Tenderness: There is no abdominal tenderness  Genitourinary:     Comments: Mild inflammation and excoriation of the vulvar vestibule and vaginal mucosa with white discharge, no focal lesions  Skin:     General: Skin is warm and dry  Neurological:      Mental Status: She is alert     Psychiatric:         Mood and Affect: Mood normal          Behavior: Behavior normal          Vital Signs  ED Triage Vitals [23]   Temperature Pulse Respirations Blood Pressure SpO2   98 °F (36 7 °C) 85 18 154/94 100 %      Temp Source Heart Rate Source Patient Position - Orthostatic VS BP Location FiO2 (%)   Oral Monitor Lying Left arm --      Pain Score       --           Vitals:    06/22/23 1944   BP: 154/94   Pulse: 85   Patient Position - Orthostatic VS: Lying         Visual Acuity      ED Medications  Medications   fluconazole (DIFLUCAN) tablet 200 mg (200 mg Oral Given 6/22/23 2021)       Diagnostic Studies  Results Reviewed     Procedure Component Value Units Date/Time    Molecular Vaginal Panel [[id number]] Collected: 06/22/23 2015    Lab Status: In process Specimen: Genital from Vaginal Updated: 06/22/23 2017                 No orders to display              Procedures  Procedures         ED Course       Patient presenting with persistent vaginal itching following empiric treatment for STDs  Negative chlamydia and gonorrhea as well as a negative UA at prior visit  Patient was also treated empirically for trichomonas  On exam patient has some irritation of her vaginal and vulvar mucosa with whitish discharge  No focal lesions or blood in the vaginal vault  Suspect ongoing Candida infection based on patient's description of symptoms  We will treat empirically with Diflucan with second optional dose  Molecular vaginal panel swab sent for confirmatory testing  Advised patient to schedule follow-up with OB/GYN  SBIRT 20yo+    Flowsheet Row Most Recent Value   Initial Alcohol Screen: US AUDIT-C     1  How often do you have a drink containing alcohol? 0 Filed at: 06/22/2023 1943   2  How many drinks containing alcohol do you have on a typical day you are drinking? 0 Filed at: 06/22/2023 1943   3b  FEMALE Any Age, or MALE 65+: How often do you have 4 or more drinks on one occassion? 0 Filed at: 06/22/2023 1943   Audit-C Score 0 Filed at: 06/22/2023 1943   [place]: How many times in the past year have you        Used an illegal drug or used a prescription medication for non-medical reasons? Never Filed at: 06/22/2023 1943                    MDM    Disposition  Final diagnoses:   Vaginal candidiasis     Time reflects when diagnosis was documented in both MDM as applicable and the Disposition within this note     Time User Action Codes Description Comment    6/22/2023  8:15 PM Shiv Coleman Add [B37 31] Vaginal candidiasis       ED Disposition     ED Disposition   Discharge    Condition   Stable    Date/Time   Thu Jun 22, 2023 2015    Comment   Miquel Ross discharge to home/self care                 Follow-up Information    None         Discharge Medication List as of 6/22/2023  8:18 PM      START taking these medications    Details   fluconazole (DIFLUCAN) 150 mg tablet Take 1 tablet (150 mg total) by mouth once for 1 dose Take 72 hours after the first dose if you are still having symptoms, Starting Thu 6/22/2023, Normal         CONTINUE these medications which have NOT CHANGED    Details   buPROPion (WELLBUTRIN XL) 300 mg 24 hr tablet Take 1 tablet (300 mg total) by mouth every morning, Starting Thu 10/27/2022, Normal      cyclobenzaprine (FLEXERIL) 10 mg tablet Take 1 tablet (10 mg total) by mouth 3 (three) times a day as needed for muscle spasms, Starting Tue 4/4/2023, Normal      escitalopram (LEXAPRO) 10 mg tablet Take 1 tablet (10 mg total) by mouth daily, Starting Thu 10/27/2022, Normal      fluticasone (FLONASE) 50 mcg/act nasal spray SPRAY 2 SPRAYS INTO EACH NOSTRIL EVERY DAY, Normal      hydrOXYzine HCL (ATARAX) 25 mg tablet Take 1 tablet (25 mg total) by mouth every 6 (six) hours as needed for itching, Starting Thu 10/27/2022, Normal      magnesium oxide (MAG-OX) 400 mg tablet Starting Thu 10/27/2022, Historical Med      Magnesium Oxide 400 MG CAPS Take 1 tablet (400 mg total) by mouth in the morning, Starting Thu 10/27/2022, Normal      Multiple Vitamin (MULTIVITAMIN) tablet Take 1 tablet by mouth daily, Historical Med nystatin (MYCOSTATIN) powder Apply topically 3 (three) times a day, Starting Wed 11/2/2022, Normal      potassium chloride (MICRO-K) 10 MEQ CR capsule Take 1 capsule (10 mEq total) by mouth daily, Starting Tue 11/1/2022, Normal      Riboflavin (Vitamin B-2) 100 MG TABS TAKE 4 TABLETS BY MOUTH DAILY , Normal      rizatriptan (MAXALT) 10 MG tablet TAKE 1 TABLET (10 MG TOTAL) BY MOUTH ONCE AS NEEDED FOR MIGRAINE FOR UP TO 1 DOSE MAY REPEAT IN 2 HOURS IF NEEDED, Starting Mon 4/26/2021, Normal      topiramate (TOPAMAX) 100 mg tablet Take 1 tablet (100 mg total) by mouth daily, Starting Thu 4/27/2023, Normal      triamcinolone (KENALOG) 0 1 % ointment Apply topically 2 (two) times a day Do not apply on face, groin  Overuse can thin skin  , Starting Tue 12/15/2020, Normal             No discharge procedures on file      PDMP Review     None          ED Provider  Electronically Signed by           Elisha Garner MD  06/22/23 2037

## 2023-06-23 NOTE — ED NOTES
Discharge instructions reviewed with pt  Pt verbalized understanding  And has no further questions at this time  Pt ambulatory off unit with steady gait        Prince Eyal RN  06/22/23 2024

## 2023-06-23 NOTE — DISCHARGE INSTRUCTIONS
Make a follow-up appointment with your gynecologist for reevaluation in case your symptoms do not resolve

## 2023-08-10 ENCOUNTER — NURSE TRIAGE (OUTPATIENT)
Dept: PHYSICAL THERAPY | Facility: OTHER | Age: 43
End: 2023-08-10

## 2023-08-10 ENCOUNTER — HOSPITAL ENCOUNTER (EMERGENCY)
Facility: HOSPITAL | Age: 43
Discharge: HOME | End: 2023-08-10
Attending: EMERGENCY MEDICINE
Payer: COMMERCIAL

## 2023-08-10 VITALS
OXYGEN SATURATION: 98 % | TEMPERATURE: 97.7 F | DIASTOLIC BLOOD PRESSURE: 93 MMHG | RESPIRATION RATE: 18 BRPM | HEART RATE: 80 BPM | SYSTOLIC BLOOD PRESSURE: 150 MMHG

## 2023-08-10 DIAGNOSIS — M54.41 RIGHT-SIDED LOW BACK PAIN WITH SCIATICA: Primary | ICD-10-CM

## 2023-08-10 DIAGNOSIS — M54.41 ACUTE RIGHT-SIDED LOW BACK PAIN WITH RIGHT-SIDED SCIATICA: Primary | ICD-10-CM

## 2023-08-10 PROCEDURE — 99284 EMERGENCY DEPT VISIT MOD MDM: CPT | Performed by: EMERGENCY MEDICINE

## 2023-08-10 PROCEDURE — 99283 EMERGENCY DEPT VISIT LOW MDM: CPT

## 2023-08-10 PROCEDURE — 96372 THER/PROPH/DIAG INJ SC/IM: CPT

## 2023-08-10 PROCEDURE — NC001 PR NO CHARGE: Performed by: EMERGENCY MEDICINE

## 2023-08-10 RX ORDER — OXYCODONE HYDROCHLORIDE 5 MG/1
5 TABLET ORAL EVERY 6 HOURS PRN
Qty: 6 TABLET | Refills: 0 | Status: SHIPPED | OUTPATIENT
Start: 2023-08-10 | End: 2023-08-13

## 2023-08-10 RX ORDER — KETOROLAC TROMETHAMINE 30 MG/ML
60 INJECTION, SOLUTION INTRAMUSCULAR; INTRAVENOUS ONCE
Status: COMPLETED | OUTPATIENT
Start: 2023-08-10 | End: 2023-08-10

## 2023-08-10 RX ORDER — OXYCODONE HYDROCHLORIDE 5 MG/1
5 TABLET ORAL ONCE
Status: COMPLETED | OUTPATIENT
Start: 2023-08-10 | End: 2023-08-10

## 2023-08-10 RX ORDER — ACETAMINOPHEN 325 MG/1
975 TABLET ORAL ONCE
Status: COMPLETED | OUTPATIENT
Start: 2023-08-10 | End: 2023-08-10

## 2023-08-10 RX ORDER — LIDOCAINE 50 MG/G
1 PATCH TOPICAL DAILY PRN
Qty: 10 PATCH | Refills: 0 | Status: SHIPPED | OUTPATIENT
Start: 2023-08-10 | End: 2023-08-24

## 2023-08-10 RX ORDER — LIDOCAINE 50 MG/G
1 PATCH TOPICAL ONCE
Status: DISCONTINUED | OUTPATIENT
Start: 2023-08-10 | End: 2023-08-10 | Stop reason: HOSPADM

## 2023-08-10 RX ORDER — NAPROXEN 500 MG/1
500 TABLET ORAL EVERY 12 HOURS PRN
Qty: 14 TABLET | Refills: 0 | Status: SHIPPED | OUTPATIENT
Start: 2023-08-10 | End: 2023-08-20

## 2023-08-10 RX ADMIN — LIDOCAINE 1 PATCH: 50 PATCH CUTANEOUS at 02:41

## 2023-08-10 RX ADMIN — ACETAMINOPHEN 975 MG: 325 TABLET, FILM COATED ORAL at 02:41

## 2023-08-10 RX ADMIN — OXYCODONE HYDROCHLORIDE 5 MG: 5 TABLET ORAL at 02:41

## 2023-08-10 RX ADMIN — KETOROLAC TROMETHAMINE 60 MG: 30 INJECTION, SOLUTION INTRAMUSCULAR; INTRAVENOUS at 02:41

## 2023-08-10 NOTE — ED PROVIDER NOTES
History  Chief Complaint   Patient presents with   • Back Pain     Pt states having lower R back pain, for a couple of weeks, dx with sciatica and is taken flexeril with no relief. Tylenol 2000, ibuprofen 0800,Flexeril taken at 0000. Patient is a 50-year-old female seen in the emergency department with concern for right low back pain radiating down the right leg over approximately the past week. Patient notes minimal relief with Flexeril at home in addition to ibuprofen/acetaminophen. Pain is apparently made worse with movement/bending. Patient notes no trauma. Patient notes no fever, chest pain, shortness of breath, bowel/bladder incontinence, weakness, numbness, tingling. Patient notes history of sciatica in the past, but states that this pain has been worse/more persistent. Prior to Admission Medications   Prescriptions Last Dose Informant Patient Reported? Taking? Magnesium Oxide 400 MG CAPS Not Taking  No No   Sig: Take 1 tablet (400 mg total) by mouth in the morning   Patient not taking: Reported on 8/10/2023   Multiple Vitamin (MULTIVITAMIN) tablet   Yes No   Sig: Take 1 tablet by mouth daily   Patient not taking: Reported on 10/27/2022   Riboflavin (Vitamin B-2) 100 MG TABS Not Taking  No No   Sig: TAKE 4 TABLETS BY MOUTH DAILY.    Patient not taking: Reported on 8/10/2023   buPROPion (WELLBUTRIN XL) 300 mg 24 hr tablet 8/9/2023  No Yes   Sig: Take 1 tablet (300 mg total) by mouth every morning   cyclobenzaprine (FLEXERIL) 10 mg tablet 8/10/2023  No Yes   Sig: Take 1 tablet (10 mg total) by mouth 3 (three) times a day as needed for muscle spasms   escitalopram (LEXAPRO) 10 mg tablet Not Taking  No No   Sig: Take 1 tablet (10 mg total) by mouth daily   Patient not taking: Reported on 8/10/2023   fluticasone (FLONASE) 50 mcg/act nasal spray Not Taking  No No   Sig: SPRAY 2 SPRAYS INTO EACH NOSTRIL EVERY DAY   Patient not taking: Reported on 8/10/2023   hydrOXYzine HCL (ATARAX) 25 mg tablet 2023  No Yes   Sig: Take 1 tablet (25 mg total) by mouth every 6 (six) hours as needed for itching   magnesium oxide (MAG-OX) 400 mg tablet   Yes No   nystatin (MYCOSTATIN) powder 8/10/2023  No Yes   Sig: Apply topically 3 (three) times a day   potassium chloride (MICRO-K) 10 MEQ CR capsule Not Taking  No No   Sig: Take 1 capsule (10 mEq total) by mouth daily   Patient not taking: Reported on 8/10/2023   rizatriptan (MAXALT) 10 MG tablet   No No   Sig: TAKE 1 TABLET (10 MG TOTAL) BY MOUTH ONCE AS NEEDED FOR MIGRAINE FOR UP TO 1 DOSE MAY REPEAT IN 2 HOURS IF NEEDED   Patient not taking: Reported on 10/27/2022   topiramate (TOPAMAX) 100 mg tablet 2023  No Yes   Sig: Take 1 tablet (100 mg total) by mouth daily   triamcinolone (KENALOG) 0.1 % ointment Not Taking  No No   Sig: Apply topically 2 (two) times a day Do not apply on face, groin. Overuse can thin skin.    Patient not taking: Reported on 8/10/2023      Facility-Administered Medications: None       Past Medical History:   Diagnosis Date   • Fetal growth problem suspected but not found     RESOLVED: 3/1/17   • First trimester bleeding     RESOLVED:3/1/17   • Iron deficiency anemia due to chronic blood loss 2018   • Miscarriage    • Missed      RESOLVED:3/1/17   • Poor fetal growth affecting management of mother     RESOLVED:3/1/17   • Recurrent pregnancy loss, antepartum condition or complication     [id number]   • Spontaneous      RESOLVED:3/1/17   • Supraventricular tachycardia ([address])     by history       Past Surgical History:   Procedure Laterality Date   • COLONOSCOPY     • DILATION AND CURETTAGE OF UTERUS      ,    • DILATION AND CURETTAGE OF UTERUS      FOR SPONTANEOUS ; X5 1268-4202   • HYSTERECTOMY  2018    Dr Lata Toth   • INDUCED      • LAPAROSCOPIC TUBAL LIGATION Right 2016    Procedure: LAPAROSCOPIC TUBAL LIGATION ;  Surgeon: Gunnar Austin MD;  [facility];  Service:    • CT LAPS W/VAG HYSTERECT 250 GM/&RMVL TUBE&/OVARIES N/A 2018    Procedure: HYSTERECTOMY LAPAROSCOPIC ASSISTED VAGINAL (LAVH); RIGHT LAPAROSCOPIC SALPINGECTOMY;  Surgeon: Sun Ramirez MD;  [facility];  Service: Gynecology   • [place] TX MISSED  FIRST TRIMESTER SURGICAL N/A 2016    Procedure: DILATATION AND EVACUATION (D&E) (MISSED AB); Surgeon: Sun Ramirez MD;  [facility];  Service: Gynecology   • SALPINGOOPHORECTOMY Left    • TUBAL LIGATION Right        Family History   Problem Relation Age of Onset   • Arthritis Mother    • Depression Mother    • Hypertension Mother    • Cancer Father      I have reviewed and agree with the history as documented. E-Cigarette/Vaping   • E-Cigarette Use Never User      E-Cigarette/Vaping Substances   • Nicotine No    • THC No    • CBD No    • Flavoring No    • Other No    • Unknown No      Social History     Tobacco Use   • Smoking status: Never   • Smokeless tobacco: Never   Vaping Use   • Vaping Use: Never used   Substance Use Topics   • Alcohol use: No   • Drug use: No       Review of Systems   Constitutional: Negative for chills and fever. HENT: Negative for ear pain and sore throat. Eyes: Negative for pain and visual disturbance. Respiratory: Negative for cough and shortness of breath. Cardiovascular: Negative for chest pain and palpitations. Gastrointestinal: Negative for abdominal pain and vomiting. Genitourinary: Negative for decreased urine volume and difficulty urinating. Musculoskeletal: Positive for back pain. Negative for neck stiffness. Skin: Negative for color change and rash. Neurological: Negative for weakness and numbness. Psychiatric/Behavioral: Negative for agitation and confusion. Physical Exam  Physical Exam  Vitals and nursing note reviewed. Constitutional:       Appearance: She is well-developed. Comments: appears uncomfortable   HENT:      Head: Normocephalic and atraumatic.       Right Ear: External ear normal.      Left Ear: External ear normal.      Nose: Nose normal.      Mouth/Throat:      Pharynx: Oropharynx is clear. Eyes:      General: No scleral icterus. Conjunctiva/sclera: Conjunctivae normal.   Cardiovascular:      Rate and Rhythm: Normal rate. Comments: well-perfused extremities  Pulmonary:      Effort: Pulmonary effort is normal. No respiratory distress. Abdominal:      General: Abdomen is flat. There is no distension. Musculoskeletal:         General: Tenderness present. No swelling, deformity or signs of injury. Cervical back: Normal range of motion and neck supple. Comments: tenderness to palpation over right lower back/right posterior buttock; no midline spinal tenderness or step-offs noted; neurovascularly intact extremities   Skin:     General: Skin is warm and dry. Neurological:      General: No focal deficit present. Mental Status: She is alert. Cranial Nerves: No cranial nerve deficit. Sensory: No sensory deficit. Psychiatric:         Mood and Affect: Mood normal.         Thought Content:  Thought content normal.         Vital Signs  ED Triage Vitals [08/10/23 0209]   Temperature Pulse Respirations Blood Pressure SpO2   97.7 °F (36.5 °C) 80 18 150/93 98 %      Temp Source Heart Rate Source Patient Position - Orthostatic VS BP Location FiO2 (%)   Oral Monitor Lying Left arm --      Pain Score       10 - Worst Possible Pain           Vitals:    08/10/23 0209   BP: 150/93   Pulse: 80   Patient Position - Orthostatic VS: Lying         Visual Acuity      ED Medications  Medications   lidocaine (LIDODERM) 5 % patch 1 patch (1 patch Topical Medication Applied 8/10/23 0241)   oxyCODONE (ROXICODONE) IR tablet 5 mg (5 mg Oral Given 8/10/23 0241)   acetaminophen (TYLENOL) tablet 975 mg (975 mg Oral Given 8/10/23 0241)   ketorolac (TORADOL) injection 60 mg (60 mg Intramuscular Given 8/10/23 0241)       Diagnostic Studies  Results Reviewed     None No orders to display              Procedures  Procedures         ED Course                                             Medical Decision Making  Patient is a 69-year-old female seen in the emergency department with concern for right low back pain radiating down the right leg, consistent with sciatica. Patient notes no history of trauma. Patient has no red flag symptoms on evaluation. Patient was treated with medication for symptom control, with good effect. Plan to treat patient with course of NSAID medication in addition to other medication for pain control, and have patient follow up with PCP/outpatient providers. Patient stable for discharge home. Discharge instructions were reviewed with patient. Right-sided low back pain with sciatica: acute illness or injury  Risk  OTC drugs. Prescription drug management. Disposition  Final diagnoses:   Right-sided low back pain with sciatica     Time reflects when diagnosis was documented in both MDM as applicable and the Disposition within this note     Time User Action Codes Description Comment    8/10/2023  2:06 AM Bridger Aj Add [M54.41] Right-sided low back pain with sciatica       ED Disposition     ED Disposition   Discharge    Condition   Stable    Date/Time   Thu Aug 10, 2023  2:19 AM    Comment   Bud Kimbrough discharge to home/self care.                Follow-up Information     Follow up With Specialties Details Why Contact Info Additional Information    Lee Trujillo MD Family Medicine Call in 1 day  [address] Physical Therapy Call  As needed [phone]          Patient's Medications   Discharge Prescriptions    LIDOCAINE (LIDODERM) 5 %    Apply 1 patch topically over 12 hours daily as needed (pain) for up to 14 days Remove & Discard patch within 12 hours or as directed by MD       Start Date: 8/10/2023 End Date: 8/24/2023       Order Dose: 1 patch       Quantity: 10 patch    Refills: 0    NAPROXEN (NAPROSYN) 500 MG TABLET    Take 1 tablet (500 mg total) by mouth every 12 (twelve) hours as needed (pain) for up to 10 days Take with food.        Start Date: 8/10/2023 End Date: 8/20/2023       Order Dose: 500 mg       Quantity: 14 tablet    Refills: 0    OXYCODONE (ROXICODONE) 5 IMMEDIATE RELEASE TABLET    Take 1 tablet (5 mg total) by mouth every 6 (six) hours as needed for moderate pain or severe pain for up to 3 days Max Daily Amount: 20 mg       Start Date: 8/10/2023 End Date: 8/13/2023       Order Dose: 5 mg       Quantity: 6 tablet    Refills: 0           PDMP Review       Value Time User    PDMP Reviewed  Yes 8/10/2023  2:19 AM Zaina Landin MD          ED Provider  Electronically Signed by           Zaina Landin MD  08/10/23 5825

## 2023-08-10 NOTE — Clinical Note
Phillip Madden was seen and treated in our emergency department on 8/10/2023. Diagnosis:     Ayala Julien  may return to work on return date. She may return on this date: 08/15/2023         If you have any questions or concerns, please don't hesitate to call.       Geradine Boeck, MD    ______________________________           _______________          _______________  Hospital Representative                              Date                                Time

## 2023-08-10 NOTE — TELEPHONE ENCOUNTER
Additional Information  • Negative: Has the patient had unexplained weight loss? • Negative: Does the patient have a fever? • Negative: Is the patient experiencing blood in sputum? • Negative: Has the patient experienced major trauma? (fall from height, high speed collision, direct blow to spine) and is also experiencing nausea, light-headedness, or loss of consciousness? • Negative: Is the patient experiencing urine retention? • Negative: Is the patient experiencing acute drop foot or paralysis? • Negative: Is this a chronic condition? Protocols used:  AMB COMPREHENSIVE SPINE PROGRAM PROTOCOL  Nurse completed triage and NO RF s/s present. Referral entered for the [address] [place] [address] site and contact/phone number info given to her as well. Patients information was sent to the preferred site and pt made aware clerical would be calling to schedule appointment. Nurse encouraged her to call site if she does not hear from clerical beforehand. Patient Agreed. Patient did not voice any additional questions or concerns at this time. Patient is aware current complaints, relevant dx, and treatment/options will be discussed at time of consult. Nurse also offered a call from the [facility] counselor d/t SBT score. Patient declined. Patient was pleasant and appropriate during this encounter. All information regarding plan to be evaluated by the therapist was reviewed. Patient is in agreement with nurse's explanation of intended care path discussed  during today's encounter. Patient appreciative of CB and referral placement for evaluation with [facility] Sp[facility] Therapist.     Nurse wished her well and referral closed.
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
## 2023-08-11 ENCOUNTER — EVALUATION (OUTPATIENT)
Dept: PHYSICAL THERAPY | Facility: CLINIC | Age: 43
End: 2023-08-11
Payer: COMMERCIAL

## 2023-08-11 DIAGNOSIS — M54.41 ACUTE RIGHT-SIDED LOW BACK PAIN WITH RIGHT-SIDED SCIATICA: ICD-10-CM

## 2023-08-11 PROCEDURE — 97162 PT EVAL MOD COMPLEX 30 MIN: CPT | Performed by: PHYSICAL THERAPIST

## 2023-08-11 PROCEDURE — 97112 NEUROMUSCULAR REEDUCATION: CPT | Performed by: PHYSICAL THERAPIST

## 2023-08-11 NOTE — PROGRESS NOTES
PT Evaluation     Today's date: 2023  Patient name: Lane Galicia  : 1980  MRN: [id number]  Referring provider: Earnestine Petersen PT  Dx:   Encounter Diagnosis     ICD-10-CM    1. Acute right-sided low back pain with right-sided sciatica  M54.41 Ambulatory referral to PT spine                     Assessment  Assessment details: Pt presents with s/s consistent with an acute lumbar posterior derangement with an extension directional preference. She will benefit from skilled PT services to address her impairments in order to decrease pain and restore function. Impairments: abnormal muscle firing, abnormal or restricted ROM, abnormal movement, activity intolerance, impaired physical strength, lacks appropriate home exercise program, pain with function, poor posture  and poor body mechanics  Understanding of Dx/Px/POC: good   Prognosis: good    Goals  STG - 2-4 wks  1) pt will centralize to L/S  2) pt will improve pain 50%    LTG - 4-8 wks  1) pt will normalize L/S ROM  2) pt will improve pain 90%    Plan  Planned modality interventions: low level laser therapy  Planned therapy interventions: joint mobilization, abdominal trunk stabilization, manual therapy, body mechanics training, neuromuscular re-education, patient education, postural training, strengthening, stretching, therapeutic activities, therapeutic exercise, flexibility, functional ROM exercises and home exercise program  Frequency: 2x week  Duration in weeks: 8  Treatment plan discussed with: patient        Subjective Evaluation    History of Present Illness  Mechanism of injury: Pt is a 39 y.o. female with PMHx significant for anxiety. She reports insidious onset of R LBP radiating into the anterior thigh, LL, and dorsal foot 1.5 wks ago.  She denies red flags, N/T. 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)    Diagnostic Tests  No diagnostic tests performed  Treatments  No previous or current treatments        Objective     Concurrent Complaints  Negative for bladder dysfunction, bowel dysfunction, saddle (S4) numbness, history of cancer and history of trauma    Postural Observations  Seated posture: poor  Standing posture: poor  Correction of posture: has no consistent effect        Neurological Testing     Sensation     Lumbar   Left   Intact: light touch    Right   Intact: light touch    Comments   Left light touch: L2-S2  Right light touch: L2-S2    Reflexes   Left   Patellar (L4): normal (2+)  Achilles (S1): normal (2+)    Right   Patellar (L4): normal (2+)  Achilles (S1): normal (2+)    Active Range of Motion     Lumbar   Flexion:  with pain Restriction level: maximal  Extension:  with pain Restriction level: minimal  Left lateral flexion:  with pain Restriction level: minimal  Right lateral flexion:  WFL and with pain  Mechanical Assessment    Cervical      Thoracic      Lumbar    Standing flexion: repeated movements   Pain location:peripheralized  Pain intensity: worse  Pain level: increased  Standing extension: repeated movements  Pain location: centralized  Pain intensity: better  Pain level: decreased  Lying extension: repeated movements  Pain location: centralized  Pain intensity: better  Pain level: decreased    Strength/Myotome Testing     Left Hip   Planes of Motion   Flexion: 4-    Right Hip   Planes of Motion   Flexion: 3-    Left Knee   Flexion: 4-  Extension: 4-    Right Knee   Flexion: 3+  Extension: 3+    Left Ankle/Foot   Dorsiflexion: 4+  Plantar flexion: 4  Great toe extension: 4    Right Ankle/Foot   Dorsiflexion: 3+  Plantar flexion: 3+  Brazil toe extension: 4             Precautions:  PMHx: anxiety  Dx: acute posterior lumbar derangement with an extension directional preference    Manuals 8/11                                                                Neuro Re-Ed             Postural correction and awareness training 5 min            Prone lying 60"x3            Prone on elbows 3x10            Prone press-ups             Standing lumbar extension 3x10                                      Ther Ex                                                                                                                     Ther Activity             Lifting mechanics                          Gait Training                                       Modalities

## 2023-08-14 ENCOUNTER — OFFICE VISIT (OUTPATIENT)
Dept: PHYSICAL THERAPY | Facility: CLINIC | Age: 43
End: 2023-08-14
Payer: COMMERCIAL

## 2023-08-14 DIAGNOSIS — M54.41 ACUTE RIGHT-SIDED LOW BACK PAIN WITH RIGHT-SIDED SCIATICA: Primary | ICD-10-CM

## 2023-08-14 PROCEDURE — 97112 NEUROMUSCULAR REEDUCATION: CPT | Performed by: PHYSICAL THERAPIST

## 2023-08-14 NOTE — PROGRESS NOTES
Daily Note     Today's date: 2023  Patient name: Kelley Reyes  : 1980  MRN: [id number]  Referring provider: Carlos Silva PT  Dx:   Encounter Diagnosis     ICD-10-CM    1. Acute right-sided low back pain with right-sided sciatica  M54.41                      Subjective: Pt reports 9/10 LBP radiating into the posterior RLE to the ankle. She reports improved pain with prone lying and prone on elbows but high pain levels after performing R side glides > EIS. Objective: See treatment diary below  Clarified HEP to perform EIS only once RTW and R side glides were only part of assessment vs HEP    Assessment: Pt centralized to knee with prone lying and prone on elbows, centralize to L/S with EIL with self or clinician OP. Plan: Assess response nv. Precautions:  PMHx: anxiety  Dx: acute posterior lumbar derangement with an extension directional preference    Manuals            Prone press-ups with clinician OP  3x10                                                  Neuro Re-Ed             Postural correction and awareness training 5 min            Prone lying 60"x3 60"x3           Prone lying inclined  5 min           Prone on elbows 3x10 60"x3           Prone press-ups  3x10           Prone press-up with self OP  1x10           Standing lumbar extension 3x10 HEP                                     Ther Ex                                                                                                                     Ther Activity             Lifting mechanics                          Gait Training                                       Modalities

## 2023-08-25 ENCOUNTER — HOSPITAL ENCOUNTER (OUTPATIENT)
Dept: RADIOLOGY | Facility: HOSPITAL | Age: 43
End: 2023-08-25
Payer: COMMERCIAL

## 2023-08-25 DIAGNOSIS — M50.30 DDD (DEGENERATIVE DISC DISEASE), CERVICAL: ICD-10-CM

## 2023-08-25 PROCEDURE — 72110 X-RAY EXAM L-2 SPINE 4/>VWS: CPT

## 2023-09-12 ENCOUNTER — OFFICE VISIT (OUTPATIENT)
Dept: OBGYN CLINIC | Facility: CLINIC | Age: 43
End: 2023-09-12

## 2023-09-12 VITALS
HEART RATE: 72 BPM | RESPIRATION RATE: 16 BRPM | BODY MASS INDEX: 35.39 KG/M2 | SYSTOLIC BLOOD PRESSURE: 155 MMHG | WEIGHT: 199.74 LBS | DIASTOLIC BLOOD PRESSURE: 94 MMHG | HEIGHT: 63 IN

## 2023-09-12 DIAGNOSIS — Z20.2 POSSIBLE EXPOSURE TO STD: ICD-10-CM

## 2023-09-12 DIAGNOSIS — B37.31 VAGINAL YEAST INFECTION: Primary | ICD-10-CM

## 2023-09-12 LAB
BV WHIFF TEST VAG QL: NEGATIVE
CLUE CELLS SPEC QL WET PREP: NEGATIVE
PH SMN: 5 [PH]
SL AMB POCT WET MOUNT: POSITIVE
T VAGINALIS VAG QL WET PREP: NEGATIVE
YEAST VAG QL WET PREP: ABNORMAL

## 2023-09-12 PROCEDURE — 87491 CHLMYD TRACH DNA AMP PROBE: CPT | Performed by: NURSE PRACTITIONER

## 2023-09-12 PROCEDURE — 87591 N.GONORRHOEAE DNA AMP PROB: CPT | Performed by: NURSE PRACTITIONER

## 2023-09-12 PROCEDURE — 87210 SMEAR WET MOUNT SALINE/INK: CPT | Performed by: NURSE PRACTITIONER

## 2023-09-12 PROCEDURE — 99203 OFFICE O/P NEW LOW 30 MIN: CPT | Performed by: NURSE PRACTITIONER

## 2023-09-12 NOTE — PROGRESS NOTES
Assessment/Plan:    No problem-specific Assessment & Plan notes found for this encounter. Patient to return to office in 2 weeks for annual GYN exam     Diagnoses and all orders for this visit:    Vaginal yeast infection  -     miconazole (MONISTAT-7) 2 % vaginal cream; Insert 1 applicator into the vagina daily at bedtime for 7 days    Possible exposure to STD  -     POCT wet mount  -     Hepatitis C antibody; Future  -     HIV 1/2 AG/AB w Reflex [facility] for 2 yr old and above; Future  -     RPR-Syphilis Screening (Total Syphilis IGG/IGM); Future  -     Hepatitis B surface antigen  -     Chlamydia/GC amplified DNA by PCR      Will call results to patient    Subjective:      Patient ID: Kristy Yi is a 39 y.o. female. HPI 59-year-old  G12 P 3093 new patient here with vaginal discharge, itching and odor for the past few months. Denies any pelvic pain. she was treated for BV and trichomonas while she was in the emergency room. Patient desires STD testing  She has a history of hysterectomy-fibroids and abnormal uterine bleeding no cancer was found. Was done 2018. The following portions of the patient's history were reviewed and updated as appropriate: allergies, current medications, past family history, past medical history, past social history, past surgical history and problem list.    Review of Systems   Constitutional: Negative for chills. Respiratory: Negative. Cardiovascular: Negative. Genitourinary: Positive for vaginal discharge. Objective:      /94 (BP Location: Right arm, Patient Position: Sitting, Cuff Size: Standard)   Pulse 72   Resp 16   Ht 5' 3" (1.6 m)   Wt 90.6 kg (199 lb 11.8 oz)   LMP  (LMP Unknown)   BMI 35.38 kg/m²          Physical Exam  Constitutional:       Appearance: Normal appearance. Cardiovascular:      Rate and Rhythm: Normal rate. Pulmonary:      Effort: Pulmonary effort is normal.   Abdominal:      Palpations: Abdomen is soft. Tenderness: There is no abdominal tenderness.        External genitalia-no lesions or erythema  Vagina-Mall amount white discharge  Cervix-surgically absent, visualized, no lesions  Uterus-surgically absent  Adnexa-surgically absent    Wet mount KOH-numerous hyphae

## 2023-09-13 LAB
C TRACH DNA SPEC QL NAA+PROBE: NEGATIVE
N GONORRHOEA DNA SPEC QL NAA+PROBE: NEGATIVE

## 2023-09-15 ENCOUNTER — TELEPHONE (OUTPATIENT)
Dept: OBGYN CLINIC | Facility: CLINIC | Age: 43
End: 2023-09-15

## 2023-09-15 NOTE — TELEPHONE ENCOUNTER
----- Message from Leonardo Mark, [address] [place] [address] sent at 9/13/2023  5:53 PM EDT -----  Please inform pt that her culture for chlamydia and gonorrhea were negative. Thank You!

## 2023-09-19 ENCOUNTER — TELEPHONE (OUTPATIENT)
Dept: NEUROLOGY | Facility: CLINIC | Age: 43
End: 2023-09-19

## 2023-09-19 NOTE — TELEPHONE ENCOUNTER
Called patient scheduled appointment from referral for migraines.  For Kurt Gambino at 27:97 am [place] [facility]
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
## 2023-10-22 ENCOUNTER — APPOINTMENT (EMERGENCY)
Dept: RADIOLOGY | Facility: HOSPITAL | Age: 43
End: 2023-10-22
Payer: COMMERCIAL

## 2023-10-22 ENCOUNTER — HOSPITAL ENCOUNTER (OUTPATIENT)
Facility: HOSPITAL | Age: 43
Setting detail: OBSERVATION
Discharge: HOME | End: 2023-10-25
Attending: EMERGENCY MEDICINE | Admitting: INTERNAL MEDICINE
Payer: COMMERCIAL

## 2023-10-22 DIAGNOSIS — M89.8X1 CLAVICLE PAIN: ICD-10-CM

## 2023-10-22 DIAGNOSIS — E87.6 HYPOKALEMIA: ICD-10-CM

## 2023-10-22 DIAGNOSIS — R20.0 LEFT ARM NUMBNESS: Primary | ICD-10-CM

## 2023-10-22 DIAGNOSIS — M54.2 NECK PAIN: ICD-10-CM

## 2023-10-22 DIAGNOSIS — M25.512 ACUTE PAIN OF LEFT SHOULDER: ICD-10-CM

## 2023-10-22 DIAGNOSIS — R03.0 ELEVATED BLOOD PRESSURE READING: ICD-10-CM

## 2023-10-22 LAB
APTT PPP: 30 SECONDS (ref 23–37)
BASOPHILS # BLD AUTO: 0.02 THOUSANDS/ÂΜL (ref 0–0.1)
BASOPHILS NFR BLD AUTO: 0 % (ref 0–1)
EOSINOPHIL # BLD AUTO: 0.08 THOUSAND/ÂΜL (ref 0–0.61)
EOSINOPHIL NFR BLD AUTO: 2 % (ref 0–6)
ERYTHROCYTE [DISTWIDTH] IN BLOOD BY AUTOMATED COUNT: 11.9 % (ref 11.6–15.1)
HCT VFR BLD AUTO: 43.2 % (ref 34.8–46.1)
HGB BLD-MCNC: 14.6 G/DL (ref 11.5–15.4)
IMM GRANULOCYTES # BLD AUTO: 0.01 THOUSAND/UL (ref 0–0.2)
IMM GRANULOCYTES NFR BLD AUTO: 0 % (ref 0–2)
INR PPP: 0.96 (ref 0.84–1.19)
LYMPHOCYTES # BLD AUTO: 2.75 THOUSANDS/ÂΜL (ref 0.6–4.47)
LYMPHOCYTES NFR BLD AUTO: 55 % (ref 14–44)
MCH RBC QN AUTO: 29.7 PG (ref 26.8–34.3)
MCHC RBC AUTO-ENTMCNC: 33.8 G/DL (ref 31.4–37.4)
MCV RBC AUTO: 88 FL (ref 82–98)
MONOCYTES # BLD AUTO: 0.2 THOUSAND/ÂΜL (ref 0.17–1.22)
MONOCYTES NFR BLD AUTO: 4 % (ref 4–12)
NEUTROPHILS # BLD AUTO: 1.93 THOUSANDS/ÂΜL (ref 1.85–7.62)
NEUTS SEG NFR BLD AUTO: 39 % (ref 43–75)
NRBC BLD AUTO-RTO: 0 /100 WBCS
PLATELET # BLD AUTO: 241 THOUSANDS/UL (ref 149–390)
PMV BLD AUTO: 9.8 FL (ref 8.9–12.7)
PROTHROMBIN TIME: 13.1 SECONDS (ref 11.6–14.5)
RBC # BLD AUTO: 4.91 MILLION/UL (ref 3.81–5.12)
WBC # BLD AUTO: 4.99 THOUSAND/UL (ref 4.31–10.16)

## 2023-10-22 PROCEDURE — 82746 ASSAY OF FOLIC ACID SERUM: CPT | Performed by: INTERNAL MEDICINE

## 2023-10-22 PROCEDURE — 99285 EMERGENCY DEPT VISIT HI MDM: CPT | Performed by: EMERGENCY MEDICINE

## 2023-10-22 PROCEDURE — 84484 ASSAY OF TROPONIN QUANT: CPT | Performed by: EMERGENCY MEDICINE

## 2023-10-22 PROCEDURE — 85025 COMPLETE CBC W/AUTO DIFF WBC: CPT | Performed by: EMERGENCY MEDICINE

## 2023-10-22 PROCEDURE — 83735 ASSAY OF MAGNESIUM: CPT | Performed by: EMERGENCY MEDICINE

## 2023-10-22 PROCEDURE — 71045 X-RAY EXAM CHEST 1 VIEW: CPT

## 2023-10-22 PROCEDURE — 99285 EMERGENCY DEPT VISIT HI MDM: CPT

## 2023-10-22 PROCEDURE — 83880 ASSAY OF NATRIURETIC PEPTIDE: CPT | Performed by: EMERGENCY MEDICINE

## 2023-10-22 PROCEDURE — 85730 THROMBOPLASTIN TIME PARTIAL: CPT | Performed by: EMERGENCY MEDICINE

## 2023-10-22 PROCEDURE — 80053 COMPREHEN METABOLIC PANEL: CPT | Performed by: EMERGENCY MEDICINE

## 2023-10-22 PROCEDURE — 82607 VITAMIN B-12: CPT | Performed by: INTERNAL MEDICINE

## 2023-10-22 PROCEDURE — 36415 COLL VENOUS BLD VENIPUNCTURE: CPT | Performed by: EMERGENCY MEDICINE

## 2023-10-22 PROCEDURE — 85610 PROTHROMBIN TIME: CPT | Performed by: EMERGENCY MEDICINE

## 2023-10-22 RX ORDER — LABETALOL HYDROCHLORIDE 5 MG/ML
10 INJECTION, SOLUTION INTRAVENOUS ONCE
Status: COMPLETED | OUTPATIENT
Start: 2023-10-22 | End: 2023-10-23

## 2023-10-22 NOTE — LETTER
[facility]  FLOOR MED SURG UNIT  [address] [place] [address]  Dept: [phone]    October 25, 2023     Patient: Queenie Chambers   YOB: 1980   Date of Visit: 10/22/2023       To Whom it May Concern:    Queenie Chambers is under my professional care. She was seen in the hospital from 10/22/2023 to 10/25/23. She may return to work on 10/26/23 with the following limitations no lifting more than 10 lbs, no pushing or pulling more than 10 lbs . She may return to regular duty on 10/29/23. If you have any questions or concerns, please don't hesitate to call.          Sincerely,          Patience Garcia PA-C

## 2023-10-23 ENCOUNTER — APPOINTMENT (OUTPATIENT)
Dept: RADIOLOGY | Facility: HOSPITAL | Age: 43
End: 2023-10-23
Payer: COMMERCIAL

## 2023-10-23 ENCOUNTER — APPOINTMENT (OUTPATIENT)
Dept: CT IMAGING | Facility: HOSPITAL | Age: 43
End: 2023-10-23
Payer: COMMERCIAL

## 2023-10-23 ENCOUNTER — APPOINTMENT (OUTPATIENT)
Dept: MRI IMAGING | Facility: HOSPITAL | Age: 43
End: 2023-10-23
Payer: COMMERCIAL

## 2023-10-23 ENCOUNTER — APPOINTMENT (EMERGENCY)
Dept: CT IMAGING | Facility: HOSPITAL | Age: 43
End: 2023-10-23
Payer: COMMERCIAL

## 2023-10-23 ENCOUNTER — APPOINTMENT (OUTPATIENT)
Dept: NON INVASIVE DIAGNOSTICS | Facility: HOSPITAL | Age: 43
End: 2023-10-23
Payer: COMMERCIAL

## 2023-10-23 ENCOUNTER — APPOINTMENT (OUTPATIENT)
Dept: VASCULAR ULTRASOUND | Facility: HOSPITAL | Age: 43
End: 2023-10-23
Payer: COMMERCIAL

## 2023-10-23 PROBLEM — M54.2 NECK PAIN: Status: ACTIVE | Noted: 2023-10-23

## 2023-10-23 PROBLEM — R03.0 ELEVATED BLOOD PRESSURE READING: Status: ACTIVE | Noted: 2023-10-23

## 2023-10-23 PROBLEM — R20.2 NUMBNESS AND TINGLING IN LEFT ARM: Status: ACTIVE | Noted: 2023-10-23

## 2023-10-23 PROBLEM — R20.0 NUMBNESS AND TINGLING IN LEFT ARM: Status: ACTIVE | Noted: 2023-10-23

## 2023-10-23 LAB
ALBUMIN SERPL BCP-MCNC: 4.4 G/DL (ref 3.5–5)
ALP SERPL-CCNC: 49 U/L (ref 34–104)
ALT SERPL W P-5'-P-CCNC: 9 U/L (ref 7–52)
ANION GAP SERPL CALCULATED.3IONS-SCNC: 9 MMOL/L
AORTIC ROOT: 2.6 CM
APICAL FOUR CHAMBER EJECTION FRACTION: 62 %
AST SERPL W P-5'-P-CCNC: 12 U/L (ref 13–39)
ATRIAL RATE: 66 BPM
BILIRUB SERPL-MCNC: 0.43 MG/DL (ref 0.2–1)
BNP SERPL-MCNC: 2 PG/ML (ref 0–100)
BUN SERPL-MCNC: 14 MG/DL (ref 5–25)
CALCIUM SERPL-MCNC: 9.5 MG/DL (ref 8.4–10.2)
CARDIAC TROPONIN I PNL SERPL HS: <2 NG/L
CHLORIDE SERPL-SCNC: 102 MMOL/L (ref 96–108)
CHOLEST SERPL-MCNC: 179 MG/DL
CO2 SERPL-SCNC: 26 MMOL/L (ref 21–32)
CREAT SERPL-MCNC: 0.94 MG/DL (ref 0.6–1.3)
E WAVE DECELERATION TIME: 149 MS
E/A RATIO: 1.17
EST. AVERAGE GLUCOSE BLD GHB EST-MCNC: 103 MG/DL
FOLATE SERPL-MCNC: 12 NG/ML
FRACTIONAL SHORTENING: 37 (ref 28–44)
GFR SERPL CREATININE-BSD FRML MDRD: 74 ML/MIN/1.73SQ M
GLUCOSE SERPL-MCNC: 102 MG/DL (ref 65–140)
HBA1C MFR BLD: 5.2 %
HDLC SERPL-MCNC: 56 MG/DL
INTERVENTRICULAR SEPTUM IN DIASTOLE (PARASTERNAL SHORT AXIS VIEW): 0.9 CM
INTERVENTRICULAR SEPTUM: 1.1 CM (ref 0.6–1.1)
LAAS-AP2: 13.8 CM2
LAAS-AP4: 12.7 CM2
LDLC SERPL CALC-MCNC: 106 MG/DL (ref 0–100)
LEFT ATRIUM SIZE: 2.9 CM
LEFT ATRIUM VOLUME (MOD BIPLANE): 31 ML
LEFT ATRIUM VOLUME INDEX (MOD BIPLANE): 16.2 ML/M2
LEFT INTERNAL DIMENSION IN SYSTOLE: 2.7 CM (ref 2.1–4)
LEFT VENTRICULAR INTERNAL DIMENSION IN DIASTOLE: 4.3 CM (ref 3.5–6)
LEFT VENTRICULAR POSTERIOR WALL IN END DIASTOLE: 0.9 CM
LEFT VENTRICULAR STROKE VOLUME: 55 ML
LVSV (TEICH): 55 ML
MAGNESIUM SERPL-MCNC: 2.2 MG/DL (ref 1.9–2.7)
MV E'TISSUE VEL-SEP: 8 CM/S
MV PEAK A VEL: 0.58 M/S
MV PEAK E VEL: 68 CM/S
MV STENOSIS PRESSURE HALF TIME: 43 MS
MV VALVE AREA P 1/2 METHOD: 5.12
P AXIS: 54 DEGREES
PLATELET # BLD AUTO: 221 THOUSANDS/UL (ref 149–390)
PMV BLD AUTO: 9.7 FL (ref 8.9–12.7)
POTASSIUM SERPL-SCNC: 3.3 MMOL/L (ref 3.5–5.3)
PR INTERVAL: 134 MS
PROT SERPL-MCNC: 7.7 G/DL (ref 6.4–8.4)
QRS AXIS: 8 DEGREES
QRSD INTERVAL: 86 MS
QT INTERVAL: 400 MS
QTC INTERVAL: 419 MS
RA PRESSURE ESTIMATED: 3 MMHG
RIGHT ATRIAL 2D VOLUME: 44 ML
RIGHT ATRIUM AREA SYSTOLE A4C: 15 CM2
RIGHT VENTRICLE ID DIMENSION: 3.6 CM
RV PSP: 18 MMHG
SL CV LEFT ATRIUM LENGTH A2C: 4.8 CM
SL CV LV EF: 60
SL CV PED ECHO LEFT VENTRICLE DIASTOLIC VOLUME (MOD BIPLANE) 2D: 83 ML
SL CV PED ECHO LEFT VENTRICLE SYSTOLIC VOLUME (MOD BIPLANE) 2D: 28 ML
SODIUM SERPL-SCNC: 137 MMOL/L (ref 135–147)
T WAVE AXIS: 26 DEGREES
TR MAX PG: 15 MMHG
TR PEAK VELOCITY: 1.9 M/S
TRICUSPID ANNULAR PLANE SYSTOLIC EXCURSION: 2.2 CM
TRICUSPID VALVE PEAK REGURGITATION VELOCITY: 1.92 M/S
TRIGL SERPL-MCNC: 86 MG/DL
TSH SERPL DL<=0.05 MIU/L-ACNC: 1.07 UIU/ML (ref 0.45–4.5)
VENTRICULAR RATE: 66 BPM
VIT B12 SERPL-MCNC: 184 PG/ML (ref 180–914)

## 2023-10-23 PROCEDURE — 85049 AUTOMATED PLATELET COUNT: CPT | Performed by: INTERNAL MEDICINE

## 2023-10-23 PROCEDURE — 93306 TTE W/DOPPLER COMPLETE: CPT

## 2023-10-23 PROCEDURE — 80061 LIPID PANEL: CPT | Performed by: INTERNAL MEDICINE

## 2023-10-23 PROCEDURE — 93010 ELECTROCARDIOGRAM REPORT: CPT | Performed by: INTERNAL MEDICINE

## 2023-10-23 PROCEDURE — G1004 CDSM NDSC: HCPCS

## 2023-10-23 PROCEDURE — 70553 MRI BRAIN STEM W/O & W/DYE: CPT

## 2023-10-23 PROCEDURE — 90471 IMMUNIZATION ADMIN: CPT | Performed by: INTERNAL MEDICINE

## 2023-10-23 PROCEDURE — 97110 THERAPEUTIC EXERCISES: CPT

## 2023-10-23 PROCEDURE — 99223 1ST HOSP IP/OBS HIGH 75: CPT | Performed by: INTERNAL MEDICINE

## 2023-10-23 PROCEDURE — 99254 IP/OBS CNSLTJ NEW/EST MOD 60: CPT | Performed by: PSYCHIATRY & NEUROLOGY

## 2023-10-23 PROCEDURE — 93880 EXTRACRANIAL BILAT STUDY: CPT | Performed by: SURGERY

## 2023-10-23 PROCEDURE — 36415 COLL VENOUS BLD VENIPUNCTURE: CPT | Performed by: EMERGENCY MEDICINE

## 2023-10-23 PROCEDURE — 71250 CT THORAX DX C-: CPT

## 2023-10-23 PROCEDURE — A9585 GADOBUTROL INJECTION: HCPCS | Performed by: INTERNAL MEDICINE

## 2023-10-23 PROCEDURE — 93005 ELECTROCARDIOGRAM TRACING: CPT

## 2023-10-23 PROCEDURE — 84443 ASSAY THYROID STIM HORMONE: CPT | Performed by: INTERNAL MEDICINE

## 2023-10-23 PROCEDURE — 97162 PT EVAL MOD COMPLEX 30 MIN: CPT

## 2023-10-23 PROCEDURE — 72052 X-RAY EXAM NECK SPINE 6/>VWS: CPT

## 2023-10-23 PROCEDURE — 70450 CT HEAD/BRAIN W/O DYE: CPT

## 2023-10-23 PROCEDURE — 90686 IIV4 VACC NO PRSV 0.5 ML IM: CPT | Performed by: INTERNAL MEDICINE

## 2023-10-23 PROCEDURE — 93306 TTE W/DOPPLER COMPLETE: CPT | Performed by: INTERNAL MEDICINE

## 2023-10-23 PROCEDURE — 96374 THER/PROPH/DIAG INJ IV PUSH: CPT

## 2023-10-23 PROCEDURE — 84484 ASSAY OF TROPONIN QUANT: CPT | Performed by: EMERGENCY MEDICINE

## 2023-10-23 PROCEDURE — 99447 NTRPROF PH1/NTRNET/EHR 11-20: CPT | Performed by: PHYSICIAN ASSISTANT

## 2023-10-23 PROCEDURE — 93880 EXTRACRANIAL BILAT STUDY: CPT

## 2023-10-23 PROCEDURE — 72156 MRI NECK SPINE W/O & W/DYE: CPT

## 2023-10-23 PROCEDURE — 83036 HEMOGLOBIN GLYCOSYLATED A1C: CPT | Performed by: INTERNAL MEDICINE

## 2023-10-23 RX ORDER — POTASSIUM CHLORIDE 20 MEQ/1
20 TABLET, EXTENDED RELEASE ORAL ONCE
Status: COMPLETED | OUTPATIENT
Start: 2023-10-23 | End: 2023-10-23

## 2023-10-23 RX ORDER — CLOPIDOGREL BISULFATE 75 MG/1
75 TABLET ORAL DAILY
Status: DISCONTINUED | OUTPATIENT
Start: 2023-10-23 | End: 2023-10-23

## 2023-10-23 RX ORDER — GADOBUTROL 604.72 MG/ML
9 INJECTION INTRAVENOUS
Status: COMPLETED | OUTPATIENT
Start: 2023-10-23 | End: 2023-10-23

## 2023-10-23 RX ORDER — CLOPIDOGREL BISULFATE 75 MG/1
300 TABLET ORAL ONCE
Status: COMPLETED | OUTPATIENT
Start: 2023-10-23 | End: 2023-10-23

## 2023-10-23 RX ORDER — ONDANSETRON 2 MG/ML
4 INJECTION INTRAMUSCULAR; INTRAVENOUS EVERY 6 HOURS PRN
Status: DISCONTINUED | OUTPATIENT
Start: 2023-10-23 | End: 2023-10-25 | Stop reason: HOSPADM

## 2023-10-23 RX ORDER — ENOXAPARIN SODIUM 100 MG/ML
40 INJECTION SUBCUTANEOUS DAILY
Status: DISCONTINUED | OUTPATIENT
Start: 2023-10-23 | End: 2023-10-25 | Stop reason: HOSPADM

## 2023-10-23 RX ORDER — ASPIRIN 325 MG
325 TABLET ORAL ONCE
Status: COMPLETED | OUTPATIENT
Start: 2023-10-23 | End: 2023-10-23

## 2023-10-23 RX ORDER — CLOPIDOGREL BISULFATE 75 MG/1
75 TABLET ORAL DAILY
Status: DISCONTINUED | OUTPATIENT
Start: 2023-10-24 | End: 2023-10-24

## 2023-10-23 RX ORDER — MAGNESIUM HYDROXIDE/ALUMINUM HYDROXICE/SIMETHICONE 120; 1200; 1200 MG/30ML; MG/30ML; MG/30ML
30 SUSPENSION ORAL EVERY 6 HOURS PRN
Status: DISCONTINUED | OUTPATIENT
Start: 2023-10-23 | End: 2023-10-25 | Stop reason: HOSPADM

## 2023-10-23 RX ORDER — ACETAMINOPHEN 325 MG/1
650 TABLET ORAL EVERY 6 HOURS PRN
Status: DISCONTINUED | OUTPATIENT
Start: 2023-10-23 | End: 2023-10-25 | Stop reason: HOSPADM

## 2023-10-23 RX ORDER — GADOBUTROL 604.72 MG/ML
9 INJECTION INTRAVENOUS
Status: DISCONTINUED | OUTPATIENT
Start: 2023-10-23 | End: 2023-10-25 | Stop reason: HOSPADM

## 2023-10-23 RX ORDER — ATORVASTATIN CALCIUM 40 MG/1
40 TABLET, FILM COATED ORAL EVERY EVENING
Status: DISCONTINUED | OUTPATIENT
Start: 2023-10-23 | End: 2023-10-25 | Stop reason: HOSPADM

## 2023-10-23 RX ADMIN — LABETALOL HYDROCHLORIDE 10 MG: 5 INJECTION, SOLUTION INTRAVENOUS at 00:27

## 2023-10-23 RX ADMIN — INFLUENZA VIRUS VACCINE 0.5 ML: 15; 15; 15; 15 SUSPENSION INTRAMUSCULAR at 09:21

## 2023-10-23 RX ADMIN — CLOPIDOGREL BISULFATE 300 MG: 75 TABLET ORAL at 03:01

## 2023-10-23 RX ADMIN — ASPIRIN 325 MG: 325 TABLET ORAL at 03:01

## 2023-10-23 RX ADMIN — POTASSIUM CHLORIDE 20 MEQ: 1500 TABLET, EXTENDED RELEASE ORAL at 01:17

## 2023-10-23 RX ADMIN — POTASSIUM CHLORIDE 20 MEQ: 1500 TABLET, EXTENDED RELEASE ORAL at 04:08

## 2023-10-23 RX ADMIN — GADOBUTROL 9 ML: 604.72 INJECTION INTRAVENOUS at 10:22

## 2023-10-23 RX ADMIN — ATORVASTATIN CALCIUM 40 MG: 40 TABLET, FILM COATED ORAL at 17:17

## 2023-10-23 RX ADMIN — ASPIRIN 81 MG: 81 TABLET, COATED ORAL at 09:09

## 2023-10-23 NOTE — OCCUPATIONAL THERAPY NOTE
Occupational Therapy Screen Note     Patient Name: Brissa Jain  IYHGX'D Date: 10/23/2023  Problem List  Principal Problem:    Numbness and tingling in left arm  Active Problems:    Neck pain    Elevated blood pressure reading        10/23/23 1053   OT Last Visit   OT Visit Date 10/23/23   Note Type   Note type Screen   Additional Comments OT consult received and chart reviewed. Pt admitted 10/22/2023 with numbness and tingling of L UE. CTH (-), MRI pending. Made contact with the patient whom reports numbness/tingling to be improving in L UE. Present from the elbow down to finger tips with sensational deficits L>R. Pt has been independently completing ADL tasks and fnxl mobility without assist during this hospitalization. Pain noted in R neck/shoulder region impacting shoulder AROM. PT provided patient with UE HEP, pt verbalized understanding with out any concerns or complaints. No acute care OT needs warranted at this time. Will d/c patient from [address] [place] [address], pt agreeable. Please re-consult with any acute change in fnxl status.      Radha Go, [address] [facility] OTR/L   [place] Licensure# [id number]

## 2023-10-23 NOTE — CONSULTS
TeleConsultation - Neurology   Fiona Rahman 39 y.o. female MRN: [id number]  Unit/Bed#: -01 Encounter: [id number]        REQUIRED DOCUMENTATION:     1. This service was provided via Telemedicine. 2. Provider located at [place], [place].  3. TeleMed provider: Ab Nieto MD.  4. Identify all parties in room with patient during tele consult: 5. Patient was then informed that this was a Telemedicine visit and that the exam was being conducted confidentially over secure lines. My office door was closed. No one else was in the room. Patient acknowledged consent and understanding of privacy and security of the Telemedicine visit, and gave us permission to have the assistant stay in the room in order to assist with the history and to conduct the exam.  I informed the patient that I have reviewed their record in Epic and presented the opportunity for them to ask any questions regarding the visit today. The patient agreed to participate. Assessment/Plan     39 y.o.  female with history including migraine, who presents with reported acute onset left hand/forearm numbness. Neurology was consulted for this. MRI brain with and without, no acute intracranial abnormality. MRI C-spine with and without contrast,-Edema and T1 hypointensity at the inferior endplate of C5 vertebral body without significant surrounding hematoma or soft tissue. Left foraminal disc protrusion at C4-C5 resulting in moderate neuroforaminal narrowing. Additional mild left neuroforaminal narrowing at C3-C4 and C5-C6. Left hand/forearm paresthesia  -Discussed with patient, low suspicion for CVA related event. Patient reported symptoms involve more on the median side more than ulnar side. This brought up concerns for peripheral nerve disease: Mononeuropathy versus radiculopathy.    -May consider gabapentin for symptom control. If symptom persists, will need EMG/NCS and follow-up with neuromuscular clinic.     Neck pain  Abnormal cervical spine  -Patient reported neck pain for 3 days. Her MRI C-spine concerns for compression fracture.  -Neurosurgery on board, will defer management to primary team and neurosurgery. Marcelo Muñoz will need follow up in in 4 weeks with neuromuscular attending or advance practitioner. She will require a EMG/NCS within 4 weeks weeks. History of Present Illness     Reason for Consult / Principal Problem: Left arm paresthesia  Hx and PE limited by: Televisit  HPI: Marcelo Muñoz is a 39 y.o.  female with history including migraine, who presents with reported acute onset left hand/forearm numbness. Neurology was consulted for this. Per patient, she reports feeling cold yesterday afternoon and went to bed around 10:30 PM on 10/22. When she woke up around 8 AM on 10/23, she noticed left hand/forearm numbness/tingling sensation. She denies focal weakness, denies any other neurological deficit at that time. Patient noticed her blood pressure elevated and decided to come to the hospital.  Patient endorsed neck pain for 3 days. Denies any recent trauma. Denies urinary, bowel movement problems. Patient was seen and evaluated through [facility] care. According to patient, her left hand/forearm paresthesia slightly improve compared to admission. She reported her paresthesia affected median more than ulnar side. However, patient was not able to specify whether the median side was more involved compared to the ulnar side when the symptoms initially started. She was not able to lift left arm above head due to shooting pain from neck. Inpatient consult to Neurology  Consult performed by: Martina Morales MD  Consult ordered by: Dimitry Bowens MD           Review of Systems  10 system reviewed, unremarkable other than above.   Historical Information   Past Medical History:   Diagnosis Date    Fetal growth problem suspected but not found     RESOLVED: 3/1/17    First trimester bleeding     RESOLVED:3/1/17    Iron deficiency anemia due to chronic blood loss 2018    Miscarriage     Missed      RESOLVED:3/1/17    Poor fetal growth affecting management of mother     RESOLVED:3/1/17    Recurrent pregnancy loss, antepartum condition or complication     TLLAOLTD:58    Spontaneous      RESOLVED:3/1/17    Supraventricular tachycardia     by history     Past Surgical History:   Procedure Laterality Date    COLONOSCOPY      DILATION AND CURETTAGE OF UTERUS      ,     DILATION AND CURETTAGE OF UTERUS      FOR SPONTANEOUS ; X5 [id number]    HYSTERECTOMY  2018    Dr Levar Jeffrey    INDUCED       LAPAROSCOPIC TUBAL LIGATION Right 2016    Procedure: LAPAROSCOPIC TUBAL LIGATION ;  Surgeon: Kieran Carl MD;  Location: [facility];  Service:     MA LAPS W/VAG HYSTERECT 250 GM/&RMVL TUBE&/OVARIES N/A 2018    Procedure: HYSTERECTOMY LAPAROSCOPIC ASSISTED VAGINAL (LAVH); RIGHT LAPAROSCOPIC SALPINGECTOMY;  Surgeon: Kieran Carl MD;  Location: [facility];  Service: Gynecology    MA [address] N/A 2016    Procedure: DILATATION AND EVACUATION (D&E) (MISSED AB); Surgeon: Kieran Carl MD;  Location: [facility];  Service: Gynecology    SALPINGOOPHORECTOMY Left     TUBAL LIGATION Right      Social History   Social History     Substance and Sexual Activity   Alcohol Use Never     Social History     Substance and Sexual Activity   Drug Use Never     E-Cigarette/Vaping    E-Cigarette Use Never User      E-Cigarette/Vaping Substances    Nicotine No     THC No     CBD No     Flavoring No     Other No     Unknown No      Social History     Tobacco Use   Smoking Status Never   Smokeless Tobacco Never     Family History: non-contributory    Review of previous medical records was  completed.      Meds/Allergies   all current active meds have been reviewed    Allergies   Allergen Reactions    Iodinated Contrast Media Anaphylaxis     Facial swelling and hives Objective   Vitals:Blood pressure 134/80, pulse 78, temperature 98.5 °F (36.9 °C), temperature source Oral, resp. rate 18, height 5' 3" (1.6 m), weight 87.5 kg (193 lb), SpO2 98 %, not currently breastfeeding. ,Body mass index is 34.19 kg/m². Intake/Output Summary (Last 24 hours) at 10/23/2023 1639  Last data filed at 10/23/2023 0725  Gross per 24 hour   Intake 473 ml   Output --   Net 473 ml       Invasive Devices: Invasive Devices       Peripheral Intravenous Line  Duration             Peripheral IV 10/22/23 Left Antecubital <1 day                    Physical Exam  Neurologic Exam  GEN: in no acute distress, well-developed, well nourished  HEENT: normocephalic,  Nose and ears grossly normal in appearance. CV:  no pedal edema. Normotensive  PULM: airways patent, non-labored breathing   ABD:  Nondistended  EXT: no   edema or erythema. No joint swelling  SKIN: no rashes or lesions. NEURO:        Mental Status: Alert and oriented to person, place, and year. Interactive, able to follow commands.   Answers questions appropriately       Speech: Intact Articulation         CN 2-12: grossly intact       Motor: can move all extremities symmetrically except for left upper extremity cannot lift above head with neck pain contribution      Sensory:  Reported decreased light touch at left hand/forearm, median> ulnar       reflexes:  Not able to assess during tele visit       Coordination: no ataxia with finger-to-nose and heel-to-shin testing             Gait/Station: Deferred        Cortical: No Extinction    Lab Results: CBC:   Results from last 7 days   Lab Units 10/23/23  0309 10/22/23  2340   WBC Thousand/uL  --  4.99   RBC Million/uL  --  4.91   HEMOGLOBIN g/dL  --  14.6   HEMATOCRIT %  --  43.2   MCV fL  --  88   PLATELETS Thousands/uL 221 241   , BMP/CMP:   Results from last 7 days   Lab Units 10/22/23  2340   SODIUM mmol/L 137   POTASSIUM mmol/L 3.3*   CHLORIDE mmol/L 102   CO2 mmol/L 26   BUN mg/dL 14 CREATININE mg/dL 0.94   CALCIUM mg/dL 9.5   AST U/L 12*   ALT U/L 9   ALK PHOS U/L 49   EGFR ml/min/1.73sq m 74     Imaging Studies: I have personally reviewed pertinent films in PACS  EKG, Pathology, and Other Studies: I have personally reviewed pertinent reports. VTE Prophylaxis: Heparin    Code Status: Level 1 - Full Code  Advance Directive and Living Will:      Power of :    POLST:      Counseling / Coordination of Care  Total time spent today 51 minutes. Greater than 50% of total time was spent with the patient and / or family counseling and / or coordination of care. A description of the counseling / coordination of care: Impression, further studies, follow-up and management.

## 2023-10-23 NOTE — PLAN OF CARE
Problem: PHYSICAL THERAPY ADULT  Goal: Performs mobility at highest level of function for planned discharge setting. See evaluation for individualized goals. Description: Treatment/Interventions: Therapeutic exercise, Patient/family training (Cervical/UE ROM and strengthening)          See flowsheet documentation for full assessment, interventions and recommendations. Outcome: Progressing  Note: Prognosis: Good  Problem List: Pain, Impaired sensation, Decreased range of motion, Decreased strength  Assessment: Orders for PT eval and treat received. Pt's PMHx: migraine HA, left eye visual deficits. Pt exhibits physical deficits noted in problem list above. Deficits listed contribute to functional limitations that are significant from the patient PLOF and include: functional use of LUE due to cervical/shoulder pain and sensation deficits    During today's session, assessment of patient's mobility, ROM/strength of UE/cervical region, activities that provoke concordant sign. Issued HEP. The AM-PAC & Barthel Index outcome tools were used to assist in determining pt safety w/ mobility/self care & appropriate d/c recommendations, see above for scores. Patient's clinical presentation is evolving due to uncontrolled pain and ongoing medical management needs, attempt to r/o CVA. Considering the patient's PLOF, co-morbidities, acute functional limitations, functional outcome measures, and/or goal to progress functional independence; this patient would benefit from skilled Physical Therapy intervention. Considerations for next session:Progress HEP as able. Assess neural tension as able. Educate and instruct on pain management strategies. Barriers to Discharge: None     PT Discharge Recommendation: Home with outpatient rehabilitation    See flowsheet documentation for full assessment.

## 2023-10-23 NOTE — ASSESSMENT & PLAN NOTE
Patient presents with persistent left upper extremity numbness from the just above the elbow to the fingers. No focal weakness  However she also endorses some neck pain that started about 3 days ago  Her blood pressure was also noted to be elevated. She reports a prior elevated cholesterol level but did not require medication  We will obtain MRI of the brain as well as C-spine  We will continue with aspirin and Plavix  Will had atorvastatin and obtain fasting lipid in the a.m. Continue with neurochecks  Neurology consultation in the a.m.

## 2023-10-23 NOTE — TELEMEDICINE
e-Consult (IP[facility])   Inpatient consult to Neurosurgery  Consult performed by: Sherrie Roman PA-C  Consult ordered by: Magnolia Mohan MD         Contacted by Dr. Twila Petersen MD.    Seymour Knight 39 y.o. female MRN: [id number]  Unit/Bed#: -01 Encounter: [id number]    Reason for Consult  Per provider report, patient  is a 36yo F with a PMH significant for migraines, obesity, and question of gestational hyper thyroidism on no daily medications who presented to the ER on 10/22 with complaints of 3 days of left hand and distal forearm numbness. Patient also reported some history of neck pain that has been ongoing for the same at a time. Patient denied any weakness, headaches, blurred vision, nausea, vomiting, right-sided symptoms, chest pain, shortness of breath. CT head in the ER was negative for any acute abnormality. Case was discussed with neurology and patient was loaded with aspirin and Plavix and admitted on the stroke pathway. MRI brain with/without contrast was negative for acute stroke. MRI C-spine revealed concern for possible C5 inferior endplate compression fracture as well as multilevel moderate degenerative changes. Neurosurgery was consulted for evaluation and recommendations given MRI findings. Per discussion with on-call attending, patient with some improvement of the numbness of the distal left forearm. It persists in the left thumb, thenar space and left lateral aspect of the forearm. Denies any weakness, bilateral symptoms, frequent dropping of items, bowel/bladder incontinence, urinary retention, balance difficulty, ambulatory dysfunction, use of an assist device to ambulate such as a cane or a walker, and recent falls or injuries. Available past medical history,social history, surgical history, medication list, drug allergies and review of systems were reviewed.     /83 (BP Location: Right arm)   Pulse 74   Temp 98.3 °F (36.8 °C) (Oral)   Resp 18   Ht 5' 3" (1.6 m)   Wt 87.5 kg (193 lb)   LMP  (LMP Unknown)   SpO2 95%   BMI 34.19 kg/m²      Clinical exam:   (Per provider report)   GCS 15   Strength is intact throughout allison UE and allison LE   Numbness noted to the L thumb, thenar eminence, and lateral L forearm   Reflexes difficult to elicit, not brisk per provider report, no Hoffmans. Imaging:   10/23 MRI c-spine w/wo: Edema and T1 hypointensity at the inferior endplate of C5 vertebral body without significant surrounding hematoma or soft tissue. Findings may represent degenerative endplate bone marrow changes versus inferior endplate compression fracture. Correlate with point tenderness and history. Left foraminal disc protrusion at C4-C5 resulting in moderate neuroforaminal narrowing. Additional mild left neuroforaminal narrowing at C3-C4 and C5-C6. Spondylosis at C5-C6 and C6-C7 resulting in indentation of the ventral thecal sac and abutment of the ventral cord at C6-C7 without significant spinal canal narrowing. 10/23 MRI brain w/wo: No acute intracranial abnormality. Nonspecific dilated perivascular spaces are again noted, unchanged compared to 7/6/2013, however somewhat disproportionate for patient's age. Mild mucosal thickening of the ethmoid air cells and right maxillary sinus    Assessment and Recommendations  Continue to closely monitor neuro exam   Frequent neuro checks per primary team   Maintain normotensive BP goals, MAP > 65   No acute neurosurgical intervention indicated at this time   Case and imaging reviewed   Patient symptoms do not correlate with her MRI findings. No symptoms or exam findings concerning for myelopathy.   No obvious cord signal change appreciated or cord compression noted  Symptoms concerning more for a peripheral neuropathy  Recommend a trial of conservative management   Continue multimodal pain regimen   Can trial elbow wrist splints for a month for question of possible ulnar neuropathy  Would recommend outpatient EMGs for further evaluation  Please obtain upright flexion/extension x-rays given question of possible compression fracture at C5   Cervical brace (trial soft collar) can be worn as needed, but not required, for comfort   Neurology following, appreciate recommendations    Neurosurgery will follow from the periphery and review upright x-rays once completed. Please reach out with any further questions or concerns. All questions answered. Provider is in agreement with the course of action. 11-20 minutes, >50% of the total time devoted to medical consultative verbal/EMR discussion between providers. Written report will be generated in the EMR.

## 2023-10-23 NOTE — PLAN OF CARE
Problem: NEUROSENSORY - ADULT  Goal: Achieves stable or improved neurological status  Description: INTERVENTIONS  - Monitor and report changes in neurological status  - Monitor vital signs such as temperature, blood pressure, glucose, and any other labs ordered   - Initiate measures to prevent increased intracranial pressure  - Monitor for seizure activity and implement precautions if appropriate      Outcome: Progressing  Goal: Remains free of injury related to seizures activity  Description: INTERVENTIONS  - Maintain airway, patient safety  and administer oxygen as ordered  - Monitor patient for seizure activity, document and report duration and description of seizure to physician/advanced practitioner  - If seizure occurs,  ensure patient safety during seizure  - Reorient patient post seizure  - Seizure pads on all 4 side rails  - Instruct patient/family to notify RN of any seizure activity including if an aura is experienced  - Instruct patient/family to call for assistance with activity based on nursing assessment  - Administer anti-seizure medications if ordered    Outcome: Progressing  Goal: Achieves maximal functionality and self care  Description: INTERVENTIONS  - Monitor swallowing and airway patency with patient fatigue and changes in neurological status  - Encourage and assist patient to increase activity and self care.    - Encourage visually impaired, hearing impaired and aphasic patients to use assistive/communication devices  Outcome: Progressing     Problem: CARDIOVASCULAR - ADULT  Goal: Maintains optimal cardiac output and hemodynamic stability  Description: INTERVENTIONS:  - Monitor I/O, vital signs and rhythm  - Monitor for S/S and trends of decreased cardiac output  - Administer and titrate ordered vasoactive medications to optimize hemodynamic stability  - Assess quality of pulses, skin color and temperature  - Assess for signs of decreased coronary artery perfusion  - Instruct patient to report change in severity of symptoms  Outcome: Progressing  Goal: Absence of cardiac dysrhythmias or at baseline rhythm  Description: INTERVENTIONS:  - Continuous cardiac monitoring, vital signs, obtain 12 lead EKG if ordered  - Administer antiarrhythmic and heart rate control medications as ordered  - Monitor electrolytes and administer replacement therapy as ordered  Outcome: Progressing     Problem: Neurological Deficit  Goal: Neurological status is stable or improving  Description: Interventions:  - Monitor and assess patient's level of consciousness, motor function, sensory function, and level of assistance needed for ADLs. - Monitor and report changes from baseline. Collaborate with interdisciplinary team to initiate plan and implement interventions as ordered. - Provide and maintain a safe environment. - Consider seizure precautions. - Consider fall precautions. - Consider aspiration precautions. - Consider bleeding precautions. Outcome: Progressing     Problem: Activity Intolerance/Impaired Mobility  Goal: Mobility/activity is maintained at optimum level for patient  Description: Interventions:  - Assess and monitor patient  barriers to mobility and need for assistive/adaptive devices. - Assess patient's emotional response to limitations. - Collaborate with interdisciplinary team and initiate plans and interventions as ordered. - Encourage independent activity per ability.  - Maintain proper body alignment. - Perform active/passive rom as tolerated/ordered. - Plan activities to conserve energy.  - Turn patient as appropriate  Outcome: Progressing     Problem: Communication Impairment  Goal: Ability to express needs and understand communication  Description: Assess patient's communication skills and ability to understand information. Patient will demonstrate use of effective communication techniques, alternative methods of communication and understanding even if not able to speak.      - Encourage communication and provide alternate methods of communication as needed. - Collaborate with case management/ for discharge needs. - Include patient/family/caregiver in decisions related to communication. Outcome: Progressing     Problem: Potential for Aspiration  Goal: Non-ventilated patient's risk of aspiration is minimized  Description: Assess and monitor vital signs, respiratory status, and labs (WBC). Monitor for signs of aspiration (tachypnea, cough, rales, wheezing, cyanosis, fever). - Assess and monitor patient's ability to swallow. - Place patient up in chair to eat if possible. - HOB up at 90 degrees to eat if unable to get patient up into chair.  - Supervise patient during oral intake. - Instruct patient/ family to take small bites. - Instruct patient/ family to take small single sips when taking liquids. - Follow patient-specific strategies generated by speech pathologist.  Outcome: Progressing     Problem: Nutrition  Goal: Nutrition/Hydration status is improving  Description: Monitor and assess patient's nutrition/hydration status for malnutrition (ex- brittle hair, bruises, dry skin, pale skin and conjunctiva, muscle wasting, smooth red tongue, and disorientation). Collaborate with interdisciplinary team and initiate plan and interventions as ordered. Monitor patient's weight and dietary intake as ordered or per policy. Utilize nutrition screening tool and intervene per policy. Determine patient's food preferences and provide high-protein, high-caloric foods as appropriate. - Assist patient with eating.  - Allow adequate time for meals.  - Encourage patient to take dietary supplement as ordered. - Collaborate with clinical nutritionist.  - Include patient/family/caregiver in decisions related to nutrition.   Outcome: Progressing

## 2023-10-23 NOTE — ASSESSMENT & PLAN NOTE
Patient reports no known history of hypertension  We will obtain a TSH level given her history of gestational hyperthyroidism  Monitor blood pressure closely

## 2023-10-23 NOTE — CASE MANAGEMENT
Case Management Assessment    Patient name Brook Johnson  Location [address] [facility] Jenniffer 89/-01 MRN [id number]  : 1980 Date 10/23/2023       Current Admission Date: 10/22/2023  Current Admission Diagnosis:Numbness and tingling in left arm   Patient Active Problem List    Diagnosis Date Noted    Numbness and tingling in left arm 10/23/2023    Neck pain 10/23/2023    Elevated blood pressure reading 10/23/2023    Vaginal yeast infection 2023    Possible exposure to STD 2023    Migraine without aura and without status migrainosus, not intractable 2023    Anxiety 10/07/2020    Hyperthyroidism 2019    Granulation tissue of vaginal cuff 2018      LOS (days): 0  Geometric Mean LOS (GMLOS) (days):   Days to GMLOS:     OBJECTIVE:              Current admission status: Observation       Preferred Pharmacy:   [id number]  [facility], [address]  [address] [place] [address]  [address] [facility] [address]  Phone: [phone] Fax: 301 [facility], [address] [place] [address]  [address] [facility] [address]  Phone: [phone] Fax: 200 [facility] 801 [facility],[address] 1 [facility] 3690 [facility] [address] [place] [address]  Phone: [phone] Fax: [phone]    [id number] [facility], [address] [facility]  [address] [place] [address]  Phone: [phone] Fax: [phone]    Primary Care Provider: Enrique Prado MD    Primary Insurance: [employer]  Secondary Insurance:     ASSESSMENT:  [place] Proxies    There are no active Health Care Proxies on file. Readmission Root Cause  30 Day Readmission: No    Patient Information  Admitted from[de-identified] Home  Mental Status: Alert  During Assessment patient was accompanied by: Not accompanied during assessment  Assessment information provided by[de-identified] Patient  Support Systems: Family members  Home entry access options.  Select all that apply.: No steps to enter home  Type of Current Residence: Apartment  Floor Level: 6 (elevator building)  Upon entering residence, is there a bedroom on the main floor (no further steps)?: Yes  Upon entering residence, is there a bathroom on the main floor (no further steps)?: Yes  In the last 12 months, was there a time when you were not able to pay the mortgage or rent on time?: No  In the last 12 months, was there a time when you did not have a steady place to sleep or slept in a shelter (including now)?: No  Homeless/housing insecurity resource given?: N/A  Living Arrangements: Lives w/ Parent(s), Lives w/ Son    Activities of Daily Living Prior to Admission  Functional Status: Independent  Completes ADLs independently?: Yes  Ambulates independently?: Yes  Does patient use assisted devices?: No  Does patient currently own DME?: No  Does patient have a history of Outpatient Therapy (PT/OT)?: Yes  Does the patient have a history of Short-Term Rehab?: No  Does patient have a history of HHC?: No  Does patient currently have [facility]?: No         Patient Information Continued  Income Source: Government aid  Does patient have prescription coverage?: Yes  Within the past 12 months, you worried that your food would run out before you got the money to buy more.: Never true  Within the past 12 months, the food you bought just didn't last and you didn't have money to get more.: Never true  Food insecurity resource given?: N/A  Does patient receive dialysis treatments?: No  Does patient have a history of substance abuse?: No         Means of Transportation  In the past 12 months, has lack of transportation kept you from medical appointments or from getting medications?: No  In the past 12 months, has lack of transportation kept you from meetings, work, or from getting things needed for daily living?: No  Was application for public transport provided?: N/A    No anticipated dc needs at this time.  Cm will continue to follow for any changes in dc needs.

## 2023-10-23 NOTE — H&P
[address]  Name: Jer Dwyer  MRN: [id number]  Unit/Bed#: -01 I Date of Admission: 10/22/2023   Date of Service: 10/23/2023 I Hospital Day: 0    Assessment/Plan   * Numbness and tingling in left arm  Assessment & Plan  Patient presents with persistent left upper extremity numbness from the just above the elbow to the fingers. No focal weakness  However she also endorses some neck pain that started about 3 days ago  Her blood pressure was also noted to be elevated. She reports a prior elevated cholesterol level but did not require medication  We will obtain MRI of the brain as well as C-spine  We will continue with aspirin and Plavix  Will had atorvastatin and obtain fasting lipid in the a.m. Continue with neurochecks  Neurology consultation in the a.m. Neck pain  Assessment & Plan  No history of trauma  To rule out cervical radiculopathy  MRI of the C-spine in the a.m. Analgesia as needed for pain    Elevated blood pressure reading  Assessment & Plan  Patient reports no known history of hypertension  We will obtain a TSH level given her history of gestational hyperthyroidism  Monitor blood pressure closely             History of Present Illness     HPI:  Digna Bernheim is a 39 y.o. female with past medical history of migraine, obesity, questionable gestational hyperthyroidism, and has been cleared by endocrinology to be off medication, that presented to the ED with complaints of persistent left hand and forearm numbness that started about 8 AM.  She woke up with symptoms and stated this had persisted all day. She works in a nursing home and while at work today she checked her blood pressure multiple times which was elevated. Denies any history of hypertension. No history of TIA. However she also reports some neck pain which has been ongoing for the last 3 days. No focal weakness. No urinary or bowel incontinence. No headaches or blurry vision.   No chest pain or shortness of breath  In the ER she had a CT of the head done which showed no acute intracranial hemorrhage. Patient still has persistence of her symptoms. Case was discussed with neurology by the ED physician and recommendation is to load her with aspirin and Plavix and observe as a stroke pathway.       Historical Information   Past Medical History:   Diagnosis Date    Fetal growth problem suspected but not found     RESOLVED: 3/1/17    First trimester bleeding     RESOLVED:3/1/17    Iron deficiency anemia due to chronic blood loss 2018    Miscarriage     Missed      RESOLVED:3/1/17    Poor fetal growth affecting management of mother     RESOLVED:3/1/17    Recurrent pregnancy loss, antepartum condition or complication     JVSDGLTN:    Spontaneous      RESOLVED:3/1/17    Supraventricular tachycardia     by history     Patient Active Problem List   Diagnosis    Granulation tissue of vaginal cuff    Hyperthyroidism    Anxiety    Migraine without aura and without status migrainosus, not intractable    Vaginal yeast infection    Possible exposure to STD    Numbness and tingling in left arm    Neck pain    Elevated blood pressure reading     Past Surgical History:   Procedure Laterality Date    COLONOSCOPY      DILATION AND CURETTAGE OF UTERUS      ,     DILATION AND CURETTAGE OF UTERUS      FOR SPONTANEOUS ; [id number]    HYSTERECTOMY  2018    Dr Immanuel Chaves    INDUCED   2010    LAPAROSCOPIC TUBAL LIGATION Right 2016    Procedure: LAPAROSCOPIC TUBAL LIGATION ;  Surgeon: Brittney Tobar MD;  Location: [facility];  Service:     AK LAPS W/VAG HYSTERECT 250 GM/&RMVL TUBE&/OVARIES N/A 2018    Procedure: HYSTERECTOMY LAPAROSCOPIC ASSISTED VAGINAL (LAVH); RIGHT LAPAROSCOPIC SALPINGECTOMY;  Surgeon: Brittney Tobar MD;  Location: [facility];  Service: Gynecology    AK [facility] N/A 2016    Procedure: DILATATION AND EVACUATION (D&E) (MISSED AB); Surgeon: Keri Zuñiga MD;  Location: [facility];  Service: Gynecology    SALPINGOOPHORECTOMY Left     TUBAL LIGATION Right        Social History   Social History     Substance and Sexual Activity   Alcohol Use No     Social History     Substance and Sexual Activity   Drug Use No     Social History     Tobacco Use   Smoking Status Never   Smokeless Tobacco Never       Family History:   Family History   Problem Relation Age of Onset    Arthritis Mother     Depression Mother     Hypertension Mother     Cancer Father     Breast cancer Neg Hx     Ovarian cancer Neg Hx     Colon cancer Neg Hx        Meds/Allergies       Current Facility-Administered Medications:     acetaminophen (TYLENOL) tablet 650 mg, 650 mg, Oral, Q6H PRN, Scott Herrera MD    aluminum-magnesium hydroxide-simethicone (MAALOX) oral suspension 30 mL, 30 mL, Oral, Q6H PRN, Scott Herrera MD    atorvastatin (LIPITOR) tablet 40 mg, 40 mg, Oral, QPM, Scott Herrera MD    [START ON 10/24/2023] clopidogrel (PLAVIX) tablet 75 mg, 75 mg, Oral, Daily, Scott Herrera MD    enoxaparin (LOVENOX) subcutaneous injection 40 mg, 40 mg, Subcutaneous, Daily, Scott Herrera MD    ondansetron (ZOFRAN) injection 4 mg, 4 mg, Intravenous, Q6H PRN, Scott Herrera MD    potassium chloride (K-DUR,KLOR-CON) CR tablet 20 mEq, 20 mEq, Oral, Once, Scott Herrera MD    Allergies   Allergen Reactions    Iodinated Contrast Media Anaphylaxis     Facial swelling and hives       Review of Systems  A detailed 12 point review of systems was conducted and is negative apart from those mentioned in the HPI. Objective   Vitals: Blood pressure (!) 153/106, pulse 73, temperature 98.1 °F (36.7 °C), temperature source Oral, resp. rate 18, height 5' 3" (1.6 m), weight 87.9 kg (193 lb 12.8 oz), SpO2 98 %, not currently breastfeeding.     Physical Exam   General-awake and alert, NAD  HEENT: PERRLA, EOMI, sclera anicteric, moist mucous membranes, tongue mucosa without lesions. Neck: supple, mild tenderness on the left neck region, no JVD, lymphadenopathy, thyromegaly. Heart: Regular rate and rhythm, S1S2 present. No murmur, rub or gallop. Lungs; Clear to auscultation bilaterally. No wheezing, crackles or rhonchi. No accessory muscle use or respiratory distress. Abdomen: soft, non-tender, non-distended, NABS. No guarding or rebound. No peritoneal sound or mass. Extremities: no clubbing, cyanosis, or edema. 2+ pedal pulses bilaterally. Full range of motion. Neurologic:  Cranial nerves II-XII intact. Patient having some numbness on the left and to forearm. Strength intact. Speech fluent and goal directed. Awake, alert and oriented x 3. Skin: warm and dry. No petechiae, purpura or rash. Lab Results:   Results from last 7 days   Lab Units 10/23/23  0309 10/22/23  2340   WBC Thousand/uL  --  4.99   HEMOGLOBIN g/dL  --  14.6   HEMATOCRIT %  --  43.2   PLATELETS Thousands/uL 221 241     Results from last 7 days   Lab Units 10/22/23  2340   POTASSIUM mmol/L 3.3*   CHLORIDE mmol/L 102   CO2 mmol/L 26   BUN mg/dL 14   CREATININE mg/dL 0.94   CALCIUM mg/dL 9.5         Imaging:  CT head without contrast   Final Result by Amarilis Pastor DO (10/23 0138)      No acute intracranial abnormality. Workstation performed: [id number]         XR chest 1 view portable   ED Interpretation by Nevaeh Barnes MD (10/23 0001)   No infiltrate or pneumothorax      MRI Inpatient Order    (Results Pending)         EKG, Pathology, and Other Studies: Reviewed, NSR, no acute ST-T wave changes        Code Status: Level 1 - Full Code      Counseling / Coordination of Care  Total floor / unit time spent today 75 minutes. Greater than 50% of total time was spent with the patient and / or family counseling and / or coordination of care. Portions of the record may have been created with voice recognition software.  Occasional wrong word or "sound a like" substitutions may have occurred due to the inherent limitations of voice recognition software. Read the chart carefully and recognize, using context, where substitutions have occurred.

## 2023-10-23 NOTE — ASSESSMENT & PLAN NOTE
No history of trauma  To rule out cervical radiculopathy  MRI of the C-spine in the a.m.   Analgesia as needed for pain

## 2023-10-23 NOTE — PLAN OF CARE
Problem: NEUROSENSORY - ADULT  Goal: Achieves stable or improved neurological status  Description: INTERVENTIONS  - Monitor and report changes in neurological status  - Monitor vital signs such as temperature, blood pressure, glucose, and any other labs ordered   - Initiate measures to prevent increased intracranial pressure  - Monitor for seizure activity and implement precautions if appropriate      Outcome: Progressing  Goal: Remains free of injury related to seizures activity  Description: INTERVENTIONS  - Maintain airway, patient safety  and administer oxygen as ordered  - Monitor patient for seizure activity, document and report duration and description of seizure to physician/advanced practitioner  - If seizure occurs,  ensure patient safety during seizure  - Reorient patient post seizure  - Seizure pads on all 4 side rails  - Instruct patient/family to notify RN of any seizure activity including if an aura is experienced  - Instruct patient/family to call for assistance with activity based on nursing assessment  - Administer anti-seizure medications if ordered    Outcome: Progressing  Goal: Achieves maximal functionality and self care  Description: INTERVENTIONS  - Monitor swallowing and airway patency with patient fatigue and changes in neurological status  - Encourage and assist patient to increase activity and self care.    - Encourage visually impaired, hearing impaired and aphasic patients to use assistive/communication devices  Outcome: Progressing     Problem: CARDIOVASCULAR - ADULT  Goal: Maintains optimal cardiac output and hemodynamic stability  Description: INTERVENTIONS:  - Monitor I/O, vital signs and rhythm  - Monitor for S/S and trends of decreased cardiac output  - Administer and titrate ordered vasoactive medications to optimize hemodynamic stability  - Assess quality of pulses, skin color and temperature  - Assess for signs of decreased coronary artery perfusion  - Instruct patient to report change in severity of symptoms  Outcome: Progressing  Goal: Absence of cardiac dysrhythmias or at baseline rhythm  Description: INTERVENTIONS:  - Continuous cardiac monitoring, vital signs, obtain 12 lead EKG if ordered  - Administer antiarrhythmic and heart rate control medications as ordered  - Monitor electrolytes and administer replacement therapy as ordered  Outcome: Progressing     Problem: Activity Intolerance/Impaired Mobility  Goal: Mobility/activity is maintained at optimum level for patient  Description: Interventions:  - Assess and monitor patient  barriers to mobility and need for assistive/adaptive devices. - Assess patient's emotional response to limitations. - Collaborate with interdisciplinary team and initiate plans and interventions as ordered. - Encourage independent activity per ability.  - Maintain proper body alignment. - Perform active/passive rom as tolerated/ordered. - Plan activities to conserve energy.  - Turn patient as appropriate  Outcome: Progressing     Problem: Communication Impairment  Goal: Ability to express needs and understand communication  Description: Assess patient's communication skills and ability to understand information. Patient will demonstrate use of effective communication techniques, alternative methods of communication and understanding even if not able to speak. - Encourage communication and provide alternate methods of communication as needed. - Collaborate with case management/ for discharge needs. - Include patient/family/caregiver in decisions related to communication. Outcome: Progressing     Problem: Potential for Aspiration  Goal: Non-ventilated patient's risk of aspiration is minimized  Description: Assess and monitor vital signs, respiratory status, and labs (WBC). Monitor for signs of aspiration (tachypnea, cough, rales, wheezing, cyanosis, fever). - Assess and monitor patient's ability to swallow.   - Place patient up in chair to eat if possible. - HOB up at 90 degrees to eat if unable to get patient up into chair.  - Supervise patient during oral intake. - Instruct patient/ family to take small bites. - Instruct patient/ family to take small single sips when taking liquids. - Follow patient-specific strategies generated by speech pathologist.  Outcome: Progressing     Problem: Nutrition  Goal: Nutrition/Hydration status is improving  Description: Monitor and assess patient's nutrition/hydration status for malnutrition (ex- brittle hair, bruises, dry skin, pale skin and conjunctiva, muscle wasting, smooth red tongue, and disorientation). Collaborate with interdisciplinary team and initiate plan and interventions as ordered. Monitor patient's weight and dietary intake as ordered or per policy. Utilize nutrition screening tool and intervene per policy. Determine patient's food preferences and provide high-protein, high-caloric foods as appropriate. - Assist patient with eating.  - Allow adequate time for meals.  - Encourage patient to take dietary supplement as ordered. - Collaborate with clinical nutritionist.  - Include patient/family/caregiver in decisions related to nutrition.   Outcome: Progressing

## 2023-10-23 NOTE — ED PROVIDER NOTES
History  Chief Complaint   Patient presents with    Numbness     Focal L arm numbness starting early this morning that has persisted throughout the day, pt also reports high blood pressure on home measurements     Patient is a 48-year-old female seen in the emergency department with concern for left forearm numbness since approximately 8 AM this morning. Patient states that she went to sleep at approximately 9 PM the evening before, and awoke with left forearm numbness which has been persistent over the past day. Patient states that she checked her blood pressure multiple times during the course of the day and notes that it has been as high as 559A systolic. Patient notes no headache, chest pain, shortness of breath, abdominal pain, nausea, vomiting, weakness. Patient denies having similar symptoms in the past.  Patient notes no personal history of high blood pressure in the past.        Prior to Admission Medications   Prescriptions Last Dose Informant Patient Reported? Taking? Magnesium Oxide 400 MG CAPS   No No   Sig: Take 1 tablet (400 mg total) by mouth in the morning   Patient not taking: Reported on 8/10/2023   Multiple Vitamin (MULTIVITAMIN) tablet   Yes No   Sig: Take 1 tablet by mouth daily   Patient not taking: Reported on 10/27/2022   Riboflavin (Vitamin B-2) 100 MG TABS   No No   Sig: TAKE 4 TABLETS BY MOUTH DAILY.    Patient not taking: Reported on 8/10/2023   buPROPion (WELLBUTRIN XL) 300 mg 24 hr tablet   No No   Sig: Take 1 tablet (300 mg total) by mouth every morning   Patient not taking: Reported on 9/12/2023   cyclobenzaprine (FLEXERIL) 10 mg tablet   No No   Sig: Take 1 tablet (10 mg total) by mouth 3 (three) times a day as needed for muscle spasms   Patient not taking: Reported on 9/12/2023   escitalopram (LEXAPRO) 10 mg tablet   No No   Sig: Take 1 tablet (10 mg total) by mouth daily   Patient not taking: Reported on 8/10/2023   fluticasone (FLONASE) 50 mcg/act nasal spray   No No   Sig: SPRAY 2 SPRAYS INTO EACH NOSTRIL EVERY DAY   Patient not taking: Reported on 8/10/2023   hydrOXYzine HCL (ATARAX) 25 mg tablet   No No   Sig: Take 1 tablet (25 mg total) by mouth every 6 (six) hours as needed for itching   Patient not taking: Reported on 2023   lidocaine (Lidoderm) 5 %   No No   Sig: Apply 1 patch topically over 12 hours daily as needed (pain) for up to 14 days Remove & Discard patch within 12 hours or as directed by MD   magnesium oxide (MAG-OX) 400 mg tablet   Yes No   Patient not taking: Reported on 2023   naproxen (Naprosyn) 500 mg tablet   No No   Sig: Take 1 tablet (500 mg total) by mouth every 12 (twelve) hours as needed (pain) for up to 10 days Take with food. nystatin (MYCOSTATIN) powder   No No   Sig: Apply topically 3 (three) times a day   Patient not taking: Reported on 2023   potassium chloride (MICRO-K) 10 MEQ CR capsule   No No   Sig: Take 1 capsule (10 mEq total) by mouth daily   Patient not taking: Reported on 8/10/2023   rizatriptan (MAXALT) 10 MG tablet   No No   Sig: TAKE 1 TABLET (10 MG TOTAL) BY MOUTH ONCE AS NEEDED FOR MIGRAINE FOR UP TO 1 DOSE MAY REPEAT IN 2 HOURS IF NEEDED   Patient not taking: Reported on 10/27/2022   topiramate (TOPAMAX) 100 mg tablet   No No   Sig: Take 1 tablet (100 mg total) by mouth daily   triamcinolone (KENALOG) 0.1 % ointment   No No   Sig: Apply topically 2 (two) times a day Do not apply on face, groin. Overuse can thin skin.    Patient not taking: Reported on 8/10/2023      Facility-Administered Medications: None       Past Medical History:   Diagnosis Date    Fetal growth problem suspected but not found     RESOLVED: 3/1/17    First trimester bleeding     RESOLVED:3/1/17    Iron deficiency anemia due to chronic blood loss 2018    Miscarriage     Missed      RESOLVED:3/1/17    Poor fetal growth affecting management of mother     RESOLVED:3/1/17    Recurrent pregnancy loss, antepartum condition or complication RESOLVED:3/1/17    Spontaneous      RESOLVED:3/1/17    Supraventricular tachycardia     by history       Past Surgical History:   Procedure Laterality Date    COLONOSCOPY      DILATION AND CURETTAGE OF UTERUS      ,     DILATION AND CURETTAGE OF UTERUS      FOR SPONTANEOUS ; [id number]    HYSTERECTOMY  2018    Dr Bañuelos Graver    INDUCED   2010    LAPAROSCOPIC TUBAL LIGATION Right 2016    Procedure: LAPAROSCOPIC TUBAL LIGATION ;  Surgeon: Arben Watt MD;  Location: [facility];  Service:     NC LAPS W/VAG HYSTERECT 250 GM/&RMVL TUBE&/OVARIES N/A 2018    Procedure: HYSTERECTOMY LAPAROSCOPIC ASSISTED VAGINAL (LAVH); RIGHT LAPAROSCOPIC SALPINGECTOMY;  Surgeon: Arben Watt MD;  Location: [facility];  Service: Gynecology    NC [facility] [facility] N/A 2016    Procedure: DILATATION AND EVACUATION (D&E) (MISSED AB); Surgeon: Arben Watt MD;  Location: [facility];  Service: Gynecology    SALPINGOOPHORECTOMY Left     TUBAL LIGATION Right        Family History   Problem Relation Age of Onset    Arthritis Mother     Depression Mother     Hypertension Mother     Cancer Father     Breast cancer Neg Hx     Ovarian cancer Neg Hx     Colon cancer Neg Hx      I have reviewed and agree with the history as documented. E-Cigarette/Vaping    E-Cigarette Use Never User      E-Cigarette/Vaping Substances    Nicotine No     THC No     CBD No     Flavoring No     Other No     Unknown No      Social History     Tobacco Use    Smoking status: Never    Smokeless tobacco: Never   Vaping Use    Vaping Use: Never used   Substance Use Topics    Alcohol use: No    Drug use: No       Review of Systems   Constitutional:  Negative for chills and fever. HENT:  Negative for ear pain and sore throat. Eyes:  Negative for pain and visual disturbance. Respiratory:  Negative for cough and shortness of breath. Cardiovascular:  Negative for chest pain and palpitations. Gastrointestinal:  Negative for abdominal pain and vomiting. Genitourinary:  Negative for decreased urine volume and difficulty urinating. Musculoskeletal:  Negative for arthralgias and back pain. Skin:  Negative for color change and rash. Neurological:  Positive for numbness. Negative for seizures, syncope, weakness and headaches. Psychiatric/Behavioral:  Negative for agitation and confusion. All other systems reviewed and are negative. Physical Exam  Physical Exam  Vitals and nursing note reviewed. Constitutional:       General: She is not in acute distress. Appearance: She is well-developed. HENT:      Head: Normocephalic and atraumatic. Right Ear: External ear normal.      Left Ear: External ear normal.      Nose: Nose normal.      Mouth/Throat:      Pharynx: Oropharynx is clear. Eyes:      General: No scleral icterus. Conjunctiva/sclera: Conjunctivae normal.   Cardiovascular:      Rate and Rhythm: Normal rate and regular rhythm. Heart sounds: No murmur heard. Pulmonary:      Effort: Pulmonary effort is normal. No respiratory distress. Breath sounds: Normal breath sounds. Abdominal:      General: There is no distension. Palpations: Abdomen is soft. Tenderness: There is no abdominal tenderness. Musculoskeletal:         General: No deformity or signs of injury. Cervical back: Normal range of motion and neck supple. Skin:     General: Skin is warm and dry. Neurological:      Mental Status: She is alert and oriented to person, place, and time. Cranial Nerves: No cranial nerve deficit. Comments: 5 out of 5 strength in upper and lower extremities bilaterally; subjectively decreased sensation to left forearm diffusely   Psychiatric:         Mood and Affect: Mood normal.         Thought Content:  Thought content normal.         Vital Signs  ED Triage Vitals [10/22/23 2332]   Temperature Pulse Respirations Blood Pressure SpO2   97.7 °F (36.5 °C) 87 18 (!) 190/113 98 %      Temp Source Heart Rate Source Patient Position - Orthostatic VS BP Location FiO2 (%)   Oral Monitor Lying Left arm --      Pain Score       No Pain           Vitals:    10/22/23 2332 10/23/23 0030 10/23/23 0100 10/23/23 0115   BP: (!) 190/113 158/99  150/94   Pulse: 87 68 74 75   Patient Position - Orthostatic VS: Lying  Lying Lying         Visual Acuity      ED Medications  Medications   aspirin tablet 325 mg (has no administration in time range)   clopidogrel (PLAVIX) tablet 300 mg (has no administration in time range)   labetalol (NORMODYNE) injection 10 mg (10 mg Intravenous Given 10/23/23 0027)   potassium chloride (K-DUR,KLOR-CON) CR tablet 20 mEq (20 mEq Oral Given 10/23/23 0117)       Diagnostic Studies  Results Reviewed       Procedure Component Value Units Date/Time    HS Troponin I 2hr [[id number]]     Lab Status: No result Specimen: Blood     HS Troponin I 4hr [[id number]]     Lab Status: No result Specimen: Blood     HS Troponin 0hr (reflex protocol) [[id number]]  (Normal) Collected: 10/22/23 2340    Lab Status: Final result Specimen: Blood from Arm, Left Updated: 10/23/23 0015     hs TnI 0hr <2 ng/L     B-Type Natriuretic Peptide(BNP) [[id number]]  (Normal) Collected: 10/22/23 2340    Lab Status: Final result Specimen: Blood from Arm, Left Updated: 10/23/23 0014     BNP 2 pg/mL     Comprehensive metabolic panel [[id number]]  (Abnormal) Collected: 10/22/23 2340    Lab Status: Final result Specimen: Blood from Arm, Left Updated: 10/23/23 0006     Sodium 137 mmol/L      Potassium 3.3 mmol/L      Chloride 102 mmol/L      CO2 26 mmol/L      ANION GAP 9 mmol/L      BUN 14 mg/dL      Creatinine 0.94 mg/dL      Glucose 102 mg/dL      Calcium 9.5 mg/dL      AST 12 U/L      ALT 9 U/L      Alkaline Phosphatase 49 U/L      Total Protein 7.7 g/dL      Albumin 4.4 g/dL      Total Bilirubin 0.43 mg/dL      eGFR 74 ml/min/1.73sq m     Narrative:      [facility] guidelines for Chronic Kidney Disease (CKD):     Stage 1 with normal or high GFR (GFR > 90 mL/min/1.73 square meters)    Stage 2 Mild CKD (GFR = 60-89 mL/min/1.73 square meters)    Stage 3A Moderate CKD (GFR = 45-59 mL/min/1.73 square meters)    Stage 3B Moderate CKD (GFR = 30-44 mL/min/1.73 square meters)    Stage 4 Severe CKD (GFR = 15-29 mL/min/1.73 square meters)    Stage 5 End Stage CKD (GFR <15 mL/min/1.73 square meters)  Note: GFR calculation is accurate only with a steady state creatinine    Magnesium [[id number]]  (Normal) Collected: 10/22/23 2340    Lab Status: Final result Specimen: Blood from Arm, Left Updated: 10/23/23 0006     Magnesium 2.2 mg/dL     APTT [[id number]]  (Normal) Collected: 10/22/23 2340    Lab Status: Final result Specimen: Blood from Arm, Left Updated: 10/22/23 2358     PTT 30 seconds     Protime-INR [[id number]]  (Normal) Collected: 10/22/23 2340    Lab Status: Final result Specimen: Blood from Arm, Left Updated: 10/22/23 2358     Protime 13.1 seconds      INR 0.96    CBC and differential [[id number]]  (Abnormal) Collected: 10/22/23 2340    Lab Status: Final result Specimen: Blood from Arm, Left Updated: 10/22/23 2347     WBC 4.99 Thousand/uL      RBC 4.91 Million/uL      Hemoglobin 14.6 g/dL      Hematocrit 43.2 %      MCV 88 fL      MCH 29.7 pg      MCHC 33.8 g/dL      RDW 11.9 %      MPV 9.8 fL      Platelets 880 Thousands/uL      nRBC 0 /100 WBCs      Neutrophils Relative 39 %      Immat GRANS % 0 %      Lymphocytes Relative 55 %      Monocytes Relative 4 %      Eosinophils Relative 2 %      Basophils Relative 0 %      Neutrophils Absolute 1.93 Thousands/µL      Immature Grans Absolute 0.01 Thousand/uL      Lymphocytes Absolute 2.75 Thousands/µL      Monocytes Absolute 0.20 Thousand/µL      Eosinophils Absolute 0.08 Thousand/µL      Basophils Absolute 0.02 Thousands/µL                    CT head without contrast   Final Result by Denilson Montenegro DO (10/23 0138)      No acute intracranial abnormality. Workstation performed: [id number]         XR chest 1 view portable   ED Interpretation by Rubina Burrell MD (10/23 0001)   No infiltrate or pneumothorax                 Procedures  ECG 12 Lead Documentation Only    Date/Time: 10/22/2023 11:41 PM    Performed by: Rubina Burrell MD  Authorized by: Rubina Burrell MD    Indications / Diagnosis:  Paresthesias  ECG reviewed by me, the ED Provider: yes    Patient location:  ED  Rate:     ECG rate:  66    ECG rate assessment: normal    Rhythm:     Rhythm: sinus rhythm    QRS:     QRS axis:  Normal  ST segments:     ST segments:  Normal  T waves:     T waves: normal    Comments:      Normal sinus rhythm at 66, normal axis, , QRS 86, QTc 419, no ST-T wave abnormality, no evidence of acute ischemia           ED Course  ED Course as of 10/23/23 0240   Mon Oct 23, 2023   0145 CT head-    No acute intracranial abnormality                  Stroke Assessment       Row Name 10/23/23 0239             NIH Stroke Scale    Interval --      Level of Consciousness (1a.) 0      LOC Questions (1b.) 0      LOC Commands (1c.) 0      Best Gaze (2.) 0      Visual (3.) 0      Facial Palsy (4.) 0      Motor Arm, Left (5a.) 0      Motor Arm, Right (5b.) 0      Motor Leg, Left (6a.) 0      Motor Leg, Right (6b.) 0      Limb Ataxia (7.) 0      Sensory (8.) 1      Best Language (9.) 0      Dysarthria (10.) 0      Extinction and Inattention (11.) (Formerly Neglect) 0      Total 1                    Flowsheet Row Most Recent Value   Thrombolytic Decision Options    Thrombolytic Decision Patient not a candidate. Patient is not a candidate options Symptoms resolved/clearly non disabling., Unclear time of onset outside appropriate time window. SBIRT 22yo+      Flowsheet Row Most Recent Value   Initial Alcohol Screen: US AUDIT-C     1.  How often do you have a drink containing alcohol? 0 Filed at: 10/22/2023 2336   Audit-C Score 0 Filed at: 10/22/2023 2332   UR[place]: How many times in the past year have you. .. Used an illegal drug or used a prescription medication for non-medical reasons? Never Filed at: 10/22/2023 1368                      Medical Decision Making  Patient is a 25-year-old female seen in the emergency department brought by EMS with concern for left forearm numbness. EKG was obtained and noted. CT head was obtained, which showed no acute intracranial abnormality. Patient has apparent IV contrast allergy. Chest x-ray showed no infiltrate or pneumothorax. Case was reviewed with neurology(Dr. Genna Meadows), with recommendation for aspirin/Plavix, observation/stroke pathway. Laboratory evaluation remarkable for low potassium of 3.3. Plan to admit patient(observation status) for further evaluation and treatment, with concern for left forearm numbness, rule out TIA/CVA. Case was reviewed with medicine team(Dr. Ritu Metzger). Plan of care was reviewed with patient. Problems Addressed:  Elevated blood pressure reading: acute illness or injury  Hypokalemia: acute illness or injury  Left arm numbness: acute illness or injury    Amount and/or Complexity of Data Reviewed  Labs: ordered. Decision-making details documented in ED Course. Radiology: ordered and independent interpretation performed. Decision-making details documented in ED Course. ECG/medicine tests: ordered and independent interpretation performed. Decision-making details documented in ED Course. Risk  OTC drugs. Prescription drug management. Decision regarding hospitalization.              Disposition  Final diagnoses:   Left arm numbness   Elevated blood pressure reading   Hypokalemia     Time reflects when diagnosis was documented in both MDM as applicable and the Disposition within this note       Time User Action Codes Description Comment    10/22/2023 11:37 PM Stef Orona Add [R20.0] Left arm numbness     10/22/2023 11:38 PM Stef Orona Add [R03.0] Elevated blood pressure reading     10/23/2023 12:36 AM Maya Churchill Add [E87.6] Hypokalemia           ED Disposition       ED Disposition   Admit    Condition   Stable    Date/Time   Mon Oct 23, 2023 0203    Comment   Case was discussed with medicine team and the patient's admission status was agreed to be Admission Status: observation status to the service of Dr. Bernice Mantilla. Follow-up Information       Follow up With Specialties Details Why Contact Info Additional Information    Camille Gottlieb MD Family Medicine Call in 1 day  [address] [facility] [address] [phone]       [facility] Call in 1 day  [address] [facility] [address] [facility] [address], [address] [facility], [address]  [address] [place], [place], [facility]            Patient's Medications   Discharge Prescriptions    No medications on file       No discharge procedures on file.     PDMP Review         Value Time User    PDMP Reviewed  Yes 8/10/2023  2:19 AM Beverly Hill MD            ED Provider  Electronically Signed by             Beverly Hill MD  10/23/23 1270

## 2023-10-23 NOTE — PHYSICAL THERAPY NOTE
PHYSICAL THERAPY Evaluation and Treatment  DATE: 10/23/23  TIME: 6451-2782 and [id number]    NAME:  Marcelino Ramírez  AGE:   39 y.o. Mrn:   [id number]  Length Of Stay: 0    ADMIT DX:  Hypokalemia [E87.6]  Elevated blood pressure reading [R03.0]  Left arm numbness [R20.0]  Numbness and tingling in left arm [R20.0, R20.2]    Past Medical History:   Diagnosis Date    Fetal growth problem suspected but not found     RESOLVED: 3/1/17    First trimester bleeding     RESOLVED:3/1/17    Iron deficiency anemia due to chronic blood loss 2018    Miscarriage     Missed      RESOLVED:3/1/17    Poor fetal growth affecting management of mother     RESOLVED:3/1/17    Recurrent pregnancy loss, antepartum condition or complication     BFYBTOVU:    Spontaneous      RESOLVED:3/1/17    Supraventricular tachycardia     by history     Past Surgical History:   Procedure Laterality Date    COLONOSCOPY      DILATION AND CURETTAGE OF UTERUS      ,     DILATION AND CURETTAGE OF UTERUS      FOR SPONTANEOUS ; X5 9831-1056    HYSTERECTOMY  2018    Dr Tyler Mahan    INDUCED   2010    LAPAROSCOPIC TUBAL LIGATION Right 2016    Procedure: LAPAROSCOPIC TUBAL LIGATION ;  Surgeon: Zen Peters MD;  [facility]: [facility];  Service:     KS LAPS W/VAG HYSTERECT 250 GM/&RMVL TUBE&/OVARIES N/A 2018    Procedure: HYSTERECTOMY LAPAROSCOPIC ASSISTED VAGINAL (LAVH); RIGHT LAPAROSCOPIC SALPINGECTOMY;  Surgeon: Zen Peters MD;  Location: [facility];  Service: Gynecology    [facility] N/A 2016    Procedure: DILATATION AND EVACUATION (D&E) (MISSED AB);   Surgeon: Zen Peters MD;  Location[facility];  Service: Gynecology    SALPINGOOPHORECTOMY Left     TUBAL LIGATION Right         10/23/23 0813   PT Last Visit   PT Visit Date 10/23/23   Note Type   Note type Evaluation   Pain Assessment   Pain Assessment Tool 0-10   Pain Score 6   Pain Location/Orientation Orientation: Left; Location: Shoulder; Location: Neck   Pain Onset/Description   (present with movement/activity, reports it has progressively worsen in the last few days.)   Effect of Pain on Daily Activities any shoulder movement or cervical motion   Patient's Stated Pain Goal No pain   Hospital Pain Intervention(s) Ambulation/increased activity   Restrictions/Precautions   Weight Bearing Precautions Per Order No   Home Living   Additional Comments Pt lives with mother and son (13 y/o) in 6th floor apartment building, elevator to enter.  bed/bathroom: flat bed, standard toilet, shower/tub. Prior Function   Comments Prior to admission: independent wiith ADL, IADLs, drive (+). Work: [employer]. General   Additional Pertinent History 40 y/o presents due to LUE numbness ongoing 1 day and HTN. Additionally, reports neck pain started 3 days prior. Upon assessment: hypokalemia. Family/Caregiver Present No   Cognition   Overall Cognitive Status WFL   Arousal/Participation Alert   Orientation Level Oriented X4   Subjective   Subjective Pt pleasant and agreeable to evaluation. Pt reports her LUE numbness persist but symptoms have improved. Pt reports numbness/tingling left forearm and hand with worsening symptoms distally. Symptoms of the elbow have improved since admission. RUE Assessment   RUE Assessment WFL   LUE Assessment   LUE Assessment   (shoulder ROM in end range limited due to pain. strength limited due to pain)   RLE Assessment   RLE Assessment WFL   LLE Assessment   LLE Assessment WFL   Cervical Assessment:  Limited ROM with pain in flexion, left/right lateral bend (left more limited than right), left rotation   Observation: left shoulder elevation, increased muscle tone/tightness of left upper trapezius.   Tenderness to palpate (left upper trapezius, left lower cervical paraspinals, left levator scapula   Coordination   Movements are Fluid and Coordinated 1   Sensation   (Diminished light touch sensation distal to left forearm, with symptoms worse distally to palm of hand)   Bed Mobility   Rolling R 7  Independent   Rolling L 7  Independent   Supine to Sit 7  Independent   Sit to Supine 7  Independent   Transfers   Sit to Stand 7  Independent   Ambulation/Elevation   Gait Assistance 7  Independent   Balance   Static Sitting Normal   Dynamic Sitting Normal   Static Standing Normal   Dynamic Standing Normal   Ambulatory Normal   Activity Tolerance   Activity Tolerance Patient limited by pain  (Unable to adequately test neural tension of LUE due to pain)   Assessment   Prognosis Good   Problem List Pain; Impaired sensation;Decreased range of motion;Decreased strength   Assessment Orders for PT eval and treat received. Pt's PMHx: migraine HA, left eye visual deficits. Pt exhibits physical deficits noted in problem list above. Deficits listed contribute to functional limitations that are significant from the patient PLOF and include: functional use of LUE due to cervical/shoulder pain and sensation deficits    During today's session, assessment of patient's mobility, ROM/strength of UE/cervical region, activities that provoke concordant sign. Issued HEP. The AM-PAC & Barthel Index outcome tools were used to assist in determining pt safety w/ mobility/self care & appropriate d/c recommendations, see above for scores. Patient's clinical presentation is evolving due to uncontrolled pain and ongoing medical management needs, attempt to r/o CVA. Considering the patient's PLOF, co-morbidities, acute functional limitations, functional outcome measures, and/or goal to progress functional independence; this patient would benefit from skilled Physical Therapy intervention. Considerations for next session:Progress HEP as able. Assess neural tension as able. Educate and instruct on pain management strategies.    Barriers to Discharge None   Goals   Patient Goals no numbness or pain   STG Expiration Date 11/02/23 Short Term Goal #1 1: Pt will achieve full cervical and left shoulder AROM. 2: Pt will report <3/10 left shoulder/cervical pain level intensity with functional activities. 3: Pt will properly perform HEP, mod I.   Plan   Treatment/Interventions Therapeutic exercise;Patient/family training  (Cervical/UE ROM and strengthening)   PT Frequency 1-2x/wk   Discharge Recommendation   PT Discharge Recommendation Home with outpatient rehabilitation   AM-PAC Basic Mobility Inpatient   Turning in Flat Bed Without Bedrails 4   Lying on Back to Sitting on Edge of Flat Bed Without Bedrails 4   Moving Bed to Chair 4   Standing Up From Chair Using Arms 4   Walk in Room 4   Climb 3-5 Stairs With Railing 4   Basic Mobility Inpatient Raw Score 24   Basic Mobility Standardized Score 57.68   Highest Level Of Mobility   JH-HLM Goal 8: Walk 250 feet or more   JH-HLM Achieved 6: Walk 10 steps or more   Barthel Index   Feeding 10   Bathing 5   Grooming Score 5   Dressing Score 10   Bladder Score 10   Bowels Score 10   Toilet Use Score 10   Transfers (Bed/Chair) Score 15   Mobility (Level Surface) Score 15   Stairs Score 10   Barthel Index Score 100   Additional Treatment Session   Start Time 0902   End Time 0910   Treatment Assessment Educated and instructed pt on HEP for cervical stretching, postural training/strengthening. Pt provided with writtent HEP and provided visual demonstration of each exercises. Discussed pain management techniques and modifications to avoid aggrevation of symptoms. Pt verbalizes understanding. Equipment Use written HEP   End of Consult   Patient Position at End of Consult Supine; All needs within reach   The patient's AM-[facility] Basic Mobility Inpatient Short Form Raw Score is 24. A Raw score of greater than or equal to 16 suggests the patient may benefit from discharge to home. Please also refer to the recommendation of the Physical Therapist for safe discharge planning.     Luis Shin, PT, DPT  [place] Licensure #[id number]

## 2023-10-24 PROCEDURE — 99232 SBSQ HOSP IP/OBS MODERATE 35: CPT | Performed by: PHYSICIAN ASSISTANT

## 2023-10-24 RX ORDER — METHYLPREDNISOLONE 4 MG/1
16 TABLET ORAL DAILY
Status: DISCONTINUED | OUTPATIENT
Start: 2023-10-28 | End: 2023-10-25 | Stop reason: HOSPADM

## 2023-10-24 RX ORDER — CYCLOBENZAPRINE HCL 10 MG
10 TABLET ORAL 3 TIMES DAILY PRN
Status: DISCONTINUED | OUTPATIENT
Start: 2023-10-24 | End: 2023-10-25 | Stop reason: HOSPADM

## 2023-10-24 RX ORDER — METHYLPREDNISOLONE 4 MG/1
16 TABLET ORAL DAILY
Status: DISCONTINUED | OUTPATIENT
Start: 2023-10-27 | End: 2023-10-24

## 2023-10-24 RX ORDER — METHYLPREDNISOLONE 4 MG/1
8 TABLET ORAL DAILY
Status: DISCONTINUED | OUTPATIENT
Start: 2023-10-29 | End: 2023-10-24

## 2023-10-24 RX ORDER — METHYLPREDNISOLONE 4 MG/1
4 TABLET ORAL DAILY
Status: DISCONTINUED | OUTPATIENT
Start: 2023-10-30 | End: 2023-10-24

## 2023-10-24 RX ORDER — METHYLPREDNISOLONE 4 MG/1
8 TABLET ORAL DAILY
Status: DISCONTINUED | OUTPATIENT
Start: 2023-10-30 | End: 2023-10-25 | Stop reason: HOSPADM

## 2023-10-24 RX ORDER — METHYLPREDNISOLONE 4 MG/1
4 TABLET ORAL DAILY
Status: DISCONTINUED | OUTPATIENT
Start: 2023-10-31 | End: 2023-10-25 | Stop reason: HOSPADM

## 2023-10-24 RX ORDER — DEXAMETHASONE SODIUM PHOSPHATE 10 MG/ML
8 INJECTION, SOLUTION INTRAMUSCULAR; INTRAVENOUS ONCE
Status: COMPLETED | OUTPATIENT
Start: 2023-10-24 | End: 2023-10-24

## 2023-10-24 RX ORDER — METHYLPREDNISOLONE 4 MG/1
12 TABLET ORAL DAILY
Status: DISCONTINUED | OUTPATIENT
Start: 2023-10-29 | End: 2023-10-25 | Stop reason: HOSPADM

## 2023-10-24 RX ORDER — KETOROLAC TROMETHAMINE 30 MG/ML
30 INJECTION, SOLUTION INTRAMUSCULAR; INTRAVENOUS ONCE
Status: COMPLETED | OUTPATIENT
Start: 2023-10-24 | End: 2023-10-24

## 2023-10-24 RX ORDER — METHYLPREDNISOLONE 4 MG/1
12 TABLET ORAL DAILY
Status: DISCONTINUED | OUTPATIENT
Start: 2023-10-28 | End: 2023-10-24

## 2023-10-24 RX ORDER — DEXAMETHASONE SODIUM PHOSPHATE 10 MG/ML
6 INJECTION, SOLUTION INTRAMUSCULAR; INTRAVENOUS ONCE
Status: COMPLETED | OUTPATIENT
Start: 2023-10-25 | End: 2023-10-25

## 2023-10-24 RX ADMIN — ATORVASTATIN CALCIUM 40 MG: 40 TABLET, FILM COATED ORAL at 17:13

## 2023-10-24 RX ADMIN — KETOROLAC TROMETHAMINE 30 MG: 30 INJECTION, SOLUTION INTRAMUSCULAR; INTRAVENOUS at 21:55

## 2023-10-24 RX ADMIN — ENOXAPARIN SODIUM 40 MG: 40 INJECTION SUBCUTANEOUS at 11:03

## 2023-10-24 RX ADMIN — DEXAMETHASONE SODIUM PHOSPHATE 8 MG: 10 INJECTION, SOLUTION INTRAMUSCULAR; INTRAVENOUS at 11:03

## 2023-10-24 NOTE — PROGRESS NOTES
[id number] [facility]  Progress Note  Name: Alix Pierson  MRN: [id number]  Unit/Bed#: -01 I Date of Admission: 10/22/2023   Date of Service: 10/24/2023 I Hospital Day: 0    Assessment/Plan   * Numbness and tingling in left arm  Assessment & Plan  Patient presents with persistent left upper extremity numbness from the just above the elbow to the fingers. No focal weakness  However she also endorses some neck pain that started about 3 days ago  Her blood pressure was also noted to be elevated. MRI brain with no acute intracranial abnormality, nonspecific dilated perivascular spaces  MRI C-spine showing edema and T1 hypointensity at the inferior endplate of C5 vertebral body without significant surrounding hematoma or soft tissue, may represent degenerative endplate bone marrow changes versus compression fracture, left foraminal disc protrusion C4-C5 resulting in moderate neuroforaminal narrowing, mild neuroforaminal narrowing at C3-C4 and C5-C6  CT flexion-extension x-rays reviewed by neurosurgery, no acute fractures  Continues to have significant neck pain and muscle cramping, will trial steroids and muscle relaxers today  Dc ASA and plavix given negative MRI    Elevated blood pressure reading  Assessment & Plan  Patient reports no known history of hypertension  We will obtain a TSH level given her history of gestational hyperthyroidism  Monitor blood pressure closely- stable today, recommend PCP follow up at discharge     Neck pain  Assessment & Plan  No history of trauma  To rule out cervical radiculopathy  Mri C spine reviewed   Neurosurgery consulted- no acute abnormalities on C spine XR, no indication for collar  Trial steroids and muscle relaxant today   Analgesia as needed for pain             VTE Pharmacologic Prophylaxis:   Moderate Risk (Score 3-4) - Pharmacological DVT Prophylaxis Ordered: enoxaparin (Lovenox).     Patient Centered Rounds: I performed bedside rounds with nursing staff today. Discussions with Specialists or Other Care Team Provider: neurosurgery    Total Time Spent on Date of Encounter in care of patient: This time was spent on one or more of the following: performing physical exam; counseling and coordination of care; obtaining or reviewing history; documenting in the medical record; reviewing/ordering tests, medications or procedures; communicating with other healthcare professionals and discussing with patient's family/caregivers. Current Length of Stay: 0 day(s)  Current Patient Status: Observation   Certification Statement: The patient will continue to require additional inpatient hospital stay due to arm numbness with severe neck pain requiring IV steroids and monitoring with pain control   Discharge Plan: Anticipate discharge later today or tomorrow to home. Code Status: Level 1 - Full Code    Subjective:   Patient seen and examined at bedside. Notes she is still having severe neck pain today. Denies any improvement since admission. Does report the numbness in her hand has resolved completely. Objective:     Vitals:   Temp (24hrs), Av.1 °F (36.7 °C), Min:97.5 °F (36.4 °C), Max:98.5 °F (36.9 °C)    Temp:  [97.5 °F (36.4 °C)-98.5 °F (36.9 °C)] 98 °F (36.7 °C)  HR:  [63-78] 78  Resp:  [16-18] 16  BP: (124-156)/(80-95) 156/94  SpO2:  [98 %-100 %] 99 %  Body mass index is 34.19 kg/m². Input and Output Summary (last 24 hours):   No intake or output data in the 24 hours ending 10/24/23 1017    Physical Exam:   Physical Exam  Vitals reviewed. Constitutional:       General: She is not in acute distress. HENT:      Head: Normocephalic and atraumatic. Eyes:      General: No scleral icterus. Conjunctiva/sclera: Conjunctivae normal.   Cardiovascular:      Rate and Rhythm: Normal rate and regular rhythm. Heart sounds: No murmur heard. Pulmonary:      Effort: Pulmonary effort is normal. No respiratory distress.       Breath sounds: Normal breath sounds. Abdominal:      General: Bowel sounds are normal. There is no distension. Palpations: Abdomen is soft. Tenderness: There is no abdominal tenderness. Musculoskeletal:         General: Tenderness (left trapezius) present. Cervical back: Neck supple. Right lower leg: No edema. Left lower leg: No edema. Skin:     General: Skin is warm and dry. Neurological:      Mental Status: She is alert and oriented to person, place, and time.    Psychiatric:         Mood and Affect: Mood normal.         Behavior: Behavior normal.          Additional Data:     Labs:  Results from last 7 days   Lab Units 10/23/23  0309 10/22/23  2340   WBC Thousand/uL  --  4.99   HEMOGLOBIN g/dL  --  14.6   HEMATOCRIT %  --  43.2   PLATELETS Thousands/uL 221 241   NEUTROS PCT %  --  39*   LYMPHS PCT %  --  55*   MONOS PCT %  --  4   EOS PCT %  --  2     Results from last 7 days   Lab Units 10/22/23  2340   SODIUM mmol/L 137   POTASSIUM mmol/L 3.3*   CHLORIDE mmol/L 102   CO2 mmol/L 26   BUN mg/dL 14   CREATININE mg/dL 0.94   ANION GAP mmol/L 9   CALCIUM mg/dL 9.5   ALBUMIN g/dL 4.4   TOTAL BILIRUBIN mg/dL 0.43   ALK PHOS U/L 49   ALT U/L 9   AST U/L 12*   GLUCOSE RANDOM mg/dL 102     Results from last 7 days   Lab Units 10/22/23  2340   INR  0.96         Results from last 7 days   Lab Units 10/23/23  0422   HEMOGLOBIN A1C % 5.2           Lines/Drains:  Invasive Devices       Peripheral Intravenous Line  Duration             Peripheral IV 10/22/23 Left Antecubital 1 day                          Imaging: Reviewed radiology reports from this admission including: MRI brain and MRI spine    Recent Cultures (last 7 days):         Last 24 Hours Medication List:   Current Facility-Administered Medications   Medication Dose Route Frequency Provider Last Rate    acetaminophen  650 mg Oral Q6H PRN Nikky Fontenot MD      aluminum-magnesium hydroxide-simethicone  30 mL Oral Q6H PRN Nikky Fontenot MD atorvastatin  40 mg Oral QPM Stoney Gallardo MD      cyclobenzaprine  10 mg Oral TID PRN Demi Franco PA-C      dexamethasone  8 mg Intravenous Once Demi Franco PA-C      enoxaparin  40 mg Subcutaneous Daily Stoney Gallardo MD      Gadobutrol  9 mL Intravenous Once in imaging Stoney Gallardo MD      [START ON 10/25/2023] methylPREDNISolone  24 mg Oral Daily Demi Franco PA-C      Followed by    Pj Maki ON 10/26/2023] methylPREDNISolone  20 mg Oral Daily Demi Franco PA-C      Followed by    Pj Maki ON 10/27/2023] methylPREDNISolone  16 mg Oral Daily Demi Franco PA-C      Followed by    Pj Maki ON 10/28/2023] methylPREDNISolone  12 mg Oral Daily Demi Franco PA-C      Followed by    Pj Maki ON 10/29/2023] methylPREDNISolone  8 mg Oral Daily Demi Franco PA-C      Followed by    Pj Maki ON 10/30/2023] methylPREDNISolone  4 mg Oral Daily Demi Franco PA-C      ondansetron  4 mg Intravenous Q6H PRN Stoney Gallardo MD          Today, Patient Was Seen By: Raúl Cornell PA-C    **Please Note: This note may have been constructed using a voice recognition system. **

## 2023-10-24 NOTE — ASSESSMENT & PLAN NOTE
No history of trauma  To rule out cervical radiculopathy  Mri C spine reviewed   Neurosurgery consulted- no acute abnormalities on C spine XR, no indication for collar  Trial steroids and muscle relaxant today   Analgesia as needed for pain

## 2023-10-24 NOTE — PLAN OF CARE
Problem: NEUROSENSORY - ADULT  Goal: Achieves stable or improved neurological status  Description: INTERVENTIONS  - Monitor and report changes in neurological status  - Monitor vital signs such as temperature, blood pressure, glucose, and any other labs ordered   - Initiate measures to prevent increased intracranial pressure  - Monitor for seizure activity and implement precautions if appropriate      Outcome: Progressing     Problem: CARDIOVASCULAR - ADULT  Goal: Absence of cardiac dysrhythmias or at baseline rhythm  Description: INTERVENTIONS:  - Continuous cardiac monitoring, vital signs, obtain 12 lead EKG if ordered  - Administer antiarrhythmic and heart rate control medications as ordered  - Monitor electrolytes and administer replacement therapy as ordered  Outcome: Progressing     Problem: Neurological Deficit  Goal: Neurological status is stable or improving  Description: Interventions:  - Monitor and assess patient's level of consciousness, motor function, sensory function, and level of assistance needed for ADLs. - Monitor and report changes from baseline. Collaborate with interdisciplinary team to initiate plan and implement interventions as ordered. - Provide and maintain a safe environment. - Consider seizure precautions. - Consider fall precautions. - Consider aspiration precautions. - Consider bleeding precautions.   Outcome: Progressing

## 2023-10-24 NOTE — QUICK NOTE
Patient had MRI C-spine which reported edema and T1 hypointensity of inferior endplate of C5 vertebral body. Degenerative endplate bone marrow changes versus inferior endplate compression fractures. 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 MRI brain without any CVA.     Neurosurgery was consulted recommended closely monitor neuro exam  No further acute intervention needed for now  X-rays ordered for neck  Symptom control with gabapentin and outpatient neurology follow-up  Neurology following  Cervical collar for comfort purposes only

## 2023-10-24 NOTE — ASSESSMENT & PLAN NOTE
Patient presents with persistent left upper extremity numbness from the just above the elbow to the fingers. No focal weakness  However she also endorses some neck pain that started about 3 days ago  Her blood pressure was also noted to be elevated.   MRI brain with no acute intracranial abnormality, nonspecific dilated perivascular spaces  MRI C-spine showing edema and T1 hypointensity at the inferior endplate of C5 vertebral body without significant surrounding hematoma or soft tissue, may represent degenerative endplate bone marrow changes versus compression fracture, left foraminal disc protrusion C4-C5 resulting in moderate neuroforaminal narrowing, mild neuroforaminal narrowing at C3-C4 and C5-C6  CT flexion-extension x-rays reviewed by neurosurgery, no acute fractures  Continues to have significant neck pain and muscle cramping, will trial steroids and muscle relaxers today  Dc ASA and plavix given negative MRI

## 2023-10-24 NOTE — TREATMENT PLAN
Upright cervical x-rays with flexion and extension views obtained for concern of a possible C5 inferior endplate compression fracture. Imaging:   10/23 upright cervical flex/ex xrays: pending final radiology read   Per my review, no obvious fracture noted. Overall alignment is well-maintained. No noted dynamic instability appreciated    Plan:   Continue to closely monitor neuro exam  Frequent neurochecks per primary team  Maintain normotensive BP goals, MAP > 65   No acute neurosurgical intervention indicated at this time  Case and imaging reviewed this a.m. on rounds with Dr. Maritza Perry  Upright x-rays reveal no concern for fracture or any obvious dynamic instability  As previously mentioned in initial consult note, patient's symptoms of left forearm and hand numbness seem to be secondary to a peripheral neuropathy as MRI of C-spine does not correlate with/explain her symptoms  Recommend conservative management at this time  Would recommend elbow and wrist splints to be worn at night  Can consider outpatient EMGs  Continue multimodal pain regimen  Patient may wear soft collar as needed for comfort  No indication for strict cervical bracing as no concern for acute fracture  Continue medical management per primary team  Pain control per primary team  PT/OT    Neurosurgery will sign off at this time. Patient may follow-up in the office on an as-needed basis. Our contact information was provided in the AVS and patient is encouraged to call with any further questions or concerns or should she experience any worsening symptoms.

## 2023-10-24 NOTE — ASSESSMENT & PLAN NOTE
Patient reports no known history of hypertension  We will obtain a TSH level given her history of gestational hyperthyroidism  Monitor blood pressure closely- stable today, recommend PCP follow up at discharge

## 2023-10-25 VITALS
DIASTOLIC BLOOD PRESSURE: 92 MMHG | TEMPERATURE: 98 F | HEIGHT: 63 IN | WEIGHT: 193 LBS | OXYGEN SATURATION: 98 % | SYSTOLIC BLOOD PRESSURE: 146 MMHG | HEART RATE: 95 BPM | RESPIRATION RATE: 16 BRPM | BODY MASS INDEX: 34.2 KG/M2

## 2023-10-25 PROCEDURE — 99239 HOSP IP/OBS DSCHRG MGMT >30: CPT | Performed by: PHYSICIAN ASSISTANT

## 2023-10-25 RX ORDER — KETOROLAC TROMETHAMINE 10 MG/1
10 TABLET, FILM COATED ORAL EVERY 6 HOURS PRN
Qty: 15 TABLET | Refills: 0 | Status: SHIPPED | OUTPATIENT
Start: 2023-10-25

## 2023-10-25 RX ORDER — METHYLPREDNISOLONE 4 MG/1
TABLET ORAL
Qty: 21 EACH | Refills: 0 | Status: SHIPPED | OUTPATIENT
Start: 2023-10-25

## 2023-10-25 RX ORDER — CYCLOBENZAPRINE HCL 10 MG
10 TABLET ORAL 3 TIMES DAILY PRN
Qty: 30 TABLET | Refills: 0 | Status: SHIPPED | OUTPATIENT
Start: 2023-10-25 | End: 2023-11-03

## 2023-10-25 RX ADMIN — DEXAMETHASONE SODIUM PHOSPHATE 6 MG: 10 INJECTION, SOLUTION INTRAMUSCULAR; INTRAVENOUS at 06:51

## 2023-10-25 NOTE — ASSESSMENT & PLAN NOTE
Patient presents with persistent left upper extremity numbness from the just above the elbow to the fingers. No focal weakness  However she also endorses some neck pain that started about 3 days ago  Her blood pressure was also noted to be elevated.   MRI brain with no acute intracranial abnormality, nonspecific dilated perivascular spaces  MRI C-spine showing edema and T1 hypointensity at the inferior endplate of C5 vertebral body without significant surrounding hematoma or soft tissue, may represent degenerative endplate bone marrow changes versus compression fracture, left foraminal disc protrusion C4-C5 resulting in moderate neuroforaminal narrowing, mild neuroforaminal narrowing at C3-C4 and C5-C6  CT flexion-extension x-rays reviewed by neurosurgery, no acute fractures  Symptoms improved with steroids- medrol dose pack sent to pharmacy   Dc ASA and plavix given negative MRI

## 2023-10-25 NOTE — ASSESSMENT & PLAN NOTE
No history of trauma  To rule out cervical radiculopathy  Mri C spine reviewed   Neurosurgery consulted- no acute abnormalities on C spine XR, no indication for collar  Given 2 doses of steroids while inpatient with improvement- continue medrol dose arnold  Analgesia as needed for pain

## 2023-10-25 NOTE — INCIDENTAL FINDINGS
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 Ramos Co

## 2023-10-25 NOTE — DISCHARGE SUMMARY
[address] [facility]  Discharge- [facility] 1980, 39 y.o. female MRN: [id number]  Unit/Bed#: -01 Encounter: [id number]  Primary Care Provider: Rossana Schmidt MD   Date and time admitted to hospital: 10/22/2023 11:30 PM    * Numbness and tingling in left arm  Assessment & Plan  Patient presents with persistent left upper extremity numbness from the just above the elbow to the fingers. No focal weakness  However she also endorses some neck pain that started about 3 days ago  Her blood pressure was also noted to be elevated.   MRI brain with no acute intracranial abnormality, nonspecific dilated perivascular spaces  MRI C-spine showing edema and T1 hypointensity at the inferior endplate of C5 vertebral body without significant surrounding hematoma or soft tissue, may represent degenerative endplate bone marrow changes versus compression fracture, left foraminal disc protrusion C4-C5 resulting in moderate neuroforaminal narrowing, mild neuroforaminal narrowing at C3-C4 and C5-C6  CT flexion-extension x-rays reviewed by neurosurgery, no acute fractures  Symptoms improved with steroids- medrol dose pack sent to pharmacy   Dc ASA and plavix given negative MRI    Elevated blood pressure reading  Assessment & Plan  Patient reports no known history of hypertension  We will obtain a TSH level given her history of gestational hyperthyroidism  PCP follow up at discharge for BP check     Neck pain  Assessment & Plan  No history of trauma  To rule out cervical radiculopathy  Mri C spine reviewed   Neurosurgery consulted- no acute abnormalities on C spine XR, no indication for collar  Given 2 doses of steroids while inpatient with improvement- continue medrol dose arnold  Analgesia as needed for pain      Medical Problems       Resolved Problems  Date Reviewed: 10/25/2023   None       Discharging Physician / Practitioner: Naomie Mahan PA-C  PCP: Rossana Schmidt MD  Admission Date:   Admission Orders (From admission, onward)       Ordered        10/23/23 0203  Place in Observation  Once                          Discharge Date: 10/25/23    Consultations During Hospital Stay:  Neurology, neurosurgery    Procedures Performed:   CT chest wo contrast    Result Date: 10/23/2023  Impression: 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 XR chest 1 view portable    Result Date: 10/23/2023  Impression: No acute cardiopulmonary disease. Workstation performed: [id number]     MRI cervical spine w wo contrast    Result Date: 10/23/2023  Impression: -Edema and T1 hypointensity at the inferior endplate of C5 vertebral body without significant surrounding hematoma or soft tissue. Findings may represent degenerative endplate bone marrow changes versus inferior endplate compression fracture. Correlate with point tenderness and history. Left foraminal disc protrusion at C4-C5 resulting in moderate neuroforaminal narrowing. Additional mild left neuroforaminal narrowing at C3-C4 and C5-C6. Spondylosis at C5-C6 and C6-C7 resulting in indentation of the ventral thecal sac and abutment of the ventral cord at C6-C7 without significant spinal canal narrowing. Increased size of incompletely visualized and suboptimally evaluated 3.7 cm T2 hyperintense lesion within the mediastinum, previously characterized on CT chest from 3/14/2019. Recommend correlation with CT chest given interval increase in size. MRI brain w wo contrast    Result Date: 10/23/2023  Impression: No acute intracranial abnormality. Nonspecific dilated perivascular spaces are again noted, unchanged compared to 7/6/2013, however somewhat disproportionate for patient's age.  Mild mucosal thickening of the ethmoid air cells and right maxillary sinus     CT head without contrast    Result Date: 10/23/2023  Impression: No acute intracranial abnormality. Significant Findings / Test Results:   See above    Incidental Findings:   Bronchogenic cyst      Test Results Pending at Discharge (will require follow up):   none     Outpatient Tests Requested:  PCP follow-up    Complications: None    Reason for Admission: Arm numbness    Hospital Course:   Cheri Gilbert is a 39 y.o. female patient who originally presented to the hospital on 10/22/2023 due to arm numbness. She initially presented to hospital with complaint of neck pain with associated arm numbness. She was started on the stroke pathway and seen in consult by neurology. She underwent MRI of her C-spine as well as her brain. MRI brain was negative for acute CVA. MRI C-spine did show hypodensity of C5 vertebral body. Neurosurgery was consulted and x-rays were obtained. She continued to have significant neck pain and was started on steroids with significant improvement. Her arm numbness completely resolved during hospital stay. She was found to have a bronchogenic cyst which appeared relatively unchanged from prior scans. She was discharged with outpatient PCP follow-up. Please see above list of diagnoses and related plan for additional information. Condition at Discharge: stable    Discharge Day Visit / Exam:   Subjective: Patient notes he is feeling much better today. Still having some tenderness in her neck but significantly improved since admission. Continues to have complete sensation of her left hand. Feels comfortable returning home today. Vitals: Blood Pressure: 142/90 (10/24/23 2254)  Pulse: 79 (10/24/23 2231)  Temperature: 97.9 °F (36.6 °C) (10/24/23 2231)  Temp Source: Oral (10/24/23 2231)  Respirations: 18 (10/24/23 2231)  Height: 5' 3" (160 cm) (10/23/23 1153)  Weight - Scale: 87.5 kg (193 lb) (10/23/23 1153)  SpO2: 97 % (10/24/23 2231)  Exam:   Physical Exam  Vitals reviewed. Constitutional:       General: She is not in acute distress.   HENT:      Head: Normocephalic and atraumatic. Eyes:      General: No scleral icterus. Conjunctiva/sclera: Conjunctivae normal.   Cardiovascular:      Rate and Rhythm: Normal rate and regular rhythm. Heart sounds: No murmur heard. Pulmonary:      Effort: Pulmonary effort is normal. No respiratory distress. Breath sounds: Normal breath sounds. Abdominal:      General: Bowel sounds are normal. There is no distension. Palpations: Abdomen is soft. Tenderness: There is no abdominal tenderness. Musculoskeletal:      Cervical back: Neck supple. Right lower leg: No edema. Left lower leg: No edema. Skin:     General: Skin is warm and dry. Neurological:      Mental Status: She is alert and oriented to person, place, and time. Psychiatric:         Mood and Affect: Mood normal.         Behavior: Behavior normal.            Discharge instructions/Information to patient and family:   See after visit summary for information provided to patient and family. Provisions for Follow-Up Care:  See after visit summary for information related to follow-up care and any pertinent home health orders. Disposition:   Home    Planned Readmission: None     Discharge Statement:  I spent 35 minutes discharging the patient. This time was spent on the day of discharge. I had direct contact with the patient on the day of discharge. Greater than 50% of the total time was spent examining patient, answering all patient questions, arranging and discussing plan of care with patient as well as directly providing post-discharge instructions. Additional time then spent on discharge activities. Discharge Medications:  See after visit summary for reconciled discharge medications provided to patient and/or family.       **Please Note: This note may have been constructed using a voice recognition system**

## 2023-10-25 NOTE — ASSESSMENT & PLAN NOTE
Patient reports no known history of hypertension  We will obtain a TSH level given her history of gestational hyperthyroidism  PCP follow up at discharge for BP check

## 2023-10-26 ENCOUNTER — TRANSITIONAL CARE MANAGEMENT (OUTPATIENT)
Dept: FAMILY MEDICINE CLINIC | Facility: CLINIC | Age: 43
End: 2023-10-26

## 2023-11-03 ENCOUNTER — OFFICE VISIT (OUTPATIENT)
Dept: FAMILY MEDICINE CLINIC | Facility: CLINIC | Age: 43
End: 2023-11-03
Payer: COMMERCIAL

## 2023-11-03 VITALS
DIASTOLIC BLOOD PRESSURE: 82 MMHG | OXYGEN SATURATION: 98 % | RESPIRATION RATE: 17 BRPM | HEIGHT: 63 IN | SYSTOLIC BLOOD PRESSURE: 128 MMHG | TEMPERATURE: 98.2 F | BODY MASS INDEX: 35.51 KG/M2 | HEART RATE: 82 BPM | WEIGHT: 200.4 LBS

## 2023-11-03 DIAGNOSIS — Z09 HOSPITAL DISCHARGE FOLLOW-UP: ICD-10-CM

## 2023-11-03 DIAGNOSIS — R20.2 NUMBNESS AND TINGLING IN LEFT ARM: ICD-10-CM

## 2023-11-03 DIAGNOSIS — M54.2 NECK PAIN: ICD-10-CM

## 2023-11-03 DIAGNOSIS — R20.0 NUMBNESS AND TINGLING IN LEFT ARM: ICD-10-CM

## 2023-11-03 DIAGNOSIS — Z00.00 ANNUAL PHYSICAL EXAM: Primary | ICD-10-CM

## 2023-11-03 DIAGNOSIS — R03.0 ELEVATED BLOOD PRESSURE READING: ICD-10-CM

## 2023-11-03 PROBLEM — N89.8 GRANULATION TISSUE OF VAGINAL CUFF: Status: RESOLVED | Noted: 2018-12-24 | Resolved: 2023-11-03

## 2023-11-03 PROBLEM — B37.31 VAGINAL YEAST INFECTION: Status: RESOLVED | Noted: 2023-09-12 | Resolved: 2023-11-03

## 2023-11-03 PROCEDURE — 99496 TRANSJ CARE MGMT HIGH F2F 7D: CPT | Performed by: FAMILY MEDICINE

## 2023-11-03 PROCEDURE — 99396 PREV VISIT EST AGE 40-64: CPT | Performed by: FAMILY MEDICINE

## 2023-11-03 RX ORDER — MELOXICAM 15 MG/1
15 TABLET ORAL DAILY PRN
COMMUNITY
Start: 2023-08-22

## 2023-11-03 RX ORDER — METHOCARBAMOL 500 MG/1
500 TABLET, FILM COATED ORAL 3 TIMES DAILY PRN
Qty: 30 TABLET | Refills: 0 | Status: SHIPPED | OUTPATIENT
Start: 2023-11-03

## 2023-11-03 NOTE — PROGRESS NOTES
Assessment & Plan     1. Annual physical exam    2. Elevated blood pressure reading  Assessment & Plan:  Improving   Continue to monitor      3. Numbness and tingling in left arm  Assessment & Plan:  Resolved after steroids   Most likely due to neck/nerve impingement   Continue to monitor       4. Hospital discharge follow-up    5. Neck pain  -     methocarbamol (ROBAXIN) 500 mg tablet; Take 1 tablet (500 mg total) by mouth 3 (three) times a day as needed for muscle spasms        Depression Screening and Follow-up Plan: Patient was screened for depression during today's encounter. They screened negative with a PHQ-2 score of 0. Subjective     Transitional Care Management Review:   Onel De Los Santos is a 39 y.o. female here for TCM follow up. During the TCM phone call patient stated:  TCM Call     Date and time call was made  10/26/2023  3:05 PM    Patient was hospitialized at  [address]    Date of Admission  10/22/23    Date of discharge  10/25/23    Diagnosis  numbness and tingling in left arm    Disposition  Home    Were the patients medications reviewed and updated  No      TCM Call     Should patient be enrolled in anticoag monitoring? No    Scheduled for follow up?   Yes    Did you obtain your prescribed medications  Yes    Do you need help managing your prescriptions or medications  No    Is transportation to your appointment needed  No    I have advised the patient to call PCP with any new or worsening symptoms  Caryle Nicolas, MR    Living Arrangements  Family members    Are you recieving any outpatient services  No    Are you recieving home care services  No    Are you using any community resources  No    Current waiver services  No    Have you fallen in the last 12 months  No    Interperter language line needed  No        HPI  Here for hospital follow up  Was admitted for left arm and hand numbness   MRI brain was normal   CT cervical spine showed No evidence of acute compression fracture, subluxation, or abnormal motion during flexion and extension   given steroids in the hospital and sent home with [facility] pack  Feels well overall today    Review of Systems   Constitutional: Negative. HENT: Negative. Respiratory: Negative. Cardiovascular: Negative. Gastrointestinal: Negative. Genitourinary: Negative. Musculoskeletal: Negative. Neurological: Negative. Hematological: Negative. Psychiatric/Behavioral: Negative. Objective     /82 (BP Location: Left arm, Patient Position: Sitting, Cuff Size: Standard)   Pulse 82   Temp 98.2 °F (36.8 °C) (Tympanic)   Resp 17   Ht 5' 3" (1.6 m)   Wt 90.9 kg (200 lb 6.4 oz)   LMP  (LMP Unknown)   SpO2 98%   BMI 35.50 kg/m²      Physical Exam  Constitutional:       Appearance: Normal appearance. HENT:      Nose: Nose normal.      Mouth/Throat:      Mouth: Mucous membranes are moist.   Eyes:      Extraocular Movements: Extraocular movements intact. Pupils: Pupils are equal, round, and reactive to light. Cardiovascular:      Rate and Rhythm: Normal rate and regular rhythm. Pulses: Normal pulses. Heart sounds: Normal heart sounds. Pulmonary:      Effort: Pulmonary effort is normal.      Breath sounds: Normal breath sounds. Musculoskeletal:         General: Normal range of motion. Cervical back: Normal range of motion and neck supple. Skin:     Capillary Refill: Capillary refill takes less than 2 seconds. Neurological:      General: No focal deficit present. Mental Status: She is alert and oriented to person, place, and time.    Psychiatric:         Mood and Affect: Mood normal.         Behavior: Behavior normal.       Medications have been reviewed by provider in current encounter    Sushma Diaz MD

## 2023-11-03 NOTE — PROGRESS NOTES
[facility]    NAME: Abdiaziz Whitfield  AGE: 39 y.o. SEX: female  : 1980     DATE: 11/3/2023     Assessment and Plan:     Problem List Items Addressed This Visit        Other    Numbness and tingling in left arm     Resolved after steroids   Most likely due to neck/nerve impingement   Continue to monitor          Neck pain    Relevant Medications    methocarbamol (ROBAXIN) 500 mg tablet    Elevated blood pressure reading     Improving   Continue to monitor        Other Visit Diagnoses     Annual physical exam    -  [facility] discharge follow-up              Immunizations and preventive care screenings were discussed with patient today. Appropriate education was printed on patient's after visit summary. Counseling:  Alcohol/drug use: discussed moderation in alcohol intake, the recommendations for healthy alcohol use, and avoidance of illicit drug use. Dental Health: discussed importance of regular tooth brushing, flossing, and dental visits. Injury prevention: discussed safety/seat belts, safety helmets, smoke detectors, carbon dioxide detectors, and smoking near bedding or upholstery. Sexual health: discussed sexually transmitted diseases, partner selection, use of condoms, avoidance of unintended pregnancy, and contraceptive alternatives. Exercise: the importance of regular exercise/physical activity was discussed. Recommend exercise 3-5 times per week for at least 30 minutes. Depression Screening and Follow-up Plan: Patient was screened for depression during today's encounter. They screened negative with a PHQ-2 score of 0. No follow-ups on file.      Chief Complaint:     Chief Complaint   Patient presents with   • Transition of Care Management     Patient being seen for TCM   • Physical Exam     Patient being seen for Physical Exam      History of Present Illness:     Adult Annual Physical   Patient here for a comprehensive physical exam. The patient reports no problems. Diet and Physical Activity  Diet/Nutrition: consuming 3-5 servings of fruits/vegetables daily. Exercise: no formal exercise. Depression Screening  PHQ-2/9 Depression Screening    Little interest or pleasure in doing things: 0 - not at all  Feeling down, depressed, or hopeless: 0 - not at all  PHQ-2 Score: 0  PHQ-2 Interpretation: Negative depression screen       General Health  Sleep: sleeps well. Hearing: normal - bilateral.  Vision: goes for regular eye exams. Dental: regular dental visits. /GYN Health  Patient is: postmenopausal  Last menstrual period: hysterectomy   Contraceptive method:  none . Advanced Care Planning  Do you have an advanced directive? no  Do you have a durable medical power of ? no     Review of Systems:     Review of Systems   Constitutional: Negative. HENT: Negative. Eyes: Negative. Respiratory: Negative. Cardiovascular: Negative. Gastrointestinal: Negative. Endocrine: Negative. Genitourinary: Negative. Musculoskeletal: Negative. Skin: Negative. Allergic/Immunologic: Negative. Neurological: Negative. Hematological: Negative. Psychiatric/Behavioral: Negative.         Past Medical History:     Past Medical History:   Diagnosis Date   • Fetal growth problem suspected but not found     RESOLVED: 3/1/17   • First trimester bleeding     RESOLVED:3/1/17   • Granulation tissue of vaginal cuff 2018   • Iron deficiency anemia due to chronic blood loss 2018   • Miscarriage    • Missed      RESOLVED:3/1/17   • Poor fetal growth affecting management of mother     RESOLVED:3/1/17   • Recurrent pregnancy loss, antepartum condition or complication     OHTDADEU:   • Spontaneous      RESOLVED:3/1/17   • Supraventricular tachycardia     by history      Past Surgical History:     Past Surgical History:   Procedure Laterality Date   • COLONOSCOPY     • DILATION AND CURETTAGE OF UTERUS      ,    • DILATION AND CURETTAGE OF UTERUS      FOR SPONTANEOUS ; X5 [id number]   • HYSTERECTOMY  2018    Dr Tyler Mahan   • INDUCED      • LAPAROSCOPIC TUBAL LIGATION Right 2016    Procedure: LAPAROSCOPIC TUBAL LIGATION ;  Surgeon: Zen Peters MD;  Location: [facility];  Service:    • CO LAPS W/VAG HYSTERECT 250 GM/&RMVL TUBE&/OVARIES N/A 2018    Procedure: HYSTERECTOMY LAPAROSCOPIC ASSISTED VAGINAL (LAVH); RIGHT LAPAROSCOPIC SALPINGECTOMY;  Surgeon: Zen Peters MD;  Location: [facility];  Service: Gynecology   • CO TX MISSED  FIRST TRIMESTER SURGICAL N/A 2016    Procedure: DILATATION AND EVACUATION (D&E) (MISSED AB); Surgeon: Zen Peters MD;  Location: [facility];  Service: Gynecology   • SALPINGOOPHORECTOMY Left    • TUBAL LIGATION Right       Social History:     Social History     Socioeconomic History   • Marital status: Single     Spouse name: None   • Number of children: None   • Years of education: None   • Highest education level: None   Occupational History   • None   Tobacco Use   • Smoking status: Never   • Smokeless tobacco: Never   Vaping Use   • Vaping Use: Never used   Substance and Sexual Activity   • Alcohol use: Never   • Drug use: Never   • Sexual activity: Yes     Partners: Male   Other Topics Concern   • None   Social History Narrative    FEELS SAFE AT HOME        Checked Rosette     Social Determinants of Health     Financial Resource Strain: Not on file   Food Insecurity: No Food Insecurity (10/23/2023)    Hunger Vital Sign    • Worried About Running Out of Food in the Last Year: Never true    • Ran Out of Food in the Last Year: Never true   Transportation Needs: No Transportation Needs (10/23/2023)    PRAPARE - Transportation    • Lack of Transportation (Medical): No    • Lack of Transportation (Non-Medical):  No   Physical Activity: Not on file   Stress: Not on file   Social Connections: Not on file   Intimate Partner Violence: Not on file   Housing Stability: Unknown (10/23/2023)    Housing Stability Vital Sign    • Unable to Pay for Housing in the Last Year: No    • Number of Places Lived in the Last Year: Not on file    • Unstable Housing in the Last Year: No      Family History:     Family History   Problem Relation Age of Onset   • Arthritis Mother    • Depression Mother    • Hypertension Mother    • Cancer Father    • Breast cancer Neg Hx    • Ovarian cancer Neg Hx    • Colon cancer Neg Hx       Current Medications:     Current Outpatient Medications   Medication Sig Dispense Refill   • meloxicam (MOBIC) 15 mg tablet Take 15 mg by mouth daily as needed     • methocarbamol (ROBAXIN) 500 mg tablet Take 1 tablet (500 mg total) by mouth 3 (three) times a day as needed for muscle spasms 30 tablet 0   • ketorolac (TORADOL) 10 mg tablet Take 1 tablet (10 mg total) by mouth every 6 (six) hours as needed for severe pain 15 tablet 0   • methylPREDNISolone 4 MG tablet therapy pack Use as directed on package 21 each 0   • rizatriptan (MAXALT) 10 MG tablet TAKE 1 TABLET (10 MG TOTAL) BY MOUTH ONCE AS NEEDED FOR MIGRAINE FOR UP TO 1 DOSE MAY REPEAT IN 2 HOURS IF NEEDED 9 tablet 0   • triamcinolone (KENALOG) 0.1 % ointment Apply topically 2 (two) times a day Do not apply on face, groin. Overuse can thin skin. 453.6 g 1     No current facility-administered medications for this visit. Allergies: Allergies   Allergen Reactions   • Iodinated Contrast Media Anaphylaxis     Facial swelling and hives      Physical Exam:     /82 (BP Location: Left arm, Patient Position: Sitting, Cuff Size: Standard)   Pulse 82   Temp 98.2 °F (36.8 °C) (Tympanic)   Resp 17   Ht 5' 3" (1.6 m)   Wt 90.9 kg (200 lb 6.4 oz)   LMP  (LMP Unknown)   SpO2 98%   BMI 35.50 kg/m²     Physical Exam  Vitals and nursing note reviewed. Constitutional:       Appearance: Normal appearance. She is well-developed. HENT:      Head: Normocephalic and atraumatic. Right Ear: Tympanic membrane normal.      Left Ear: Tympanic membrane normal.      Nose: Nose normal.      Mouth/Throat:      Mouth: Mucous membranes are moist.   Eyes:      Pupils: Pupils are equal, round, and reactive to light. Cardiovascular:      Rate and Rhythm: Normal rate and regular rhythm. Pulses: Normal pulses. Heart sounds: Normal heart sounds. Pulmonary:      Effort: Pulmonary effort is normal. No respiratory distress. Breath sounds: Normal breath sounds. No wheezing. Abdominal:      General: Bowel sounds are normal.      Palpations: Abdomen is soft. Musculoskeletal:         General: No deformity. Normal range of motion. Cervical back: Normal range of motion and neck supple. Skin:     General: Skin is warm. Capillary Refill: Capillary refill takes less than 2 seconds. Neurological:      Mental Status: She is alert and oriented to person, place, and time.    Psychiatric:         Mood and Affect: Mood normal.         Behavior: Behavior normal.          Adina Chowdhury MD  [address] [place] [address]

## 2023-12-07 ENCOUNTER — TELEPHONE (OUTPATIENT)
Dept: NEUROLOGY | Facility: CLINIC | Age: 43
End: 2023-12-07

## 2023-12-07 NOTE — TELEPHONE ENCOUNTER
Patient has accepted , [facility] VL, 3:30 pm, 1/16/2023. Mailed appt card. Thank you,     Maria Victoria Mei     HFU/ SHAHBAZ BARROW/ Left hand/forearm paresthesia     DC- 10/25/2023- HOME    Abdiaziz Whitfield will need follow up in in 4 weeks with neuromuscular attending or advance practitioner. She will require a EMG/NCS within 4 weeks weeks.

## 2024-01-12 ENCOUNTER — TELEPHONE (OUTPATIENT)
Dept: NEUROLOGY | Facility: CLINIC | Age: 44
End: 2024-01-12

## 2024-03-30 ENCOUNTER — HOSPITAL ENCOUNTER (OUTPATIENT)
Facility: HOSPITAL | Age: 44
Setting detail: OBSERVATION
Discharge: HOME | End: 2024-03-31
Attending: EMERGENCY MEDICINE | Admitting: INTERNAL MEDICINE
Payer: COMMERCIAL

## 2024-03-30 ENCOUNTER — APPOINTMENT (EMERGENCY)
Dept: CT IMAGING | Facility: HOSPITAL | Age: 44
End: 2024-03-30
Payer: COMMERCIAL

## 2024-03-30 ENCOUNTER — APPOINTMENT (EMERGENCY)
Dept: RADIOLOGY | Facility: HOSPITAL | Age: 44
End: 2024-03-30
Payer: COMMERCIAL

## 2024-03-30 DIAGNOSIS — M54.31 RIGHT SIDED SCIATICA: Primary | ICD-10-CM

## 2024-03-30 PROBLEM — M54.41 ACUTE RIGHT-SIDED LOW BACK PAIN WITH RIGHT-SIDED SCIATICA: Status: ACTIVE | Noted: 2024-03-30

## 2024-03-30 LAB
ANION GAP SERPL CALCULATED.3IONS-SCNC: 7 MMOL/L (ref 4–13)
BASOPHILS # BLD AUTO: 0.04 THOUSANDS/ÂΜL (ref 0–0.1)
BASOPHILS NFR BLD AUTO: 1 % (ref 0–1)
BUN SERPL-MCNC: 11 MG/DL (ref 5–25)
CALCIUM SERPL-MCNC: 9 MG/DL (ref 8.4–10.2)
CHLORIDE SERPL-SCNC: 103 MMOL/L (ref 96–108)
CO2 SERPL-SCNC: 28 MMOL/L (ref 21–32)
CREAT SERPL-MCNC: 0.91 MG/DL (ref 0.6–1.3)
EOSINOPHIL # BLD AUTO: 0.04 THOUSAND/ÂΜL (ref 0–0.61)
EOSINOPHIL NFR BLD AUTO: 1 % (ref 0–6)
ERYTHROCYTE [DISTWIDTH] IN BLOOD BY AUTOMATED COUNT: 12.2 % (ref 11.6–15.1)
GFR SERPL CREATININE-BSD FRML MDRD: 76 ML/MIN/1.73SQ M
GLUCOSE SERPL-MCNC: 84 MG/DL (ref 65–140)
HCG SERPL QL: NEGATIVE
HCT VFR BLD AUTO: 41.7 % (ref 34.8–46.1)
HGB BLD-MCNC: 13.8 G/DL (ref 11.5–15.4)
IMM GRANULOCYTES # BLD AUTO: 0.01 THOUSAND/UL (ref 0–0.2)
IMM GRANULOCYTES NFR BLD AUTO: 0 % (ref 0–2)
LYMPHOCYTES # BLD AUTO: 1.88 THOUSANDS/ÂΜL (ref 0.6–4.47)
LYMPHOCYTES NFR BLD AUTO: 37 % (ref 14–44)
MCH RBC QN AUTO: 29.2 PG (ref 26.8–34.3)
MCHC RBC AUTO-ENTMCNC: 33.1 G/DL (ref 31.4–37.4)
MCV RBC AUTO: 88 FL (ref 82–98)
MONOCYTES # BLD AUTO: 0.27 THOUSAND/ÂΜL (ref 0.17–1.22)
MONOCYTES NFR BLD AUTO: 5 % (ref 4–12)
NEUTROPHILS # BLD AUTO: 2.88 THOUSANDS/ÂΜL (ref 1.85–7.62)
NEUTS SEG NFR BLD AUTO: 56 % (ref 43–75)
NRBC BLD AUTO-RTO: 0 /100 WBCS
PLATELET # BLD AUTO: 239 THOUSANDS/UL (ref 149–390)
PMV BLD AUTO: 9.7 FL (ref 8.9–12.7)
POTASSIUM SERPL-SCNC: 3.6 MMOL/L (ref 3.5–5.3)
RBC # BLD AUTO: 4.73 MILLION/UL (ref 3.81–5.12)
SODIUM SERPL-SCNC: 138 MMOL/L (ref 135–147)
WBC # BLD AUTO: 5.12 THOUSAND/UL (ref 4.31–10.16)

## 2024-03-30 PROCEDURE — 99285 EMERGENCY DEPT VISIT HI MDM: CPT | Performed by: PHYSICIAN ASSISTANT

## 2024-03-30 PROCEDURE — 84703 CHORIONIC GONADOTROPIN ASSAY: CPT | Performed by: PHYSICIAN ASSISTANT

## 2024-03-30 PROCEDURE — 96375 TX/PRO/DX INJ NEW DRUG ADDON: CPT

## 2024-03-30 PROCEDURE — 85025 COMPLETE CBC W/AUTO DIFF WBC: CPT | Performed by: PHYSICIAN ASSISTANT

## 2024-03-30 PROCEDURE — 36415 COLL VENOUS BLD VENIPUNCTURE: CPT | Performed by: PHYSICIAN ASSISTANT

## 2024-03-30 PROCEDURE — 80048 BASIC METABOLIC PNL TOTAL CA: CPT | Performed by: PHYSICIAN ASSISTANT

## 2024-03-30 PROCEDURE — 72100 X-RAY EXAM L-S SPINE 2/3 VWS: CPT

## 2024-03-30 PROCEDURE — 99222 1ST HOSP IP/OBS MODERATE 55: CPT | Performed by: INTERNAL MEDICINE

## 2024-03-30 PROCEDURE — 96372 THER/PROPH/DIAG INJ SC/IM: CPT

## 2024-03-30 PROCEDURE — 96374 THER/PROPH/DIAG INJ IV PUSH: CPT

## 2024-03-30 PROCEDURE — 99284 EMERGENCY DEPT VISIT MOD MDM: CPT

## 2024-03-30 PROCEDURE — 72128 CT CHEST SPINE W/O DYE: CPT

## 2024-03-30 PROCEDURE — 72131 CT LUMBAR SPINE W/O DYE: CPT

## 2024-03-30 RX ORDER — MORPHINE SULFATE 4 MG/ML
4 INJECTION, SOLUTION INTRAMUSCULAR; INTRAVENOUS ONCE
Status: COMPLETED | OUTPATIENT
Start: 2024-03-30 | End: 2024-03-30

## 2024-03-30 RX ORDER — GABAPENTIN 100 MG/1
100 CAPSULE ORAL 3 TIMES DAILY
Status: DISCONTINUED | OUTPATIENT
Start: 2024-03-30 | End: 2024-03-31 | Stop reason: HOSPADM

## 2024-03-30 RX ORDER — DIAZEPAM 2 MG/1
2 TABLET ORAL ONCE
Status: COMPLETED | OUTPATIENT
Start: 2024-03-30 | End: 2024-03-30

## 2024-03-30 RX ORDER — POLYETHYLENE GLYCOL 3350 17 G/17G
17 POWDER, FOR SOLUTION ORAL DAILY
Status: DISCONTINUED | OUTPATIENT
Start: 2024-03-31 | End: 2024-03-31 | Stop reason: HOSPADM

## 2024-03-30 RX ORDER — OXYCODONE HYDROCHLORIDE 5 MG/1
2.5 TABLET ORAL EVERY 4 HOURS PRN
Status: DISCONTINUED | OUTPATIENT
Start: 2024-03-30 | End: 2024-03-31 | Stop reason: HOSPADM

## 2024-03-30 RX ORDER — OXYCODONE HYDROCHLORIDE 5 MG/1
5 TABLET ORAL ONCE
Status: COMPLETED | OUTPATIENT
Start: 2024-03-30 | End: 2024-03-30

## 2024-03-30 RX ORDER — HYDRALAZINE HYDROCHLORIDE 20 MG/ML
5 INJECTION INTRAMUSCULAR; INTRAVENOUS EVERY 6 HOURS PRN
Status: DISCONTINUED | OUTPATIENT
Start: 2024-03-30 | End: 2024-03-31 | Stop reason: HOSPADM

## 2024-03-30 RX ORDER — KETOROLAC TROMETHAMINE 30 MG/ML
15 INJECTION, SOLUTION INTRAMUSCULAR; INTRAVENOUS EVERY 6 HOURS PRN
Status: DISCONTINUED | OUTPATIENT
Start: 2024-03-30 | End: 2024-03-31 | Stop reason: HOSPADM

## 2024-03-30 RX ORDER — ENOXAPARIN SODIUM 100 MG/ML
40 INJECTION SUBCUTANEOUS DAILY
Status: DISCONTINUED | OUTPATIENT
Start: 2024-03-31 | End: 2024-03-31 | Stop reason: HOSPADM

## 2024-03-30 RX ORDER — DOCUSATE SODIUM 100 MG/1
100 CAPSULE, LIQUID FILLED ORAL 2 TIMES DAILY
Status: DISCONTINUED | OUTPATIENT
Start: 2024-03-30 | End: 2024-03-31 | Stop reason: HOSPADM

## 2024-03-30 RX ORDER — KETOROLAC TROMETHAMINE 30 MG/ML
15 INJECTION, SOLUTION INTRAMUSCULAR; INTRAVENOUS ONCE
Status: COMPLETED | OUTPATIENT
Start: 2024-03-30 | End: 2024-03-30

## 2024-03-30 RX ORDER — GABAPENTIN 100 MG/1
100 CAPSULE ORAL ONCE
Status: COMPLETED | OUTPATIENT
Start: 2024-03-30 | End: 2024-03-30

## 2024-03-30 RX ORDER — ACETAMINOPHEN 325 MG/1
650 TABLET ORAL EVERY 6 HOURS PRN
Status: DISCONTINUED | OUTPATIENT
Start: 2024-03-30 | End: 2024-03-31 | Stop reason: HOSPADM

## 2024-03-30 RX ORDER — ONDANSETRON 2 MG/ML
4 INJECTION INTRAMUSCULAR; INTRAVENOUS ONCE
Status: COMPLETED | OUTPATIENT
Start: 2024-03-30 | End: 2024-03-30

## 2024-03-30 RX ORDER — LIDOCAINE 50 MG/G
1 PATCH TOPICAL ONCE
Status: DISCONTINUED | OUTPATIENT
Start: 2024-03-30 | End: 2024-03-30

## 2024-03-30 RX ORDER — HYDROMORPHONE HCL IN WATER/PF 6 MG/30 ML
0.2 PATIENT CONTROLLED ANALGESIA SYRINGE INTRAVENOUS EVERY 2 HOUR PRN
Status: DISCONTINUED | OUTPATIENT
Start: 2024-03-30 | End: 2024-03-31 | Stop reason: HOSPADM

## 2024-03-30 RX ORDER — ACETAMINOPHEN 325 MG/1
975 TABLET ORAL EVERY 8 HOURS SCHEDULED
Status: DISCONTINUED | OUTPATIENT
Start: 2024-03-30 | End: 2024-03-31 | Stop reason: HOSPADM

## 2024-03-30 RX ORDER — OXYCODONE HYDROCHLORIDE 5 MG/1
5 TABLET ORAL EVERY 4 HOURS PRN
Status: DISCONTINUED | OUTPATIENT
Start: 2024-03-30 | End: 2024-03-31 | Stop reason: HOSPADM

## 2024-03-30 RX ORDER — SENNOSIDES 8.8 MG/5ML
8.8 LIQUID ORAL DAILY
Status: DISCONTINUED | OUTPATIENT
Start: 2024-03-31 | End: 2024-03-31 | Stop reason: HOSPADM

## 2024-03-30 RX ORDER — METHOCARBAMOL 750 MG/1
750 TABLET, FILM COATED ORAL EVERY 6 HOURS SCHEDULED
Status: DISCONTINUED | OUTPATIENT
Start: 2024-03-30 | End: 2024-03-31 | Stop reason: HOSPADM

## 2024-03-30 RX ORDER — ONDANSETRON 2 MG/ML
4 INJECTION INTRAMUSCULAR; INTRAVENOUS EVERY 4 HOURS PRN
Status: DISCONTINUED | OUTPATIENT
Start: 2024-03-30 | End: 2024-03-31 | Stop reason: HOSPADM

## 2024-03-30 RX ORDER — PREDNISONE 20 MG/1
40 TABLET ORAL ONCE
Status: COMPLETED | OUTPATIENT
Start: 2024-03-30 | End: 2024-03-30

## 2024-03-30 RX ADMIN — OXYCODONE HYDROCHLORIDE 5 MG: 5 TABLET ORAL at 09:53

## 2024-03-30 RX ADMIN — OXYCODONE HYDROCHLORIDE 5 MG: 5 TABLET ORAL at 17:14

## 2024-03-30 RX ADMIN — GABAPENTIN 100 MG: 100 CAPSULE ORAL at 20:43

## 2024-03-30 RX ADMIN — DOCUSATE SODIUM 100 MG: 100 CAPSULE, LIQUID FILLED ORAL at 17:13

## 2024-03-30 RX ADMIN — HYDROMORPHONE HYDROCHLORIDE 0.2 MG: 0.2 INJECTION, SOLUTION INTRAMUSCULAR; INTRAVENOUS; SUBCUTANEOUS at 20:44

## 2024-03-30 RX ADMIN — ACETAMINOPHEN 975 MG: 325 TABLET, FILM COATED ORAL at 21:35

## 2024-03-30 RX ADMIN — MORPHINE SULFATE 4 MG: 4 INJECTION INTRAVENOUS at 12:13

## 2024-03-30 RX ADMIN — METHOCARBAMOL TABLETS 750 MG: 750 TABLET, COATED ORAL at 23:34

## 2024-03-30 RX ADMIN — ONDANSETRON 4 MG: 2 INJECTION INTRAMUSCULAR; INTRAVENOUS at 12:13

## 2024-03-30 RX ADMIN — DIAZEPAM 2 MG: 2 TABLET ORAL at 10:54

## 2024-03-30 RX ADMIN — METHOCARBAMOL TABLETS 750 MG: 750 TABLET, COATED ORAL at 17:13

## 2024-03-30 RX ADMIN — KETOROLAC TROMETHAMINE 15 MG: 30 INJECTION, SOLUTION INTRAMUSCULAR at 09:53

## 2024-03-30 RX ADMIN — OXYCODONE HYDROCHLORIDE 5 MG: 5 TABLET ORAL at 21:35

## 2024-03-30 RX ADMIN — ACETAMINOPHEN 975 MG: 325 TABLET, FILM COATED ORAL at 17:13

## 2024-03-30 RX ADMIN — PREDNISONE 40 MG: 20 TABLET ORAL at 10:54

## 2024-03-30 RX ADMIN — GABAPENTIN 100 MG: 100 CAPSULE ORAL at 17:13

## 2024-03-30 RX ADMIN — GABAPENTIN 100 MG: 100 CAPSULE ORAL at 10:54

## 2024-03-30 NOTE — ED NOTES
"Rn in went to medicate patient. Rn explained she was getting a lidocaine patch, a tordal shot and oxycodone.  patient stated \" I have a lidocaine patient that I put on the morning at 7am\" rn then said that was fine and she would notify the provider she already has one and not apply the new one. Rn then began to prep the pts arm with alcohol prior to the toradol inection and asked the pt to relax her arm prior to the injection. Pt refused injection stating \" I don't want you to do it, your antsy and didn't explain what your doing\" pt appears irritated at this time with the plan of care that was also explained by provider prior to rn entering room. rn corrected the patient and told her that she indeed explain what she was giving not just once but twice, once when this rn walked in the room to identify  the patient and a second time as she wiped her arm with the alcohol preparing to administer meds. Pt being rude and difficult with rn at this time and has an inappropriate tone with rn- rn feels uncomfortable with the inaccurate comments patient is making. Primary rn made charge rn aware and charge rn medicating patient.     Emili Cantu RN  03/30/24 0944       Emili Cantu RN  03/30/24 0952    "

## 2024-03-30 NOTE — ED PROVIDER NOTES
History  Chief Complaint   Patient presents with    Leg Pain     Pt presents to ed for sciatica pain, has a hx of it. Pain goes from r lower back down into the right leg. Pt took 10mg of toradol at home, did not take robaxin bc she was at work. Denies cp denies sob. No other complaints     This is a 44-year-old female with past medical history significant for sciatica senting to the emergency department today for right-sided back pain that radiates to her right buttock and to the right lower extremity.  It has been worsening over the past few days.  She took Toradol without relief.  He notes it is a shooting pain that runs down the leg.  She has no associated bowel incontinence, bladder incontinence, or saddle anesthesia.  She has no fevers or chills.  No recent injuries.  She has no chest pain or shortness of breath.  She has no calf pain.  The patient has no other complaints at this time.      History provided by:  Patient   used: No    Leg Pain  Lower extremity pain location: right back and right lower extremity.  Pain details:     Quality:  Shooting    Radiates to: down the leg.  Chronicity:  Recurrent  Dislocation: no    Prior injury to area:  No  Relieved by:  Nothing  Worsened by:  Nothing  Ineffective treatments: Toradol.  Associated symptoms: back pain    Associated symptoms: no decreased ROM, no fatigue, no fever, no itching, no muscle weakness, no neck pain, no numbness, no stiffness, no swelling and no tingling        Prior to Admission Medications   Prescriptions Last Dose Informant Patient Reported? Taking?   ketorolac (TORADOL) 10 mg tablet 3/30/2024  No Yes   Sig: Take 1 tablet (10 mg total) by mouth every 6 (six) hours as needed for severe pain   meloxicam (MOBIC) 15 mg tablet Not Taking  Yes No   Sig: Take 15 mg by mouth daily as needed   Patient not taking: Reported on 3/30/2024   methocarbamol (ROBAXIN) 500 mg tablet 3/29/2024  No Yes   Sig: Take 1 tablet (500 mg total) by  mouth 3 (three) times a day as needed for muscle spasms   methylPREDNISolone 4 MG tablet therapy pack Not Taking  No No   Sig: Use as directed on package   Patient not taking: Reported on 3/30/2024   rizatriptan (MAXALT) 10 MG tablet Not Taking  No No   Sig: TAKE 1 TABLET (10 MG TOTAL) BY MOUTH ONCE AS NEEDED FOR MIGRAINE FOR UP TO 1 DOSE MAY REPEAT IN 2 HOURS IF NEEDED   Patient not taking: Reported on 3/30/2024   triamcinolone (KENALOG) 0.1 % ointment Not Taking  No No   Sig: Apply topically 2 (two) times a day Do not apply on face, groin. Overuse can thin skin.   Patient not taking: Reported on 3/30/2024      Facility-Administered Medications: None       Past Medical History:   Diagnosis Date    Fetal growth problem suspected but not found     RESOLVED: 3/1/17    First trimester bleeding     RESOLVED:3/1/17    Granulation tissue of vaginal cuff 2018    Iron deficiency anemia due to chronic blood loss 2018    Miscarriage     Missed      RESOLVED:3/1/17    Poor fetal growth affecting management of mother     RESOLVED:3/1/17    Recurrent pregnancy loss, antepartum condition or complication     RESOLVED:3/1/17    Spontaneous      RESOLVED:3/1/17    Supraventricular tachycardia     by history       Past Surgical History:   Procedure Laterality Date    COLONOSCOPY      DILATION AND CURETTAGE OF UTERUS      ,     DILATION AND CURETTAGE OF UTERUS      FOR SPONTANEOUS ; [id number]    HYSTERECTOMY  2018    Dr Singh    INDUCED       LAPAROSCOPIC TUBAL LIGATION Right 2016    Procedure: LAPAROSCOPIC TUBAL LIGATION ;  Surgeon: Jacque Singh MD;  Location: [facility];  Service:     OR LAPS W/VAG HYSTERECT 250 GM/&RMVL TUBE&/OVARIES N/A 2018    Procedure: HYSTERECTOMY LAPAROSCOPIC ASSISTED VAGINAL (LAVH); RIGHT LAPAROSCOPIC SALPINGECTOMY;  Surgeon: Jacque Singh MD;  Location: [facility];  Service: Gynecology    OR TX MISSED  FIRST TRIMESTER  SURGICAL N/A 9/12/2016    Procedure: DILATATION AND EVACUATION (D&E) (MISSED AB);  Surgeon: Jacque Singh MD;  Location: [facility];  Service: Gynecology    SALPINGOOPHORECTOMY Left     TUBAL LIGATION Right        Family History   Problem Relation Age of Onset    Arthritis Mother     Depression Mother     Hypertension Mother     Cancer Father     Breast cancer Neg Hx     Ovarian cancer Neg Hx     Colon cancer Neg Hx      I have reviewed and agree with the history as documented.    E-Cigarette/Vaping    E-Cigarette Use Never User      E-Cigarette/Vaping Substances    Nicotine No     THC No     CBD No     Flavoring No     Other No     Unknown No      Social History     Tobacco Use    Smoking status: Never    Smokeless tobacco: Never   Vaping Use    Vaping status: Never Used   Substance Use Topics    Alcohol use: Never    Drug use: Never       Review of Systems   Constitutional:  Negative for appetite change, chills, diaphoresis, fatigue and fever.   Eyes:  Negative for visual disturbance.   Respiratory:  Negative for cough, chest tightness, shortness of breath and wheezing.    Cardiovascular:  Negative for chest pain, palpitations and leg swelling.   Gastrointestinal:  Negative for rectal pain and vomiting.   Musculoskeletal:  Positive for back pain. Negative for neck pain, neck stiffness and stiffness.   Skin:  Negative for itching, rash and wound.   Neurological:  Negative for dizziness, seizures, syncope, weakness, light-headedness, numbness and headaches.   Psychiatric/Behavioral:  Negative for confusion.    All other systems reviewed and are negative.      Physical Exam  Physical Exam  Vitals and nursing note reviewed.   Constitutional:       General: She is not in acute distress.     Appearance: Normal appearance. She is normal weight. She is not ill-appearing, toxic-appearing or diaphoretic.   HENT:      Head: Normocephalic and atraumatic.      Nose: Nose normal. No congestion or rhinorrhea.      Mouth/Throat:       Mouth: Mucous membranes are moist.      Pharynx: No oropharyngeal exudate or posterior oropharyngeal erythema.   Eyes:      General: No scleral icterus.        Right eye: No discharge.         Left eye: No discharge.      Extraocular Movements: Extraocular movements intact.      Pupils: Pupils are equal, round, and reactive to light.   Cardiovascular:      Rate and Rhythm: Normal rate and regular rhythm.      Pulses: Normal pulses.      Heart sounds: Normal heart sounds. No murmur heard.     No friction rub. No gallop.   Pulmonary:      Effort: Pulmonary effort is normal. No respiratory distress.      Breath sounds: Normal breath sounds. No stridor. No wheezing, rhonchi or rales.   Chest:      Chest wall: No tenderness.   Musculoskeletal:         General: Normal range of motion.      Cervical back: Normal range of motion. No tenderness.      Right lower leg: No edema.      Left lower leg: No edema.      Comments: The patient has right-sided paralumbar muscular back pain positive right-sided straight leg raise; she appears uncomfortable on examination  Negative Homans' sign bilaterally   Skin:     General: Skin is warm and dry.      Capillary Refill: Capillary refill takes less than 2 seconds.      Coloration: Skin is not jaundiced or pale.   Neurological:      General: No focal deficit present.      Mental Status: She is alert and oriented to person, place, and time. Mental status is at baseline.      Comments: 5 out of 5 strength to the bilateral lower extremities  Normal sensation to the bilateral lower extremities   Psychiatric:         Mood and Affect: Mood normal.         Behavior: Behavior normal.         Vital Signs  ED Triage Vitals   Temperature Pulse Respirations Blood Pressure SpO2   03/30/24 0905 03/30/24 0905 03/30/24 0905 03/30/24 0905 03/30/24 0905   97.7 °F (36.5 °C) 91 20 (!) 186/110 100 %      Temp Source Heart Rate Source Patient Position - Orthostatic VS BP Location FiO2 (%)   03/30/24 0905  03/30/24 0905 03/30/24 0905 03/30/24 0905 --   Oral Monitor Lying Left arm       Pain Score       03/30/24 0923       8           Vitals:    03/30/24 0905 03/30/24 1210 03/30/24 1604   BP: (!) 186/110 (!) 174/92 (!) 163/111   Pulse: 91 84 68   Patient Position - Orthostatic VS: Lying  Lying         Visual Acuity      ED Medications  Medications   acetaminophen (TYLENOL) tablet 650 mg (has no administration in time range)   docusate sodium (COLACE) capsule 100 mg (100 mg Oral Given 3/30/24 1713)   polyethylene glycol (MIRALAX) packet 17 g (has no administration in time range)   senna oral syrup 8.8 mg (has no administration in time range)   enoxaparin (LOVENOX) subcutaneous injection 40 mg (has no administration in time range)   gabapentin (NEURONTIN) capsule 100 mg (100 mg Oral Given 3/30/24 1713)   acetaminophen (TYLENOL) tablet 975 mg (975 mg Oral Given 3/30/24 1713)   oxyCODONE (ROXICODONE) IR tablet 2.5 mg ( Oral See Alternative 3/30/24 1714)     Or   oxyCODONE (ROXICODONE) IR tablet 5 mg (5 mg Oral Given 3/30/24 1714)   HYDROmorphone HCl (DILAUDID) injection 0.2 mg (has no administration in time range)   naloxone (NARCAN) 0.04 mg/mL syringe 0.04 mg (has no administration in time range)   methocarbamol (ROBAXIN) tablet 750 mg (750 mg Oral Given 3/30/24 1713)   ondansetron (ZOFRAN) injection 4 mg (has no administration in time range)   ketorolac (TORADOL) injection 15 mg (has no administration in time range)   hydrALAZINE (APRESOLINE) injection 5 mg (has no administration in time range)   ketorolac (TORADOL) injection 15 mg (15 mg Intramuscular Given 3/30/24 0953)   oxyCODONE (ROXICODONE) IR tablet 5 mg (5 mg Oral Given 3/30/24 0953)   gabapentin (NEURONTIN) capsule 100 mg (100 mg Oral Given 3/30/24 1054)   predniSONE tablet 40 mg (40 mg Oral Given 3/30/24 1054)   diazepam (VALIUM) tablet 2 mg (2 mg Oral Given 3/30/24 1054)   morphine injection 4 mg (4 mg Intravenous Given 3/30/24 1213)   ondansetron (ZOFRAN)  injection 4 mg (4 mg Intravenous Given 3/30/24 1213)       Diagnostic Studies  Results Reviewed       Procedure Component Value Units Date/Time    hCG, qualitative pregnancy [[id number]]  (Normal) Collected: 03/30/24 1210    Lab Status: Final result Specimen: Blood from Arm, Left Updated: 03/30/24 1241     Preg, Serum Negative    Basic metabolic panel [[id number]] Collected: 03/30/24 1210    Lab Status: Final result Specimen: Blood from Arm, Left Updated: 03/30/24 1235     Sodium 138 mmol/L      Potassium 3.6 mmol/L      Chloride 103 mmol/L      CO2 28 mmol/L      ANION GAP 7 mmol/L      BUN 11 mg/dL      Creatinine 0.91 mg/dL      Glucose 84 mg/dL      Calcium 9.0 mg/dL      eGFR 76 ml/min/1.73sq m     Narrative:      [employer] guidelines for Chronic Kidney Disease (CKD):     Stage 1 with normal or high GFR (GFR > 90 mL/min/1.73 square meters)    Stage 2 Mild CKD (GFR = 60-89 mL/min/1.73 square meters)    Stage 3A Moderate CKD (GFR = 45-59 mL/min/1.73 square meters)    Stage 3B Moderate CKD (GFR = 30-44 mL/min/1.73 square meters)    Stage 4 Severe CKD (GFR = 15-29 mL/min/1.73 square meters)    Stage 5 End Stage CKD (GFR <15 mL/min/1.73 square meters)  Note: GFR calculation is accurate only with a steady state creatinine    CBC and differential [[id number]] Collected: 03/30/24 1210    Lab Status: Final result Specimen: Blood from Arm, Left Updated: 03/30/24 1218     WBC 5.12 Thousand/uL      RBC 4.73 Million/uL      Hemoglobin 13.8 g/dL      Hematocrit 41.7 %      MCV 88 fL      MCH 29.2 pg      MCHC 33.1 g/dL      RDW 12.2 %      MPV 9.7 fL      Platelets 239 Thousands/uL      nRBC 0 /100 WBCs      Neutrophils Relative 56 %      Immature Grans % 0 %      Lymphocytes Relative 37 %      Monocytes Relative 5 %      Eosinophils Relative 1 %      Basophils Relative 1 %      Neutrophils Absolute 2.88 Thousands/µL      Absolute Immature Grans 0.01 Thousand/uL      Absolute Lymphocytes 1.88  Thousands/µL      Absolute Monocytes 0.27 Thousand/µL      Eosinophils Absolute 0.04 Thousand/µL      Basophils Absolute 0.04 Thousands/µL                    CT spine thoracic & lumbar wo contrast   Final Result by Eldon Clemente MD (03/30 1440)         1. No acute fracture or subluxation.   2. No significant bony central canal or neural foraminal narrowing.   3. 4.7 cm bronchogenic cyst in the middle mediastinum is stable from 2019.            Workstation performed: [id number]         XR spine lumbar 2 or 3 views injury    (Results Pending)              Procedures  Procedures         ED Course  ED Course as of 03/30/24 1911   Sat Mar 30, 2024   1042 Patient still with pain. Additional pain medications ordered. Will continue to monitor.   [facility] requests CT imaging prior to admission. Awaiting results.                                             Medical Decision Making  This is a 44-year-old female presenting to the emergency department today for right lower back pain that radiates down the right leg.  No injuries.  No red flag symptoms.  Her vital signs initially show hypertension.  On physical examination, the patient has a positive right-sided straight leg raise with the palpation to the right paralumbar musculature.  5 out of 5 strength to the bilateral lower extremities.  Normal sensation to the bilateral lower extremities.  The patient was initially dosed with Toradol and Roxicodone without relief.  She was then treated with Valium, gabapentin, and prednisone while here in the emergency department.  This did not help her pain.  I then rolando blood work which came back reassuring.  X-ray of the lumbar spine negative for any acute osseous abnormalities on my independent interpretation.  CT thoracic and lumbar spine negative for any acute fracture or subluxation.  She was given intravenous morphine.  Patient still notes significant pain.  Case was discussed with GUI[facility] attending Dr. Feliciano who accepts  "the patient for admission.  The patient and/or patient's proxy verify their understanding and agree to the plan at this time.  All questions answered to the patient and/or their proxy's satisfaction.  All labs reviewed and utilized in the medical decision making process (if labs were ordered).  Portions of the record may have been created with voice recognition software.  Occasional wrong word or \"sound a like\" substitutions may have occurred due to the inherent limitations of voice recognition software.  Read the chart carefully and recognize, using context, where substitutions have occurred.    I reviewed prior notes.    Problems Addressed:  Right sided sciatica: undiagnosed new problem with uncertain prognosis    Amount and/or Complexity of Data Reviewed  External Data Reviewed: notes.  Labs: ordered. Decision-making details documented in ED Course.  Radiology: ordered and independent interpretation performed. Decision-making details documented in ED Course.     Details: XR Lumbar Spine  Discussion of management or test interpretation with external provider(s): Dr. Feliciano - [facility]  Prescription drug management.  Decision regarding hospitalization.             Disposition  Final diagnoses:   Right sided sciatica     Time reflects when diagnosis was documented in both MDM as applicable and the Disposition within this note       Time User Action Codes Description Comment    3/30/2024  2:46 PM Thomas Craft Add [M54.31] Right sided sciatica           ED Disposition       ED Disposition   Admit    Condition   Stable    Date/Time   Sat Mar 30, 2024  2:46 PM    Comment   Case was discussed with Dr. Feliciano and the patient's admission status was agreed to be Admission Status: observation status to the service of Dr. Feliciano.             Follow-up Information    None         Current Discharge Medication List        CONTINUE these medications which have NOT CHANGED    Details   ketorolac " (TORADOL) 10 mg tablet Take 1 tablet (10 mg total) by mouth every 6 (six) hours as needed for severe pain  Qty: 15 tablet, Refills: 0    Associated Diagnoses: Left arm numbness      methocarbamol (ROBAXIN) 500 mg tablet Take 1 tablet (500 mg total) by mouth 3 (three) times a day as needed for muscle spasms  Qty: 30 tablet, Refills: 0    Associated Diagnoses: Neck pain      meloxicam (MOBIC) 15 mg tablet Take 15 mg by mouth daily as needed      methylPREDNISolone 4 MG tablet therapy pack Use as directed on package  Qty: 21 each, Refills: 0    Associated Diagnoses: Left arm numbness      rizatriptan (MAXALT) 10 MG tablet TAKE 1 TABLET (10 MG TOTAL) BY MOUTH ONCE AS NEEDED FOR MIGRAINE FOR UP TO 1 DOSE MAY REPEAT IN 2 HOURS IF NEEDED  Qty: 9 tablet, Refills: 0    Associated Diagnoses: Chronic migraine without aura with status migrainosus, not intractable      triamcinolone (KENALOG) 0.1 % ointment Apply topically 2 (two) times a day Do not apply on face, groin. Overuse can thin skin.  Qty: 453.6 g, Refills: 1    Associated Diagnoses: Rash             No discharge procedures on file.    PDMP Review         Value Time User    PDMP Reviewed  Yes 8/10/2023  2:19 AM Herve Lira MD            ED Provider  Electronically Signed by             Thomas Craft PA-C  03/30/24 1911

## 2024-03-30 NOTE — ASSESSMENT & PLAN NOTE
Patient with elevated BP readings in the ED  Per chart review, no formal history of hypertension but elevated BP readings noted on multiple office/Ed visits  Likely pain contributing  Monitor while admitted  Continue pain management  Consider starting additional agent

## 2024-03-30 NOTE — ASSESSMENT & PLAN NOTE
Patient presented with worsening right lower back pain that radiates down right leg. History of Sciatica but notes this pain is worse than baseline. Took tylenol and Toradol with minimal relief prior to admission  CT Spine: No acute fracture or subluxation. No significant bony central canal or neural foraminal narrowing.   No red flag symptoms noted  S/p Toradol, Gabapentin, Oxycodone, Valium and IV Morphine in the ED with minimal relief  Continue supportive care  Analgesics  Consider PT/OT evaluation  Can consider MRI evaluation if symptoms persist or worsen

## 2024-03-30 NOTE — H&P
[facility]  H&P  Name: Glenna Lanier 44 y.o. female I MRN: [id number]  Unit/Bed#: -01 I Date of Admission: 3/30/2024   Date of Service: 3/30/2024 I Hospital Day: 0      Assessment/Plan   * Acute right-sided low back pain with right-sided sciatica  Assessment & Plan  Patient presented with worsening right lower back pain that radiates down right leg. History of Sciatica but notes this pain is worse than baseline. Took tylenol and Toradol with minimal relief prior to admission  CT Spine: No acute fracture or subluxation. No significant bony central canal or neural foraminal narrowing.   No red flag symptoms noted  S/p Toradol, Gabapentin, Oxycodone, Valium and IV Morphine in the ED with minimal relief  Continue supportive care  Analgesics  Consider PT/OT evaluation  Can consider MRI evaluation if symptoms persist or worsen    Elevated blood pressure reading  Assessment & Plan  Patient with elevated BP readings in the ED  Per chart review, no formal history of hypertension but elevated BP readings noted on multiple office/Ed visits  Likely pain contributing  Monitor while admitted  Continue pain management  Consider starting additional agent    Anxiety  Assessment & Plan  Continue supportive care           VTE Pharmacologic Prophylaxis: VTE Score: 4 Moderate Risk (Score 3-4) - Pharmacological DVT Prophylaxis Ordered: enoxaparin (Lovenox).  Code Status: Level 1 - Full Code   Discussion with family: Patient declined call to .     Anticipated Length of Stay: Patient will be admitted on an observation basis with an anticipated length of stay of less than 2 midnights secondary to uncontrolled pain.    Total Time Spent on Date of Encounter in care of patient: 60 mins. This time was spent on one or more of the following: performing physical exam; counseling and coordination of care; obtaining or reviewing history; documenting in the medical record; reviewing/ordering tests,  medications or procedures; communicating with other healthcare professionals and discussing with patient's family/caregivers.    Chief Complaint: Back Pain    History of Present Illness:  Glenna Lanier is a 44 y.o. female with no pertinent PMH who presents with worsening lower back pain that radiates down the right leg. Patient stated that she has had sciatica in the past but this pain is worse than normal. Patient stated pain worsened over the past 3-4 days. Patient denies any traumatic events or falls. Patient stated she picked up the trays this morning while at work and her leg became weak and gave out.    Upon arrival to the ED, labs generally unremarkable. Vitals with notable hypertension. Patient received Oxycodone, Gabapentin, Valium, IM Toradol and IV Morphine with minimal improvement in pain. CT Spine: No acute fracture or subluxation. No significant bony central canal or neural foraminal narrowing. Patient will be admitted for further pain management.     Review of Systems:  Review of Systems   Constitutional:  Negative for activity change, chills, fatigue and fever.   HENT:  Negative for congestion.    Respiratory:  Negative for cough, chest tightness and shortness of breath.    Cardiovascular:  Negative for chest pain, palpitations and leg swelling.   Gastrointestinal:  Negative for abdominal pain, nausea and vomiting.   Musculoskeletal:  Positive for back pain.   Neurological:  Negative for dizziness and light-headedness.   All other systems reviewed and are negative.      Past Medical and Surgical History:   Past Medical History:   Diagnosis Date    Fetal growth problem suspected but not found     RESOLVED: 3/1/17    First trimester bleeding     RESOLVED:3/1/17    Granulation tissue of vaginal cuff 2018    Iron deficiency anemia due to chronic blood loss 2018    Miscarriage     Missed      RESOLVED:3/1/17    Poor fetal growth affecting management of mother     RESOLVED:3/1/17     Recurrent pregnancy loss, antepartum condition or complication     RESOLVED:3/1/17    Spontaneous      RESOLVED:3/1/17    Supraventricular tachycardia     by history       Past Surgical History:   Procedure Laterality Date    COLONOSCOPY      DILATION AND CURETTAGE OF UTERUS      ,     DILATION AND CURETTAGE OF UTERUS      FOR SPONTANEOUS ; X5 [id number]    HYSTERECTOMY  2018    Dr Singh    INDUCED   2010    LAPAROSCOPIC TUBAL LIGATION Right 2016    Procedure: LAPAROSCOPIC TUBAL LIGATION ;  Surgeon: Jacque Singh MD;  Location: [facility];  Service:     MO LAPS W/VAG HYSTERECT 250 GM/&RMVL TUBE&/OVARIES N/A 2018    Procedure: HYSTERECTOMY LAPAROSCOPIC ASSISTED VAGINAL (LAVH); RIGHT LAPAROSCOPIC SALPINGECTOMY;  Surgeon: Jacque Singh MD;  Location: [facility];  Service: Gynecology    MO TX MISSED  FIRST TRIMESTER SURGICAL N/A 2016    Procedure: DILATATION AND EVACUATION (D&E) (MISSED AB);  Surgeon: Jacque Singh MD;  Location: [facility];  Service: Gynecology    SALPINGOOPHORECTOMY Left     TUBAL LIGATION Right        Meds/Allergies:  Prior to Admission medications    Medication Sig Start Date End Date Taking? Authorizing Provider   ketorolac (TORADOL) 10 mg tablet Take 1 tablet (10 mg total) by mouth every 6 (six) hours as needed for severe pain 10/25/23   Юлия Mendez PA-C   meloxicam (MOBIC) 15 mg tablet Take 15 mg by mouth daily as needed 23   Historical Provider, MD   methocarbamol (ROBAXIN) 500 mg tablet Take 1 tablet (500 mg total) by mouth 3 (three) times a day as needed for muscle spasms 11/3/23   Antonia Castro MD   methylPREDNISolone 4 MG tablet therapy pack Use as directed on package 10/25/23   Юлия Mendez PA-C   rizatriptan (MAXALT) 10 MG tablet TAKE 1 TABLET (10 MG TOTAL) BY MOUTH ONCE AS NEEDED FOR MIGRAINE FOR UP TO 1 DOSE MAY REPEAT IN 2 HOURS IF NEEDED 21   Antonia Castro MD   triamcinolone (KENALOG) 0.1 % ointment Apply  topically 2 (two) times a day Do not apply on face, groin. Overuse can thin skin. 12/15/20   Kelly Reddy MD     I have reviewed home medications with patient personally.    Allergies:   Allergies   Allergen Reactions    Iodinated Contrast Media Anaphylaxis     Facial swelling and hives       Social History:  Marital Status: Single   Occupation: [employer] at [employer]  Patient Pre-hospital Living Situation: Home  Patient Pre-hospital Level of Mobility: walks  Patient Pre-hospital Diet Restrictions: None  Substance Use History:   Social History     Substance and Sexual Activity   Alcohol Use Never     Social History     Tobacco Use   Smoking Status Never   Smokeless Tobacco Never     Social History     Substance and Sexual Activity   Drug Use Never       Family History:  Family History   Problem Relation Age of Onset    Arthritis Mother     Depression Mother     Hypertension Mother     Cancer Father     Breast cancer Neg Hx     Ovarian cancer Neg Hx     Colon cancer Neg Hx        Physical Exam:     Vitals:   Blood Pressure: (!) 174/92 (03/30/24 1210)  Pulse: 84 (03/30/24 1210)  Temperature: 97.7 °F (36.5 °C) (03/30/24 0905)  Temp Source: Oral (03/30/24 0905)  Respirations: 19 (03/30/24 1210)  SpO2: 99 % (03/30/24 1210)    Physical Exam  Vitals and nursing note reviewed.   Constitutional:       General: She is not in acute distress.     Appearance: Normal appearance. She is not ill-appearing.   HENT:      Head: Normocephalic.      Nose: Nose normal. No congestion.      Mouth/Throat:      Mouth: Mucous membranes are moist.      Pharynx: Oropharynx is clear.   Cardiovascular:      Rate and Rhythm: Normal rate and regular rhythm.      Pulses: Normal pulses.      Heart sounds: No murmur heard.  Pulmonary:      Effort: Pulmonary effort is normal. No respiratory distress.      Breath sounds: Normal breath sounds.   Abdominal:      General: Bowel sounds are normal. There is no distension.      Palpations: Abdomen is  soft.      Comments: Scar, CDI   Musculoskeletal:         General: Normal range of motion.      Cervical back: Normal range of motion.   Skin:     General: Skin is warm and dry.   Neurological:      General: No focal deficit present.      Mental Status: She is alert and oriented to person, place, and time.      Sensory: Sensation is intact.      Motor: Weakness (Mild RLE) present.   Psychiatric:         Mood and Affect: Mood is anxious.         Speech: Speech normal.         Behavior: Behavior is cooperative.          Additional Data:     Lab Results:  Results from last 7 days   Lab Units 03/30/24  1210   WBC Thousand/uL 5.12   HEMOGLOBIN g/dL 13.8   HEMATOCRIT % 41.7   PLATELETS Thousands/uL 239   NEUTROS PCT % 56   LYMPHS PCT % 37   MONOS PCT % 5   EOS PCT % 1     Results from last 7 days   Lab Units 03/30/24  1210   SODIUM mmol/L 138   POTASSIUM mmol/L 3.6   CHLORIDE mmol/L 103   CO2 mmol/L 28   BUN mg/dL 11   CREATININE mg/dL 0.91   ANION GAP mmol/L 7   CALCIUM mg/dL 9.0   GLUCOSE RANDOM mg/dL 84                       Lines/Drains:  Invasive Devices       Peripheral Intravenous Line  Duration             Peripheral IV 03/30/24 Left Antecubital <1 day                        Imaging: Reviewed radiology reports from this admission including: CT Spine  CT spine thoracic & lumbar wo contrast   Final Result by Eldon Clemente MD (03/30 1440)         1. No acute fracture or subluxation.   2. No significant bony central canal or neural foraminal narrowing.   3. 4.7 cm bronchogenic cyst in the middle mediastinum is stable from 2019.            Workstation performed: [id number]         XR spine lumbar 2 or 3 views injury    (Results Pending)       EKG and Other Studies Reviewed on Admission:   EKG: No EKG obtained.    ** Please Note: This note has been constructed using a voice recognition system. **

## 2024-03-31 VITALS
BODY MASS INDEX: 34.02 KG/M2 | OXYGEN SATURATION: 92 % | TEMPERATURE: 96.6 F | DIASTOLIC BLOOD PRESSURE: 73 MMHG | HEART RATE: 85 BPM | SYSTOLIC BLOOD PRESSURE: 141 MMHG | RESPIRATION RATE: 20 BRPM | HEIGHT: 63 IN | WEIGHT: 192 LBS

## 2024-03-31 LAB
ANION GAP SERPL CALCULATED.3IONS-SCNC: 9 MMOL/L (ref 4–13)
BUN SERPL-MCNC: 14 MG/DL (ref 5–25)
CALCIUM SERPL-MCNC: 9.3 MG/DL (ref 8.4–10.2)
CHLORIDE SERPL-SCNC: 102 MMOL/L (ref 96–108)
CO2 SERPL-SCNC: 26 MMOL/L (ref 21–32)
CREAT SERPL-MCNC: 0.84 MG/DL (ref 0.6–1.3)
ERYTHROCYTE [DISTWIDTH] IN BLOOD BY AUTOMATED COUNT: 12.1 % (ref 11.6–15.1)
GFR SERPL CREATININE-BSD FRML MDRD: 84 ML/MIN/1.73SQ M
GLUCOSE P FAST SERPL-MCNC: 111 MG/DL (ref 65–99)
GLUCOSE SERPL-MCNC: 111 MG/DL (ref 65–140)
HCT VFR BLD AUTO: 39.4 % (ref 34.8–46.1)
HGB BLD-MCNC: 13.1 G/DL (ref 11.5–15.4)
MCH RBC QN AUTO: 29.2 PG (ref 26.8–34.3)
MCHC RBC AUTO-ENTMCNC: 33.2 G/DL (ref 31.4–37.4)
MCV RBC AUTO: 88 FL (ref 82–98)
PLATELET # BLD AUTO: 266 THOUSANDS/UL (ref 149–390)
PMV BLD AUTO: 10.3 FL (ref 8.9–12.7)
POTASSIUM SERPL-SCNC: 4 MMOL/L (ref 3.5–5.3)
RBC # BLD AUTO: 4.49 MILLION/UL (ref 3.81–5.12)
SODIUM SERPL-SCNC: 137 MMOL/L (ref 135–147)
WBC # BLD AUTO: 10.1 THOUSAND/UL (ref 4.31–10.16)

## 2024-03-31 PROCEDURE — 85027 COMPLETE CBC AUTOMATED: CPT | Performed by: INTERNAL MEDICINE

## 2024-03-31 PROCEDURE — 99239 HOSP IP/OBS DSCHRG MGMT >30: CPT

## 2024-03-31 PROCEDURE — 80048 BASIC METABOLIC PNL TOTAL CA: CPT | Performed by: INTERNAL MEDICINE

## 2024-03-31 RX ORDER — METHOCARBAMOL 750 MG/1
750 TABLET, FILM COATED ORAL EVERY 6 HOURS PRN
Qty: 20 TABLET | Refills: 0 | Status: SHIPPED | OUTPATIENT
Start: 2024-03-31 | End: 2024-04-03 | Stop reason: ALTCHOICE

## 2024-03-31 RX ORDER — OXYCODONE HYDROCHLORIDE 5 MG/1
5 TABLET ORAL EVERY 4 HOURS PRN
Qty: 10 TABLET | Refills: 0 | Status: SHIPPED | OUTPATIENT
Start: 2024-03-31

## 2024-03-31 RX ORDER — GABAPENTIN 100 MG/1
100 CAPSULE ORAL 3 TIMES DAILY
Qty: 90 CAPSULE | Refills: 0 | Status: SHIPPED | OUTPATIENT
Start: 2024-03-31 | End: 2024-04-03

## 2024-03-31 RX ORDER — ACETAMINOPHEN 325 MG/1
975 TABLET ORAL EVERY 8 HOURS SCHEDULED
Qty: 27 TABLET | Refills: 0 | Status: SHIPPED | OUTPATIENT
Start: 2024-03-31 | End: 2024-04-03

## 2024-03-31 RX ADMIN — GABAPENTIN 100 MG: 100 CAPSULE ORAL at 08:51

## 2024-03-31 RX ADMIN — OXYCODONE HYDROCHLORIDE 5 MG: 5 TABLET ORAL at 04:44

## 2024-03-31 RX ADMIN — KETOROLAC TROMETHAMINE 15 MG: 30 INJECTION, SOLUTION INTRAMUSCULAR at 10:11

## 2024-03-31 RX ADMIN — OXYCODONE HYDROCHLORIDE 5 MG: 5 TABLET ORAL at 08:51

## 2024-03-31 RX ADMIN — METHOCARBAMOL TABLETS 750 MG: 750 TABLET, COATED ORAL at 05:07

## 2024-03-31 RX ADMIN — METHOCARBAMOL TABLETS 750 MG: 750 TABLET, COATED ORAL at 11:14

## 2024-03-31 RX ADMIN — ACETAMINOPHEN 975 MG: 325 TABLET, FILM COATED ORAL at 05:07

## 2024-03-31 NOTE — DISCHARGE SUMMARY
[facility]  Discharge- Glenna Lanier 1980, 44 y.o. female MRN: [id number]  Unit/Bed#: -01 Encounter: [id number]  Primary Care Provider: Antonia Castro MD   Date and time admitted to hospital: 3/30/2024  8:56 AM    * Acute right-sided low back pain with right-sided sciatica  Assessment & Plan  Patient presented with worsening right lower back pain that radiates down right leg. History of Sciatica but notes this pain is worse than baseline. Took tylenol and Toradol with minimal relief prior to admission  CT Spine: No acute fracture or subluxation. No significant bony central canal or neural foraminal narrowing.   No red flag symptoms noted  S/p Toradol, Gabapentin, Oxycodone, Valium and IV Morphine in the ED with minimal relief  Continue supportive care  Analgesics  Patient with improved pain, Still 8/10 but with improved mobility/ambulation  Recommend continuing medication regimen upon discharge  Recommend outpatient follow up with PCP and Primary Pain specialist upon discharge    Elevated blood pressure reading  Assessment & Plan  Patient with elevated BP readings in the ED  Per chart review, no formal history of hypertension but elevated BP readings noted on multiple office/Ed visits  Likely pain contributing  BP reviewed and improved  Recommend outpatient follow up with PCP for continued management     Anxiety  Assessment & Plan  Continue supportive care        Medical Problems       Resolved Problems  Date Reviewed: 3/31/2024   None       Discharging Physician / Practitioner: MELODIE Batista  PCP: Antonia Castro MD  Admission Date:   Admission Orders (From admission, onward)       Ordered        03/30/24 1447  Place in Observation  Once                          Discharge Date: 03/31/24    Consultations During Hospital Stay:  None    Procedures Performed:   None    Significant Findings / Test Results:   CT Spine: No acute fracture or subluxation. No significant bony central canal  "or neural foraminal narrowing.     Incidental Findings:   CT Spine: 4.7 cm bronchogenic cyst in the middle mediastinum is stable from 2019.     Test Results Pending at Discharge (will require follow up):   None     Outpatient Tests Requested:  Recommend outpatient follow up with PCP  Recommend outpatient follow up with Pain/Spine Specialist     Complications:  None    Reason for Admission: Back Pain    Hospital Course:   Glenna Lanier is a 44 y.o. female patient with known Sciatica who originally presented to the hospital on 3/30/2024 due to worsening lower back pain that radiates down the right leg. Per H&P, \"Patient stated that she has had sciatica in the past but this pain is worse than normal. Patient stated pain worsened over the past 3-4 days. Patient denies any traumatic events or falls. Patient stated she picked up the trays this morning while at work and her leg became weak and gave out.\"    Upon arrival to the ED, labs generally unremarkable. Vitals with notable hypertension. Patient received Oxycodone, Gabapentin, Valium, IM Toradol and IV Morphine with minimal improvement in pain. CT Spine: No acute fracture or subluxation. No significant bony central canal or neural foraminal narrowing. Patient was admitted and started on multimodal pain regimen with improvement in pain and mobility. Recommend continuing current regimen upon discharge. Recommend close outpatient follow up with PCP and Primary Pain specialist for further management of continued sciatica.    Discussed plan with patient at bedside, all questions were answered.   Please see above list of diagnoses and related plan for additional information.     Condition at Discharge: stable    Discharge Day Visit / Exam:   Subjective:  Patient stated she is feeling okay today. Patient stating pain is improved but still an 8/10. Patient stating she no longer has any pain with ambulation. Patient requesting increased dosing of Robaxin upon discharge. " "Patient denies any chest pain, shortness of breath or abdominal pain.     Vitals: Blood Pressure: 141/73 (03/31/24 0758)  Pulse: 85 (03/31/24 0758)  Temperature: (!) 96.6 °F (35.9 °C) (03/31/24 0758)  Temp Source: Tympanic (03/31/24 0758)  Respirations: 20 (03/31/24 0758)  Height: 5' 3\" (160 cm) (03/30/24 2030)  Weight - Scale: 87.1 kg (192 lb) (03/30/24 1713)  SpO2: 92 % (03/31/24 0758)  Exam:   Physical Exam  Vitals and nursing note reviewed.   Constitutional:       General: She is not in acute distress.     Appearance: Normal appearance.   HENT:      Head: Normocephalic.      Nose: Nose normal. No congestion.      Mouth/Throat:      Mouth: Mucous membranes are moist.      Pharynx: Oropharynx is clear.   Cardiovascular:      Rate and Rhythm: Normal rate and regular rhythm.      Pulses: Normal pulses.      Heart sounds: No murmur heard.  Pulmonary:      Effort: Pulmonary effort is normal. No respiratory distress.      Breath sounds: Normal breath sounds.   Abdominal:      General: Bowel sounds are normal. There is no distension.      Palpations: Abdomen is soft.      Tenderness: There is no abdominal tenderness.   Musculoskeletal:         General: Normal range of motion.      Cervical back: Normal range of motion.      Right lower leg: No edema.      Left lower leg: No edema.   Skin:     General: Skin is warm and dry.      Capillary Refill: Capillary refill takes less than 2 seconds.   Neurological:      General: No focal deficit present.      Mental Status: She is alert and oriented to person, place, and time. Mental status is at baseline.   Psychiatric:         Mood and Affect: Mood is anxious.         Speech: Speech normal.         Behavior: Behavior is cooperative.          Discussion with Family: Patient declined call to .     Discharge instructions/Information to patient and family:   See after visit summary for information provided to patient and family.      Provisions for Follow-Up Care:  See " after visit summary for information related to follow-up care and any pertinent home health orders.      Mobility at time of Discharge:   Basic Mobility Inpatient Raw Score: 18  JH-HLM Goal: 6: Walk 10 steps or more  JH-HLM Achieved: 7: Walk 25 feet or more  HLM Goal achieved. Continue to encourage appropriate mobility.     Disposition:   Home    Planned Readmission: No     Discharge Statement:  I spent 60 minutes discharging the patient. This time was spent on the day of discharge. I had direct contact with the patient on the day of discharge. Greater than 50% of the total time was spent examining patient, answering all patient questions, arranging and discussing plan of care with patient as well as directly providing post-discharge instructions.  Additional time then spent on discharge activities.    Discharge Medications:  See after visit summary for reconciled discharge medications provided to patient and/or family.      **Please Note: This note may have been constructed using a voice recognition system**

## 2024-03-31 NOTE — ASSESSMENT & PLAN NOTE
Patient presented with worsening right lower back pain that radiates down right leg. History of Sciatica but notes this pain is worse than baseline. Took tylenol and Toradol with minimal relief prior to admission  CT Spine: No acute fracture or subluxation. No significant bony central canal or neural foraminal narrowing.   No red flag symptoms noted  S/p Toradol, Gabapentin, Oxycodone, Valium and IV Morphine in the ED with minimal relief  Continue supportive care  Analgesics  Patient with improved pain, Still 8/10 but with improved mobility/ambulation  Recommend continuing medication regimen upon discharge  Recommend outpatient follow up with PCP and Primary Pain specialist upon discharge

## 2024-03-31 NOTE — UTILIZATION REVIEW
Initial Clinical Review    Admission: Date/Time/Statement:   Admission Orders (From admission, onward)       Ordered        03/30/24 1447  Place in Observation  Once                          Orders Placed This Encounter   Procedures    Place in Observation     Standing Status:   Standing     Number of Occurrences:   1     Order Specific Question:   Level of Care     Answer:   Med Surg [16]     ED Arrival Information       Expected   -    Arrival   3/30/2024 08:56    Acuity   Urgent              Means of arrival   Ambulance    Escorted by   [facility]    Service   Hospitalist    Admission type   Emergency              Arrival complaint   leg pain             Chief Complaint   Patient presents with    Leg Pain     Pt presents to ed for sciatica pain, has a hx of it. Pain goes from r lower back down into the right leg. Pt took 10mg of toradol at home, did not take robaxin bc she was at work. Denies cp denies sob. No other complaints       Initial Presentation: 44 y.o. female presented to ED via EMS as observation for right sciatica pain. Past medical history significant for sciatica senting to the emergency department today for right-sided back pain that radiates to her right buttock and to the right lower extremity. It has been worsening over the past few days. She took Toradol without relief.She notes it is a shooting pain that runs down the leg. She has no associated bowel incontinence, bladder incontinence, or saddle anesthesia. On exam he patient has right-sided paralumbar muscular back pain positive right-sided straight leg raise; she appears uncomfortable on examination Negative Homans' sign bilaterally  5 out of 5 strength to the bilateral lower extremities Normal sensation to the bilateral lower extremities. Plan aqua k pad q 1 hrs PT/OT pain medication as needed and supportive care.    Date:     ED Triage Vitals   Temperature Pulse Respirations Blood Pressure SpO2   03/30/24 0905 03/30/24 0905 03/30/24 0905  03/30/24 0905 03/30/24 0905   97.7 °F (36.5 °C) 91 20 (!) 186/110 100 %      Temp Source Heart Rate Source Patient Position - Orthostatic VS BP Location FiO2 (%)   03/30/24 0905 03/30/24 0905 03/30/24 0905 03/30/24 0905 --   Oral Monitor Lying Left arm       Pain Score       03/30/24 0923       8          Wt Readings from Last 1 Encounters:   03/30/24 87.1 kg (192 lb)     Additional Vital Signs:       Date/Time Temp Pulse Resp BP MAP (mmHg) SpO2 O2 Device Patient Position - Orthostatic VS   03/31/24 0758 96.6 °F (35.9 °C) Abnormal  85 20 141/73 -- 92 % None (Room air) Lying   03/30/24 2332 98 °F (36.7 °C) 83 17 146/84 105 96 % None (Room air) Lying   03/30/24 2030 98.5 °F (36.9 °C) 81 17 147/93 130 97 % None (Room air) Lying   03/30/24 1604 98.1 °F (36.7 °C) 68 18 163/111 Abnormal  128 95 % None (Room air) Lying   03/30/24 1210 -- 84 19 174/92 Abnormal  -- 99 % None (Room air) --       Pertinent Labs/Diagnostic Test Results:   CT spine thoracic & lumbar wo contrast   Final Result by Eldon Clemente MD (03/30 1440)         1. No acute fracture or subluxation.   2. No significant bony central canal or neural foraminal narrowing.   3. 4.7 cm bronchogenic cyst in the middle mediastinum is stable from 2019.            Workstation performed: [id number]         XR spine lumbar 2 or 3 views injury   Final Result by Justo Muñiz MD (03/31 0859)      No acute osseous abnormality.      Degenerative changes as described.         Workstation performed: [id number]               Results from last 7 days   Lab Units 03/31/24  0431 03/30/24  1210   WBC Thousand/uL 10.10 5.12   HEMOGLOBIN g/dL 13.1 13.8   HEMATOCRIT % 39.4 41.7   PLATELETS Thousands/uL 266 239   NEUTROS ABS Thousands/µL  --  2.88         Results from last 7 days   Lab Units 03/31/24  0431 03/30/24  1210   SODIUM mmol/L 137 138   POTASSIUM mmol/L 4.0 3.6   CHLORIDE mmol/L 102 103   CO2 mmol/L 26 28   ANION GAP mmol/L 9 7   BUN mg/dL 14 11   CREATININE mg/dL 0.84  0.91   EGFR ml/min/1.73sq m 84 76   CALCIUM mg/dL 9.3 9.0             Results from last 7 days   Lab Units 24  0431 24  1210   GLUCOSE RANDOM mg/dL 111 84     D Treatment:   Medication Administration from 2024 0856 to 2024 1543         Date/Time Order Dose Route Action     2024 0953 EDT ketorolac (TORADOL) injection 15 mg 15 mg Intramuscular Given     2024 0953 EDT oxyCODONE (ROXICODONE) IR tablet 5 mg 5 mg Oral Given     2024 1054 EDT gabapentin (NEURONTIN) capsule 100 mg 100 mg Oral Given     2024 1054 EDT predniSONE tablet 40 mg 40 mg Oral Given     2024 1054 EDT diazepam (VALIUM) tablet 2 mg 2 mg Oral Given     2024 1213 EDT morphine injection 4 mg 4 mg Intravenous Given     2024 1213 EDT ondansetron (ZOFRAN) injection 4 mg 4 mg Intravenous Given          Past Medical History:   Diagnosis Date    Fetal growth problem suspected but not found     RESOLVED: 3/1/17    First trimester bleeding     RESOLVED:3/1/17    Granulation tissue of vaginal cuff 2018    Iron deficiency anemia due to chronic blood loss 2018    Miscarriage     Missed      RESOLVED:3/1/17    Poor fetal growth affecting management of mother     RESOLVED:3/1/17    Recurrent pregnancy loss, antepartum condition or complication     RESOLVED:3/1/17    Spontaneous      RESOLVED:3/1/17    Supraventricular tachycardia     by history     Present on Admission:   Anxiety   Elevated blood pressure reading      Admitting Diagnosis: Right sided sciatica [M54.31]  Age/Sex: 44 y.o. female    Admission Orders:  Aqua K  q 1 hrs   PT/OT  SCD        Scheduled Medications:  acetaminophen, 975 mg, Oral, Q8H GILBERT  docusate sodium, 100 mg, Oral, BID  enoxaparin, 40 mg, Subcutaneous, Daily  gabapentin, 100 mg, Oral, TID  methocarbamol, 750 mg, Oral, Q6H GILBERT  polyethylene glycol, 17 g, Oral, Daily  senna, 8.8 mg, Oral, Daily      Continuous IV Infusions:     PRN  Meds:  acetaminophen, 650 mg, Oral, Q6H PRN  hydrALAZINE, 5 mg, Intravenous, Q6H PRN  HYDROmorphone, 0.2 mg, Intravenous, Q2H PRN  X 1  ketorolac, 15 mg, Intravenous, Q6H PRN  naloxone, 0.04 mg, Intravenous, Q1MIN PRN  ondansetron, 4 mg, Intravenous, Q4H PRN  oxyCODONE, 2.5 mg, Oral, Q4H PRN   Or  oxyCODONE, 5 mg, Oral, Q4H PRN   X 4        None    Network Utilization Review Department  ATTENTION: Please call with any questions or concerns to [phone] and carefully listen to the prompts so that you are directed to the right person. All voicemails are confidential.   For Discharge needs, contact Care Management DC Support Team at [phone] opt. 2  Send all requests for admission clinical reviews, approved or denied determinations and any other requests to dedicated fax number below belonging to the campus where the patient is receiving treatment. List of dedicated fax numbers for the Facilities:  FACILITY NAME UR FAX NUMBER   ADMISSION DENIALS (Administrative/Medical Necessity) [phone]   DISCHARGE SUPPORT TEAM (NETWORK) [phone]   PARENT CHILD HEALTH (Maternity/NICU/Pediatrics) [phone]   [facility] [phone]   [facility] [phone]   [facility] [phone]   [facility] [phone]   [facility] [phone]   [facility] [phone]   [facility] [phone]   [facility] [phone]   [facility] [phone]   [facility] [phone]   [facility] [phone]   [facility] [place] [phone]

## 2024-03-31 NOTE — ASSESSMENT & PLAN NOTE
Patient with elevated BP readings in the ED  Per chart review, no formal history of hypertension but elevated BP readings noted on multiple office/Ed visits  Likely pain contributing  BP reviewed and improved  Recommend outpatient follow up with PCP for continued management

## 2024-03-31 NOTE — PLAN OF CARE
Problem: SAFETY ADULT  Goal: Patient will remain free of falls  Description: INTERVENTIONS:  - Educate patient/family on patient safety including physical limitations  - Instruct patient to call for assistance with activity   - Consult OT/PT to assist with strengthening/mobility   - Keep Call bell within reach  - Keep bed low and locked with side rails adjusted as appropriate  - Keep care items and personal belongings within reach  - Initiate and maintain comfort rounds  - Make Fall Risk Sign visible to staff  - Apply yellow socks and bracelet for high fall risk patients  - Consider moving patient to room near nurses station  Outcome: Progressing  Goal: Maintain or return to baseline ADL function  Description: INTERVENTIONS:  -  Assess patient's ability to carry out ADLs; assess patient's baseline for ADL function and identify physical deficits which impact ability to perform ADLs (bathing, care of mouth/teeth, toileting, grooming, dressing, etc.)  - Assess/evaluate cause of self-care deficits   - Assess range of motion  - Assess patient's mobility; develop plan if impaired  - Assess patient's need for assistive devices and provide as appropriate  - Encourage maximum independence but intervene and supervise when necessary  - Involve family in performance of ADLs  - Assess for home care needs following discharge   - Consider OT consult to assist with ADL evaluation and planning for discharge  - Provide patient education as appropriate  Outcome: Progressing  Goal: Maintains/Returns to pre admission functional level  Description: INTERVENTIONS:  - Perform AM-PAC 6 Click Basic Mobility/ Daily Activity assessment daily.  - Set and communicate daily mobility goal to care team and patient/family/caregiver.   - Collaborate with rehabilitation services on mobility goals if consulted  - Out of bed for toileting  - Record patient progress and toleration of activity level   Outcome: Progressing     Problem: PAIN -  ADULT  Goal: Verbalizes/displays adequate comfort level or baseline comfort level  Description: Interventions:  - Encourage patient to monitor pain and request assistance  - Assess pain using appropriate pain scale  - Administer analgesics based on type and severity of pain and evaluate response  - Implement non-pharmacological measures as appropriate and evaluate response  - Consider cultural and social influences on pain and pain management  - Notify physician/advanced practitioner if interventions unsuccessful or patient reports new pain  Outcome: Progressing

## 2024-04-01 ENCOUNTER — TRANSITIONAL CARE MANAGEMENT (OUTPATIENT)
Dept: FAMILY MEDICINE CLINIC | Facility: CLINIC | Age: 44
End: 2024-04-01

## 2024-04-03 ENCOUNTER — OFFICE VISIT (OUTPATIENT)
Dept: FAMILY MEDICINE CLINIC | Facility: CLINIC | Age: 44
End: 2024-04-03
Payer: COMMERCIAL

## 2024-04-03 VITALS
HEART RATE: 82 BPM | SYSTOLIC BLOOD PRESSURE: 130 MMHG | TEMPERATURE: 97.8 F | WEIGHT: 197.4 LBS | BODY MASS INDEX: 34.98 KG/M2 | HEIGHT: 63 IN | OXYGEN SATURATION: 98 % | RESPIRATION RATE: 16 BRPM | DIASTOLIC BLOOD PRESSURE: 78 MMHG

## 2024-04-03 DIAGNOSIS — M54.31 RIGHT SIDED SCIATICA: ICD-10-CM

## 2024-04-03 DIAGNOSIS — F41.9 ANXIETY: ICD-10-CM

## 2024-04-03 DIAGNOSIS — M54.41 ACUTE RIGHT-SIDED LOW BACK PAIN WITH RIGHT-SIDED SCIATICA: Primary | ICD-10-CM

## 2024-04-03 DIAGNOSIS — G43.009 MIGRAINE WITHOUT AURA AND WITHOUT STATUS MIGRAINOSUS, NOT INTRACTABLE: ICD-10-CM

## 2024-04-03 DIAGNOSIS — G43.701 CHRONIC MIGRAINE WITHOUT AURA WITH STATUS MIGRAINOSUS, NOT INTRACTABLE: ICD-10-CM

## 2024-04-03 PROCEDURE — 99496 TRANSJ CARE MGMT HIGH F2F 7D: CPT | Performed by: FAMILY MEDICINE

## 2024-04-03 RX ORDER — GABAPENTIN 300 MG/1
300 CAPSULE ORAL 3 TIMES DAILY
Qty: 90 CAPSULE | Refills: 0 | Status: SHIPPED | OUTPATIENT
Start: 2024-04-03

## 2024-04-03 RX ORDER — TIZANIDINE HYDROCHLORIDE 2 MG/1
2 CAPSULE, GELATIN COATED ORAL 3 TIMES DAILY PRN
Qty: 30 CAPSULE | Refills: 0 | Status: SHIPPED | OUTPATIENT
Start: 2024-04-03

## 2024-04-03 RX ORDER — RIZATRIPTAN BENZOATE 10 MG/1
10 TABLET ORAL ONCE AS NEEDED
Qty: 9 TABLET | Refills: 0 | Status: SHIPPED | OUTPATIENT
Start: 2024-04-03

## 2024-04-03 NOTE — PROGRESS NOTES
Assessment & Plan     1. Acute right-sided low back pain with right-sided sciatica  Assessment & Plan:  Continued to have issues  Increased gabapentin to 300 mg TID  Changed muscle relaxer to tizanidine     Orders:  -     Ambulatory referral to Spine & Pain Management; Future  -     Ambulatory Referral to Chiropractic; Future    2. Right sided sciatica  -     gabapentin (NEURONTIN) 300 mg capsule; Take 1 capsule (300 mg total) by mouth 3 (three) times a day  -     TiZANidine (ZANAFLEX) 2 MG capsule; Take 1 capsule (2 mg total) by mouth 3 (three) times a day as needed for muscle spasms  -     Ambulatory referral to Spine & Pain Management; Future  -     Ambulatory Referral to Chiropractic; Future    3. Anxiety  Assessment & Plan:  Coping well      4. Migraine without aura and without status migrainosus, not intractable  Assessment & Plan:  Stable         5. Chronic migraine without aura with status migrainosus, not intractable  Comments:  magnesium and B2 supplements recommended   Orders:  -     rizatriptan (MAXALT) 10 mg tablet; Take 1 tablet (10 mg total) by mouth once as needed for migraine for up to 1 dose May repeat in 2 hours if needed         Subjective     Transitional Care Management Review:   Glenna Lanier is a 44 y.o. female here for TCM follow up.     During the TCM phone call patient stated:  TCM Call     Date and time call was made  4/1/2024 10:28 AM    Patient was hospitial[facility] at  [facility]    Date of Admission  03/30/24    Date of discharge  03/31/24    Diagnosis  Acute right-sided low back pain with right-sided sciatica    Disposition  Home    Were the patients medications reviewed and updated  No      TCM Call     Should patient be enrolled in anticoag monitoring?  No    Scheduled for follow up?  Yes    Did you obtain your prescribed medications  Yes    Do you need help managing your prescriptions or medications  No    Is transportation to your appointment needed  No    I have advised the  "patient to call PCP with any new or worsening symptoms  Caro Ward,     Living Arrangements  Family members    Are you recieving any outpatient services  No    Are you recieving home care services  No    Are you using any community resources  No    Current waiver services  No    Have you fallen in the last 12 months  No    Interperter language line needed  No        HPI  Here for hospital follow up  44 years old with h/o chronic low back pain- seen by dr. Sade Patton pain specialist oct 2023 and EMG tests were normal and epidural injection didn't help. Belle dos pack helped  Was admitted to the hospital on 3/30/24 for acute right sided low back pain with sciatica  CT Spine showed No acute fracture or subluxation. No significant bony central canal or neural foraminal narrowing.   Physical therapy didn't help   Continued to have right leg pain 8/10 pain, feels like charito horse on her right calf  Taking gabapentin, robaxin and tylenol currently which are not helping   Sitting makes it better but laying down and walking makes it worse- dragging her leg at work      Review of Systems   Constitutional: Negative.    HENT: Negative.     Respiratory: Negative.     Endocrine: Negative.    Genitourinary: Negative.    Musculoskeletal:  Positive for arthralgias and myalgias. Negative for gait problem.   Neurological:  Negative for seizures and numbness.   Hematological: Negative.    Psychiatric/Behavioral: Negative.         Objective     /78 (BP Location: Left arm, Patient Position: Sitting, Cuff Size: Standard)   Pulse 82   Temp 97.8 °F (36.6 °C) (Tympanic)   Resp 16   Ht 5' 3\" (1.6 m)   Wt 89.5 kg (197 lb 6.4 oz)   LMP  (LMP Unknown)   SpO2 98%   BMI 34.97 kg/m²      Physical Exam  Vitals and nursing note reviewed.   Constitutional:       Appearance: Normal appearance.   Cardiovascular:      Rate and Rhythm: Normal rate and regular rhythm.      Pulses: Normal pulses.      Heart sounds: Normal heart " sounds.   Pulmonary:      Effort: Pulmonary effort is normal.      Breath sounds: Normal breath sounds.   Neurological:      General: No focal deficit present.      Mental Status: She is alert and oriented to person, place, and time.       Medications have been reviewed by provider in current encounter    Antonia Castro MD

## 2024-04-03 NOTE — ASSESSMENT & PLAN NOTE
Continued to have issues  Increased gabapentin to 300 mg TID  Changed muscle relaxer to tizanidine    Hydroxychloroquine Pregnancy And Lactation Text: This medication has been shown to cause fetal harm but it isn't assigned a Pregnancy Risk Category. There are small amounts excreted in breast milk.

## 2024-04-12 ENCOUNTER — HOSPITAL ENCOUNTER (EMERGENCY)
Facility: HOSPITAL | Age: 44
Discharge: HOME | End: 2024-04-12
Attending: INTERNAL MEDICINE
Payer: COMMERCIAL

## 2024-04-12 VITALS
HEIGHT: 63 IN | SYSTOLIC BLOOD PRESSURE: 170 MMHG | OXYGEN SATURATION: 100 % | WEIGHT: 194 LBS | BODY MASS INDEX: 34.38 KG/M2 | HEART RATE: 69 BPM | DIASTOLIC BLOOD PRESSURE: 112 MMHG | TEMPERATURE: 98.1 F | RESPIRATION RATE: 18 BRPM

## 2024-04-12 DIAGNOSIS — T78.40XA ALLERGIC REACTION, INITIAL ENCOUNTER: Primary | ICD-10-CM

## 2024-04-12 PROCEDURE — 96375 TX/PRO/DX INJ NEW DRUG ADDON: CPT

## 2024-04-12 PROCEDURE — 99284 EMERGENCY DEPT VISIT MOD MDM: CPT | Performed by: INTERNAL MEDICINE

## 2024-04-12 PROCEDURE — 96374 THER/PROPH/DIAG INJ IV PUSH: CPT

## 2024-04-12 PROCEDURE — 99282 EMERGENCY DEPT VISIT SF MDM: CPT

## 2024-04-12 RX ORDER — DIPHENHYDRAMINE HYDROCHLORIDE 50 MG/ML
50 INJECTION INTRAMUSCULAR; INTRAVENOUS ONCE
Status: COMPLETED | OUTPATIENT
Start: 2024-04-12 | End: 2024-04-12

## 2024-04-12 RX ORDER — DIPHENHYDRAMINE HCL 25 MG
25 TABLET ORAL EVERY 6 HOURS
Qty: 20 TABLET | Refills: 0 | Status: SHIPPED | OUTPATIENT
Start: 2024-04-12

## 2024-04-12 RX ORDER — FAMOTIDINE 10 MG/ML
20 INJECTION, SOLUTION INTRAVENOUS ONCE
Status: COMPLETED | OUTPATIENT
Start: 2024-04-12 | End: 2024-04-12

## 2024-04-12 RX ORDER — METHYLPREDNISOLONE SODIUM SUCCINATE 125 MG/2ML
125 INJECTION, POWDER, LYOPHILIZED, FOR SOLUTION INTRAMUSCULAR; INTRAVENOUS ONCE
Status: COMPLETED | OUTPATIENT
Start: 2024-04-12 | End: 2024-04-12

## 2024-04-12 RX ORDER — PREDNISONE 50 MG/1
50 TABLET ORAL DAILY
Qty: 5 TABLET | Refills: 0 | Status: SHIPPED | OUTPATIENT
Start: 2024-04-12 | End: 2024-04-17

## 2024-04-12 RX ADMIN — DIPHENHYDRAMINE HYDROCHLORIDE 50 MG: 50 INJECTION, SOLUTION INTRAMUSCULAR; INTRAVENOUS at 20:20

## 2024-04-12 RX ADMIN — FAMOTIDINE 20 MG: 10 INJECTION, SOLUTION INTRAVENOUS at 20:20

## 2024-04-12 RX ADMIN — METHYLPREDNISOLONE SODIUM SUCCINATE 125 MG: 125 INJECTION, POWDER, FOR SOLUTION INTRAMUSCULAR; INTRAVENOUS at 20:19

## 2024-04-13 NOTE — DISCHARGE INSTRUCTIONS
Take medication as prescribed.  Follow-up with your PMD.  What she thinks you are eating or drinking on you close causing this allergies.

## 2024-04-13 NOTE — ED PROVIDER NOTES
History  Chief Complaint   Patient presents with    Rash     Generalized itchy rash x days, denies new exposures     This is a 44 years old came for having itching and hives.  Patient has hives rash on the upper and lower extremities.  Patient keep itching for 2 days.  Patient denies any intake of new medication or change of her detergents.  Patient takes no medication for the itching.  Patient denies SOB, wheezing.  Patient has no history of asthma.  Patient has no other complaints.        Prior to Admission Medications   Prescriptions Last Dose Informant Patient Reported? Taking?   TiZANidine (ZANAFLEX) 2 MG capsule   No No   Sig: Take 1 capsule (2 mg total) by mouth 3 (three) times a day as needed for muscle spasms   gabapentin (NEURONTIN) 300 mg capsule   No No   Sig: Take 1 capsule (300 mg total) by mouth 3 (three) times a day   ketorolac (TORADOL) 10 mg tablet   No No   Sig: Take 1 tablet (10 mg total) by mouth every 6 (six) hours as needed for severe pain   Patient not taking: Reported on 4/3/2024   meloxicam (MOBIC) 15 mg tablet   Yes No   Sig: Take 15 mg by mouth daily as needed   Patient not taking: Reported on 3/30/2024   oxyCODONE (ROXICODONE) 5 immediate release tablet   No No   Sig: Take 1 tablet (5 mg total) by mouth every 4 (four) hours as needed for severe pain Max Daily Amount: 30 mg   rizatriptan (MAXALT) 10 mg tablet   No No   Sig: Take 1 tablet (10 mg total) by mouth once as needed for migraine for up to 1 dose May repeat in 2 hours if needed   triamcinolone (KENALOG) 0.1 % ointment   No No   Sig: Apply topically 2 (two) times a day Do not apply on face, groin. Overuse can thin skin.   Patient not taking: Reported on 3/30/2024      Facility-Administered Medications: None       Past Medical History:   Diagnosis Date    Fetal growth problem suspected but not found     RESOLVED: 3/1/17    First trimester bleeding     RESOLVED:3/1/17    Granulation tissue of vaginal cuff 12/24/2018    Iron  deficiency anemia due to chronic blood loss 2018    Miscarriage     Missed      RESOLVED:3/1/17    Poor fetal growth affecting management of mother     RESOLVED:3/1/17    Recurrent pregnancy loss, antepartum condition or complication     RESOLVED:3/1/17    Spontaneous      RESOLVED:3/1/17    Supraventricular tachycardia     by history       Past Surgical History:   Procedure Laterality Date    COLONOSCOPY      DILATION AND CURETTAGE OF UTERUS      ,     DILATION AND CURETTAGE OF UTERUS      FOR SPONTANEOUS ; X5 [id number]    HYSTERECTOMY  2018    Dr Singh    INDUCED   2010    LAPAROSCOPIC TUBAL LIGATION Right 2016    Procedure: LAPAROSCOPIC TUBAL LIGATION ;  Surgeon: Jacque Singh MD;  [facility];  Service:     MT LAPS W/VAG HYSTERECT 250 GM/&RMVL TUBE&/OVARIES N/A 2018    Procedure: HYSTERECTOMY LAPAROSCOPIC ASSISTED VAGINAL (LAVH); RIGHT LAPAROSCOPIC SALPINGECTOMY;  Surgeon: Jacque Singh MD;  [facility];  Service: Gynecology    MT TX MISSED  FIRST TRIMESTER SURGICAL N/A 2016    Procedure: DILATATION AND EVACUATION (D&E) (MISSED AB);  Surgeon: Jacque Singh MD;  [facility];  Service: Gynecology    SALPINGOOPHORECTOMY Left     TUBAL LIGATION Right        Family History   Problem Relation Age of Onset    Arthritis Mother     Depression Mother     Hypertension Mother     Cancer Father     Breast cancer Neg Hx     Ovarian cancer Neg Hx     Colon cancer Neg Hx      I have reviewed and agree with the history as documented.    E-Cigarette/Vaping    E-Cigarette Use Never User      E-Cigarette/Vaping Substances    Nicotine No     THC No     CBD No     Flavoring No     Other No     Unknown No      Social History     Tobacco Use    Smoking status: Never    Smokeless tobacco: Never   Vaping Use    Vaping status: Never Used   Substance Use Topics    Alcohol use: Never    Drug use: Never       Review of Systems    Constitutional:  Negative for fatigue and fever.   HENT:  Negative for congestion, ear discharge, ear pain, rhinorrhea, sinus pressure, sinus pain, sneezing, sore throat, tinnitus and trouble swallowing.    Respiratory:  Negative for cough, shortness of breath and wheezing.    Cardiovascular:  Negative for chest pain and palpitations.   Gastrointestinal:  Negative for abdominal pain, diarrhea, nausea and vomiting.   Genitourinary:  Negative for difficulty urinating, dysuria, flank pain and frequency.   Musculoskeletal:  Negative for back pain, myalgias, neck pain and neck stiffness.   Skin:  Positive for rash. Negative for color change and pallor.   Neurological:  Negative for dizziness, light-headedness and headaches.   Psychiatric/Behavioral:  Negative for agitation and behavioral problems.        Physical Exam  Physical Exam  Vitals and nursing note reviewed.   Constitutional:       General: She is not in acute distress.     Appearance: Normal appearance. She is well-developed. She is not ill-appearing, toxic-appearing or diaphoretic.   HENT:      Head: Normocephalic and atraumatic.      Right Ear: Ear canal normal.      Left Ear: Ear canal normal.      Nose: Nose normal. No congestion or rhinorrhea.      Mouth/Throat:      Pharynx: No oropharyngeal exudate or posterior oropharyngeal erythema.   Eyes:      Extraocular Movements: Extraocular movements intact.      Pupils: Pupils are equal, round, and reactive to light.   Neck:      Vascular: No carotid bruit.   Cardiovascular:      Rate and Rhythm: Normal rate and regular rhythm.      Heart sounds: Normal heart sounds. No murmur heard.     No friction rub. No gallop.   Pulmonary:      Effort: Pulmonary effort is normal. No respiratory distress.      Breath sounds: Normal breath sounds. No wheezing, rhonchi or rales.   Chest:      Chest wall: No tenderness.   Abdominal:      General: Bowel sounds are normal.      Palpations: Abdomen is soft.   Musculoskeletal:          General: No swelling, tenderness or deformity. Normal range of motion.      Cervical back: Normal range of motion and neck supple. No rigidity or tenderness.      Right lower leg: No edema.      Left lower leg: No edema.   Lymphadenopathy:      Cervical: No cervical adenopathy.   Skin:     General: Skin is warm and dry.      Coloration: Skin is not jaundiced or pale.      Findings: Rash present. No bruising, erythema or lesion.      Comments: Patient has hives on the upper and lower extremities with marked itching.  Hives are reddish in color and raised about the skin.  And very itchy   Neurological:      Mental Status: She is alert and oriented to person, place, and time.   Psychiatric:         Behavior: Behavior normal.         Vital Signs  ED Triage Vitals   Temperature Pulse Respirations Blood Pressure SpO2   04/12/24 1942 04/12/24 1941 04/12/24 1941 04/12/24 1942 04/12/24 1941   98.1 °F (36.7 °C) 69 18 (!) 170/112 100 %      Temp Source Heart Rate Source Patient Position - Orthostatic VS BP Location FiO2 (%)   04/12/24 1942 04/12/24 1941 04/12/24 1941 04/12/24 1941 --   Oral Monitor Sitting Left arm       Pain Score       04/12/24 1942       No Pain           Vitals:    04/12/24 1941 04/12/24 1942   BP:  (!) 170/112   Pulse: 69    Patient Position - Orthostatic VS: Sitting Sitting         Visual Acuity      ED Medications  Medications   diphenhydrAMINE (BENADRYL) injection 50 mg (50 mg Intravenous Given 4/12/24 2020)   Famotidine (PF) (PEPCID) injection 20 mg (20 mg Intravenous Given 4/12/24 2020)   methylPREDNISolone sodium succinate (Solu-MEDROL) injection 125 mg (125 mg Intravenous Given 4/12/24 2019)       Diagnostic Studies  Results Reviewed       None                   No orders to display              Procedures  Procedures         ED Course                                             Medical Decision Making  This is 44y old came for having hives over upper and lower extremities .  Patient has  itching for 2 days.  Patient denies any SOB.  Patient denies wheezing.  Patient has no history of asthma.  Patient denies taking new medication or change of her diet patient denies any thing which can elicit this allergic reaction.  Physical exam significant for these hives on the upper and lower extremities.  Patient did receive Solu-Medrol, Benadryl, Pepcid IV started to feel better and there is marked decrease of these hives.  Patient felt better.  Patient with go home.  Discharge patient on prednisone 50 mg once daily for 5 days and Benadryl 25 mg every 6 hours.  And follow-up with her PMD.  All question concerns of patient have been fully addressed.    Risk  OTC drugs.  Prescription drug management.             Disposition  Final diagnoses:   Allergic reaction, initial encounter     Time reflects when diagnosis was documented in both MDM as applicable and the Disposition within this note       Time User Action Codes Description Comment    4/12/2024  8:20 PM Varinder Daley Add [T78.40XA] Allergic reaction, initial encounter           ED Disposition       ED Disposition   Discharge    Condition   Stable    Date/Time   Fri Apr 12, 2024  9:24 PM    Comment   Glenna Lanier discharge to home/self care.                   Follow-up Information       Follow up With Specialties Details Why Contact Info    Antonia Castro MD Family Medicine In 3 days  [address] [place] [address]  [phone]              Discharge Medication List as of 4/12/2024  9:25 PM        START taking these medications    Details   diphenhydrAMINE (BENADRYL) 25 mg tablet Take 1 tablet (25 mg total) by mouth every 6 (six) hours, Starting Fri 4/12/2024, Normal      predniSONE 50 mg tablet Take 1 tablet (50 mg total) by mouth daily for 5 days, Starting Fri 4/12/2024, Until Wed 4/17/2024, Normal           CONTINUE these medications which have NOT CHANGED    Details   gabapentin (NEURONTIN) 300 mg capsule Take 1 capsule (300 mg  total) by mouth 3 (three) times a day, Starting Wed 4/3/2024, Normal      ketorolac (TORADOL) 10 mg tablet Take 1 tablet (10 mg total) by mouth every 6 (six) hours as needed for severe pain, Starting Wed 10/25/2023, Normal      meloxicam (MOBIC) 15 mg tablet Take 15 mg by mouth daily as needed, Starting Tue 8/22/2023, Historical Med      oxyCODONE (ROXICODONE) 5 immediate release tablet Take 1 tablet (5 mg total) by mouth every 4 (four) hours as needed for severe pain Max Daily Amount: 30 mg, Starting Sun 3/31/2024, Normal      rizatriptan (MAXALT) 10 mg tablet Take 1 tablet (10 mg total) by mouth once as needed for migraine for up to 1 dose May repeat in 2 hours if needed, Starting Wed 4/3/2024, Normal      TiZANidine (ZANAFLEX) 2 MG capsule Take 1 capsule (2 mg total) by mouth 3 (three) times a day as needed for muscle spasms, Starting Wed 4/3/2024, Normal      triamcinolone (KENALOG) 0.1 % ointment Apply topically 2 (two) times a day Do not apply on face, groin. Overuse can thin skin., Starting Tue 12/15/2020, Normal             No discharge procedures on file.    PDMP Review         Value Time User    PDMP Reviewed  Yes 3/31/2024 12:11 PM MELODIE Batista            ED Provider  Electronically Signed by             Varinder Daley MD  04/13/24 2567

## 2024-04-23 ENCOUNTER — TELEPHONE (OUTPATIENT)
Age: 44
End: 2024-04-23

## 2024-04-23 NOTE — TELEPHONE ENCOUNTER
Pt called was seen in ER for allergic reaction and was prescribed Prednisone which helped. Pt wants to know if Dr Castro can refill the med until  her appt w Derm 5/9.    Pt states she finished med and rash all over has returned and this was the only that was helping.    Please advise; pt states to leave message if she's unavailable.    predniSONE 50 mg tablet [[id number]]

## 2024-04-24 ENCOUNTER — OFFICE VISIT (OUTPATIENT)
Dept: FAMILY MEDICINE CLINIC | Facility: CLINIC | Age: 44
End: 2024-04-24
Payer: COMMERCIAL

## 2024-04-24 VITALS
DIASTOLIC BLOOD PRESSURE: 90 MMHG | OXYGEN SATURATION: 98 % | RESPIRATION RATE: 16 BRPM | SYSTOLIC BLOOD PRESSURE: 140 MMHG | HEIGHT: 63 IN | HEART RATE: 88 BPM | BODY MASS INDEX: 34.34 KG/M2 | WEIGHT: 193.8 LBS | TEMPERATURE: 98.4 F

## 2024-04-24 DIAGNOSIS — R21 RASH AND NONSPECIFIC SKIN ERUPTION: Primary | ICD-10-CM

## 2024-04-24 DIAGNOSIS — G43.009 MIGRAINE WITHOUT AURA AND WITHOUT STATUS MIGRAINOSUS, NOT INTRACTABLE: ICD-10-CM

## 2024-04-24 DIAGNOSIS — F41.9 ANXIETY: ICD-10-CM

## 2024-04-24 DIAGNOSIS — I10 PRIMARY HYPERTENSION: ICD-10-CM

## 2024-04-24 PROBLEM — R03.0 ELEVATED BLOOD PRESSURE READING: Status: RESOLVED | Noted: 2023-10-23 | Resolved: 2024-04-24

## 2024-04-24 PROCEDURE — 99214 OFFICE O/P EST MOD 30 MIN: CPT | Performed by: FAMILY MEDICINE

## 2024-04-24 RX ORDER — HYDROCHLOROTHIAZIDE 25 MG/1
25 TABLET ORAL DAILY
Qty: 30 TABLET | Refills: 0 | Status: SHIPPED | OUTPATIENT
Start: 2024-04-24

## 2024-04-24 RX ORDER — PERMETHRIN 50 MG/G
CREAM TOPICAL ONCE
Qty: 60 G | Refills: 0 | Status: SHIPPED | OUTPATIENT
Start: 2024-04-24 | End: 2024-04-24

## 2024-04-24 RX ORDER — HYDROXYZINE HYDROCHLORIDE 25 MG/1
25 TABLET, FILM COATED ORAL EVERY 6 HOURS PRN
Qty: 30 TABLET | Refills: 0 | Status: SHIPPED | OUTPATIENT
Start: 2024-04-24

## 2024-04-24 RX ORDER — PREDNISONE 10 MG/1
TABLET ORAL
Qty: 20 TABLET | Refills: 0 | Status: SHIPPED | OUTPATIENT
Start: 2024-04-24

## 2024-04-24 RX ORDER — TRIAMCINOLONE ACETONIDE 5 MG/G
CREAM TOPICAL 2 TIMES DAILY
Qty: 15 G | Refills: 0 | Status: SHIPPED | OUTPATIENT
Start: 2024-04-24

## 2024-04-24 NOTE — PROGRESS NOTES
Name: Glenna Lanier      : 1980      MRN: [id number]  Encounter Provider: Antonia Castro MD  Encounter Date: 2024   Encounter department: [facility]    Assessment & Plan     1. Rash and nonspecific skin eruption  Comments:  dermatitis vs. scabies   to cover both  Orders:  -     predniSONE 10 mg tablet; 4 tabs x 2 days, 3 tabs x 2 days, 2 tabs x 2 days, 1 tab x 2 days  -     hydrOXYzine HCL (ATARAX) 25 mg tablet; Take 1 tablet (25 mg total) by mouth every 6 (six) hours as needed for itching  -     triamcinolone (KENALOG) 0.5 % cream; Apply topically 2 (two) times a day  -     permethrin (ELIMITE) 5 % cream; Apply topically once for 1 dose    2. Primary hypertension  Assessment & Plan:  Elevated for few years now   Not improving   Trial of HCTZ    Orders:  -     hydroCHLOROthiazide 25 mg tablet; Take 1 tablet (25 mg total) by mouth daily    3. Migraine without aura and without status migrainosus, not intractable  Assessment & Plan:  Stable on current meds      4. Anxiety  Assessment & Plan:  Stable               Subjective   Went to ER on 2024 for rash all over started on 4/10/2024  No new detergent , soap,lotion , food    Rash  This is a chronic problem. The current episode started 1 to 4 weeks ago. The problem has been waxing and waning since onset. The rash is diffuse. The rash is characterized by itchiness and dryness. She was exposed to nothing. Associated symptoms include itching. Pertinent negatives include no anorexia, congestion, cough, decreased physical activity, decreased responsiveness, decreased sleep, drinking less, diarrhea, facial edema, fatigue, fever, joint pain, rhinorrhea, shortness of breath, sore throat or vomiting. Past treatments include oral steroids and antihistamine. The treatment provided mild relief. There is no history of allergies, asthma, eczema or varicella.     Review of Systems   Constitutional:  Negative for decreased responsiveness, fatigue and  fever.   HENT:  Negative for congestion, rhinorrhea and sore throat.    Respiratory:  Negative for cough and shortness of breath.    Gastrointestinal:  Negative for anorexia, diarrhea and vomiting.   Musculoskeletal:  Negative for joint pain.   Skin:  Positive for itching and rash.       Past Medical History:   Diagnosis Date   • Fetal growth problem suspected but not found     RESOLVED: 3/1/17   • First trimester bleeding     RESOLVED:3/1/17   • Granulation tissue of vaginal cuff 2018   • Iron deficiency anemia due to chronic blood loss 2018   • Miscarriage    • Missed      RESOLVED:3/1/17   • Poor fetal growth affecting management of mother     RESOLVED:3/1/17   • Recurrent pregnancy loss, antepartum condition or complication     RESOLVED:3/1/17   • Spontaneous      RESOLVED:3/1/17   • Supraventricular tachycardia     by history     Past Surgical History:   Procedure Laterality Date   • COLONOSCOPY     • DILATION AND CURETTAGE OF UTERUS      ,    • DILATION AND CURETTAGE OF UTERUS      FOR SPONTANEOUS ; X5 [id number]   • HYSTERECTOMY  2018    Dr Singh   • INDUCED      • LAPAROSCOPIC TUBAL LIGATION Right 2016    Procedure: LAPAROSCOPIC TUBAL LIGATION ;  Surgeon: Jacque Singh MD;  [facility];  Service:    • SC LAPS W/VAG HYSTERECT 250 GM/&RMVL TUBE&/OVARIES N/A 2018    Procedure: HYSTERECTOMY LAPAROSCOPIC ASSISTED VAGINAL (LAVH); RIGHT LAPAROSCOPIC SALPINGECTOMY;  Surgeon: Jacque Singh MD;  [facility];  Service: Gynecology   • SC TX MISSED  FIRST TRIMESTER SURGICAL N/A 2016    Procedure: DILATATION AND EVACUATION (D&E) (MISSED AB);  Surgeon: Jacque Singh MD;  [facility];  Service: Gynecology   • SALPINGOOPHORECTOMY Left    • TUBAL LIGATION Right      Family History   Problem Relation Age of Onset   • Arthritis Mother    • Depression Mother    • Hypertension Mother    • Cancer Father    • Breast cancer  Neg Hx    • Ovarian cancer Neg Hx    • Colon cancer Neg Hx      Social History     Socioeconomic History   • Marital status: Single     Spouse name: None   • Number of children: None   • Years of education: None   • Highest education level: None   Occupational History   • None   Tobacco Use   • Smoking status: Never   • Smokeless tobacco: Never   Vaping Use   • Vaping status: Never Used   Substance and Sexual Activity   • Alcohol use: Never   • Drug use: Never   • Sexual activity: Yes     Partners: Male   Other Topics Concern   • None   Social History Narrative    FEELS SAFE AT HOME        Checked Rosette     Social Determinants of Health     Financial Resource Strain: Low Risk  (11/13/2023)    Received from [facility]    Overall Financial Resource Strain (CARDIA)    • Difficulty of Paying Living Expenses: Not very hard   Food Insecurity: No Food Insecurity (11/13/2023)    Received from [facility]    Hunger Vital Sign    • Worried About Running Out of Food in the Last Year: Never true    • Ran Out of Food in the Last Year: Never true   Transportation Needs: No Transportation Needs (11/13/2023)    Received from [facility]    PRAPARE - Transportation    • Lack of Transportation (Medical): No    • Lack of Transportation (Non-Medical): No   Physical Activity: Not on file   Stress: Not on file   Social Connections: Not on file   Intimate Partner Violence: Not At Risk (11/13/2023)    Received from [facility]    Humiliation, Afraid, Rape, and Kick questionnaire    • Fear of Current or Ex-Partner: No    • Emotionally Abused: No    • Physically Abused: No    • Sexually Abused: No   Housing Stability: Low Risk  (11/13/2023)    Received from [facility]    Housing Stability Vital Sign    • Unable to Pay for Housing in the Last Year: No    • Number of Places Lived in the Last Year: 1    • Unstable Housing in the Last Year: No     Current  "Outpatient Medications on File Prior to Visit   Medication Sig   • diphenhydrAMINE (BENADRYL) 25 mg tablet Take 1 tablet (25 mg total) by mouth every 6 (six) hours   • diphenhydrAMINE (SOMINEX) 25 MG tablet Take 25 mg by mouth every 6 (six) hours   • gabapentin (NEURONTIN) 300 mg capsule Take 1 capsule (300 mg total) by mouth 3 (three) times a day   • ketorolac (TORADOL) 10 mg tablet Take 1 tablet (10 mg total) by mouth every 6 (six) hours as needed for severe pain   • meloxicam (MOBIC) 15 mg tablet Take 15 mg by mouth daily as needed   • rizatriptan (MAXALT) 10 mg tablet Take 1 tablet (10 mg total) by mouth once as needed for migraine for up to 1 dose May repeat in 2 hours if needed   • TiZANidine (ZANAFLEX) 2 MG capsule Take 1 capsule (2 mg total) by mouth 3 (three) times a day as needed for muscle spasms   • oxyCODONE (ROXICODONE) 5 immediate release tablet Take 1 tablet (5 mg total) by mouth every 4 (four) hours as needed for severe pain Max Daily Amount: 30 mg (Patient not taking: Reported on 4/24/2024)   • [DISCONTINUED] triamcinolone (KENALOG) 0.1 % ointment Apply topically 2 (two) times a day Do not apply on face, groin. Overuse can thin skin. (Patient not taking: Reported on 3/30/2024)     Allergies   Allergen Reactions   • Iodinated Contrast Media Anaphylaxis     Facial swelling and hives     Immunization History   Administered Date(s) Administered   • COVID-19 PFIZER VACCINE 0.3 ML IM 03/14/2022, 04/04/2022   • Influenza, injectable, quadrivalent, preservative free 0.5 mL 10/02/2018, 10/07/2020, 10/23/2023   • Tdap 10/10/2016, 06/27/2020       Objective     /90 (BP Location: Left arm, Patient Position: Sitting, Cuff Size: Standard)   Pulse 88   Temp 98.4 °F (36.9 °C) (Tympanic)   Resp 16   Ht 5' 3\" (1.6 m)   Wt 87.9 kg (193 lb 12.8 oz)   LMP  (LMP Unknown)   SpO2 98%   BMI 34.33 kg/m²     Physical Exam  Constitutional:       Appearance: Normal appearance.   Cardiovascular:      Rate and " Rhythm: Normal rate and regular rhythm.      Pulses: Normal pulses.      Heart sounds: Normal heart sounds.   Pulmonary:      Effort: Pulmonary effort is normal.      Breath sounds: Normal breath sounds.   Skin:     Findings: Lesion and rash present. No erythema.      Comments: Bilateral harms and legs   Neurological:      General: No focal deficit present.      Mental Status: She is alert and oriented to person, place, and time.   Psychiatric:         Mood and Affect: Mood normal.         Behavior: Behavior normal.             Antonia Castro MD

## 2024-04-26 ENCOUNTER — TELEPHONE (OUTPATIENT)
Age: 44
End: 2024-04-26

## 2024-04-26 NOTE — TELEPHONE ENCOUNTER
Patient is requesting a Quantiferon test for her job as the two step test could not be done due to the steroid she is currently taking. Once order is placed please call patient.

## 2024-04-29 ENCOUNTER — APPOINTMENT (OUTPATIENT)
Dept: LAB | Facility: CLINIC | Age: 44
End: 2024-04-29
Payer: COMMERCIAL

## 2024-04-29 DIAGNOSIS — Z11.1 SCREENING-PULMONARY TB: ICD-10-CM

## 2024-04-29 DIAGNOSIS — Z11.1 SCREENING-PULMONARY TB: Primary | ICD-10-CM

## 2024-04-29 PROCEDURE — 86480 TB TEST CELL IMMUN MEASURE: CPT

## 2024-04-29 PROCEDURE — 36415 COLL VENOUS BLD VENIPUNCTURE: CPT

## 2024-04-30 LAB
GAMMA INTERFERON BACKGROUND BLD IA-ACNC: 0.17 IU/ML
M TB IFN-G BLD-IMP: NEGATIVE
M TB IFN-G CD4+ BCKGRND COR BLD-ACNC: -0.02 IU/ML
M TB IFN-G CD4+ BCKGRND COR BLD-ACNC: 0.04 IU/ML
MITOGEN IGNF BCKGRD COR BLD-ACNC: 9.83 IU/ML

## 2024-05-16 DIAGNOSIS — I10 PRIMARY HYPERTENSION: ICD-10-CM

## 2024-05-16 RX ORDER — HYDROCHLOROTHIAZIDE 25 MG/1
25 TABLET ORAL DAILY
Qty: 90 TABLET | Refills: 1 | Status: SHIPPED | OUTPATIENT
Start: 2024-05-16

## 2024-07-21 ENCOUNTER — HOSPITAL ENCOUNTER (EMERGENCY)
Facility: HOSPITAL | Age: 44
Discharge: HOME | End: 2024-07-22
Attending: EMERGENCY MEDICINE
Payer: COMMERCIAL

## 2024-07-21 DIAGNOSIS — R07.9 CHEST PAIN: Primary | ICD-10-CM

## 2024-07-21 DIAGNOSIS — I10 PRIMARY HYPERTENSION: ICD-10-CM

## 2024-07-21 PROCEDURE — 93005 ELECTROCARDIOGRAM TRACING: CPT

## 2024-07-21 PROCEDURE — 99285 EMERGENCY DEPT VISIT HI MDM: CPT

## 2024-07-22 ENCOUNTER — APPOINTMENT (EMERGENCY)
Dept: RADIOLOGY | Facility: HOSPITAL | Age: 44
End: 2024-07-22
Payer: COMMERCIAL

## 2024-07-22 VITALS
DIASTOLIC BLOOD PRESSURE: 72 MMHG | HEART RATE: 69 BPM | TEMPERATURE: 97.4 F | OXYGEN SATURATION: 97 % | SYSTOLIC BLOOD PRESSURE: 127 MMHG | RESPIRATION RATE: 16 BRPM

## 2024-07-22 LAB
ALBUMIN SERPL BCG-MCNC: 3.8 G/DL (ref 3.5–5)
ALP SERPL-CCNC: 44 U/L (ref 34–104)
ALT SERPL W P-5'-P-CCNC: 6 U/L (ref 7–52)
ANION GAP SERPL CALCULATED.3IONS-SCNC: 7 MMOL/L (ref 4–13)
AST SERPL W P-5'-P-CCNC: 11 U/L (ref 13–39)
ATRIAL RATE: 68 BPM
BASOPHILS # BLD AUTO: 0.04 THOUSANDS/ÂΜL (ref 0–0.1)
BASOPHILS NFR BLD AUTO: 1 % (ref 0–1)
BILIRUB SERPL-MCNC: 0.39 MG/DL (ref 0.2–1)
BUN SERPL-MCNC: 11 MG/DL (ref 5–25)
CALCIUM SERPL-MCNC: 9.1 MG/DL (ref 8.4–10.2)
CARDIAC TROPONIN I PNL SERPL HS: <2 NG/L
CARDIAC TROPONIN I PNL SERPL HS: <2 NG/L
CHLORIDE SERPL-SCNC: 106 MMOL/L (ref 96–108)
CO2 SERPL-SCNC: 27 MMOL/L (ref 21–32)
CREAT SERPL-MCNC: 0.84 MG/DL (ref 0.6–1.3)
EOSINOPHIL # BLD AUTO: 0.11 THOUSAND/ÂΜL (ref 0–0.61)
EOSINOPHIL NFR BLD AUTO: 2 % (ref 0–6)
ERYTHROCYTE [DISTWIDTH] IN BLOOD BY AUTOMATED COUNT: 12 % (ref 11.6–15.1)
GFR SERPL CREATININE-BSD FRML MDRD: 84 ML/MIN/1.73SQ M
GLUCOSE SERPL-MCNC: 86 MG/DL (ref 65–140)
HCT VFR BLD AUTO: 39.7 % (ref 34.8–46.1)
HGB BLD-MCNC: 13.1 G/DL (ref 11.5–15.4)
IMM GRANULOCYTES # BLD AUTO: 0.01 THOUSAND/UL (ref 0–0.2)
IMM GRANULOCYTES NFR BLD AUTO: 0 % (ref 0–2)
LYMPHOCYTES # BLD AUTO: 3.14 THOUSANDS/ÂΜL (ref 0.6–4.47)
LYMPHOCYTES NFR BLD AUTO: 54 % (ref 14–44)
MCH RBC QN AUTO: 29.5 PG (ref 26.8–34.3)
MCHC RBC AUTO-ENTMCNC: 33 G/DL (ref 31.4–37.4)
MCV RBC AUTO: 89 FL (ref 82–98)
MONOCYTES # BLD AUTO: 0.3 THOUSAND/ÂΜL (ref 0.17–1.22)
MONOCYTES NFR BLD AUTO: 5 % (ref 4–12)
NEUTROPHILS # BLD AUTO: 2.24 THOUSANDS/ÂΜL (ref 1.85–7.62)
NEUTS SEG NFR BLD AUTO: 38 % (ref 43–75)
NRBC BLD AUTO-RTO: 0 /100 WBCS
P AXIS: 75 DEGREES
PLATELET # BLD AUTO: 213 THOUSANDS/UL (ref 149–390)
PMV BLD AUTO: 9.8 FL (ref 8.9–12.7)
POTASSIUM SERPL-SCNC: 3.5 MMOL/L (ref 3.5–5.3)
PR INTERVAL: 134 MS
PROT SERPL-MCNC: 6.3 G/DL (ref 6.4–8.4)
QRS AXIS: 29 DEGREES
QRSD INTERVAL: 82 MS
QT INTERVAL: 388 MS
QTC INTERVAL: 412 MS
RBC # BLD AUTO: 4.44 MILLION/UL (ref 3.81–5.12)
SODIUM SERPL-SCNC: 140 MMOL/L (ref 135–147)
T WAVE AXIS: 12 DEGREES
VENTRICULAR RATE: 68 BPM
WBC # BLD AUTO: 5.84 THOUSAND/UL (ref 4.31–10.16)

## 2024-07-22 PROCEDURE — 71046 X-RAY EXAM CHEST 2 VIEWS: CPT

## 2024-07-22 PROCEDURE — 84484 ASSAY OF TROPONIN QUANT: CPT

## 2024-07-22 PROCEDURE — 99285 EMERGENCY DEPT VISIT HI MDM: CPT | Performed by: EMERGENCY MEDICINE

## 2024-07-22 PROCEDURE — 80053 COMPREHEN METABOLIC PANEL: CPT

## 2024-07-22 PROCEDURE — 85025 COMPLETE CBC W/AUTO DIFF WBC: CPT

## 2024-07-22 PROCEDURE — 93010 ELECTROCARDIOGRAM REPORT: CPT | Performed by: INTERNAL MEDICINE

## 2024-07-22 PROCEDURE — 36415 COLL VENOUS BLD VENIPUNCTURE: CPT

## 2024-07-22 RX ORDER — HYDROCHLOROTHIAZIDE 25 MG/1
25 TABLET ORAL DAILY
Qty: 30 TABLET | Refills: 0 | Status: SHIPPED | OUTPATIENT
Start: 2024-07-22 | End: 2024-08-21

## 2024-07-22 NOTE — ED PROVIDER NOTES
History  Chief Complaint   Patient presents with    Chest Pain     Patient woke up from sleep with aching cp and tingling in her face. -sob -dizzy. Patient states she takes bp meds and she ran out     Patient is a 44-year-old female, past medical history significant for hypertension and hypothyroidism, presenting today for evaluation of chest pain and facial tingling.  Patient states that approximately an hour prior to arrival she woke from her sleep due to sudden onset sharp chest pain that was located in the center of her chest.  At the same time, patient noted that she had a tingling sensation over her face.  Patient states that the chest pain improved after several minutes.  At that time she got up and got a drink, she states that at some point after she got out of bed the pain restarted.  She states that the pain is constantly a dull pain, but intermittently does get worse. Does not appear to specifically be associated with exertion or food intake. Patient was feeling well until this onset of pain.  Denies history of cardiac disease, smoking, family history of cardiac disease, or hyperlipidemia.   She has been out of her hydrochlorithiazide for 3 weeks and urinating less since.           Prior to Admission Medications   Prescriptions Last Dose Informant Patient Reported? Taking?   TiZANidine (ZANAFLEX) 2 MG capsule   No No   Sig: Take 1 capsule (2 mg total) by mouth 3 (three) times a day as needed for muscle spasms   diphenhydrAMINE (BENADRYL) 25 mg tablet   No No   Sig: Take 1 tablet (25 mg total) by mouth every 6 (six) hours   diphenhydrAMINE (SOMINEX) 25 MG tablet   Yes No   Sig: Take 25 mg by mouth every 6 (six) hours   gabapentin (NEURONTIN) 300 mg capsule   No No   Sig: Take 1 capsule (300 mg total) by mouth 3 (three) times a day   hydrOXYzine HCL (ATARAX) 25 mg tablet   No No   Sig: Take 1 tablet (25 mg total) by mouth every 6 (six) hours as needed for itching   hydroCHLOROthiazide 25 mg tablet   No No    Sig: TAKE 1 TABLET (25 MG TOTAL) BY MOUTH DAILY.   ketorolac (TORADOL) 10 mg tablet   No No   Sig: Take 1 tablet (10 mg total) by mouth every 6 (six) hours as needed for severe pain   meloxicam (MOBIC) 15 mg tablet   Yes No   Sig: Take 15 mg by mouth daily as needed   oxyCODONE (ROXICODONE) 5 immediate release tablet   No No   Sig: Take 1 tablet (5 mg total) by mouth every 4 (four) hours as needed for severe pain Max Daily Amount: 30 mg   Patient not taking: Reported on 2024   predniSONE 10 mg tablet   No No   Si tabs x 2 days, 3 tabs x 2 days, 2 tabs x 2 days, 1 tab x 2 days   rizatriptan (MAXALT) 10 mg tablet   No No   Sig: Take 1 tablet (10 mg total) by mouth once as needed for migraine for up to 1 dose May repeat in 2 hours if needed   triamcinolone (KENALOG) 0.5 % cream   No No   Sig: Apply topically 2 (two) times a day      Facility-Administered Medications: None       Past Medical History:   Diagnosis Date    Fetal growth problem suspected but not found     RESOLVED: 3/1/17    First trimester bleeding     RESOLVED:3/1/17    Granulation tissue of vaginal cuff 2018    Iron deficiency anemia due to chronic blood loss 2018    Miscarriage     Missed      RESOLVED:3/1/17    Poor fetal growth affecting management of mother     RESOLVED:3/1/17    Recurrent pregnancy loss, antepartum condition or complication     RESOLVED:3/1/17    Spontaneous      RESOLVED:3/1/17    Supraventricular tachycardia     by history       Past Surgical History:   Procedure Laterality Date    COLONOSCOPY      DILATION AND CURETTAGE OF UTERUS      ,     DILATION AND CURETTAGE OF UTERUS      FOR SPONTANEOUS ; X5 [id number]    HYSTERECTOMY  2018    Dr Singh    INDUCED       LAPAROSCOPIC TUBAL LIGATION Right 2016    Procedure: LAPAROSCOPIC TUBAL LIGATION ;  Surgeon: Jacque Singh MD;  Location: [facility];  Service:     TX LAPS W/VAG HYSTERECT 250 GM/&RMVL TUBE&/OVARIES  N/A 2018    Procedure: HYSTERECTOMY LAPAROSCOPIC ASSISTED VAGINAL (LAVH); RIGHT LAPAROSCOPIC SALPINGECTOMY;  Surgeon: Jacque Singh MD;  Location: [facility];  Service: Gynecology    MS TX MISSED  FIRST TRIMESTER SURGICAL N/A 2016    Procedure: DILATATION AND EVACUATION (D&E) (MISSED AB);  Surgeon: Jacque Singh MD;  Location: [facility];  Service: Gynecology    SALPINGOOPHORECTOMY Left     TUBAL LIGATION Right        Family History   Problem Relation Age of Onset    Arthritis Mother     Depression Mother     Hypertension Mother     Cancer Father     Breast cancer Neg Hx     Ovarian cancer Neg Hx     Colon cancer Neg Hx      I have reviewed and agree with the history as documented.    E-Cigarette/Vaping    E-Cigarette Use Never User      E-Cigarette/Vaping Substances    Nicotine No     THC No     CBD No     Flavoring No     Other No     Unknown No      Social History     Tobacco Use    Smoking status: Never    Smokeless tobacco: Never   Vaping Use    Vaping status: Never Used   Substance Use Topics    Alcohol use: Never    Drug use: Never        Review of Systems    Physical Exam  ED Triage Vitals [24 2345]   Temperature Pulse Respirations Blood Pressure SpO2   (!) 97.4 °F (36.3 °C) 76 18 (!) 172/110 99 %      Temp Source Heart Rate Source Patient Position - Orthostatic VS BP Location FiO2 (%)   Temporal Monitor Sitting Left arm --      Pain Score       --             Orthostatic Vital Signs  Vitals:    24 0100 24 0130 24 0200 24 0230   BP: 153/96 152/99 153/99 127/72   Pulse: 67 66 65 69   Patient Position - Orthostatic VS: Lying Lying Lying        Physical Exam  Vitals and nursing note reviewed.   Constitutional:       General: She is not in acute distress.     Appearance: Normal appearance. She is not ill-appearing or toxic-appearing.   HENT:      Head: Normocephalic and atraumatic.   Eyes:      General: No scleral icterus.     Extraocular Movements: Extraocular  movements intact.   Cardiovascular:      Rate and Rhythm: Normal rate and regular rhythm.      Pulses: Normal pulses.      Heart sounds: Normal heart sounds. No murmur heard.  Pulmonary:      Effort: Pulmonary effort is normal. No respiratory distress.      Breath sounds: Normal breath sounds. No wheezing or rhonchi.   Abdominal:      General: Abdomen is flat. There is no distension.      Palpations: Abdomen is soft.      Tenderness: There is no abdominal tenderness. There is no right CVA tenderness, left CVA tenderness, guarding or rebound.   Musculoskeletal:         General: Normal range of motion.      Cervical back: Normal range of motion.   Skin:     Capillary Refill: Capillary refill takes less than 2 seconds.   Neurological:      General: No focal deficit present.      Mental Status: She is alert.      GCS: GCS eye subscore is 4. GCS verbal subscore is 5. GCS motor subscore is 6.      Cranial Nerves: No cranial nerve deficit, dysarthria or facial asymmetry.      Sensory: Sensation is intact. No sensory deficit.      Motor: Motor function is intact. No weakness.      Coordination: Coordination is intact. Romberg sign negative. Finger-Nose-Finger Test normal.      Gait: Gait is intact. Gait and tandem walk normal.   Psychiatric:         Mood and Affect: Mood normal.         Behavior: Behavior normal.         ED Medications  Medications - No data to display    Diagnostic Studies  Results Reviewed       Procedure Component Value Units Date/Time    HS Troponin I 2hr [[id number]] Collected: 07/22/24 0234    Lab Status: Final result Specimen: Blood from Arm, Right Updated: 07/22/24 0309     hs TnI 2hr <2 ng/L      Delta 2hr hsTnI --    HS Troponin 0hr (reflex protocol) [[id number]]  (Normal) Collected: 07/22/24 0051    Lab Status: Final result Specimen: Blood from Arm, Right Updated: 07/22/24 0131     hs TnI 0hr <2 ng/L     Comprehensive metabolic panel [[id number]]  (Abnormal) Collected: 07/22/24 0051    Lab Status:  Final result Specimen: Blood from Arm, Right Updated: 07/22/24 0123     Sodium 140 mmol/L      Potassium 3.5 mmol/L      Chloride 106 mmol/L      CO2 27 mmol/L      ANION GAP 7 mmol/L      BUN 11 mg/dL      Creatinine 0.84 mg/dL      Glucose 86 mg/dL      Calcium 9.1 mg/dL      AST 11 U/L      ALT 6 U/L      Alkaline Phosphatase 44 U/L      Total Protein 6.3 g/dL      Albumin 3.8 g/dL      Total Bilirubin 0.39 mg/dL      eGFR 84 ml/min/1.73sq m     Narrative:      National Kid[employer] Foundation guidelines for Chronic Kidney Disease (CKD):     Stage 1 with normal or high GFR (GFR > 90 mL/min/1.73 square meters)    Stage 2 Mild CKD (GFR = 60-89 mL/min/1.73 square meters)    Stage 3A Moderate CKD (GFR = 45-59 mL/min/1.73 square meters)    Stage 3B Moderate CKD (GFR = 30-44 mL/min/1.73 square meters)    Stage 4 Severe CKD (GFR = 15-29 mL/min/1.73 square meters)    Stage 5 End Stage CKD (GFR <15 mL/min/1.73 square meters)  Note: GFR calculation is accurate only with a steady state creatinine    CBC and differential [[id number]]  (Abnormal) Collected: 07/22/24 0051    Lab Status: Final result Specimen: Blood from Arm, Right Updated: 07/22/24 0101     WBC 5.84 Thousand/uL      RBC 4.44 Million/uL      Hemoglobin 13.1 g/dL      Hematocrit 39.7 %      MCV 89 fL      MCH 29.5 pg      MCHC 33.0 g/dL      RDW 12.0 %      MPV 9.8 fL      Platelets 213 Thousands/uL      nRBC 0 /100 WBCs      Segmented % 38 %      Immature Grans % 0 %      Lymphocytes % 54 %      Monocytes % 5 %      Eosinophils Relative 2 %      Basophils Relative 1 %      Absolute Neutrophils 2.24 Thousands/µL      Absolute Immature Grans 0.01 Thousand/uL      Absolute Lymphocytes 3.14 Thousands/µL      Absolute Monocytes 0.30 Thousand/µL      Eosinophils Absolute 0.11 Thousand/µL      Basophils Absolute 0.04 Thousands/µL                    XR chest 2 views   ED Interpretation by Anne Altamirano MD (07/22 0139)   WNL      Final Result by Shivam Hall,  MD (07/22 0544)      No acute cardiopulmonary disease.      Findings are stable compared with prior x-rays. Tiny pulmonary nodules identified by CT are not visualized      Workstation performed: [id number]               Procedures  Procedures      ED Course  ED Course as of 07/22/24 1552   Mon Jul 22, 2024   0000 Patient seen and evaluated by me  Ddx: hypertensive urgency with chest pain raising concern for emergency. Patient complaining of bilaterally facial tingling which may be secondary to hypertension but no focal findings to suggest CVA. Decreased urination may be due to recent medication change or kidney damage  Workup and plan: CBC, CMP, troponin, ua, chest xr   0000 EKG done at 2345 interpreted by me   rate 68  rhythm normal sinus   axis normal  QRS complexes are normal  Intervals are normal  ST segments showing no ischemic changes.      0121 CBC and differential(!)  WNL   0134 hs TnI 0hr: <2  Will need delta   0134 Comprehensive metabolic panel(!)  WNL   0139 XR chest 2 views  No acute cardiopulmonary disease. Normal aortic notch and mediastinum.   0230 Signed out to Dr. Jones pending delta troponin.             HEART Risk Score      Flowsheet Row Most Recent Value   Heart Score Risk Calculator    History 0 Filed at: 07/22/2024 1551   ECG 0 Filed at: 07/22/2024 1551   Age 0 Filed at: 07/22/2024 1551   Risk Factors 1 Filed at: 07/22/2024 1551   Troponin 0 Filed at: 07/22/2024 1551   HEART Score 1 Filed at: 07/22/2024 1551                                  Medical Decision Making  Please see ED course above regarding details of the MDM    Amount and/or Complexity of Data Reviewed  Labs: ordered. Decision-making details documented in ED Course.  Radiology: ordered and independent interpretation performed. Decision-making details documented in ED Course.    Risk  Prescription drug management.          Disposition  Final diagnoses:   Chest pain   Primary hypertension     Time reflects when diagnosis was  documented in both MDM as applicable and the Disposition within this note       Time User Action Codes Description Comment    7/22/2024  2:08 AM Anne Altamirano [R07.9] Chest pain     7/22/2024  2:10 AM Anne Altamirano [I10] Primary hypertension           ED Disposition       ED Disposition   Discharge    Condition   Stable    Date/Time   Mon Jul 22, 2024 0311    Comment   Glenna Lanier discharge to home/self care.                   Follow-up Information    None         Discharge Medication List as of 7/22/2024  3:11 AM        START taking these medications    Details   !! hydroCHLOROthiazide 25 mg tablet Take 1 tablet (25 mg total) by mouth daily, Starting Mon 7/22/2024, Until Wed 8/21/2024, Normal       !! - Potential duplicate medications found. Please discuss with provider.        CONTINUE these medications which have NOT CHANGED    Details   diphenhydrAMINE (BENADRYL) 25 mg tablet Take 1 tablet (25 mg total) by mouth every 6 (six) hours, Starting Fri 4/12/2024, Normal      diphenhydrAMINE (SOMINEX) 25 MG tablet Take 25 mg by mouth every 6 (six) hours, Starting Sat 4/13/2024, Historical Med      gabapentin (NEURONTIN) 300 mg capsule Take 1 capsule (300 mg total) by mouth 3 (three) times a day, Starting Wed 4/3/2024, Normal      !! hydroCHLOROthiazide 25 mg tablet TAKE 1 TABLET (25 MG TOTAL) BY MOUTH DAILY., Starting Thu 5/16/2024, Normal      hydrOXYzine HCL (ATARAX) 25 mg tablet Take 1 tablet (25 mg total) by mouth every 6 (six) hours as needed for itching, Starting Wed 4/24/2024, Normal      ketorolac (TORADOL) 10 mg tablet Take 1 tablet (10 mg total) by mouth every 6 (six) hours as needed for severe pain, Starting Wed 10/25/2023, Normal      meloxicam (MOBIC) 15 mg tablet Take 15 mg by mouth daily as needed, Starting Tue 8/22/2023, Historical Med      oxyCODONE (ROXICODONE) 5 immediate release tablet Take 1 tablet (5 mg total) by mouth every 4 (four) hours as needed for severe pain Max Daily Amount:  30 mg, Starting Sun 3/31/2024, Normal      predniSONE 10 mg tablet 4 tabs x 2 days, 3 tabs x 2 days, 2 tabs x 2 days, 1 tab x 2 days, Normal      rizatriptan (MAXALT) 10 mg tablet Take 1 tablet (10 mg total) by mouth once as needed for migraine for up to 1 dose May repeat in 2 hours if needed, Starting Wed 4/3/2024, Normal      TiZANidine (ZANAFLEX) 2 MG capsule Take 1 capsule (2 mg total) by mouth 3 (three) times a day as needed for muscle spasms, Starting Wed 4/3/2024, Normal      triamcinolone (KENALOG) 0.5 % cream Apply topically 2 (two) times a day, Starting Wed 4/24/2024, Normal       !! - Potential duplicate medications found. Please discuss with provider.        No discharge procedures on file.    PDMP Review         Value Time User    PDMP Reviewed  Yes 3/31/2024 12:11 PM MELODIE Batista             ED Provider  Attending physically available and evaluated Glenna Lanier. I managed the patient along with the ED Attending.    Electronically Signed by           Anne Altamirano MD  07/22/24 6441

## 2024-07-22 NOTE — DISCHARGE INSTRUCTIONS
You have been seen in the emergency department for evaluation of chest pain and facial tingling. Your workup today was normal. Please follow up as directed below and take your hydrochlorothiazine as prescribed. Please return to the ED if you develop weakness, one sided sensation changes, blurry vision, severe headaches, worsening chest pain, or worsening decreased urination.

## 2024-07-24 NOTE — ED ATTENDING ATTESTATION
7/21/2024  I, Nadia Haines DO, saw and evaluated the patient. I have discussed the patient with the resident/non-physician practitioner and agree with the resident's/non-physician practitioner's findings, Plan of Care, and MDM as documented in the resident's/non-physician practitioner's note, except where noted. All available labs and Radiology studies were reviewed.  I was present for key portions of any procedure(s) performed by the resident/non-physician practitioner and I was immediately available to provide assistance.       At this point I agree with the current assessment done in the Emergency Department.  I have conducted an independent evaluation of this patient a history and physical is as follows:    44-year-old female presents with chest pain.  Patient states she woke up from sleep with chest pain and tingling in her face.  Takes blood pressure medication but ran out.  No shortness of breath, no diaphoresis, no nausea or vomiting.  On exam-no acute distress, heart regular, no respiratory distress, no lower extremity calf tenderness, no focal neurologic deficits.  Plan-will do cardiac labs, reassess, monitor blood pressure    ED Course         Critical Care Time  Procedures

## 2024-07-29 DIAGNOSIS — I10 PRIMARY HYPERTENSION: ICD-10-CM

## 2024-07-29 RX ORDER — HYDROCHLOROTHIAZIDE 25 MG/1
25 TABLET ORAL DAILY
Qty: 90 TABLET | Refills: 1 | Status: SHIPPED | OUTPATIENT
Start: 2024-07-29

## 2024-07-29 NOTE — TELEPHONE ENCOUNTER
Reason for call:   [x] Refill   [] Prior Auth  [] Other:     Office:   [x] PCP/Provider -   [] Specialty/Provider -     Medication:     hydroCHLOROthiazide 25 mg tablet       Dose/Frequency:  TAKE 1 TABLET (25 MG TOTAL) BY MOUTH DAILY     Quantity: 90 tablet     Pharmacy: [facility]/pharmacy #[id number] - [place] - [address] [phone]     Does the patient have enough for 3 days?   [] Yes   [x] No - Send as HP to POD

## 2024-09-22 ENCOUNTER — HOSPITAL ENCOUNTER (EMERGENCY)
Facility: HOSPITAL | Age: 44
Discharge: HOME | End: 2024-09-22
Attending: EMERGENCY MEDICINE
Payer: COMMERCIAL

## 2024-09-22 VITALS
WEIGHT: 195 LBS | BODY MASS INDEX: 34.54 KG/M2 | OXYGEN SATURATION: 97 % | RESPIRATION RATE: 18 BRPM | HEART RATE: 75 BPM | DIASTOLIC BLOOD PRESSURE: 105 MMHG | TEMPERATURE: 98.3 F | SYSTOLIC BLOOD PRESSURE: 170 MMHG

## 2024-09-22 DIAGNOSIS — J06.9 VIRAL UPPER RESPIRATORY TRACT INFECTION: Primary | ICD-10-CM

## 2024-09-22 DIAGNOSIS — G44.209 TENSION HEADACHE: ICD-10-CM

## 2024-09-22 PROCEDURE — 96372 THER/PROPH/DIAG INJ SC/IM: CPT

## 2024-09-22 PROCEDURE — 99282 EMERGENCY DEPT VISIT SF MDM: CPT

## 2024-09-22 PROCEDURE — 99284 EMERGENCY DEPT VISIT MOD MDM: CPT | Performed by: EMERGENCY MEDICINE

## 2024-09-22 RX ORDER — KETOROLAC TROMETHAMINE 30 MG/ML
15 INJECTION, SOLUTION INTRAMUSCULAR; INTRAVENOUS ONCE
Status: COMPLETED | OUTPATIENT
Start: 2024-09-22 | End: 2024-09-22

## 2024-09-22 RX ORDER — ACETAMINOPHEN 325 MG/1
975 TABLET ORAL ONCE
Status: COMPLETED | OUTPATIENT
Start: 2024-09-22 | End: 2024-09-22

## 2024-09-22 RX ORDER — OXYMETAZOLINE HYDROCHLORIDE 0.05 G/100ML
2 SPRAY NASAL ONCE
Status: COMPLETED | OUTPATIENT
Start: 2024-09-22 | End: 2024-09-22

## 2024-09-22 RX ADMIN — ACETAMINOPHEN 975 MG: 325 TABLET ORAL at 09:23

## 2024-09-22 RX ADMIN — OXYMETAZOLINE HYDROCHLORIDE 2 SPRAY: 0.05 SPRAY NASAL at 09:32

## 2024-09-22 RX ADMIN — KETOROLAC TROMETHAMINE 15 MG: 30 INJECTION, SOLUTION INTRAMUSCULAR; INTRAVENOUS at 09:24

## 2024-09-22 RX ADMIN — DEXAMETHASONE SODIUM PHOSPHATE 10 MG: 10 INJECTION, SOLUTION INTRAMUSCULAR; INTRAVENOUS at 09:32

## 2024-09-22 NOTE — ED PROVIDER NOTES
"1. Viral upper respiratory tract infection    2. Tension headache      ED Disposition       ED Disposition   Discharge    Condition   Stable    Date/Time   Sun Sep 22, 2024 10:02 AM    Comment   lGenna Lanier discharge to home/self care.                   Assessment & Plan       Medical Decision Making  Patient is a 44 y.o. female with PMH of SVT who presents to the ED with sore throat, cough, headache, congestion.    Vital signs hypertensive, however otherwise stable. On exam oropharyngeal erythema, intact TMs bilaterally, cervical lymphadenopathy.    History and physical exam most consistent with upper respiratory infection and tension type headache. However, differential diagnosis included but not limited to COVID, flu, migraine headache    Plan: Symptomatic management with Tylenol and Toradol.  Discharge instructions for over-the-counter ibuprofen and Tylenol regimen, honey, hot tea, plenty fluids, rest    View ED course above for further discussion on patient workup.     On review of previous records reviewed patient's previous medical history.    All labs reviewed and utilized in the medical decision making process  All radiology studies independently viewed by me and interpreted by the radiologist.  I reviewed all testing with the patient.     Upon re-evaluation patient feeling improved after Tylenol and Toradol.    Disposition: Discharge    Portions of the record may have been created with voice recognition software. Occasional wrong word or \"sound a like\" substitutions may have occurred due to the inherent limitations of voice recognition software. Read the chart carefully and recognize, using context, where substitutions have occurred.      Risk  OTC drugs.  Prescription drug management.                     Medications   ketorolac (TORADOL) injection 15 mg (15 mg Intramuscular Given 9/22/24 0924)   acetaminophen (TYLENOL) tablet 975 mg (975 mg Oral Given 9/22/24 0923)   oxymetazoline (AFRIN) 0.05 % nasal " spray 2 spray (2 sprays Each Nare Given 9/22/24 0932)   dexamethasone oral liquid 10 mg 1 mL (10 mg Oral Given 9/22/24 0932)       History of Present Illness       44-year-old female with medical history of SVT presenting to emergency department for sore throat, headache, cough, congestion.  Patient states sore throat began 3 days ago, then over the past day developed congestion, dry cough, left ear pain, headache that is a bilateral bandlike sensation across the frontal bone between the bilateral temples.  Denies sick contacts, recent travel.  Denies fever, chills, chest pain, difficulty breathing, runny nose, abdominal pain, nausea, vomiting, diarrhea, constipation, numbness, tingling, weakness, leg swelling, rash.      URI  Presenting symptoms: congestion, ear pain and sore throat    Presenting symptoms: no cough, no fever and no rhinorrhea    Associated symptoms: headaches        Review of Systems   Constitutional:  Negative for chills and fever.   HENT:  Positive for congestion, ear pain and sore throat. Negative for rhinorrhea.    Respiratory:  Negative for cough and shortness of breath.    Cardiovascular:  Negative for chest pain, palpitations and leg swelling.   Gastrointestinal:  Negative for abdominal pain, constipation, diarrhea, nausea and vomiting.   Skin:  Negative for rash.   Neurological:  Positive for headaches. Negative for dizziness and light-headedness.   All other systems reviewed and are negative.          Objective     ED Triage Vitals [09/22/24 0822]   Temperature Pulse Blood Pressure Respirations SpO2 Patient Position - Orthostatic VS   98.3 °F (36.8 °C) 75 (!) 170/105 18 97 % Sitting      Temp Source Heart Rate Source BP Location FiO2 (%) Pain Score    Temporal Monitor Right arm -- 8        Physical Exam  Vitals and nursing note reviewed.   Constitutional:       General: She is not in acute distress.     Appearance: Normal appearance. She is not ill-appearing.   HENT:      Head:  Normocephalic and atraumatic.      Right Ear: Tympanic membrane, ear canal and external ear normal.      Left Ear: Tympanic membrane, ear canal and external ear normal.      Mouth/Throat:      Mouth: Mucous membranes are moist.      Pharynx: Posterior oropharyngeal erythema present. No oropharyngeal exudate.   Eyes:      Conjunctiva/sclera: Conjunctivae normal.   Cardiovascular:      Rate and Rhythm: Normal rate and regular rhythm.      Heart sounds: No murmur heard.     No friction rub. No gallop.   Pulmonary:      Effort: Pulmonary effort is normal. No respiratory distress.      Breath sounds: Normal breath sounds. No wheezing, rhonchi or rales.   Abdominal:      General: There is no distension.      Palpations: Abdomen is soft.      Tenderness: There is no abdominal tenderness. There is no guarding or rebound.   Musculoskeletal:         General: Normal range of motion.   Skin:     General: Skin is dry.   Neurological:      General: No focal deficit present.      Mental Status: She is alert.   Psychiatric:         Mood and Affect: Mood normal.         Behavior: Behavior normal.         Labs Reviewed - No data to display  No orders to display       Procedures    ED Medication and Procedure Management   Prior to Admission Medications   Prescriptions Last Dose Informant Patient Reported? Taking?   TiZANidine (ZANAFLEX) 2 MG capsule   No No   Sig: Take 1 capsule (2 mg total) by mouth 3 (three) times a day as needed for muscle spasms   diphenhydrAMINE (BENADRYL) 25 mg tablet   No No   Sig: Take 1 tablet (25 mg total) by mouth every 6 (six) hours   diphenhydrAMINE (SOMINEX) 25 MG tablet   Yes No   Sig: Take 25 mg by mouth every 6 (six) hours   gabapentin (NEURONTIN) 300 mg capsule   No No   Sig: Take 1 capsule (300 mg total) by mouth 3 (three) times a day   hydrOXYzine HCL (ATARAX) 25 mg tablet   No No   Sig: Take 1 tablet (25 mg total) by mouth every 6 (six) hours as needed for itching   hydroCHLOROthiazide 25 mg  tablet   No No   Sig: Take 1 tablet (25 mg total) by mouth daily   hydroCHLOROthiazide 25 mg tablet   No No   Sig: Take 1 tablet (25 mg total) by mouth daily   ketorolac (TORADOL) 10 mg tablet   No No   Sig: Take 1 tablet (10 mg total) by mouth every 6 (six) hours as needed for severe pain   meloxicam (MOBIC) 15 mg tablet   Yes No   Sig: Take 15 mg by mouth daily as needed   oxyCODONE (ROXICODONE) 5 immediate release tablet   No No   Sig: Take 1 tablet (5 mg total) by mouth every 4 (four) hours as needed for severe pain Max Daily Amount: 30 mg   Patient not taking: Reported on 2024   predniSONE 10 mg tablet   No No   Si tabs x 2 days, 3 tabs x 2 days, 2 tabs x 2 days, 1 tab x 2 days   rizatriptan (MAXALT) 10 mg tablet   No No   Sig: Take 1 tablet (10 mg total) by mouth once as needed for migraine for up to 1 dose May repeat in 2 hours if needed   triamcinolone (KENALOG) 0.5 % cream   No No   Sig: Apply topically 2 (two) times a day      Facility-Administered Medications: None     Patient's Medications   Discharge Prescriptions    No medications on file     No discharge procedures on file.     Gagan Wall,   [phone]

## 2024-09-22 NOTE — ED ATTENDING ATTESTATION
Final Diagnoses:     1. Viral upper respiratory tract infection    2. Tension headache      ED Course as of 09/24/24 1632   Sun Sep 22, 2024   0906 This is a 43 y/o F presenting for URTI. Sore throat x3 days, x1 day of ear pain, cough, headache across forehead with nasal congestion.   No SOB.  No f/ch/cp  No N/V/D.  No sick contacts but son getting sick now with similar.   No recent travel.    PMH: SVT a few years ago.    0907 General: VSS, NAD, awake, alert.   Well-nourished, well-developed.   Appears stated age.   Head: Normocephalic, atraumatic, nontender.  Eyes: PERRL, EOM-I. No diplopia.   No hyphema.   No subconjunctival hemorrhages.  Symmetrical lids.   ENTAtraumatic external nose and ears.    MMM  +cervical lymphadenopathy  No stridor.   Normal phonation.   No drooling.   Base of mouth is soft. No mastoid tenderness.   Neck: Symmetric, trachea midline. No JVD.  CV: Peripheral pulses +2 throughout.   No chest wall tenderness.   Lungs:   Unlabored   No retractions  No crepitus.   No tachypnea.   No paradoxical motion.  Abd: +BS, soft, NT/ND.   MSK:   FROM   No lower extremity edema.   Back:   No CVAT  Skin: Dry, intact.   Neuro: AAOx3, GCS 15, CN II-XII grossly intact.   Motor grossly intact.  Psychiatric/Behavioral: Appropriate mood and affect   Exam: deferred   0907 A/P:  Viral syndrome: Patient's story / exam fits with viral syndrome  - PO hydration.   - Discussed Tylenol/NSAIDs here + q6h at home.   - Discussed that it will have to run its course and low yield of antibiotics.   - RTER precautions discussed orally.        ISaud MD, saw and evaluated the patient. All available labs and X-rays were ordered by me or the resident / non-physician and have been reviewed by myself. I discussed the patient with the resident / non-physician and agree with the resident's / non-physician practitioner's findings and plan as documented in the resident's / non-physician practicitioner's note, except  where noted.   At this point, I agree with the current assessment done in the ED.   I was present during key portions of all procedures performed unless otherwise stated.     Nursing Triage:     Chief Complaint   Patient presents with    URI     Pt has c/o sore throat for 3 days, L ear pain for a day, cough, congestion, and a HA.       HPI:   As above     ASSESSMENT + PLAN:   As per ED course     Physical:     Vitals:    24 0822   BP: (!) 170/105   BP Location: Right arm   Pulse: 75   Resp: 18   Temp: 98.3 °F (36.8 °C)   TempSrc: Temporal   SpO2: 97%   Weight: 88.5 kg (195 lb)       - There are no obvious limitations to social determinants of care.   - Nursing note reviewed.   - Vitals reviewed.   - Orders placed by myself and/or advanced practitioner / resident.    - Previous chart was reviewed  - No language barrier.   - History obtained from patient.    - There are no limitations to the history obtained:     Past Medical:    has a past medical history of Fetal growth problem suspected but not found, First trimester bleeding, Granulation tissue of vaginal cuff (2018), Iron deficiency anemia due to chronic blood loss (2018), Miscarriage, Missed , Poor fetal growth affecting management of mother, Recurrent pregnancy loss, antepartum condition or complication, Spontaneous , and Supraventricular tachycardia.    Past Surgical:    has a past surgical history that includes Dilation and curettage of uterus; LAPAROSCOPIC TUBAL LIGATION (Right, 2016); pr tx missed  first trimester surgical (N/A, 2016); Salpingoophorectomy (Left); Dilation and curettage of uterus; Induced  (); Tubal ligation (Right); Colonoscopy; pr laps w/vag hysterect 250 gm/&rmvl tube&/ovaries (N/A, 2018); and Hysterectomy (2018).    Social:     Social History     Substance and Sexual Activity   Alcohol Use Yes    Comment: social     Social History     Tobacco Use   Smoking Status  Never   Smokeless Tobacco Never     Social History     Substance and Sexual Activity   Drug Use Never       Echo:   No results found for this or any previous visit.    No results found for this or any previous visit.      Cath:    No results found for this or any previous visit.      Code Status: Prior  Advance Directive and Living Will:      Power of :    POLST:    Medications   ketorolac (TORADOL) injection 15 mg (15 mg Intramuscular Given 9/22/24 0924)   acetaminophen (TYLENOL) tablet 975 mg (975 mg Oral Given 9/22/24 0923)   oxymetazoline (AFRIN) 0.05 % nasal spray 2 spray (2 sprays Each Nare Given 9/22/24 0932)   dexamethasone oral liquid 10 mg 1 mL (10 mg Oral Given 9/22/24 0932)     No orders to display     No orders of the defined types were placed in this encounter.    Labs Reviewed - No data to display  Time reflects when diagnosis was documented in both MDM as applicable and the Disposition within this note       Time User Action Codes Description Comment    9/22/2024  9:16 AM Gagan Wall [J06.9] Viral upper respiratory tract infection     9/22/2024  9:16 AM Gagan Wall [G44.209] Tension headache           ED Disposition       ED Disposition   Discharge    Condition   Stable    Date/Time   Sun Sep 22, 2024 10:02 AM    Comment   Glenna Lanier discharge to home/self care.                   Follow-up Information    None       Discharge Medication List as of 9/22/2024 10:02 AM        CONTINUE these medications which have NOT CHANGED    Details   diphenhydrAMINE (BENADRYL) 25 mg tablet Take 1 tablet (25 mg total) by mouth every 6 (six) hours, Starting Fri 4/12/2024, Normal      diphenhydrAMINE (SOMINEX) 25 MG tablet Take 25 mg by mouth every 6 (six) hours, Starting Sat 4/13/2024, Historical Med      gabapentin (NEURONTIN) 300 mg capsule Take 1 capsule (300 mg total) by mouth 3 (three) times a day, Starting Wed 4/3/2024, Normal      hydroCHLOROthiazide 25 mg tablet Take 1  tablet (25 mg total) by mouth daily, Starting Mon 7/29/2024, Normal      hydrOXYzine HCL (ATARAX) 25 mg tablet Take 1 tablet (25 mg total) by mouth every 6 (six) hours as needed for itching, Starting Wed 4/24/2024, Normal      ketorolac (TORADOL) 10 mg tablet Take 1 tablet (10 mg total) by mouth every 6 (six) hours as needed for severe pain, Starting Wed 10/25/2023, Normal      meloxicam (MOBIC) 15 mg tablet Take 15 mg by mouth daily as needed, Starting Tue 8/22/2023, Historical Med      oxyCODONE (ROXICODONE) 5 immediate release tablet Take 1 tablet (5 mg total) by mouth every 4 (four) hours as needed for severe pain Max Daily Amount: 30 mg, Starting Sun 3/31/2024, Normal      predniSONE 10 mg tablet 4 tabs x 2 days, 3 tabs x 2 days, 2 tabs x 2 days, 1 tab x 2 days, Normal      rizatriptan (MAXALT) 10 mg tablet Take 1 tablet (10 mg total) by mouth once as needed for migraine for up to 1 dose May repeat in 2 hours if needed, Starting Wed 4/3/2024, Normal      TiZANidine (ZANAFLEX) 2 MG capsule Take 1 capsule (2 mg total) by mouth 3 (three) times a day as needed for muscle spasms, Starting Wed 4/3/2024, Normal      triamcinolone (KENALOG) 0.5 % cream Apply topically 2 (two) times a day, Starting Wed 4/24/2024, Normal           No discharge procedures on file.  Prior to Admission Medications   Prescriptions Last Dose Informant Patient Reported? Taking?   TiZANidine (ZANAFLEX) 2 MG capsule   No No   Sig: Take 1 capsule (2 mg total) by mouth 3 (three) times a day as needed for muscle spasms   diphenhydrAMINE (BENADRYL) 25 mg tablet   No No   Sig: Take 1 tablet (25 mg total) by mouth every 6 (six) hours   diphenhydrAMINE (SOMINEX) 25 MG tablet   Yes No   Sig: Take 25 mg by mouth every 6 (six) hours   gabapentin (NEURONTIN) 300 mg capsule   No No   Sig: Take 1 capsule (300 mg total) by mouth 3 (three) times a day   hydrOXYzine HCL (ATARAX) 25 mg tablet   No No   Sig: Take 1 tablet (25 mg total) by mouth every 6 (six)  "hours as needed for itching   hydroCHLOROthiazide 25 mg tablet   No No   Sig: Take 1 tablet (25 mg total) by mouth daily   hydroCHLOROthiazide 25 mg tablet   No No   Sig: Take 1 tablet (25 mg total) by mouth daily   ketorolac (TORADOL) 10 mg tablet   No No   Sig: Take 1 tablet (10 mg total) by mouth every 6 (six) hours as needed for severe pain   meloxicam (MOBIC) 15 mg tablet   Yes No   Sig: Take 15 mg by mouth daily as needed   oxyCODONE (ROXICODONE) 5 immediate release tablet   No No   Sig: Take 1 tablet (5 mg total) by mouth every 4 (four) hours as needed for severe pain Max Daily Amount: 30 mg   Patient not taking: Reported on 2024   predniSONE 10 mg tablet   No No   Si tabs x 2 days, 3 tabs x 2 days, 2 tabs x 2 days, 1 tab x 2 days   rizatriptan (MAXALT) 10 mg tablet   No No   Sig: Take 1 tablet (10 mg total) by mouth once as needed for migraine for up to 1 dose May repeat in 2 hours if needed   triamcinolone (KENALOG) 0.5 % cream   No No   Sig: Apply topically 2 (two) times a day      Facility-Administered Medications: None                        Portions of the record may have been created with voice recognition software. Occasional wrong word or \"sound a like\" substitutions may have occurred due to the inherent limitations of voice recognition software. Read the chart carefully and recognize, using context, where substitutions have occurred.    Electronically signed by:  Saud Bonilla  "

## 2024-09-22 NOTE — DISCHARGE INSTRUCTIONS
You were seen and evaluated in the emergency department for sore throat, cough, ear pain, headache.  You are found to have upper respiratory infection and a tension type headache.  You were treated with Tylenol and Toradol.   You can take 800 mg ibuprofen every 8 hours as needed for pain and Tylenol 1000 mg every 8 hours as needed for pain.  You should drink plenty of fluids, including hot tea.  Recommend 1 tablespoon of honey before bed to reduce cough and nighttime awakenings.    Please follow-up with your primary care provider for further management of your upper respiratory infection and tension type headache.  Please return to the emergency department if you develop persistent headache, persistent vomiting, difficulty walking, decreased sensation, generalized weakness.

## 2024-10-21 ENCOUNTER — HOSPITAL ENCOUNTER (OUTPATIENT)
Facility: HOSPITAL | Age: 44
Discharge: HOME | End: 2024-10-21
Payer: COMMERCIAL

## 2024-10-21 DIAGNOSIS — Z12.31 VISIT FOR SCREENING MAMMOGRAM: ICD-10-CM

## 2024-10-21 PROCEDURE — 77063 BREAST TOMOSYNTHESIS BI: CPT

## 2024-10-21 PROCEDURE — 77067 SCR MAMMO BI INCL CAD: CPT

## 2024-10-31 ENCOUNTER — HOSPITAL ENCOUNTER (OUTPATIENT)
Dept: MAMMOGRAPHY | Facility: CLINIC | Age: 44
Discharge: HOME | End: 2024-10-31
Payer: COMMERCIAL

## 2024-10-31 ENCOUNTER — HOSPITAL ENCOUNTER (OUTPATIENT)
Dept: ULTRASOUND IMAGING | Facility: CLINIC | Age: 44
Discharge: HOME | End: 2024-10-31
Payer: COMMERCIAL

## 2024-10-31 VITALS — BODY MASS INDEX: 34.55 KG/M2 | HEIGHT: 63 IN | WEIGHT: 195 LBS

## 2024-10-31 DIAGNOSIS — R92.8 ABNORMAL SCREENING MAMMOGRAM: ICD-10-CM

## 2024-10-31 PROCEDURE — 77065 DX MAMMO INCL CAD UNI: CPT

## 2024-10-31 PROCEDURE — 76642 ULTRASOUND BREAST LIMITED: CPT

## 2024-10-31 PROCEDURE — G0279 TOMOSYNTHESIS, MAMMO: HCPCS

## 2024-11-04 DIAGNOSIS — R92.8 ABNORMAL MAMMOGRAM: Primary | ICD-10-CM

## 2024-11-07 ENCOUNTER — HOSPITAL ENCOUNTER (EMERGENCY)
Facility: HOSPITAL | Age: 44
Discharge: HOME | End: 2024-11-07
Attending: EMERGENCY MEDICINE
Payer: COMMERCIAL

## 2024-11-07 ENCOUNTER — APPOINTMENT (EMERGENCY)
Dept: RADIOLOGY | Facility: HOSPITAL | Age: 44
End: 2024-11-07
Payer: COMMERCIAL

## 2024-11-07 VITALS
DIASTOLIC BLOOD PRESSURE: 105 MMHG | SYSTOLIC BLOOD PRESSURE: 161 MMHG | TEMPERATURE: 97 F | OXYGEN SATURATION: 100 % | HEART RATE: 67 BPM | RESPIRATION RATE: 16 BRPM

## 2024-11-07 DIAGNOSIS — R10.9 FLANK PAIN: Primary | ICD-10-CM

## 2024-11-07 DIAGNOSIS — M54.9 BACK PAIN: ICD-10-CM

## 2024-11-07 LAB
ALBUMIN SERPL BCG-MCNC: 3.8 G/DL (ref 3.5–5)
ALP SERPL-CCNC: 44 U/L (ref 34–104)
ALT SERPL W P-5'-P-CCNC: 11 U/L (ref 7–52)
AMORPH URATE CRY URNS QL MICRO: ABNORMAL
ANION GAP SERPL CALCULATED.3IONS-SCNC: 5 MMOL/L (ref 4–13)
AST SERPL W P-5'-P-CCNC: 12 U/L (ref 13–39)
ATRIAL RATE: 51 BPM
BACTERIA UR QL AUTO: ABNORMAL /HPF
BASOPHILS # BLD AUTO: 0.04 THOUSANDS/ΜL (ref 0–0.1)
BASOPHILS NFR BLD AUTO: 1 % (ref 0–1)
BILIRUB SERPL-MCNC: 0.43 MG/DL (ref 0.2–1)
BILIRUB UR QL STRIP: NEGATIVE
BUN SERPL-MCNC: 10 MG/DL (ref 5–25)
CALCIUM SERPL-MCNC: 9.6 MG/DL (ref 8.4–10.2)
CHLORIDE SERPL-SCNC: 105 MMOL/L (ref 96–108)
CLARITY UR: ABNORMAL
CO2 SERPL-SCNC: 29 MMOL/L (ref 21–32)
COLOR UR: ABNORMAL
CREAT SERPL-MCNC: 0.97 MG/DL (ref 0.6–1.3)
EOSINOPHIL # BLD AUTO: 0.09 THOUSAND/ΜL (ref 0–0.61)
EOSINOPHIL NFR BLD AUTO: 2 % (ref 0–6)
ERYTHROCYTE [DISTWIDTH] IN BLOOD BY AUTOMATED COUNT: 12.1 % (ref 11.6–15.1)
EXT PREGNANCY TEST URINE: NEGATIVE
EXT. CONTROL: NORMAL
GFR SERPL CREATININE-BSD FRML MDRD: 70 ML/MIN/1.73SQ M
GLUCOSE SERPL-MCNC: 89 MG/DL (ref 65–140)
GLUCOSE UR STRIP-MCNC: NEGATIVE MG/DL
HCT VFR BLD AUTO: 38.7 % (ref 34.8–46.1)
HGB BLD-MCNC: 12.8 G/DL (ref 11.5–15.4)
HGB UR QL STRIP.AUTO: ABNORMAL
HYALINE CASTS #/AREA URNS LPF: ABNORMAL /LPF
IMM GRANULOCYTES # BLD AUTO: 0.02 THOUSAND/UL (ref 0–0.2)
IMM GRANULOCYTES NFR BLD AUTO: 0 % (ref 0–2)
KETONES UR STRIP-MCNC: NEGATIVE MG/DL
LEUKOCYTE ESTERASE UR QL STRIP: ABNORMAL
LIPASE SERPL-CCNC: 17 U/L (ref 11–82)
LYMPHOCYTES # BLD AUTO: 3.17 THOUSANDS/ΜL (ref 0.6–4.47)
LYMPHOCYTES NFR BLD AUTO: 55 % (ref 14–44)
MCH RBC QN AUTO: 29.2 PG (ref 26.8–34.3)
MCHC RBC AUTO-ENTMCNC: 33.1 G/DL (ref 31.4–37.4)
MCV RBC AUTO: 88 FL (ref 82–98)
MONOCYTES # BLD AUTO: 0.37 THOUSAND/ΜL (ref 0.17–1.22)
MONOCYTES NFR BLD AUTO: 6 % (ref 4–12)
MUCOUS THREADS UR QL AUTO: ABNORMAL
NEUTROPHILS # BLD AUTO: 2.1 THOUSANDS/ΜL (ref 1.85–7.62)
NEUTS SEG NFR BLD AUTO: 36 % (ref 43–75)
NITRITE UR QL STRIP: NEGATIVE
NON-SQ EPI CELLS URNS QL MICRO: ABNORMAL /HPF
NRBC BLD AUTO-RTO: 0 /100 WBCS
P AXIS: 55 DEGREES
PH UR STRIP.AUTO: 7 [PH]
PLATELET # BLD AUTO: 208 THOUSANDS/UL (ref 149–390)
PMV BLD AUTO: 10.6 FL (ref 8.9–12.7)
POTASSIUM SERPL-SCNC: 3.4 MMOL/L (ref 3.5–5.3)
PR INTERVAL: 132 MS
PROT SERPL-MCNC: 6.5 G/DL (ref 6.4–8.4)
PROT UR STRIP-MCNC: NEGATIVE MG/DL
QRS AXIS: 46 DEGREES
QRSD INTERVAL: 86 MS
QT INTERVAL: 412 MS
QTC INTERVAL: 379 MS
RBC # BLD AUTO: 4.38 MILLION/UL (ref 3.81–5.12)
RBC #/AREA URNS AUTO: ABNORMAL /HPF
SODIUM SERPL-SCNC: 139 MMOL/L (ref 135–147)
SP GR UR STRIP.AUTO: 1.01 (ref 1–1.03)
T WAVE AXIS: 25 DEGREES
UROBILINOGEN UR STRIP-ACNC: <2 MG/DL
VENTRICULAR RATE: 51 BPM
WBC # BLD AUTO: 5.79 THOUSAND/UL (ref 4.31–10.16)
WBC #/AREA URNS AUTO: ABNORMAL /HPF

## 2024-11-07 PROCEDURE — 93010 ELECTROCARDIOGRAM REPORT: CPT | Performed by: INTERNAL MEDICINE

## 2024-11-07 PROCEDURE — 80053 COMPREHEN METABOLIC PANEL: CPT

## 2024-11-07 PROCEDURE — 93005 ELECTROCARDIOGRAM TRACING: CPT

## 2024-11-07 PROCEDURE — 74176 CT ABD & PELVIS W/O CONTRAST: CPT

## 2024-11-07 PROCEDURE — 99284 EMERGENCY DEPT VISIT MOD MDM: CPT | Performed by: EMERGENCY MEDICINE

## 2024-11-07 PROCEDURE — 81001 URINALYSIS AUTO W/SCOPE: CPT

## 2024-11-07 PROCEDURE — 96374 THER/PROPH/DIAG INJ IV PUSH: CPT

## 2024-11-07 PROCEDURE — 81025 URINE PREGNANCY TEST: CPT

## 2024-11-07 PROCEDURE — 36415 COLL VENOUS BLD VENIPUNCTURE: CPT

## 2024-11-07 PROCEDURE — 85025 COMPLETE CBC W/AUTO DIFF WBC: CPT

## 2024-11-07 PROCEDURE — 83690 ASSAY OF LIPASE: CPT

## 2024-11-07 PROCEDURE — 99285 EMERGENCY DEPT VISIT HI MDM: CPT

## 2024-11-07 RX ORDER — ONDANSETRON 2 MG/ML
4 INJECTION INTRAMUSCULAR; INTRAVENOUS ONCE
Status: DISCONTINUED | OUTPATIENT
Start: 2024-11-07 | End: 2024-11-07 | Stop reason: HOSPADM

## 2024-11-07 RX ORDER — NAPROXEN 500 MG/1
500 TABLET ORAL 2 TIMES DAILY PRN
Qty: 30 TABLET | Refills: 0 | Status: SHIPPED | OUTPATIENT
Start: 2024-11-07

## 2024-11-07 RX ORDER — KETOROLAC TROMETHAMINE 30 MG/ML
15 INJECTION, SOLUTION INTRAMUSCULAR; INTRAVENOUS ONCE
Status: COMPLETED | OUTPATIENT
Start: 2024-11-07 | End: 2024-11-07

## 2024-11-07 RX ADMIN — KETOROLAC TROMETHAMINE 15 MG: 30 INJECTION, SOLUTION INTRAMUSCULAR at 01:02

## 2024-11-07 NOTE — Clinical Note
Glenna Lanier was seen and treated in our emergency department on 11/7/2024.    No restrictions            Diagnosis:     Glenna  may return to work on return date.    She may return on this date: 11/08/2024         If you have any questions or concerns, please don't hesitate to call.      Deny Yuan MD    ______________________________           _______________          _______________  Hospital Representative                              Date                                Time

## 2024-11-07 NOTE — ED PROVIDER NOTES
Time reflects when diagnosis was documented in both MDM as applicable and the Disposition within this note       Time User Action Codes Description Comment    11/7/2024  5:25 AM Deny Yuan P Add [R10.9] Flank pain     11/7/2024  5:25 AM Lissy Deny P Add [M54.9] Back pain           ED Disposition       ED Disposition   Discharge    Condition   Stable    Date/Time   Thu Nov 7, 2024  5:08 AM    Comment   Mary Ellenregla Yannick discharge to home/self care.                   Assessment & Plan       Medical Decision Making  44-year-old female with past medical history of hypertension, migraine presents ED for evaluation of of right flank pain beginning approximately 1 hour ago, worsening with associated nausea.  Patient denies history of similar symptoms.  Denies a history of nephrolithiasis, DVT/PE, cardiac.  On physical examination, patient is hypertensive, but vital signs otherwise unremarkable.  Patient is in no acute distress.  Heart and lungs clear.  Abdomen is soft and nondistended.  Mild right upper quadrant tenderness with no Galeana sign, rebound, guarding.  Right flank tenderness present.  Tenderness to palpation of the bilateral paraspinal musculature in the thoracic region.  Differential diagnosis: Rule out ACS, I doubt PE as patient does not have a history of this and no recent travel, considering nephrolithiasis, cholecystitis, I doubt appendicitis based on location, musculoskeletal, infectious.  Plan: EKG, CT abdomen pelvis without contrast, urinalysis, CBC, CMP, lipase.  Will give Zofran for nausea, Toradol for pain.  If above workup negative, will discharge to home.    Amount and/or Complexity of Data Reviewed  Labs: ordered. Decision-making details documented in ED Course.  Radiology: ordered.    Risk  Prescription drug management.        ED Course as of 11/07/24 0549   Thu Nov 07, 2024   0201 LIPASE: 17  Within normal limits   0205 WBC: 5.79  Within normal limits       Medications   ondansetron (ZOFRAN)  injection 4 mg (4 mg Intravenous Not Given 24 0057)   ketorolac (TORADOL) injection 15 mg (15 mg Intravenous Given 24 0102)       ED Risk Strat Scores                           SBIRT 20yo+      Flowsheet Row Most Recent Value   Initial Alcohol Screen: US AUDIT-C     1. How often do you have a drink containing alcohol? 0 Filed at: 2024   2. How many drinks containing alcohol do you have on a typical day you are drinking?  0 Filed at: 2024   3a. Male UNDER 65: How often do you have five or more drinks on one occasion? 0 Filed at: 2024   3b. FEMALE Any Age, or MALE 65+: How often do you have 4 or more drinks on one occassion? 0 Filed at: 2024   Audit-C Score 0 Filed at: 2024   [place]: How many times in the past year have you...    Used an illegal drug or used a prescription medication for non-medical reasons? Never Filed at: 2024                            History of Present Illness       Chief Complaint   Patient presents with    Flank Pain     Pt reports R sided flank pain that radiates to RLQ area. +Diarrhea a few days ago        Past Medical History:   Diagnosis Date    Fetal growth problem suspected but not found     RESOLVED: 3/1/17    First trimester bleeding     RESOLVED:3/1/17    Granulation tissue of vaginal cuff 2018    Iron deficiency anemia due to chronic blood loss 2018    Miscarriage     Missed      RESOLVED:3/1/17    Poor fetal growth affecting management of mother     RESOLVED:3/1/17    Recurrent pregnancy loss, antepartum condition or complication     RESOLVED:3/1/17    Spontaneous      RESOLVED:3/1/17    Supraventricular tachycardia (HCC)     by history      Past Surgical History:   Procedure Laterality Date    COLONOSCOPY      DILATION AND CURETTAGE OF UTERUS      ,     DILATION AND CURETTAGE OF UTERUS      FOR SPONTANEOUS ; [id number]    HYSTERECTOMY  2018    Dr Singh     INDUCED   2010    LAPAROSCOPIC TUBAL LIGATION Right 2016    Procedure: LAPAROSCOPIC TUBAL LIGATION ;  Surgeon: Jacque Singh MD;  Location: [facility];  Service:     GA LAPS W/VAG HYSTERECT 250 GM/&RMVL TUBE&/OVARIES N/A 2018    Procedure: HYSTERECTOMY LAPAROSCOPIC ASSISTED VAGINAL (LAVH); RIGHT LAPAROSCOPIC SALPINGECTOMY;  Surgeon: Jacque Singh MD;  Location: [facility];  Service: Gynecology    GA TX MISSED  FIRST TRIMESTER SURGICAL N/A 2016    Procedure: DILATATION AND EVACUATION (D&E) (MISSED AB);  Surgeon: Jacque Singh MD;  Location: [facility];  Service: Gynecology    SALPINGOOPHORECTOMY Left     TUBAL LIGATION Right       Family History   Problem Relation Age of Onset    Arthritis Mother     Depression Mother     Hypertension Mother     Cancer Father     Breast cancer Neg Hx     Ovarian cancer Neg Hx     Colon cancer Neg Hx       Social History     Tobacco Use    Smoking status: Never    Smokeless tobacco: Never   Vaping Use    Vaping status: Never Used   Substance Use Topics    Alcohol use: Yes     Comment: social    Drug use: Never      E-Cigarette/Vaping    E-Cigarette Use Never User       E-Cigarette/Vaping Substances    Nicotine No     THC No     CBD No     Flavoring No     Other No     Unknown No       I have reviewed and agree with the history as documented.     44-year-old female with past medical history hypertension, migraine, presents to ED for evaluation of right flank tenderness beginning approximately 1 hour ago.  It is described as stabbing.  Patient states that the pain is constant, is worsening.  She denies a history of similar symptoms.  The pain is worsened with movement and deep inspiration.  Patient does note associated nausea.  She also reports right upper quadrant pain that is now. she denies associated chest pain, shortness of breath, fever, chills, dysuria, hematuria, vomiting.  She denies a personal history of DVT/PE.  She has not taken any medication  prior to arrival.  She does state that she has had several episodes of diarrhea over the past few days.        Review of Systems   Constitutional:  Negative for fever.   Respiratory:  Negative for cough and shortness of breath.    Cardiovascular:  Negative for chest pain.   Gastrointestinal:  Positive for abdominal pain, diarrhea and nausea. Negative for vomiting.   Genitourinary:  Positive for flank pain. Negative for difficulty urinating, dysuria and hematuria.   Musculoskeletal:  Negative for back pain and neck pain.   Skin:  Negative for color change.   Neurological:  Positive for headaches. Negative for dizziness, weakness, light-headedness and numbness.           Objective       ED Triage Vitals [11/07/24 0022]   Temperature Pulse Blood Pressure Respirations SpO2 Patient Position - Orthostatic VS   (!) 97 °F (36.1 °C) 67 (!) 161/105 16 100 % --      Temp Source Heart Rate Source BP Location FiO2 (%) Pain Score    Temporal -- Left arm -- 10 - Worst Possible Pain      Vitals      Date and Time Temp Pulse SpO2 Resp BP Pain Score FACES Pain Rating User   11/07/24 0102 -- -- -- -- -- 10 - Worst Possible Pain -- MS   11/07/24 0022 97 °F (36.1 °C) 67 100 % 16 161/105 10 - Worst Possible Pain -- JW            Physical Exam  Vitals and nursing note reviewed.   Constitutional:       General: She is not in acute distress.     Appearance: Normal appearance. She is normal weight. She is not ill-appearing, toxic-appearing or diaphoretic.   HENT:      Head: Normocephalic and atraumatic.      Mouth/Throat:      Mouth: Mucous membranes are moist.   Eyes:      Extraocular Movements: Extraocular movements intact.      Conjunctiva/sclera: Conjunctivae normal.   Cardiovascular:      Rate and Rhythm: Normal rate and regular rhythm.      Pulses: Normal pulses.      Heart sounds: Normal heart sounds.   Pulmonary:      Effort: Pulmonary effort is normal.      Breath sounds: Normal breath sounds.   Abdominal:      General: Abdomen is  flat. There is no distension.      Palpations: Abdomen is soft.      Tenderness: There is abdominal tenderness (Right upper quadrant). There is right CVA tenderness. There is no left CVA tenderness, guarding or rebound.      Comments: Galeana sign negative   Musculoskeletal:      Cervical back: Neck supple. No tenderness.      Right lower leg: No edema.      Left lower leg: No edema.      Comments: Bilateral paraspinal tenderness to palpation in the thoracic region   Skin:     General: Skin is warm and dry.   Neurological:      Mental Status: She is alert and oriented to person, place, and time.         Results Reviewed       Procedure Component Value Units Date/Time    Urine Microscopic [[id number]]  (Abnormal) Collected: 11/07/24 0248    Lab Status: Final result Specimen: Urine, Clean Catch Updated: 11/07/24 0305     RBC, UA 4-10 /hpf      WBC, UA 1-2 /hpf      Epithelial Cells Occasional /hpf      Bacteria, UA Occasional /hpf      MUCUS THREADS Occasional     Hyaline Casts, UA 0-3 /lpf      Amorphous Crystals, UA Occasional    UA w Reflex to Microscopic w Reflex to Culture [[id number]]  (Abnormal) Collected: 11/07/24 0248    Lab Status: Final result Specimen: Urine, Clean Catch Updated: 11/07/24 0300     Color, UA Light Yellow     Clarity, UA Turbid     Specific Gravity, UA 1.013     pH, UA 7.0     Leukocytes, UA Small     Nitrite, UA Negative     Protein, UA Negative mg/dl      Glucose, UA Negative mg/dl      Ketones, UA Negative mg/dl      Urobilinogen, UA <2.0 mg/dl      Bilirubin, UA Negative     Occult Blood, UA Small    POCT pregnancy, urine [[id number]]  (Normal) Resulted: 11/07/24 0253    Lab Status: Final result Updated: 11/07/24 0253     EXT Preg Test, Ur Negative     Control Valid    CBC and differential [[id number]]  (Abnormal) Collected: 11/07/24 0103    Lab Status: Final result Specimen: Blood from Arm, Right Updated: 11/07/24 0202     WBC 5.79 Thousand/uL      RBC 4.38 Million/uL      Hemoglobin 12.8  g/dL      Hematocrit 38.7 %      MCV 88 fL      MCH 29.2 pg      MCHC 33.1 g/dL      RDW 12.1 %      MPV 10.6 fL      Platelets 208 Thousands/uL      nRBC 0 /100 WBCs      Segmented % 36 %      Immature Grans % 0 %      Lymphocytes % 55 %      Monocytes % 6 %      Eosinophils Relative 2 %      Basophils Relative 1 %      Absolute Neutrophils 2.10 Thousands/µL      Absolute Immature Grans 0.02 Thousand/uL      Absolute Lymphocytes 3.17 Thousands/µL      Absolute Monocytes 0.37 Thousand/µL      Eosinophils Absolute 0.09 Thousand/µL      Basophils Absolute 0.04 Thousands/µL     Comprehensive metabolic panel [[id number]]  (Abnormal) Collected: 11/07/24 0103    Lab Status: Final result Specimen: Blood from Arm, Right Updated: 11/07/24 0139     Sodium 139 mmol/L      Potassium 3.4 mmol/L      Chloride 105 mmol/L      CO2 29 mmol/L      ANION GAP 5 mmol/L      BUN 10 mg/dL      Creatinine 0.97 mg/dL      Glucose 89 mg/dL      Calcium 9.6 mg/dL      AST 12 U/L      ALT 11 U/L      Alkaline Phosphatase 44 U/L      Total Protein 6.5 g/dL      Albumin 3.8 g/dL      Total Bilirubin 0.43 mg/dL      eGFR 70 ml/min/1.73sq m     Narrative:      [employer] guidelines for Chronic Kidney Disease (CKD):     Stage 1 with normal or high GFR (GFR > 90 mL/min/1.73 square meters)    Stage 2 Mild CKD (GFR = 60-89 mL/min/1.73 square meters)    Stage 3A Moderate CKD (GFR = 45-59 mL/min/1.73 square meters)    Stage 3B Moderate CKD (GFR = 30-44 mL/min/1.73 square meters)    Stage 4 Severe CKD (GFR = 15-29 mL/min/1.73 square meters)    Stage 5 End Stage CKD (GFR <15 mL/min/1.73 square meters)  Note: GFR calculation is accurate only with a steady state creatinine    Lipase [[id number]]  (Normal) Collected: 11/07/24 0103    Lab Status: Final result Specimen: Blood from Arm, Right Updated: 11/07/24 0139     Lipase 17 u/L             CT abdomen pelvis wo contrast   Final Interpretation by Andry Juárez DO (11/07 0503)      No  acute findings in the abdomen or pelvis within the limits of unenhanced technique.      Workstation performed: [id number]             Procedures    ED Medication and Procedure Management   Prior to Admission Medications   Prescriptions Last Dose Informant Patient Reported? Taking?   TiZANidine (ZANAFLEX) 2 MG capsule   No No   Sig: Take 1 capsule (2 mg total) by mouth 3 (three) times a day as needed for muscle spasms   diphenhydrAMINE (BENADRYL) 25 mg tablet   No No   Sig: Take 1 tablet (25 mg total) by mouth every 6 (six) hours   diphenhydrAMINE (SOMINEX) 25 MG tablet   Yes No   Sig: Take 25 mg by mouth every 6 (six) hours   gabapentin (NEURONTIN) 300 mg capsule   No No   Sig: Take 1 capsule (300 mg total) by mouth 3 (three) times a day   hydrOXYzine HCL (ATARAX) 25 mg tablet   No No   Sig: Take 1 tablet (25 mg total) by mouth every 6 (six) hours as needed for itching   hydroCHLOROthiazide 25 mg tablet   No No   Sig: Take 1 tablet (25 mg total) by mouth daily   hydroCHLOROthiazide 25 mg tablet   No No   Sig: Take 1 tablet (25 mg total) by mouth daily   ketorolac (TORADOL) 10 mg tablet   No No   Sig: Take 1 tablet (10 mg total) by mouth every 6 (six) hours as needed for severe pain   meloxicam (MOBIC) 15 mg tablet   Yes No   Sig: Take 15 mg by mouth daily as needed   oxyCODONE (ROXICODONE) 5 immediate release tablet   No No   Sig: Take 1 tablet (5 mg total) by mouth every 4 (four) hours as needed for severe pain Max Daily Amount: 30 mg   Patient not taking: Reported on 2024   predniSONE 10 mg tablet   No No   Si tabs x 2 days, 3 tabs x 2 days, 2 tabs x 2 days, 1 tab x 2 days   rizatriptan (MAXALT) 10 mg tablet   No No   Sig: Take 1 tablet (10 mg total) by mouth once as needed for migraine for up to 1 dose May repeat in 2 hours if needed   triamcinolone (KENALOG) 0.5 % cream   No No   Sig: Apply topically 2 (two) times a day      Facility-Administered Medications: None     Discharge Medication List as of  11/7/2024  5:26 AM        START taking these medications    Details   naproxen (Naprosyn) 500 mg tablet Take 1 tablet (500 mg total) by mouth 2 (two) times a day as needed for mild pain, Starting Thu 11/7/2024, Normal           CONTINUE these medications which have NOT CHANGED    Details   diphenhydrAMINE (BENADRYL) 25 mg tablet Take 1 tablet (25 mg total) by mouth every 6 (six) hours, Starting Fri 4/12/2024, Normal      diphenhydrAMINE (SOMINEX) 25 MG tablet Take 25 mg by mouth every 6 (six) hours, Starting Sat 4/13/2024, Historical Med      gabapentin (NEURONTIN) 300 mg capsule Take 1 capsule (300 mg total) by mouth 3 (three) times a day, Starting Wed 4/3/2024, Normal      hydroCHLOROthiazide 25 mg tablet Take 1 tablet (25 mg total) by mouth daily, Starting Mon 7/29/2024, Normal      hydrOXYzine HCL (ATARAX) 25 mg tablet Take 1 tablet (25 mg total) by mouth every 6 (six) hours as needed for itching, Starting Wed 4/24/2024, Normal      ketorolac (TORADOL) 10 mg tablet Take 1 tablet (10 mg total) by mouth every 6 (six) hours as needed for severe pain, Starting Wed 10/25/2023, Normal      meloxicam (MOBIC) 15 mg tablet Take 15 mg by mouth daily as needed, Starting Tue 8/22/2023, Historical Med      oxyCODONE (ROXICODONE) 5 immediate release tablet Take 1 tablet (5 mg total) by mouth every 4 (four) hours as needed for severe pain Max Daily Amount: 30 mg, Starting Sun 3/31/2024, Normal      predniSONE 10 mg tablet 4 tabs x 2 days, 3 tabs x 2 days, 2 tabs x 2 days, 1 tab x 2 days, Normal      rizatriptan (MAXALT) 10 mg tablet Take 1 tablet (10 mg total) by mouth once as needed for migraine for up to 1 dose May repeat in 2 hours if needed, Starting Wed 4/3/2024, Normal      TiZANidine (ZANAFLEX) 2 MG capsule Take 1 capsule (2 mg total) by mouth 3 (three) times a day as needed for muscle spasms, Starting Wed 4/3/2024, Normal      triamcinolone (KENALOG) 0.5 % cream Apply topically 2 (two) times a day, Starting Wed  4/24/2024, Normal           No discharge procedures on file.  ED SEPSIS DOCUMENTATION   Time reflects when diagnosis was documented in both MDM as applicable and the Disposition within this note       Time User Action Codes Description Comment    11/7/2024  5:25 AM Deny Yuan Add [R10.9] Flank pain     11/7/2024  5:25 AM Deny Yuan Add [M54.9] Back pain                  Justin Weir DO  11/07/24 0549

## 2024-11-07 NOTE — ED ATTENDING ATTESTATION
11/7/2024  IDeny MD, saw and evaluated the patient. I have discussed the patient with the resident/non-physician practitioner and agree with the resident's/non-physician practitioner's findings, Plan of Care, and MDM as documented in the resident's/non-physician practitioner's note, except where noted. All available labs and Radiology studies were reviewed.  I was present for key portions of any procedure(s) performed by the resident/non-physician practitioner and I was immediately available to provide assistance.       At this point I agree with the current assessment done in the Emergency Department.  I have conducted an independent evaluation of this patient a history and physical is as follows:      Final Diagnosis:  1. Flank pain    2. Back pain      Chief Complaint   Patient presents with    Flank Pain     Pt reports R sided flank pain that radiates to RLQ area. +Diarrhea a few days ago            A:  -44-year-old female presents with right sided flank pain.      P:  - given the presentation, will check CBC for marked leukocytosis  - CMP for liver enzyme elevation that could signal cholecystitis, biliary obstructive disease. Check RFTs for CLAUDE / markers of dehydration.  - Lipase given abdominal pain to evaluate specifically for pancreatitis.  - Urine: will check for UTI or signs of pyelonephritis.   - Lastly, will consider abdominal imaging.  - CT AP w/o  Contrast: r/o appendicitis, cholecystitis, bowel obstruction or other acute abdominal pathology. Would also demonstrate signs of pyelonephritis, cystitis.   - Disposition per workup.        H:    44-year-old female presents with right-sided flank pain.  Started around 11 PM.  Patient was able to fall asleep.  However, woke up with worsening back pain.  No nausea/vomiting.  No  symptoms.  Has never experienced similar symptoms.  Pain does radiated to right side of abdomen.      PMH:  Past Medical History:   Diagnosis Date    Fetal growth  problem suspected but not found     RESOLVED: 3/1/17    First trimester bleeding     RESOLVED:3/1/17    Granulation tissue of vaginal cuff 2018    Iron deficiency anemia due to chronic blood loss 2018    Miscarriage     Missed      RESOLVED:3/1/17    Poor fetal growth affecting management of mother     RESOLVED:3/1/17    Recurrent pregnancy loss, antepartum condition or complication     RESOLVED:3/1/17    Spontaneous      RESOLVED:3/1/17    Supraventricular tachycardia (HCC)     by history       PSH:  Past Surgical History:   Procedure Laterality Date    COLONOSCOPY      DILATION AND CURETTAGE OF UTERUS      ,     DILATION AND CURETTAGE OF UTERUS      FOR SPONTANEOUS ; X5 [id number]    HYSTERECTOMY  2018    Dr Singh    INDUCED   2010    LAPAROSCOPIC TUBAL LIGATION Right 2016    Procedure: LAPAROSCOPIC TUBAL LIGATION ;  Surgeon: Jacque Singh MD;  Location: [facility];  Service:     VT LAPS W/VAG HYSTERECT 250 GM/&RMVL TUBE&/OVARIES N/A 2018    Procedure: HYSTERECTOMY LAPAROSCOPIC ASSISTED VAGINAL (LAVH); RIGHT LAPAROSCOPIC SALPINGECTOMY;  Surgeon: Jacque Singh MD;  Location: [facility];  Service: Gynecology    VT TX MISSED  FIRST TRIMESTER SURGICAL N/A 2016    Procedure: DILATATION AND EVACUATION (D&E) (MISSED AB);  Surgeon: Jacque Singh MD;  Location: [facility];  Service: Gynecology    SALPINGOOPHORECTOMY Left     TUBAL LIGATION Right          PE:   Vitals:    24 0022   BP: (!) 161/105   BP Location: Left arm   Pulse: 67   Resp: 16   Temp: (!) 97 °F (36.1 °C)   TempSrc: Temporal   SpO2: 100%         Constitutional: Vital signs are normal. She appears well-developed. She is cooperative. No distress.   HENT:   Mouth/Throat: Uvula is midline, oropharynx is clear and moist and mucous membranes are normal.   Eyes: Pupils are equal, round, and reactive to light. Conjunctivae and EOM are normal.   Neck: Trachea normal. No thyroid mass  and no thyromegaly present.   Cardiovascular: Normal rate, regular rhythm, normal heart sounds.   No murmur heard.  Pulmonary/Chest: Effort normal and breath sounds normal.   Abdominal: Soft. Normal appearance and bowel sounds are normal. There is no tenderness. There is no rebound, no guarding.   Neurological: She is alert.   Skin: Skin is warm, dry and intact.   Psychiatric: She has a normal mood and affect. Her speech is normal and behavior is normal. Thought content normal.          - 13 point ROS was performed and all are normal unless stated in the history above.   - Nursing note reviewed. Vitals reviewed.   - Orders placed by myself and/or advanced practitioner / resident.    - Previous chart was reviewed  - No language barrier.   - History obtained from patient.   - There are no limitations to the history obtained. Reasons ROS could not be obtained:  N/A         Medications   ondansetron (ZOFRAN) injection 4 mg (4 mg Intravenous Not Given 11/7/24 0057)   ketorolac (TORADOL) injection 15 mg (15 mg Intravenous Given 11/7/24 0102)     CT abdomen pelvis wo contrast   Final Result      No acute findings in the abdomen or pelvis within the limits of unenhanced technique.      Workstation performed: [id number]           Orders Placed This Encounter   Procedures    CT abdomen pelvis wo contrast    UA w Reflex to Microscopic w Reflex to Culture    CBC and differential    Comprehensive metabolic panel    Lipase    Urine Microscopic    POCT pregnancy, urine    ECG 12 lead    ECG 12 lead     Labs Reviewed   UA W REFLEX TO MICROSCOPIC WITH REFLEX TO CULTURE - Abnormal       Result Value Ref Range Status    Color, UA Light Yellow   Final    Clarity, UA Turbid   Final    Specific Gravity, UA 1.013  1.003 - 1.030 Final    pH, UA 7.0  4.5, 5.0, 5.5, 6.0, 6.5, 7.0, 7.5, 8.0 Final    Leukocytes, UA Small (*) Negative Final    Nitrite, UA Negative  Negative Final    Protein, UA Negative  Negative mg/dl Final    Glucose, UA  Negative  Negative mg/dl Final    Ketones, UA Negative  Negative mg/dl Final    Urobilinogen, UA <2.0  <2.0 mg/dl mg/dl Final    Bilirubin, UA Negative  Negative Final    Occult Blood, UA Small (*) Negative Final   CBC AND DIFFERENTIAL - Abnormal    WBC 5.79  4.31 - 10.16 Thousand/uL Final    RBC 4.38  3.81 - 5.12 Million/uL Final    Hemoglobin 12.8  11.5 - 15.4 g/dL Final    Hematocrit 38.7  34.8 - 46.1 % Final    MCV 88  82 - 98 fL Final    MCH 29.2  26.8 - 34.3 pg Final    MCHC 33.1  31.4 - 37.4 g/dL Final    RDW 12.1  11.6 - 15.1 % Final    MPV 10.6  8.9 - 12.7 fL Final    Platelets 208  149 - 390 Thousands/uL Final    nRBC 0  /100 WBCs Final    Segmented % 36 (*) 43 - 75 % Final    Immature Grans % 0  0 - 2 % Final    Lymphocytes % 55 (*) 14 - 44 % Final    Monocytes % 6  4 - 12 % Final    Eosinophils Relative 2  0 - 6 % Final    Basophils Relative 1  0 - 1 % Final    Absolute Neutrophils 2.10  1.85 - 7.62 Thousands/µL Final    Absolute Immature Grans 0.02  0.00 - 0.20 Thousand/uL Final    Absolute Lymphocytes 3.17  0.60 - 4.47 Thousands/µL Final    Absolute Monocytes 0.37  0.17 - 1.22 Thousand/µL Final    Eosinophils Absolute 0.09  0.00 - 0.61 Thousand/µL Final    Basophils Absolute 0.04  0.00 - 0.10 Thousands/µL Final   COMPREHENSIVE METABOLIC PANEL - Abnormal    Sodium 139  135 - 147 mmol/L Final    Potassium 3.4 (*) 3.5 - 5.3 mmol/L Final    Chloride 105  96 - 108 mmol/L Final    CO2 29  21 - 32 mmol/L Final    ANION GAP 5  4 - 13 mmol/L Final    BUN 10  5 - 25 mg/dL Final    Creatinine 0.97  0.60 - 1.30 mg/dL Final    Comment: Standardized to IDMS reference method    Glucose 89  65 - 140 mg/dL Final    Comment: If the patient is fasting, the ADA then defines impaired fasting glucose as > 100 mg/dL and diabetes as > or equal to 123 mg/dL.    Calcium 9.6  8.4 - 10.2 mg/dL Final    AST 12 (*) 13 - 39 U/L Final    ALT 11  7 - 52 U/L Final    Comment: Specimen collection should occur prior to Sulfasalazine  administration due to the potential for falsely depressed results.     Alkaline Phosphatase 44  34 - 104 U/L Final    Total Protein 6.5  6.4 - 8.4 g/dL Final    Albumin 3.8  3.5 - 5.0 g/dL Final    Total Bilirubin 0.43  0.20 - 1.00 mg/dL Final    Comment: Use of this assay is not recommended for patients undergoing treatment with eltrombopag due to the potential for falsely elevated results.  N-acetyl-p-benzoquinone imine (metabolite of Acetaminophen) will generate erroneously low results in samples for patients that have taken an overdose of Acetaminophen.    eGFR 70  ml/min/1.73sq m Final    Narrative:     National Kid[employer] Foundation guidelines for Chronic Kidney Disease (CKD):     Stage 1 with normal or high GFR (GFR > 90 mL/min/1.73 square meters)    Stage 2 Mild CKD (GFR = 60-89 mL/min/1.73 square meters)    Stage 3A Moderate CKD (GFR = 45-59 mL/min/1.73 square meters)    Stage 3B Moderate CKD (GFR = 30-44 mL/min/1.73 square meters)    Stage 4 Severe CKD (GFR = 15-29 mL/min/1.73 square meters)    Stage 5 End Stage CKD (GFR <15 mL/min/1.73 square meters)  Note: GFR calculation is accurate only with a steady state creatinine   URINE MICROSCOPIC - Abnormal    RBC, UA 4-10 (*) None Seen, 1-2 /hpf Final    WBC, UA 1-2  None Seen, 1-2 /hpf Final    Epithelial Cells Occasional  None Seen, Occasional /hpf Final    Bacteria, UA Occasional  None Seen, Occasional /hpf Final    MUCUS THREADS Occasional (*) None Seen Final    Hyaline Casts, UA 0-3 (*) None Seen /lpf Final    Amorphous Crystals, UA Occasional   Final   LIPASE - Normal    Lipase 17  11 - 82 u/L Final   POCT PREGNANCY, URINE - Normal    EXT Preg Test, Ur Negative   Final    Control Valid   Final     Time reflects when diagnosis was documented in both MDM as applicable and the Disposition within this note       Time User Action Codes Description Comment    11/7/2024  5:25 AM Deny Yuan Add [R10.9] Flank pain     11/7/2024  5:25 AM Deny Yuan  Add [M54.9] Back pain           ED Disposition       ED Disposition   Discharge    Condition   Stable    Date/Time   Thu Nov 7, 2024  5:08 AM    Comment   Glenna Lanier discharge to home/self care.                   Follow-up Information       Follow up With Specialties Details Why Contact Info Additional Information    Antonia Castro MD Family Medicine Schedule an appointment as soon as possible for a visit   [address] [place] [id number]  [phone]       [facility] Emergency Department Emergency Medicine Go to  If symptoms worsen [address] [facility] [id number] [phone] [facility] Emergency Department, [address] [place] [address], [address] [phone]          Patient's Medications   Discharge Prescriptions    NAPROXEN (NAPROSYN) 500 MG TABLET    Take 1 tablet (500 mg total) by mouth 2 (two) times a day as needed for mild pain       Start Date: 11/7/2024 End Date: --       Order Dose: 500 mg       Quantity: 30 tablet    Refills: 0     No discharge procedures on file.  Prior to Admission Medications   Prescriptions Last Dose Informant Patient Reported? Taking?   TiZANidine (ZANAFLEX) 2 MG capsule   No No   Sig: Take 1 capsule (2 mg total) by mouth 3 (three) times a day as needed for muscle spasms   diphenhydrAMINE (BENADRYL) 25 mg tablet   No No   Sig: Take 1 tablet (25 mg total) by mouth every 6 (six) hours   diphenhydrAMINE (SOMINEX) 25 MG tablet   Yes No   Sig: Take 25 mg by mouth every 6 (six) hours   gabapentin (NEURONTIN) 300 mg capsule   No No   Sig: Take 1 capsule (300 mg total) by mouth 3 (three) times a day   hydrOXYzine HCL (ATARAX) 25 mg tablet   No No   Sig: Take 1 tablet (25 mg total) by mouth every 6 (six) hours as needed for itching   hydroCHLOROthiazide 25 mg tablet   No No   Sig: Take 1 tablet (25 mg total) by mouth daily   hydroCHLOROthiazide 25 mg tablet   No No   Sig: Take 1 tablet (25 mg total) by  "mouth daily   ketorolac (TORADOL) 10 mg tablet   No No   Sig: Take 1 tablet (10 mg total) by mouth every 6 (six) hours as needed for severe pain   meloxicam (MOBIC) 15 mg tablet   Yes No   Sig: Take 15 mg by mouth daily as needed   oxyCODONE (ROXICODONE) 5 immediate release tablet   No No   Sig: Take 1 tablet (5 mg total) by mouth every 4 (four) hours as needed for severe pain Max Daily Amount: 30 mg   Patient not taking: Reported on 2024   predniSONE 10 mg tablet   No No   Si tabs x 2 days, 3 tabs x 2 days, 2 tabs x 2 days, 1 tab x 2 days   rizatriptan (MAXALT) 10 mg tablet   No No   Sig: Take 1 tablet (10 mg total) by mouth once as needed for migraine for up to 1 dose May repeat in 2 hours if needed   triamcinolone (KENALOG) 0.5 % cream   No No   Sig: Apply topically 2 (two) times a day      Facility-Administered Medications: None       Portions of the record may have been created with voice recognition software. Occasional wrong word or \"sound a like\" substitutions may have occurred due to the inherent limitations of voice recognition software. Read the chart carefully and recognize, using context, where substitutions have occurred.         ED Course         Critical Care Time  Procedures      "
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
## 2024-12-09 ENCOUNTER — APPOINTMENT (OUTPATIENT)
Dept: URGENT CARE | Age: 44
End: 2024-12-09

## 2024-12-29 ENCOUNTER — HOSPITAL ENCOUNTER (EMERGENCY)
Facility: HOSPITAL | Age: 44
Discharge: HOME | End: 2024-12-29
Attending: EMERGENCY MEDICINE
Payer: COMMERCIAL

## 2024-12-29 VITALS
SYSTOLIC BLOOD PRESSURE: 160 MMHG | HEART RATE: 81 BPM | RESPIRATION RATE: 16 BRPM | DIASTOLIC BLOOD PRESSURE: 96 MMHG | OXYGEN SATURATION: 100 % | TEMPERATURE: 97.8 F

## 2024-12-29 DIAGNOSIS — G43.909 MIGRAINE HEADACHE: Primary | ICD-10-CM

## 2024-12-29 DIAGNOSIS — I10 PRIMARY HYPERTENSION: ICD-10-CM

## 2024-12-29 DIAGNOSIS — R11.0 NAUSEA: ICD-10-CM

## 2024-12-29 LAB
ALBUMIN SERPL BCG-MCNC: 3.9 G/DL (ref 3.5–5)
ALP SERPL-CCNC: 47 U/L (ref 34–104)
ALT SERPL W P-5'-P-CCNC: 12 U/L (ref 7–52)
ANION GAP SERPL CALCULATED.3IONS-SCNC: 5 MMOL/L (ref 4–13)
AST SERPL W P-5'-P-CCNC: 13 U/L (ref 13–39)
ATRIAL RATE: 68 BPM
BACTERIA UR QL AUTO: ABNORMAL /HPF
BASOPHILS # BLD AUTO: 0.02 THOUSANDS/ΜL (ref 0–0.1)
BASOPHILS NFR BLD AUTO: 1 % (ref 0–1)
BILIRUB SERPL-MCNC: 0.57 MG/DL (ref 0.2–1)
BILIRUB UR QL STRIP: NEGATIVE
BUN SERPL-MCNC: 9 MG/DL (ref 5–25)
CALCIUM SERPL-MCNC: 8.5 MG/DL (ref 8.4–10.2)
CARDIAC TROPONIN I PNL SERPL HS: <2 NG/L (ref ?–50)
CARDIAC TROPONIN I PNL SERPL HS: <2 NG/L (ref ?–50)
CHLORIDE SERPL-SCNC: 107 MMOL/L (ref 96–108)
CLARITY UR: ABNORMAL
CO2 SERPL-SCNC: 29 MMOL/L (ref 21–32)
COLOR UR: YELLOW
CREAT SERPL-MCNC: 0.82 MG/DL (ref 0.6–1.3)
EOSINOPHIL # BLD AUTO: 0.06 THOUSAND/ΜL (ref 0–0.61)
EOSINOPHIL NFR BLD AUTO: 1 % (ref 0–6)
ERYTHROCYTE [DISTWIDTH] IN BLOOD BY AUTOMATED COUNT: 11.8 % (ref 11.6–15.1)
GFR SERPL CREATININE-BSD FRML MDRD: 86 ML/MIN/1.73SQ M
GLUCOSE SERPL-MCNC: 105 MG/DL (ref 65–140)
GLUCOSE UR STRIP-MCNC: NEGATIVE MG/DL
HCT VFR BLD AUTO: 40 % (ref 34.8–46.1)
HGB BLD-MCNC: 13.3 G/DL (ref 11.5–15.4)
HGB UR QL STRIP.AUTO: ABNORMAL
IMM GRANULOCYTES # BLD AUTO: 0.03 THOUSAND/UL (ref 0–0.2)
IMM GRANULOCYTES NFR BLD AUTO: 1 % (ref 0–2)
KETONES UR STRIP-MCNC: NEGATIVE MG/DL
LEUKOCYTE ESTERASE UR QL STRIP: ABNORMAL
LYMPHOCYTES # BLD AUTO: 1.82 THOUSANDS/ΜL (ref 0.6–4.47)
LYMPHOCYTES NFR BLD AUTO: 41 % (ref 14–44)
MCH RBC QN AUTO: 29.4 PG (ref 26.8–34.3)
MCHC RBC AUTO-ENTMCNC: 33.3 G/DL (ref 31.4–37.4)
MCV RBC AUTO: 88 FL (ref 82–98)
MONOCYTES # BLD AUTO: 0.29 THOUSAND/ΜL (ref 0.17–1.22)
MONOCYTES NFR BLD AUTO: 7 % (ref 4–12)
MUCOUS THREADS UR QL AUTO: ABNORMAL
NEUTROPHILS # BLD AUTO: 2.18 THOUSANDS/ΜL (ref 1.85–7.62)
NEUTS SEG NFR BLD AUTO: 49 % (ref 43–75)
NITRITE UR QL STRIP: NEGATIVE
NON-SQ EPI CELLS URNS QL MICRO: ABNORMAL /HPF
NRBC BLD AUTO-RTO: 0 /100 WBCS
P AXIS: 28 DEGREES
PH UR STRIP.AUTO: 7 [PH]
PLATELET # BLD AUTO: 217 THOUSANDS/UL (ref 149–390)
PMV BLD AUTO: 9.8 FL (ref 8.9–12.7)
POTASSIUM SERPL-SCNC: 3.6 MMOL/L (ref 3.5–5.3)
PR INTERVAL: 106 MS
PROT SERPL-MCNC: 6.6 G/DL (ref 6.4–8.4)
PROT UR STRIP-MCNC: NEGATIVE MG/DL
QRS AXIS: 39 DEGREES
QRSD INTERVAL: 88 MS
QT INTERVAL: 390 MS
QTC INTERVAL: 414 MS
RBC # BLD AUTO: 4.53 MILLION/UL (ref 3.81–5.12)
RBC #/AREA URNS AUTO: ABNORMAL /HPF
SODIUM SERPL-SCNC: 141 MMOL/L (ref 135–147)
SP GR UR STRIP.AUTO: 1.02 (ref 1–1.03)
T WAVE AXIS: 27 DEGREES
UROBILINOGEN UR STRIP-ACNC: <2 MG/DL
VENTRICULAR RATE: 68 BPM
WBC # BLD AUTO: 4.4 THOUSAND/UL (ref 4.31–10.16)
WBC #/AREA URNS AUTO: ABNORMAL /HPF

## 2024-12-29 PROCEDURE — 84484 ASSAY OF TROPONIN QUANT: CPT

## 2024-12-29 PROCEDURE — 93005 ELECTROCARDIOGRAM TRACING: CPT

## 2024-12-29 PROCEDURE — 96365 THER/PROPH/DIAG IV INF INIT: CPT

## 2024-12-29 PROCEDURE — 99285 EMERGENCY DEPT VISIT HI MDM: CPT

## 2024-12-29 PROCEDURE — 99283 EMERGENCY DEPT VISIT LOW MDM: CPT

## 2024-12-29 PROCEDURE — 80053 COMPREHEN METABOLIC PANEL: CPT

## 2024-12-29 PROCEDURE — 36415 COLL VENOUS BLD VENIPUNCTURE: CPT

## 2024-12-29 PROCEDURE — 81001 URINALYSIS AUTO W/SCOPE: CPT

## 2024-12-29 PROCEDURE — 85025 COMPLETE CBC W/AUTO DIFF WBC: CPT

## 2024-12-29 PROCEDURE — 96361 HYDRATE IV INFUSION ADD-ON: CPT

## 2024-12-29 PROCEDURE — 96375 TX/PRO/DX INJ NEW DRUG ADDON: CPT

## 2024-12-29 RX ORDER — ONDANSETRON 4 MG/1
4 TABLET, ORALLY DISINTEGRATING ORAL EVERY 6 HOURS PRN
Qty: 20 TABLET | Refills: 0 | Status: SHIPPED | OUTPATIENT
Start: 2024-12-29 | End: 2024-12-29

## 2024-12-29 RX ORDER — KETOROLAC TROMETHAMINE 30 MG/ML
30 INJECTION, SOLUTION INTRAMUSCULAR; INTRAVENOUS ONCE
Status: COMPLETED | OUTPATIENT
Start: 2024-12-29 | End: 2024-12-29

## 2024-12-29 RX ORDER — DIPHENHYDRAMINE HYDROCHLORIDE 50 MG/ML
25 INJECTION INTRAMUSCULAR; INTRAVENOUS ONCE
Status: COMPLETED | OUTPATIENT
Start: 2024-12-29 | End: 2024-12-29

## 2024-12-29 RX ORDER — MAGNESIUM SULFATE HEPTAHYDRATE 40 MG/ML
2 INJECTION, SOLUTION INTRAVENOUS ONCE
Status: COMPLETED | OUTPATIENT
Start: 2024-12-29 | End: 2024-12-29

## 2024-12-29 RX ORDER — ONDANSETRON 4 MG/1
4 TABLET, ORALLY DISINTEGRATING ORAL EVERY 6 HOURS PRN
Qty: 20 TABLET | Refills: 0 | Status: SHIPPED | OUTPATIENT
Start: 2024-12-29

## 2024-12-29 RX ORDER — METOCLOPRAMIDE HYDROCHLORIDE 5 MG/ML
10 INJECTION INTRAMUSCULAR; INTRAVENOUS ONCE
Status: COMPLETED | OUTPATIENT
Start: 2024-12-29 | End: 2024-12-29

## 2024-12-29 RX ADMIN — DIPHENHYDRAMINE HYDROCHLORIDE 25 MG: 50 INJECTION, SOLUTION INTRAMUSCULAR; INTRAVENOUS at 09:27

## 2024-12-29 RX ADMIN — METOCLOPRAMIDE 10 MG: 5 INJECTION, SOLUTION INTRAMUSCULAR; INTRAVENOUS at 09:29

## 2024-12-29 RX ADMIN — SODIUM CHLORIDE 1000 ML: 0.9 INJECTION, SOLUTION INTRAVENOUS at 09:25

## 2024-12-29 RX ADMIN — KETOROLAC TROMETHAMINE 30 MG: 30 INJECTION, SOLUTION INTRAMUSCULAR; INTRAVENOUS at 09:26

## 2024-12-29 RX ADMIN — MAGNESIUM SULFATE HEPTAHYDRATE 2 G: 40 INJECTION, SOLUTION INTRAVENOUS at 10:00

## 2024-12-29 NOTE — DISCHARGE INSTRUCTIONS
Use the prescribed nausea medication to treat your acute nausea and feeling like he may vomit.  Take 600 mg of ibuprofen (Motrin) see every 6 hours and/or 1000 mg of acetaminophen (Tylenol) every 8 hours as needed for reoccurrence of headache.  Rest, take small sips of water throughout the day.    Follow-up with primary care for blood pressure recheck in 1 to 2 weeks.    Return to the ER if develop severe headache, double vision, loss of vision, severe chest pain, difficulty breathing, persistent vomiting, weakness, confusion, or lethargy.

## 2024-12-29 NOTE — ED PROVIDER NOTES
Time reflects when diagnosis was documented in both MDM as applicable and the Disposition within this note       Time User Action Codes Description Comment    12/29/2024 11:26 AM Shugars, Koki Add [G43.909] Migraine headache     12/29/2024 11:26 AM Shugars, Koki Add [R11.0] Nausea     12/29/2024 11:27 AM Shugars, Koki Add [I27.0] Primary pulmonary HTN (HCC)     12/29/2024 11:27 AM Shugars, Koki Remove [I27.0] Primary pulmonary HTN (HCC)     12/29/2024 11:27 AM Shugars, Koki Add [I10] Primary hypertension           ED Disposition       ED Disposition   Discharge    Condition   Stable    Date/Time   Sun Dec 29, 2024 11:26 AM    Comment   Glenna Lanier discharge to home/self care.                   Assessment & Plan       Medical Decision Making  DDX including but not limited to: Viral illness, tension headache, migraine headache, cluster headache, gastroenteritis, atypical ACS, primary hypertension; considered but doubt hypertensive emergency    The patient presents for 1 day of feeling slightly fatigued, with hot and cold flashes, as well as mild nausea.  Noted to be hypertensive at work for which she presents here.  She reports taking 25 mg of HCTZ but does not check her blood pressure regularly.  She currently denies chest pain, shortness of breath, and palpitations.  She does have a classic frontal migraine headache for which we will administer a migraine cocktail.  EKG obtained and without acute ischemic changes.  Troponin obtained and negative, given no chest pain or shortness of breath, no suspicion for ACS at this time.  Labs obtained without electrolyte derangement, organ dysfunction, or anemia.  Given benign abdominal exam, doubt acute intra-abdominal pathology.    Problems Addressed:  Migraine headache: acute illness or injury  Nausea: acute illness or injury  Primary hypertension: chronic illness or injury    Amount and/or Complexity of Data Reviewed  Labs: ordered.  ECG/medicine tests: ordered and  independent interpretation performed.    Risk  OTC drugs.  Prescription drug management.        ED Course as of 24 1444   Sun Dec 29, 2024   1123 On reevaluation, patient resting comfortably.  Notes her headache is fully resolved.  No complaint chest pain or shortness of breath, do suspect the start of viral illness as she does feel subjective hot and cold flashes.  Will plan for supportive care at home, as needed Zofran, close follow-up with primary care.       Medications   ketorolac (TORADOL) injection 30 mg (30 mg Intravenous Given 24 0926)   metoclopramide (REGLAN) injection 10 mg (10 mg Intravenous Given 24 0929)   diphenhydrAMINE (BENADRYL) injection 25 mg (25 mg Intravenous Given 24 0927)   magnesium sulfate 2 g/50 mL IVPB (premix) 2 g (0 g Intravenous Stopped 24 1100)   sodium chloride 0.9 % bolus 1,000 mL (0 mL Intravenous Stopped 24 1136)       ED Risk Strat Scores                                              History of Present Illness       Chief Complaint   Patient presents with    Nausea     Pt reports being at work when she got sweaty and nausea, pt took bp and it was over 200s       Past Medical History:   Diagnosis Date    Fetal growth problem suspected but not found     RESOLVED: 3/1/17    First trimester bleeding     RESOLVED:3/1/17    Granulation tissue of vaginal cuff 2018    Iron deficiency anemia due to chronic blood loss 2018    Miscarriage     Missed      RESOLVED:3/1/17    Poor fetal growth affecting management of mother     RESOLVED:3/1/17    Recurrent pregnancy loss, antepartum condition or complication     RESOLVED:3/1/17    Spontaneous      RESOLVED:3/1/17    Supraventricular tachycardia (HCC)     by history      Past Surgical History:   Procedure Laterality Date    COLONOSCOPY      DILATION AND CURETTAGE OF UTERUS      ,     DILATION AND CURETTAGE OF UTERUS      FOR SPONTANEOUS ; [id number]     HYSTERECTOMY  2018    Dr Singh    INDUCED   2010    LAPAROSCOPIC TUBAL LIGATION Right 2016    Procedure: LAPAROSCOPIC TUBAL LIGATION ;  Surgeon: Jacque Singh MD;  Location: [facility];  Service:     ID LAPS W/VAG HYSTERECT 250 GM/&RMVL TUBE&/OVARIES N/A 2018    Procedure: HYSTERECTOMY LAPAROSCOPIC ASSISTED VAGINAL (LAVH); RIGHT LAPAROSCOPIC SALPINGECTOMY;  Surgeon: Jacque Singh MD;  Location: [facility];  Service: Gynecology    ID TX MISSED  FIRST TRIMESTER SURGICAL N/A 2016    Procedure: DILATATION AND EVACUATION (D&E) (MISSED AB);  Surgeon: Jacque Singh MD;  Location: [facility];  Service: Gynecology    SALPINGOOPHORECTOMY Left     TUBAL LIGATION Right       Family History   Problem Relation Age of Onset    Arthritis Mother     Depression Mother     Hypertension Mother     Cancer Father     Breast cancer Neg Hx     Ovarian cancer Neg Hx     Colon cancer Neg Hx       Social History     Tobacco Use    Smoking status: Never    Smokeless tobacco: Never   Vaping Use    Vaping status: Never Used   Substance Use Topics    Alcohol use: Yes     Comment: social    Drug use: Never      E-Cigarette/Vaping    E-Cigarette Use Never User       E-Cigarette/Vaping Substances    Nicotine No     THC No     CBD No     Flavoring No     Other No     Unknown No       I have reviewed and agree with the history as documented.     The patient is a 44-year-old female with PMH of HTN and migraine headaches presenting for evaluation of subjective fevers, chills, and nausea.  The patient awoke this morning feeling slightly clammy and sweaty.  She initially thought this was because she had too many layers on.  She went to work as scheduled.  While at work she started to feel slightly hot and cold as well as slightly queasy and nauseous.  Her coworkers were checking their own blood pressures for which they checked her ears as well.  Her first blood pressure was noted to be 201/121 and a manual was completed  being 206/122.  She works at [employer] and was sent here due to her symptoms and the elevated blood pressure readings.  Patient notes having a frontal throbbing migraine headache which is like her classic migraines.  She does endorse mild nausea.  She denies recent illnesses, fevers, chills, and URI symptoms.  She denies associated chest pain, shortness of breath, palpitations, abdominal pain, vomiting, flank pain, and change in bowel.  She does have urinary urgency and frequency at baseline but denies new dysuria and hematuria.      History provided by:  Patient   used: No    Nausea  The primary symptoms include fever and nausea. Primary symptoms do not include abdominal pain, vomiting or rash.   The illness is also significant for chills. The illness does not include constipation or back pain.       Review of Systems   Constitutional:  Positive for chills and fever.   Respiratory:  Negative for cough and shortness of breath.    Cardiovascular:  Negative for chest pain, palpitations and leg swelling.   Gastrointestinal:  Positive for nausea. Negative for abdominal pain, constipation and vomiting.   Musculoskeletal:  Negative for back pain and gait problem.   Skin:  Negative for rash and wound.   Neurological:  Positive for headaches. Negative for dizziness, syncope, facial asymmetry, weakness and light-headedness.   All other systems reviewed and are negative.          Objective       ED Triage Vitals   Temperature Pulse Blood Pressure Respirations SpO2 Patient Position - Orthostatic VS   12/29/24 0841 12/29/24 0841 12/29/24 0841 12/29/24 0841 12/29/24 0841 --   97.8 °F (36.6 °C) 78 (!) 178/107 18 98 %       Temp src Heart Rate Source BP Location FiO2 (%) Pain Score    -- 12/29/24 0900 -- -- 12/29/24 0901     Monitor   8      Vitals      Date and Time Temp Pulse SpO2 Resp BP Pain Score FACES Pain Rating User   12/29/24 1130 -- 81 100 % 16 160/96 -- -- RN   12/29/24 1030 -- 80 99 % 14 146/85 --  -- RN   12/29/24 1000 -- 69 95 % 16 165/98 -- -- RN   12/29/24 0930 -- 76 98 % 16 189/114 -- -- RN   12/29/24 0926 -- -- -- -- -- 8 -- RN   12/29/24 0901 -- -- -- -- -- 8 -- RN   12/29/24 0900 -- 81 97 % 18 173/107 -- -- RN   12/29/24 0841 97.8 °F (36.6 °C) 78 98 % 18 178/107 -- -- ALONZO            Physical Exam  Vitals and nursing note reviewed.   Constitutional:       General: She is not in acute distress.     Appearance: Normal appearance. She is normal weight. She is not ill-appearing, toxic-appearing or diaphoretic.   HENT:      Head: Normocephalic and atraumatic.      Nose: Nose normal.      Mouth/Throat:      Mouth: Mucous membranes are moist.      Pharynx: Oropharynx is clear.   Eyes:      Extraocular Movements: Extraocular movements intact.      Conjunctiva/sclera: Conjunctivae normal.   Cardiovascular:      Rate and Rhythm: Normal rate and regular rhythm.      Pulses:           Radial pulses are 2+ on the right side and 2+ on the left side.        Dorsalis pedis pulses are 2+ on the right side and 2+ on the left side.      Heart sounds: Normal heart sounds, S1 normal and S2 normal.   Pulmonary:      Effort: Pulmonary effort is normal. No respiratory distress.      Breath sounds: Normal breath sounds and air entry.   Abdominal:      Palpations: Abdomen is soft.      Tenderness: There is no abdominal tenderness. There is no guarding or rebound.   Musculoskeletal:         General: Normal range of motion.      Cervical back: Normal range of motion and neck supple.      Right lower leg: No edema.      Left lower leg: No edema.   Skin:     General: Skin is warm and dry.      Capillary Refill: Capillary refill takes less than 2 seconds.      Findings: No rash or wound.   Neurological:      General: No focal deficit present.      Mental Status: She is alert and oriented to person, place, and time. Mental status is at baseline.         Results Reviewed       Procedure Component Value Units Date/Time    HS Troponin I  2hr [[id number]] Collected: 12/29/24 1110    Lab Status: Final result Specimen: Blood from Arm, Right Updated: 12/29/24 1138     hs TnI 2hr <2 ng/L      Delta 2hr hsTnI --    Urine Microscopic [[id number]]  (Abnormal) Collected: 12/29/24 0958    Lab Status: Final result Specimen: Urine, Clean Catch Updated: 12/29/24 1025     RBC, UA 4-10 /hpf      WBC, UA 1-2 /hpf      Epithelial Cells Moderate /hpf      Bacteria, UA Occasional /hpf      MUCUS THREADS Occasional    UA w Reflex to Microscopic w Reflex to Culture [[id number]]  (Abnormal) Collected: 12/29/24 0958    Lab Status: Final result Specimen: Urine, Clean Catch Updated: 12/29/24 1025     Color, UA Yellow     Clarity, UA Slightly Cloudy     Specific Gravity, UA 1.020     pH, UA 7.0     Leukocytes, UA Small     Nitrite, UA Negative     Protein, UA Negative mg/dl      Glucose, UA Negative mg/dl      Ketones, UA Negative mg/dl      Urobilinogen, UA <2.0 mg/dl      Bilirubin, UA Negative     Occult Blood, UA Moderate    HS Troponin 0hr (reflex protocol) [[id number]]  (Normal) Collected: 12/29/24 0858    Lab Status: Final result Specimen: Blood from Arm, Right Updated: 12/29/24 0925     hs TnI 0hr <2 ng/L     Comprehensive metabolic panel [[id number]] Collected: 12/29/24 0858    Lab Status: Final result Specimen: Blood from Arm, Right Updated: 12/29/24 0918     Sodium 141 mmol/L      Potassium 3.6 mmol/L      Chloride 107 mmol/L      CO2 29 mmol/L      ANION GAP 5 mmol/L      BUN 9 mg/dL      Creatinine 0.82 mg/dL      Glucose 105 mg/dL      Calcium 8.5 mg/dL      AST 13 U/L      ALT 12 U/L      Alkaline Phosphatase 47 U/L      Total Protein 6.6 g/dL      Albumin 3.9 g/dL      Total Bilirubin 0.57 mg/dL      eGFR 86 ml/min/1.73sq m     Narrative:      [employer] guidelines for Chronic Kidney Disease (CKD):     Stage 1 with normal or high GFR (GFR > 90 mL/min/1.73 square meters)    Stage 2 Mild CKD (GFR = 60-89 mL/min/1.73 square meters)    Stage 3A  Moderate CKD (GFR = 45-59 mL/min/1.73 square meters)    Stage 3B Moderate CKD (GFR = 30-44 mL/min/1.73 square meters)    Stage 4 Severe CKD (GFR = 15-29 mL/min/1.73 square meters)    Stage 5 End Stage CKD (GFR <15 mL/min/1.73 square meters)  Note: GFR calculation is accurate only with a steady state creatinine    CBC and differential [[id number]] Collected: 12/29/24 0858    Lab Status: Final result Specimen: Blood from Arm, Right Updated: 12/29/24 0908     WBC 4.40 Thousand/uL      RBC 4.53 Million/uL      Hemoglobin 13.3 g/dL      Hematocrit 40.0 %      MCV 88 fL      MCH 29.4 pg      MCHC 33.3 g/dL      RDW 11.8 %      MPV 9.8 fL      Platelets 217 Thousands/uL      nRBC 0 /100 WBCs      Segmented % 49 %      Immature Grans % 1 %      Lymphocytes % 41 %      Monocytes % 7 %      Eosinophils Relative 1 %      Basophils Relative 1 %      Absolute Neutrophils 2.18 Thousands/µL      Absolute Immature Grans 0.03 Thousand/uL      Absolute Lymphocytes 1.82 Thousands/µL      Absolute Monocytes 0.29 Thousand/µL      Eosinophils Absolute 0.06 Thousand/µL      Basophils Absolute 0.02 Thousands/µL             No orders to display       ECG 12 Lead Documentation Only    Date/Time: 12/29/2024 9:07 AM    Performed by: MELODIE Blackwood  Authorized by: MELODIE Blackwood    Indications / Diagnosis:  Nausea, sweatiness  ECG reviewed by me, the ED Provider: yes    Patient location:  ED  Previous ECG:     Previous ECG:  Compared to current    Comparison ECG info:  November 7, 2024    Similarity:  No change    Comparison to cardiac monitor: Yes    Interpretation:     Interpretation: normal    Rate:     ECG rate:  68    ECG rate assessment: normal    Rhythm:     Rhythm: sinus rhythm    Ectopy:     Ectopy: none    QRS:     QRS axis:  Normal    QRS intervals:  Normal  Conduction:     Conduction: normal    ST segments:     ST segments:  Normal  T waves:     T waves: normal    Comments:      Sinus rhythm with shortened RI, normal axis,  normal intervals, no acute ischemic changes read by me      ED Medication and Procedure Management   Prior to Admission Medications   Prescriptions Last Dose Informant Patient Reported? Taking?   TiZANidine (ZANAFLEX) 2 MG capsule   No No   Sig: Take 1 capsule (2 mg total) by mouth 3 (three) times a day as needed for muscle spasms   diphenhydrAMINE (BENADRYL) 25 mg tablet   No No   Sig: Take 1 tablet (25 mg total) by mouth every 6 (six) hours   diphenhydrAMINE (SOMINEX) 25 MG tablet   Yes No   Sig: Take 25 mg by mouth every 6 (six) hours   gabapentin (NEURONTIN) 300 mg capsule   No No   Sig: Take 1 capsule (300 mg total) by mouth 3 (three) times a day   hydrOXYzine HCL (ATARAX) 25 mg tablet   No No   Sig: Take 1 tablet (25 mg total) by mouth every 6 (six) hours as needed for itching   hydroCHLOROthiazide 25 mg tablet   No No   Sig: Take 1 tablet (25 mg total) by mouth daily   hydroCHLOROthiazide 25 mg tablet   No No   Sig: Take 1 tablet (25 mg total) by mouth daily   ketorolac (TORADOL) 10 mg tablet   No No   Sig: Take 1 tablet (10 mg total) by mouth every 6 (six) hours as needed for severe pain   meloxicam (MOBIC) 15 mg tablet   Yes No   Sig: Take 15 mg by mouth daily as needed   naproxen (Naprosyn) 500 mg tablet   No No   Sig: Take 1 tablet (500 mg total) by mouth 2 (two) times a day as needed for mild pain   oxyCODONE (ROXICODONE) 5 immediate release tablet   No No   Sig: Take 1 tablet (5 mg total) by mouth every 4 (four) hours as needed for severe pain Max Daily Amount: 30 mg   Patient not taking: Reported on 2024   predniSONE 10 mg tablet   No No   Si tabs x 2 days, 3 tabs x 2 days, 2 tabs x 2 days, 1 tab x 2 days   rizatriptan (MAXALT) 10 mg tablet   No No   Sig: Take 1 tablet (10 mg total) by mouth once as needed for migraine for up to 1 dose May repeat in 2 hours if needed   triamcinolone (KENALOG) 0.5 % cream   No No   Sig: Apply topically 2 (two) times a day      Facility-Administered Medications:  None     Discharge Medication List as of 12/29/2024 11:29 AM        START taking these medications    Details   ondansetron (ZOFRAN-ODT) 4 mg disintegrating tablet Take 1 tablet (4 mg total) by mouth every 6 (six) hours as needed for nausea or vomiting, Starting Sun 12/29/2024, Print           CONTINUE these medications which have NOT CHANGED    Details   diphenhydrAMINE (BENADRYL) 25 mg tablet Take 1 tablet (25 mg total) by mouth every 6 (six) hours, Starting Fri 4/12/2024, Normal      diphenhydrAMINE (SOMINEX) 25 MG tablet Take 25 mg by mouth every 6 (six) hours, Starting Sat 4/13/2024, Historical Med      gabapentin (NEURONTIN) 300 mg capsule Take 1 capsule (300 mg total) by mouth 3 (three) times a day, Starting Wed 4/3/2024, Normal      hydroCHLOROthiazide 25 mg tablet Take 1 tablet (25 mg total) by mouth daily, Starting Mon 7/29/2024, Normal      hydrOXYzine HCL (ATARAX) 25 mg tablet Take 1 tablet (25 mg total) by mouth every 6 (six) hours as needed for itching, Starting Wed 4/24/2024, Normal      ketorolac (TORADOL) 10 mg tablet Take 1 tablet (10 mg total) by mouth every 6 (six) hours as needed for severe pain, Starting Wed 10/25/2023, Normal      meloxicam (MOBIC) 15 mg tablet Take 15 mg by mouth daily as needed, Starting Tue 8/22/2023, Historical Med      naproxen (Naprosyn) 500 mg tablet Take 1 tablet (500 mg total) by mouth 2 (two) times a day as needed for mild pain, Starting Thu 11/7/2024, Normal      oxyCODONE (ROXICODONE) 5 immediate release tablet Take 1 tablet (5 mg total) by mouth every 4 (four) hours as needed for severe pain Max Daily Amount: 30 mg, Starting Sun 3/31/2024, Normal      predniSONE 10 mg tablet 4 tabs x 2 days, 3 tabs x 2 days, 2 tabs x 2 days, 1 tab x 2 days, Normal      rizatriptan (MAXALT) 10 mg tablet Take 1 tablet (10 mg total) by mouth once as needed for migraine for up to 1 dose May repeat in 2 hours if needed, Starting Wed 4/3/2024, Normal      TiZANidine (ZANAFLEX) 2 MG  capsule Take 1 capsule (2 mg total) by mouth 3 (three) times a day as needed for muscle spasms, Starting Wed 4/3/2024, Normal      triamcinolone (KENALOG) 0.5 % cream Apply topically 2 (two) times a day, Starting Wed 4/24/2024, Normal           No discharge procedures on file.  ED SEPSIS DOCUMENTATION   Time reflects when diagnosis was documented in both MDM as applicable and the Disposition within this note       Time User Action Codes Description Comment    12/29/2024 11:26 AM Koki West [G43.909] Migraine headache     12/29/2024 11:26 AM Koki West [R11.0] Nausea     12/29/2024 11:27 AM Koki West [I27.0] Primary pulmonary HTN (HCC)     12/29/2024 11:27 AM Koki Wset Remove [I27.0] Primary pulmonary HTN (HCC)     12/29/2024 11:27 AM Koki West [I10] Primary hypertension                  MELODIE Blackwood  12/29/24 [id number]

## 2025-01-28 ENCOUNTER — APPOINTMENT (EMERGENCY)
Dept: RADIOLOGY | Facility: HOSPITAL | Age: 45
End: 2025-01-28
Payer: COMMERCIAL

## 2025-01-28 ENCOUNTER — HOSPITAL ENCOUNTER (EMERGENCY)
Facility: HOSPITAL | Age: 45
Discharge: HOME | End: 2025-01-28
Attending: EMERGENCY MEDICINE
Payer: COMMERCIAL

## 2025-01-28 VITALS
SYSTOLIC BLOOD PRESSURE: 137 MMHG | RESPIRATION RATE: 18 BRPM | DIASTOLIC BLOOD PRESSURE: 92 MMHG | HEART RATE: 78 BPM | OXYGEN SATURATION: 98 % | TEMPERATURE: 97.3 F

## 2025-01-28 DIAGNOSIS — R51.9 HEADACHE: ICD-10-CM

## 2025-01-28 DIAGNOSIS — R29.90 STROKE-LIKE SYMPTOMS: Primary | ICD-10-CM

## 2025-01-28 LAB
ANION GAP SERPL CALCULATED.3IONS-SCNC: 8 MMOL/L (ref 4–13)
ATRIAL RATE: 84 BPM
BASOPHILS # BLD AUTO: 0.02 THOUSANDS/ΜL (ref 0–0.1)
BASOPHILS NFR BLD AUTO: 0 % (ref 0–1)
BUN SERPL-MCNC: 9 MG/DL (ref 5–25)
CALCIUM SERPL-MCNC: 9.2 MG/DL (ref 8.4–10.2)
CHLORIDE SERPL-SCNC: 103 MMOL/L (ref 96–108)
CO2 SERPL-SCNC: 27 MMOL/L (ref 21–32)
CREAT SERPL-MCNC: 0.74 MG/DL (ref 0.6–1.3)
EOSINOPHIL # BLD AUTO: 0.06 THOUSAND/ΜL (ref 0–0.61)
EOSINOPHIL NFR BLD AUTO: 1 % (ref 0–6)
ERYTHROCYTE [DISTWIDTH] IN BLOOD BY AUTOMATED COUNT: 11.9 % (ref 11.6–15.1)
GFR SERPL CREATININE-BSD FRML MDRD: 98 ML/MIN/1.73SQ M
GLUCOSE SERPL-MCNC: 58 MG/DL (ref 65–140)
GLUCOSE SERPL-MCNC: 59 MG/DL (ref 65–140)
GLUCOSE SERPL-MCNC: 84 MG/DL (ref 65–140)
HCT VFR BLD AUTO: 40 % (ref 34.8–46.1)
HGB BLD-MCNC: 13.5 G/DL (ref 11.5–15.4)
IMM GRANULOCYTES # BLD AUTO: 0.01 THOUSAND/UL (ref 0–0.2)
IMM GRANULOCYTES NFR BLD AUTO: 0 % (ref 0–2)
LYMPHOCYTES # BLD AUTO: 2.33 THOUSANDS/ΜL (ref 0.6–4.47)
LYMPHOCYTES NFR BLD AUTO: 40 % (ref 14–44)
MCH RBC QN AUTO: 29 PG (ref 26.8–34.3)
MCHC RBC AUTO-ENTMCNC: 33.8 G/DL (ref 31.4–37.4)
MCV RBC AUTO: 86 FL (ref 82–98)
MONOCYTES # BLD AUTO: 0.32 THOUSAND/ΜL (ref 0.17–1.22)
MONOCYTES NFR BLD AUTO: 6 % (ref 4–12)
NEUTROPHILS # BLD AUTO: 3.08 THOUSANDS/ΜL (ref 1.85–7.62)
NEUTS SEG NFR BLD AUTO: 53 % (ref 43–75)
NRBC BLD AUTO-RTO: 0 /100 WBCS
P AXIS: 75 DEGREES
PLATELET # BLD AUTO: 210 THOUSANDS/UL (ref 149–390)
PMV BLD AUTO: 10.2 FL (ref 8.9–12.7)
POTASSIUM SERPL-SCNC: 3.5 MMOL/L (ref 3.5–5.3)
PR INTERVAL: 132 MS
QRS AXIS: 56 DEGREES
QRSD INTERVAL: 78 MS
QT INTERVAL: 370 MS
QTC INTERVAL: 438 MS
RBC # BLD AUTO: 4.65 MILLION/UL (ref 3.81–5.12)
SODIUM SERPL-SCNC: 138 MMOL/L (ref 135–147)
T WAVE AXIS: 3 DEGREES
VENTRICULAR RATE: 84 BPM
WBC # BLD AUTO: 5.82 THOUSAND/UL (ref 4.31–10.16)

## 2025-01-28 PROCEDURE — 96365 THER/PROPH/DIAG IV INF INIT: CPT

## 2025-01-28 PROCEDURE — 70496 CT ANGIOGRAPHY HEAD: CPT

## 2025-01-28 PROCEDURE — 93010 ELECTROCARDIOGRAM REPORT: CPT | Performed by: INTERNAL MEDICINE

## 2025-01-28 PROCEDURE — 99245 OFF/OP CONSLTJ NEW/EST HI 55: CPT | Performed by: PSYCHIATRY & NEUROLOGY

## 2025-01-28 PROCEDURE — 99285 EMERGENCY DEPT VISIT HI MDM: CPT

## 2025-01-28 PROCEDURE — 36415 COLL VENOUS BLD VENIPUNCTURE: CPT | Performed by: STUDENT IN AN ORGANIZED HEALTH CARE EDUCATION/TRAINING PROGRAM

## 2025-01-28 PROCEDURE — 93005 ELECTROCARDIOGRAM TRACING: CPT

## 2025-01-28 PROCEDURE — 96361 HYDRATE IV INFUSION ADD-ON: CPT

## 2025-01-28 PROCEDURE — 99285 EMERGENCY DEPT VISIT HI MDM: CPT | Performed by: EMERGENCY MEDICINE

## 2025-01-28 PROCEDURE — 96375 TX/PRO/DX INJ NEW DRUG ADDON: CPT

## 2025-01-28 PROCEDURE — 70498 CT ANGIOGRAPHY NECK: CPT

## 2025-01-28 PROCEDURE — 96366 THER/PROPH/DIAG IV INF ADDON: CPT

## 2025-01-28 PROCEDURE — 85025 COMPLETE CBC W/AUTO DIFF WBC: CPT | Performed by: EMERGENCY MEDICINE

## 2025-01-28 PROCEDURE — 80048 BASIC METABOLIC PNL TOTAL CA: CPT | Performed by: STUDENT IN AN ORGANIZED HEALTH CARE EDUCATION/TRAINING PROGRAM

## 2025-01-28 PROCEDURE — 82948 REAGENT STRIP/BLOOD GLUCOSE: CPT

## 2025-01-28 RX ORDER — METOCLOPRAMIDE HYDROCHLORIDE 5 MG/ML
10 INJECTION INTRAMUSCULAR; INTRAVENOUS ONCE
Status: COMPLETED | OUTPATIENT
Start: 2025-01-28 | End: 2025-01-28

## 2025-01-28 RX ORDER — DIPHENHYDRAMINE HYDROCHLORIDE 50 MG/ML
INJECTION INTRAMUSCULAR; INTRAVENOUS
Status: COMPLETED
Start: 2025-01-28 | End: 2025-01-28

## 2025-01-28 RX ORDER — DIPHENHYDRAMINE HYDROCHLORIDE 50 MG/ML
50 INJECTION INTRAMUSCULAR; INTRAVENOUS ONCE
Status: DISCONTINUED | OUTPATIENT
Start: 2025-01-28 | End: 2025-01-28

## 2025-01-28 RX ORDER — KETOROLAC TROMETHAMINE 30 MG/ML
15 INJECTION, SOLUTION INTRAMUSCULAR; INTRAVENOUS ONCE
Status: COMPLETED | OUTPATIENT
Start: 2025-01-28 | End: 2025-01-28

## 2025-01-28 RX ORDER — ACETAMINOPHEN 325 MG/1
975 TABLET ORAL ONCE
Status: COMPLETED | OUTPATIENT
Start: 2025-01-28 | End: 2025-01-28

## 2025-01-28 RX ORDER — MAGNESIUM SULFATE HEPTAHYDRATE 40 MG/ML
2 INJECTION, SOLUTION INTRAVENOUS ONCE
Status: COMPLETED | OUTPATIENT
Start: 2025-01-28 | End: 2025-01-28

## 2025-01-28 RX ADMIN — DIPHENHYDRAMINE HYDROCHLORIDE 50 MG: 50 INJECTION, SOLUTION INTRAMUSCULAR; INTRAVENOUS at 15:53

## 2025-01-28 RX ADMIN — ACETAMINOPHEN 975 MG: 325 TABLET, FILM COATED ORAL at 16:33

## 2025-01-28 RX ADMIN — SODIUM CHLORIDE 1000 ML: 0.9 INJECTION, SOLUTION INTRAVENOUS at 16:34

## 2025-01-28 RX ADMIN — METOCLOPRAMIDE 10 MG: 5 INJECTION, SOLUTION INTRAMUSCULAR; INTRAVENOUS at 16:33

## 2025-01-28 RX ADMIN — DIPHENHYDRAMINE HYDROCHLORIDE 50 MG: 50 INJECTION INTRAMUSCULAR; INTRAVENOUS at 15:53

## 2025-01-28 RX ADMIN — IOHEXOL 85 ML: 350 INJECTION, SOLUTION INTRAVENOUS at 16:01

## 2025-01-28 RX ADMIN — MAGNESIUM SULFATE HEPTAHYDRATE 2 G: 40 INJECTION, SOLUTION INTRAVENOUS at 17:17

## 2025-01-28 RX ADMIN — KETOROLAC TROMETHAMINE 15 MG: 30 INJECTION, SOLUTION INTRAMUSCULAR; INTRAVENOUS at 16:33

## 2025-01-28 NOTE — ED ATTENDING ATTESTATION
1/28/2025  I, Elias Neff DO, saw and evaluated the patient. I have discussed the patient with the resident/non-physician practitioner and agree with the resident's/non-physician practitioner's findings, Plan of Care, and MDM as documented in the resident's/non-physician practitioner's note, except where noted. All available labs and Radiology studies were reviewed.  I was present for key portions of any procedure(s) performed by the resident/non-physician practitioner and I was immediately available to provide assistance.       At this point I agree with the current assessment done in the Emergency Department.  I have conducted an independent evaluation of this patient a history and physical is as follows:    Patient is a 44-year-old female history of hypertension, miscarriage, says she woke about 5:30 AM this morning, had a bit of a slight dull headache which she normally will have occasionally, took her morning medications including her antihypertensive medications, felt better, then about 2:30 PM had the sudden onset of a right-sided headache, some photophobia, and felt like the right side of her face was numb.  She said that she has had headaches in the past like migraine headaches but this feels much stronger and sharper than normal and she is never had facial numbness.  No difficulty speaking, thinking, or concentrating, no trauma, no fever, no chills.  No one else sick with similar headaches at home, no recent head or neck infections or head or neck surgeries.  Does have chronic hypertension, has an appointment on February 3 with PCP for further evaluation regarding this.  She activated EMS, EMS indicated her blood pressure initially was 195 systolic, no interventions were performed and her blood pressure has now decreased to 147/85.    Patient has said she's gotten a rash from IV contrast in past, has been okay with benadryl premedication.    General:  Patient is well-appearing  Head:   Atraumatic  Eyes:  Conjunctiva pink, Extraocular muscle intact, PERRL, no nystagmus  ENT:  Mucous membranes are moist  Neck:  Supple  Cardiac:  S1-S2, without murmurs  Lungs:  Clear to auscultation bilaterally  Abdomen:  Soft, nontender, normal bowel sounds, no CVA tenderness, no tympany, no rigidity, no guarding  Extremities:  Normal range of motion  Neurologic:  Awake, fluent speech, normal comprehension. AAOx3. Cranial nerves 2-12 are intact with the exception of decrease sensation to light touch on the right side of her face, including involving the forehead., strength is 5/5 in the bilateral upper & lower extremities, no slurred speech, no facial droop, no deficit on finger-to-nose testing, no pronator drift.  Sensation to light touch is equal and symmetric throughout the whole body except for decreased sensation to light touch on the right side of her face.  Skin:  Pink warm and dry, no rash  Psychiatric:  Alert, pleasant, cooperative          ED Course  ED Course as of 01/28/25 1852 Tue Jan 28, 2025   1600 Patient being seen and evaluated by neurology team including Dr. Rivera, neuro attending   1630 Dr. Rivera indicated that based on the patient's presentation, unremarkable imaging, he did not believe the patient had an acute stroke or required further stroke workup, suspect it was more likely be complex migraine.  Recommended treating with antimigraine therapies.   1851 Patient reassessed, feeling fine, said her headache is resolved, she had no facial numbness on exam and had a completely normal neuroexam.     ECG interpreted by me, sinus rhythm, rate 84, no acute ischemic or infarctive changes    CT stroke alert brain   Final Result      No acute intracranial hemorrhage, significant mass effect or midline shift.      Findings were directly discussed with KALIN RIVERA at 4:00 p.m.      Workstation performed: [id number]         CTA stroke alert (head/neck)   Final Result      No proximal large vessel  occlusion or high-grade vascular stenosis in the head and neck, within the confines discussed above where streak artifact degrades evaluation of the proximal left vertebral artery.         Findings were directly discussed with KALIN RIVERA at 4:01 p.m.      Workstation performed: [id number]           Labs Reviewed   POCT GLUCOSE - Abnormal       Result Value Ref Range Status    POC Glucose 58 (*) 65 - 140 mg/dl Final    Comment: RN NOTIFIED/CRIT MARIO   POCT GLUCOSE - Normal    POC Glucose 84  65 - 140 mg/dl Final   CBC AND DIFFERENTIAL    WBC 5.82  4.31 - 10.16 Thousand/uL Final    RBC 4.65  3.81 - 5.12 Million/uL Final    Hemoglobin 13.5  11.5 - 15.4 g/dL Final    Hematocrit 40.0  34.8 - 46.1 % Final    MCV 86  82 - 98 fL Final    MCH 29.0  26.8 - 34.3 pg Final    MCHC 33.8  31.4 - 37.4 g/dL Final    RDW 11.9  11.6 - 15.1 % Final    MPV 10.2  8.9 - 12.7 fL Final    Platelets 210  149 - 390 Thousands/uL Final    nRBC 0  /100 WBCs Final    Segmented % 53  43 - 75 % Final    Immature Grans % 0  0 - 2 % Final    Lymphocytes % 40  14 - 44 % Final    Monocytes % 6  4 - 12 % Final    Eosinophils Relative 1  0 - 6 % Final    Basophils Relative 0  0 - 1 % Final    Absolute Neutrophils 3.08  1.85 - 7.62 Thousands/µL Final    Absolute Immature Grans 0.01  0.00 - 0.20 Thousand/uL Final    Absolute Lymphocytes 2.33  0.60 - 4.47 Thousands/µL Final    Absolute Monocytes 0.32  0.17 - 1.22 Thousand/µL Final    Eosinophils Absolute 0.06  0.00 - 0.61 Thousand/µL Final    Basophils Absolute 0.02  0.00 - 0.10 Thousands/µL Final   BASIC METABOLIC PANEL         Patient presents with acute headache, acute neurologic changes, at risk for subarachnoid hemorrhage, stroke considered less likely, very low concern for carbon monoxide exposure or environmental headache, also believe that cavernous sinus thrombosis is extremely unlikely.  Given [facility] protocol and guidelines, acute stroke alert called.    After workup, head CT, CT angiogram  shows no acute pathology, and I believe that subarachnoid hemorrhage is extremely unlikely and I do not believe an LP indicated.  Stroke also considered extremely unlikely given neurology assessment as well as patient's significant improvement with antimigraine therapy.  Patient has no evidence of life-threatening metabolic abnormality.Supportive care, importance of follow-up and return precautions were discussed with the patient, who expressed understanding.        DIAGNOSIS:  Acute recurrent headache    MEDICAL DECISION MAKING CODING    COLLECTION AND INTERPRETATION OF DATA  I reviewed prior external notes, including April 24, 2024 family medicine office visit    I ordered each unique test  Tests reviewed personally by me:  ECG: See my ED course  Labs: See above  Imaging: I independently interpreted the head CT found no acute pathology, no hemorrhage.            Critical Care Time  Procedures

## 2025-01-28 NOTE — ASSESSMENT & PLAN NOTE
NIH 1 for patient with headache with right hemifacial numbness/tingling with right eye pain.  CTH No acute intracranial hemorrhage, significant mass effect or midline shift.   CTAHN No proximal large vessel occlusion or high-grade vascular stenosis in the head and neck, within the confines discussed above where streak artifact degrades evaluation of the proximal left vertebral artery.     Given patient's deficits are limited to right facial sensation and include half of forehead with negative CTH and CTAHN with migraine history, this is most consistent with migraine. No further stroke workup is required as sensory deficit does not match a central etiology. Patient does not require TNK.  Patient may benefit from outpatient neurology follow up.    Recommend migraine cocktail with tylenol, toradol, reglan, mag sulfate, and IVF  Outpatient medication management for migraine prophylaxis  Manage blood pressure as spikes may exacerbate migraine  Remainder per primary

## 2025-01-28 NOTE — CONSULTS
Consultation - Neurology   Name: Glenna Lanier 44 y.o. female I MRN: [id number]  Unit/Bed#: Z5HD I Date of Admission: 1/28/2025   Date of Service: 1/28/2025 I Hospital Day: 0   Inpatient consult to Neurology  Consult performed by: Bijal Cisneros MD  Consult ordered by: Elias Neff DO        Physician Requesting Evaluation: Elias Neff DO   Reason for Evaluation / Principal Problem: stroke alert    Assessment & Plan  Stroke-like episode  NIH 1 for patient with headache with right hemifacial numbness/tingling with right eye pain.  CTH No acute intracranial hemorrhage, significant mass effect or midline shift.   CTAHN No proximal large vessel occlusion or high-grade vascular stenosis in the head and neck, within the confines discussed above where streak artifact degrades evaluation of the proximal left vertebral artery.     Given patient's deficits are limited to right facial sensation and include half of forehead with negative CTH and CTAHN with migraine history, this is most consistent with migraine. No further stroke workup is required as sensory deficit does not match a central etiology. Patient does not require TNK.  Patient may benefit from outpatient neurology follow up.    Recommend migraine cocktail with tylenol, toradol, reglan, mag sulfate, and IVF  Outpatient medication management for migraine prophylaxis  Manage blood pressure as spikes may exacerbate migraine  Remainder per primary  I have discussed the above management plan in detail with the primary service.   Neurology service signing off.    Thrombolytic Decision: Patient not a candidate. Unclear time of onset outside appropriate time window. Deficits nondisabling and more consistent with migraine.    Glenna Lanier will need follow up in in 6 weeks with headache attending or advance practitioner. She will not require outpatient neurological testing. Staff message sent    History of Present Illness   Hx and PE limited by: n/a  Patient last  "known well: 9 am  Stroke alert called: 3:46 pm  Neurology time of arrival: immediate  HPI: Glenna Lanier is a 44 y.o.  female who presents with headache with facial numbness. She has a history of migraines but is not currently on any medications as the migraine medications were not working. She has a follow-up with her pcp in a week to discuss different medications to try. This morning around 9 am she got a headache that was not unusual for her, but then around 1 pm she got a thunderclap sudden onset headache behind her right eye. This was a sharp pain and she suddenly felt her full right side of her face was numb/tingly. She also felt moving her right eye was painful. This was unusual for her migraine symptoms and she had no nausea so she called EMS. No other symptoms reported. She typically gets 3 severe migraines per month, but numbness is not usually on her face. Right now it feels like her face is \"thawing\" as if coming in from the cold. She reports she took her home blood pressure medications today.     Review of Systems   Neurological:  Positive for numbness and headaches. Negative for dizziness, tremors, seizures, syncope, facial asymmetry, speech difficulty, weakness and light-headedness.     I have reviewed the patient's PMH, PSH, Social History, Family History, Meds, and Allergies    Objective :  Temp:  [97.3 °F (36.3 °C)] 97.3 °F (36.3 °C)  HR:  [78-83] 78  BP: (147-174)/() 174/97  Resp:  [18-20] 20  SpO2:  [97 %-100 %] 98 %  O2 Device: None (Room air)    Physical Exam  Eyes:      Extraocular Movements: Extraocular movements intact.   Neurological:      Motor: Motor strength is normal.  Psychiatric:         Speech: Speech normal.     Neurological Exam  Mental Status   Oriented to person, place and time. Speech is normal. Language is fluent with no aphasia.    Cranial Nerves  CN III, IV, VI: Extraocular movements intact bilaterally.  CN V:  Right: Diminished sensation of the entire right side of the " face. Complete right hemifacial diminished sensation to light touch, pinprick, and vibration even down the middle of forehead.  Left: Facial sensation is normal on the left.  CN VII: Full and symmetric facial movement.  Complete EOM, but pain reported with right lateral gaze.    Motor   Strength is 5/5 throughout all four extremities.    Sensory  Light touch abnormality: Pinprick abnormality: Vibration abnormality:   Complete right hemifacial diminished sensation to light touch, pinprick, and vibration even down the middle of forehead  Upper and lower extremities no sensory deficits noted.    Coordination  Right: Finger-to-nose normal. Heel-to-shin normal.Left: Finger-to-nose normal. Heel-to-shin normal.      NIHSS:  1a.Level of Consciousness: 0 = Alert   1b. LOC Questions: 0 = Answers both correctly   1c. LOC Commands: 0 = Obeys both correctly   2. Best Gaze: 0 = Normal   3. Visual: 0 = No visual field loss   4. Facial Palsy: 0=Normal symmetric movement   5a. Motor Right Arm: 0=No drift, limb holds 90 (or 45) degrees for full 10 seconds   5b. Motor Left Arm: 0=No drift, limb holds 90 (or 45) degrees for full 10 seconds   6a. Motor Right Le=No drift, limb holds 90 (or 45) degrees for full 10 seconds   6b. Motor Left Le=No drift, limb holds 90 (or 45) degrees for full 10 seconds   7. Limb Ataxia:  0=Absent   8. Sensory: 1=Mild to moderate sensory loss; patient feels pinprick is less sharp or is dull on the affected side; there is a loss of superficial pain with pinprick but patient is aware She is being touched   9. Best Language:  0=No aphasia, normal   10. Dysarthria: 0=Normal articulation   11. Extinction and Inattention (formerly Neglect): 0=No abnormality   Total Score: 1     Time NIHSS was completed: immediate    Modified Spalding Score:  0 (No baseline symptoms/disability)      Lab Results: I have reviewed the following results:    Imaging Results Review: I reviewed radiology reports from this admission  including: CT head.      VTE Prophylaxis:     Code Status: Prior  Advance Directive and Living Will:      Power of :    POLST:

## 2025-01-28 NOTE — ED PROVIDER NOTES
Time reflects when diagnosis was documented in both MDM as applicable and the Disposition within this note       Time User Action Codes Description Comment    1/28/2025  3:44 PM Familia Abernathy [R29.90] Stroke-like symptoms     1/28/2025  7:35 PM Familia Abernathy [R51.9] Headache           ED Disposition       ED Disposition   Discharge    Condition   Stable    Date/Time   Tue Jan 28, 2025  7:35 PM    Comment   Glenna Yannick discharge to home/self care.                   Assessment & Plan       Medical Decision Making  44-year-old female with history of chronic migraines and hypertension presents to the emergency department a for evaluation of sudden onset severe headache, right eye pain which is worse with movement, and right facial numbness.  History examination most concerning for complex migraine versus stroke.  Stroke alert called and CT/CTA of the head and neck were performed which fortunately did not reveal any intracranial hemorrhages, vascular occlusion or stenosis.  Patient received Benadryl prior to getting the contrast dye due to her reaction of rash/hives in the past.  She had no reactions after dye was administered.  Lab workup consisted of CBC CMP and fingerstick glucose: All which were largely unremarkable.  EKG interpreted by myself and the attending physician revealed a heart rate of 84 with no ST elevations or depressions consistent with ACS or findings indicative of pathologic dysrhythmia.  With negative stroke workup, this is most likely a complex migraine which is consistent with patient's history and presentation.  Patient treated with fluid bolus, magnesium, Tylenol, Toradol, Reglan.    After reassessment, patient is feeling much better and would like to be discharged at this time.  I advised her to follow-up with her outpatient physician who has been prescribing her migraine medications.  We discussed worrisome symptoms which would require her to come back to the emergency department to  which she verbalized understanding.  She remained hemodynamically stable while under my care and is appropriate for discharge home with outpatient follow-up instructions and strict emergency department return precautions.    Amount and/or Complexity of Data Reviewed  Labs: ordered.  Radiology: ordered.    Risk  OTC drugs.  Prescription drug management.             Medications   iohexol (OMNIPAQUE) 350 MG/ML injection (MULTI-DOSE) 85 mL (85 mL Intravenous Given 25 1601)   sodium chloride 0.9 % bolus 1,000 mL (0 mL Intravenous Stopped 25 194)   metoclopramide (REGLAN) injection 10 mg (10 mg Intravenous Given 25 1633)   magnesium sulfate 2 g/50 mL IVPB (premix) 2 g (0 g Intravenous Stopped 25 191)   acetaminophen (TYLENOL) tablet 975 mg (975 mg Oral Given 25 1633)   ketorolac (TORADOL) injection 15 mg (15 mg Intravenous Given 25 1633)       ED Risk Strat Scores                                              History of Present Illness       Chief Complaint   Patient presents with    Headache     Patient has uncontrolled htn. EMS reports elevated /100. Is to see pcp on the , but started today with headache on top of her head that radiated to behind right eye and right sided facial numbness.        Past Medical History:   Diagnosis Date    Fetal growth problem suspected but not found     RESOLVED: 3/1/17    First trimester bleeding     RESOLVED:3/1/17    Granulation tissue of vaginal cuff 2018    Iron deficiency anemia due to chronic blood loss 2018    Miscarriage     Missed      RESOLVED:3/1/17    Poor fetal growth affecting management of mother     RESOLVED:3/1/17    Recurrent pregnancy loss, antepartum condition or complication     RESOLVED:3/1/17    Spontaneous      RESOLVED:3/1/17    Supraventricular tachycardia (HCC)     by history      Past Surgical History:   Procedure Laterality Date    COLONOSCOPY      DILATION AND CURETTAGE OF UTERUS       ,     DILATION AND CURETTAGE OF UTERUS      FOR SPONTANEOUS ; [id number]    HYSTERECTOMY  2018    Dr Singh    INDUCED   2010    LAPAROSCOPIC TUBAL LIGATION Right 2016    Procedure: LAPAROSCOPIC TUBAL LIGATION ;  Surgeon: Jacque Singh MD;  Location: [facility];  Service:     DC LAPS W/VAG HYSTERECT 250 GM/&RMVL TUBE&/OVARIES N/A 2018    Procedure: HYSTERECTOMY LAPAROSCOPIC ASSISTED VAGINAL (LAVH); RIGHT LAPAROSCOPIC SALPINGECTOMY;  Surgeon: Jacque Singh MD;  Location: [facility];  Service: Gynecology    DC TX MISSED  FIRST TRIMESTER SURGICAL N/A 2016    Procedure: DILATATION AND EVACUATION (D&E) (MISSED AB);  Surgeon: Jacque Singh MD;  Location: [facility];  Service: Gynecology    SALPINGOOPHORECTOMY Left     TUBAL LIGATION Right       Family History   Problem Relation Age of Onset    Arthritis Mother     Depression Mother     Hypertension Mother     Cancer Father     Breast cancer Neg Hx     Ovarian cancer Neg Hx     Colon cancer Neg Hx       Social History     Tobacco Use    Smoking status: Never    Smokeless tobacco: Never   Vaping Use    Vaping status: Never Used   Substance Use Topics    Alcohol use: Yes     Comment: social    Drug use: Never      E-Cigarette/Vaping    E-Cigarette Use Never User       E-Cigarette/Vaping Substances    Nicotine No     THC No     CBD No     Flavoring No     Other No     Unknown No       I have reviewed and agree with the history as documented.     44-year-old female with history of chronic migraines and hypertension presents to the emergency department a for evaluation of sudden onset severe headache, right eye pain which is worse with movement, and right facial numbness.  She tells me the symptoms began earlier this morning with sudden onset headache.  The headache improved and then returned even worse than it was before.  She then noted right-sided facial numbness which prompted her to call EMS because she was fearful that  she was having a stroke.  She has never had a stroke in the past and never had a DVT.  Takes no blood thinners.  No history of hormone replacement therapy.  She did not take any of her migraine medication because she has been on for months.  She tells me that her headache is now becoming more global and is having some associated nausea.  Denies visual disturbances or diplopia but states that her right eye is bothering her when she looks to the left and upward.  No recent falls or trauma.        Review of Systems   Constitutional:  Negative for activity change, appetite change, chills, fatigue and fever.   Eyes:  Negative for photophobia and visual disturbance.   Respiratory:  Negative for chest tightness and shortness of breath.    Cardiovascular:  Negative for chest pain.   Gastrointestinal:  Positive for nausea. Negative for abdominal pain and vomiting.   Musculoskeletal:  Negative for back pain and neck pain.   Neurological:  Positive for headaches. Negative for dizziness and light-headedness.           Objective       ED Triage Vitals [01/28/25 1449]   Temperature Pulse Blood Pressure Respirations SpO2 Patient Position - Orthostatic VS   (!) 97.3 °F (36.3 °C) 83 147/85 18 97 % Sitting      Temp Source Heart Rate Source BP Location FiO2 (%) Pain Score    Oral Monitor Left arm -- 9      Vitals      Date and Time Temp Pulse SpO2 Resp BP Pain Score FACES Pain Rating User   01/28/25 1820 -- 78 98 % 18 137/92 5 -- ST   01/28/25 1720 -- 77 99 % 18 142/73 6 -- ST 01/28/25 1633 -- -- -- -- -- 9 -- ST 01/28/25 1620 -- 78 98 % 20 174/97 9 -- ST 01/28/25 1605 -- 81 100 % 18 173/116 9 -- ST 01/28/25 1550 -- 79 100 % 20 156/97 9 -- ST 01/28/25 1520 -- -- -- -- -- 9 -- ST 01/28/25 1449 97.3 °F (36.3 °C) 83 97 % 18 147/85 9 -- ST            Physical Exam  Constitutional:       General: She is in acute distress.      Appearance: Normal appearance. She is not ill-appearing.   HENT:      Head: Normocephalic and  atraumatic.      Nose: Nose normal.      Mouth/Throat:      Mouth: Mucous membranes are moist.      Pharynx: Oropharynx is clear. No oropharyngeal exudate or posterior oropharyngeal erythema.      Comments: Uvula midline.  No tongue deviation.  Eyes:      Extraocular Movements: Extraocular movements intact.      Pupils: Pupils are equal, round, and reactive to light.      Comments: No photophobia on examination.  Pupils equal round reactive to light and accommodating.  Extraocular movements are intact but painful particular with rightward and upward gaze of the right eye.  No nystagmus.   Cardiovascular:      Rate and Rhythm: Normal rate and regular rhythm.      Pulses: Normal pulses.      Heart sounds: Normal heart sounds. No murmur heard.     No friction rub.   Pulmonary:      Effort: Pulmonary effort is normal. No respiratory distress.      Breath sounds: Normal breath sounds.   Abdominal:      General: Abdomen is flat.      Palpations: Abdomen is soft.      Tenderness: There is no abdominal tenderness.   Musculoskeletal:         General: No swelling or tenderness.      Cervical back: Normal range of motion. No rigidity or tenderness.      Right lower leg: No edema.      Left lower leg: No edema.   Skin:     General: Skin is warm and dry.      Coloration: Skin is not jaundiced.      Findings: No bruising, erythema, lesion or rash.   Neurological:      Mental Status: She is alert and oriented to person, place, and time.      Cranial Nerves: No cranial nerve deficit.      Sensory: Sensory deficit present.      Motor: No weakness.      Coordination: Coordination normal.      Comments: Subjective decrease in station without forehead sparing of the right side of the face.   Psychiatric:         Mood and Affect: Mood normal.         Results Reviewed       Procedure Component Value Units Date/Time    Basic metabolic panel [[id number]]  (Abnormal) Collected: 01/28/25 1620    Lab Status: Final result Specimen: Blood from  Arm, Left Updated: 01/28/25 1904     Sodium 138 mmol/L      Potassium 3.5 mmol/L      Chloride 103 mmol/L      CO2 27 mmol/L      ANION GAP 8 mmol/L      BUN 9 mg/dL      Creatinine 0.74 mg/dL      Glucose 59 mg/dL      Calcium 9.2 mg/dL      eGFR 98 ml/min/1.73sq m     Narrative:      [employer] guidelines for Chronic Kidney Disease (CKD):     Stage 1 with normal or high GFR (GFR > 90 mL/min/1.73 square meters)    Stage 2 Mild CKD (GFR = 60-89 mL/min/1.73 square meters)    Stage 3A Moderate CKD (GFR = 45-59 mL/min/1.73 square meters)    Stage 3B Moderate CKD (GFR = 30-44 mL/min/1.73 square meters)    Stage 4 Severe CKD (GFR = 15-29 mL/min/1.73 square meters)    Stage 5 End Stage CKD (GFR <15 mL/min/1.73 square meters)  Note: GFR calculation is accurate only with a steady state creatinine    Fingerstick Glucose (POCT) [[id number]]  (Normal) Collected: 01/28/25 1648    Lab Status: Final result Specimen: Blood Updated: 01/28/25 1649     POC Glucose 84 mg/dl     CBC and differential [[id number]] Collected: 01/28/25 1620    Lab Status: Final result Specimen: Blood from Arm, Left Updated: 01/28/25 1634     WBC 5.82 Thousand/uL      RBC 4.65 Million/uL      Hemoglobin 13.5 g/dL      Hematocrit 40.0 %      MCV 86 fL      MCH 29.0 pg      MCHC 33.8 g/dL      RDW 11.9 %      MPV 10.2 fL      Platelets 210 Thousands/uL      nRBC 0 /100 WBCs      Segmented % 53 %      Immature Grans % 0 %      Lymphocytes % 40 %      Monocytes % 6 %      Eosinophils Relative 1 %      Basophils Relative 0 %      Absolute Neutrophils 3.08 Thousands/µL      Absolute Immature Grans 0.01 Thousand/uL      Absolute Lymphocytes 2.33 Thousands/µL      Absolute Monocytes 0.32 Thousand/µL      Eosinophils Absolute 0.06 Thousand/µL      Basophils Absolute 0.02 Thousands/µL     Fingerstick Glucose (POCT) [[id number]]  (Abnormal) Collected: 01/28/25 1615    Lab Status: Final result Specimen: Blood Updated: 01/28/25 1616     POC Glucose  58 mg/dl             CT stroke alert brain   Final Interpretation by Mohan Parrish MD (01/28 3886)      No acute intracranial hemorrhage, significant mass effect or midline shift.      Findings were directly discussed with KALIN RIVERA at 4:00 p.m.      Workstation performed: [id number]         CTA stroke alert (head/neck)   Final Interpretation by Mohan Parrish MD (01/28 9347)      No proximal large vessel occlusion or high-grade vascular stenosis in the head and neck, within the confines discussed above where streak artifact degrades evaluation of the proximal left vertebral artery.         Findings were directly discussed with KALIN RIVERA at 4:01 p.m.      Workstation performed: [id number]             Procedures    ED Medication and Procedure Management   Prior to Admission Medications   Prescriptions Last Dose Informant Patient Reported? Taking?   TiZANidine (ZANAFLEX) 2 MG capsule   No No   Sig: Take 1 capsule (2 mg total) by mouth 3 (three) times a day as needed for muscle spasms   diphenhydrAMINE (BENADRYL) 25 mg tablet   No No   Sig: Take 1 tablet (25 mg total) by mouth every 6 (six) hours   diphenhydrAMINE (SOMINEX) 25 MG tablet   Yes No   Sig: Take 25 mg by mouth every 6 (six) hours   gabapentin (NEURONTIN) 300 mg capsule   No No   Sig: Take 1 capsule (300 mg total) by mouth 3 (three) times a day   hydrOXYzine HCL (ATARAX) 25 mg tablet   No No   Sig: Take 1 tablet (25 mg total) by mouth every 6 (six) hours as needed for itching   hydroCHLOROthiazide 25 mg tablet   No No   Sig: Take 1 tablet (25 mg total) by mouth daily   hydroCHLOROthiazide 25 mg tablet   No No   Sig: Take 1 tablet (25 mg total) by mouth daily   ketorolac (TORADOL) 10 mg tablet   No No   Sig: Take 1 tablet (10 mg total) by mouth every 6 (six) hours as needed for severe pain   meloxicam (MOBIC) 15 mg tablet   Yes No   Sig: Take 15 mg by mouth daily as needed   naproxen (Naprosyn) 500 mg tablet   No No   Sig: Take 1 tablet  (500 mg total) by mouth 2 (two) times a day as needed for mild pain   ondansetron (ZOFRAN-ODT) 4 mg disintegrating tablet   No No   Sig: Take 1 tablet (4 mg total) by mouth every 6 (six) hours as needed for nausea or vomiting   oxyCODONE (ROXICODONE) 5 immediate release tablet   No No   Sig: Take 1 tablet (5 mg total) by mouth every 4 (four) hours as needed for severe pain Max Daily Amount: 30 mg   Patient not taking: Reported on 2024   predniSONE 10 mg tablet   No No   Si tabs x 2 days, 3 tabs x 2 days, 2 tabs x 2 days, 1 tab x 2 days   rizatriptan (MAXALT) 10 mg tablet   No No   Sig: Take 1 tablet (10 mg total) by mouth once as needed for migraine for up to 1 dose May repeat in 2 hours if needed   triamcinolone (KENALOG) 0.5 % cream   No No   Sig: Apply topically 2 (two) times a day      Facility-Administered Medications: None     Discharge Medication List as of 2025  7:35 PM        CONTINUE these medications which have NOT CHANGED    Details   diphenhydrAMINE (BENADRYL) 25 mg tablet Take 1 tablet (25 mg total) by mouth every 6 (six) hours, Starting 2024, Normal      diphenhydrAMINE (SOMINEX) 25 MG tablet Take 25 mg by mouth every 6 (six) hours, Starting Sat 2024, Historical Med      gabapentin (NEURONTIN) 300 mg capsule Take 1 capsule (300 mg total) by mouth 3 (three) times a day, Starting Wed 4/3/2024, Normal      hydroCHLOROthiazide 25 mg tablet Take 1 tablet (25 mg total) by mouth daily, Starting 2024, Normal      hydrOXYzine HCL (ATARAX) 25 mg tablet Take 1 tablet (25 mg total) by mouth every 6 (six) hours as needed for itching, Starting 2024, Normal      ketorolac (TORADOL) 10 mg tablet Take 1 tablet (10 mg total) by mouth every 6 (six) hours as needed for severe pain, Starting Wed 10/25/2023, Normal      meloxicam (MOBIC) 15 mg tablet Take 15 mg by mouth daily as needed, Starting Tu2023, Historical Med      naproxen (Naprosyn) 500 mg tablet Take 1 tablet  (500 mg total) by mouth 2 (two) times a day as needed for mild pain, Starting Thu 11/7/2024, Normal      ondansetron (ZOFRAN-ODT) 4 mg disintegrating tablet Take 1 tablet (4 mg total) by mouth every 6 (six) hours as needed for nausea or vomiting, Starting Sun 12/29/2024, Normal      oxyCODONE (ROXICODONE) 5 immediate release tablet Take 1 tablet (5 mg total) by mouth every 4 (four) hours as needed for severe pain Max Daily Amount: 30 mg, Starting Sun 3/31/2024, Normal      predniSONE 10 mg tablet 4 tabs x 2 days, 3 tabs x 2 days, 2 tabs x 2 days, 1 tab x 2 days, Normal      rizatriptan (MAXALT) 10 mg tablet Take 1 tablet (10 mg total) by mouth once as needed for migraine for up to 1 dose May repeat in 2 hours if needed, Starting Wed 4/3/2024, Normal      TiZANidine (ZANAFLEX) 2 MG capsule Take 1 capsule (2 mg total) by mouth 3 (three) times a day as needed for muscle spasms, Starting Wed 4/3/2024, Normal      triamcinolone (KENALOG) 0.5 % cream Apply topically 2 (two) times a day, Starting Wed 4/24/2024, Normal           No discharge procedures on file.  ED SEPSIS DOCUMENTATION   Time reflects when diagnosis was documented in both MDM as applicable and the Disposition within this note       Time User Action Codes Description Comment    1/28/2025  3:44 PM Familia Abernathy Add [R29.90] Stroke-like symptoms     1/28/2025  7:35 PM Familia Abernathy [R51.9] Headache                  Familia Abernathy MD  02/01/25 0141

## 2025-01-28 NOTE — ED NOTES
Reports decreased facial numbness, but states there is still some right sided tingling on face.     Jimena Lin RN  01/28/25 6315

## 2025-01-29 ENCOUNTER — NURSE TRIAGE (OUTPATIENT)
Age: 45
End: 2025-01-29

## 2025-01-29 ENCOUNTER — TELEPHONE (OUTPATIENT)
Age: 45
End: 2025-01-29

## 2025-01-29 ENCOUNTER — HOSPITAL ENCOUNTER (EMERGENCY)
Facility: HOSPITAL | Age: 45
Discharge: HOME | End: 2025-01-29
Attending: EMERGENCY MEDICINE
Payer: COMMERCIAL

## 2025-01-29 VITALS
OXYGEN SATURATION: 100 % | DIASTOLIC BLOOD PRESSURE: 99 MMHG | SYSTOLIC BLOOD PRESSURE: 159 MMHG | RESPIRATION RATE: 20 BRPM | TEMPERATURE: 97.7 F | HEART RATE: 82 BPM

## 2025-01-29 DIAGNOSIS — T78.40XA ALLERGIC REACTION, INITIAL ENCOUNTER: Primary | ICD-10-CM

## 2025-01-29 PROCEDURE — 99283 EMERGENCY DEPT VISIT LOW MDM: CPT

## 2025-01-29 PROCEDURE — 99284 EMERGENCY DEPT VISIT MOD MDM: CPT | Performed by: EMERGENCY MEDICINE

## 2025-01-29 RX ORDER — DIPHENHYDRAMINE HCL 25 MG
25 TABLET ORAL ONCE
Status: COMPLETED | OUTPATIENT
Start: 2025-01-29 | End: 2025-01-29

## 2025-01-29 RX ORDER — FAMOTIDINE 20 MG/1
20 TABLET, FILM COATED ORAL ONCE
Status: COMPLETED | OUTPATIENT
Start: 2025-01-29 | End: 2025-01-29

## 2025-01-29 RX ADMIN — DIPHENHYDRAMINE HCL 25 MG: 25 TABLET ORAL at 11:20

## 2025-01-29 RX ADMIN — DIPHENHYDRAMINE HCL 25 MG: 25 TABLET ORAL at 11:13

## 2025-01-29 RX ADMIN — DEXAMETHASONE 10 MG: 4 TABLET ORAL at 11:09

## 2025-01-29 RX ADMIN — FAMOTIDINE 20 MG: 20 TABLET, FILM COATED ORAL at 11:09

## 2025-01-29 NOTE — TELEPHONE ENCOUNTER
"Warm transfer to clinical for further advisement. Clinical spoke to Dr Castro, advises ED for treatment. Patient understands instructions , will proceed there now , has a ride.   Patient was treated in the ED yesterday , had CT with contrast, allergic to contrast Dye, they gave her Benadryl prior to test. Was fine yesterday,. Woke up this morning with widespread itchy rash and swollen eyes. Denies any respiratory distress. Reason for Disposition   Rash began within 4 hours of a new prescription medication   Widespread hives and onset < 2 hours of exposure to 1st dose of drug    Additional Information   Drug rash suspected and started taking new medicine within last 2 weeks  (Exception: Antihistamine, eye drops, ear drops, decongestant or other OTC cough/cold medicines.)   Negative: Rash beginning within 4 hours of a new prescription medication    Answer Assessment - Initial Assessment Questions  1. APPEARANCE of RASH: \"Describe the rash.\" (e.g., spots, blisters, raised areas, skin peeling, scaly)      Hives on the back , chest and face , eye swollen   2. SIZE: \"How big are the spots?\" (e.g., tip of pen, eraser, coin; inches, centimeters)      Dime size   3. LOCATION: \"Where is the rash located?\"      Widespread   4. COLOR: \"What color is the rash?\" (Note: It is difficult to assess rash color in people with darker-colored skin. When this situation occurs, simply ask the caller to describe what they see.)      red  5. ONSET: \"When did the rash begin?\"      Woke up this morning with the rash and swelling   6. FEVER: \"Do you have a fever?\" If Yes, ask: \"What is your temperature, how was it measured, and when did it start?\"      Denies   7. ITCHING: \"Does the rash itch?\" If Yes, ask: \"How bad is the itch?\" (Scale 1-10; or mild, moderate, severe)      Yes , 9/10  8. CAUSE: \"What do you think is causing the rash?\"      IV contrast   9. MEDICINE FACTORS: \"Have you started any new medicines within the last 2 weeks?\" (e.g., " "antibiotics)       Contrast dye   10. OTHER SYMPTOMS: \"Do you have any other symptoms?\" (e.g., dizziness, headache, sore throat, joint pain)        Denies any respiratory   11. PREGNANCY: \"Is there any chance you are pregnant?\" \"When was your last menstrual period?\"        Denies    Protocols used: Rash or Redness - Widespread-Adult-[place], Rash - Widespread On Drugs-Adult-[place]    "

## 2025-01-29 NOTE — ED ATTENDING ATTESTATION
1/29/2025  I, Florencia Fonseca MD, saw and evaluated the patient. I have discussed the patient with the resident/non-physician practitioner and agree with the resident's/non-physician practitioner's findings, Plan of Care, and MDM as documented in the resident's/non-physician practitioner's note, except where noted. All available labs and Radiology studies were reviewed.  I was present for key portions of any procedure(s) performed by the resident/non-physician practitioner and I was immediately available to provide assistance.       At this point I agree with the current assessment done in the Emergency Department.  I have conducted an independent evaluation of this patient a history and physical is as follows:    ED Course         Critical Care Time  Procedures    43 yo female with known iv dye allergy and had cta yesterday, and woke up with facial swelling and hives. No throat or tongue swelling, no sob, no wheezing.  Vss, afebrile, lungs cta, rrr, abdomen soft nontender, hive like rash, eye swelling.  Benadryl, decadron, pepcid.

## 2025-01-29 NOTE — DISCHARGE INSTRUCTIONS
You were seen in the emergency department today for allergic reaction.    Follow up with your primary care provider.     Take benadryl as needed for itching following the instructions on the bottle.     Return to the emergency department for any new or concerning symptoms including difficulty breathing, throat swelling.     Thank you for choosing [facility] for your care today.

## 2025-01-29 NOTE — ED PROVIDER NOTES
Time reflects when diagnosis was documented in both MDM as applicable and the Disposition within this note       Time User Action Codes Description Comment    1/29/2025 12:59 PM Elisa Aguilar Add [T78.40XA] Allergic reaction, initial encounter           ED Disposition       ED Disposition   Discharge    Condition   Stable    Date/Time   Wed Jan 29, 2025 12:59 PM    Comment   Glenna Lanier discharge to home/self care.                   Assessment & Plan       Medical Decision Making  Risk  OTC drugs.    Patient is a 44 y.o. female with PMH of allergy to contrast who presents to the ED with concern for allergic reaction.    Vital signs otherwise stable. On exam diffuse hives, periorbital swelling, no difficulty breathing / wheezing / throat swelling.    History and physical exam most consistent with allergic reaction.     Plan: will treat with oral pepcid, steroids, benadryl    View ED course for further discussion on patient workup.     On review of previous records reviewed ED note from yesterday, patient evaluated for stroke like symptoms, received CTA with IV contrast.    All labs reviewed and utilized in the medical decision making process  All radiology studies independently viewed by me and interpreted by the radiologist.  I reviewed all testing with the patient.     Upon re-evaluation patient improving, swelling improved.    Disposition: I have reviewed the patient's vital signs, nursing notes, and other relevant tests/information. I had a detailed discussion with the patient regarding the history, exam findings, and any diagnostic results.   Plan to discharge home in stable condition, follow up with PCP.  Discussed with patient who is agreeable to plan.  I discussed discharge instructions, need for follow-up, and oral return precautions for what to return for in addition to the written return precautions and discharge instructions, specifically highlighting areas of special concern.  The patient verbalized  "understanding of the discharge instructions and warnings that would necessitate return to the Emergency Department including difficulty breathing / GI symptoms / throat swelling.  All questions the patient had were answered prior to discharge to the best of my ability.       Portions of the record may have been created with voice recognition software. Occasional wrong word or \"sound a like\" substitutions may have occurred due to the inherent limitations of voice recognition software. Read the chart carefully and recognize, using context, where substitutions have occurred.      ED Course as of 01/30/25 1406   Wed Jan 29, 2025   1258 Patient feeling better, itching improving, no tongue swelling / difficulty breathing        Medications   diphenhydrAMINE (BENADRYL) tablet 25 mg (25 mg Oral Given 1/29/25 1113)   famotidine (PEPCID) tablet 20 mg (20 mg Oral Given 1/29/25 1109)   dexamethasone (DECADRON) tablet 10 mg (10 mg Oral Given 1/29/25 1109)   diphenhydrAMINE (BENADRYL) tablet 25 mg (25 mg Oral Given 1/29/25 1120)       ED Risk Strat Scores                          SBIRT 22yo+      Flowsheet Row Most Recent Value   Initial Alcohol Screen: US AUDIT-C     1. How often do you have a drink containing alcohol? 0 Filed at: 01/29/2025 1053   2. How many drinks containing alcohol do you have on a typical day you are drinking?  0 Filed at: 01/29/2025 1053   3b. FEMALE Any Age, or MALE 65+: How often do you have 4 or more drinks on one occassion? 0 Filed at: 01/29/2025 1053   Audit-C Score 0 Filed at: 01/29/2025 1053   [place]: How many times in the past year have you...    Used an illegal drug or used a prescription medication for non-medical reasons? Never Filed at: 01/29/2025 1053                            History of Present Illness       Chief Complaint   Patient presents with    Allergic Reaction     Pt reports getting CT yesterday, allergic to contrast dye, woke up today with full body itching and hives and facial " swelling, pt reports feeling slightly SOB but denies airway swelling         Past Medical History:   Diagnosis Date    Fetal growth problem suspected but not found     RESOLVED: 3/1/17    First trimester bleeding     RESOLVED:3/1/17    Granulation tissue of vaginal cuff 2018    Iron deficiency anemia due to chronic blood loss 2018    Miscarriage     Missed      RESOLVED:3/1/17    Poor fetal growth affecting management of mother     RESOLVED:3/1/17    Recurrent pregnancy loss, antepartum condition or complication     RESOLVED:3/1/17    Spontaneous      RESOLVED:3/1/17    Supraventricular tachycardia (HCC)     by history      Past Surgical History:   Procedure Laterality Date    COLONOSCOPY      DILATION AND CURETTAGE OF UTERUS      ,     DILATION AND CURETTAGE OF UTERUS      FOR SPONTANEOUS ; X5 [id number]    HYSTERECTOMY  2018    Dr Singh    INDUCED       LAPAROSCOPIC TUBAL LIGATION Right 2016    Procedure: LAPAROSCOPIC TUBAL LIGATION ;  Surgeon: Jacque Singh MD;  Location: [facility];  Service:     LA LAPS W/VAG HYSTERECT 250 GM/&RMVL TUBE&/OVARIES N/A 2018    Procedure: HYSTERECTOMY LAPAROSCOPIC ASSISTED VAGINAL (LAVH); RIGHT LAPAROSCOPIC SALPINGECTOMY;  Surgeon: Jacque Singh MD;  Location: [facility];  Service: Gynecology    LA TX MISSED  FIRST TRIMESTER SURGICAL N/A 2016    Procedure: DILATATION AND EVACUATION (D&E) (MISSED AB);  Surgeon: Jacque Singh MD;  Location: [facility];  Service: Gynecology    SALPINGOOPHORECTOMY Left     TUBAL LIGATION Right       Family History   Problem Relation Age of Onset    Arthritis Mother     Depression Mother     Hypertension Mother     Cancer Father     Breast cancer Neg Hx     Ovarian cancer Neg Hx     Colon cancer Neg Hx       Social History     Tobacco Use    Smoking status: Never    Smokeless tobacco: Never   Vaping Use    Vaping status: Never Used   Substance Use Topics    Alcohol use:  Yes     Comment: social    Drug use: Never      E-Cigarette/Vaping    E-Cigarette Use Never User       E-Cigarette/Vaping Substances    Nicotine No     THC No     CBD No     Flavoring No     Other No     Unknown No       I have reviewed and agree with the history as documented.     HPI  Patient is a 44 y.o. female with history of allergic reaction to contrast presenting to the emergency department for concern for allergic reaction. Patient states that she had a CTA with contrast last night. States that she felt ok afterwards and then earlier today developed rash, facial swelling. She states that she has had an allergic reaction to contrast in the past. Denies any new soaps / shampoos / lotions, also denies any new food or other allergic exposures that she thinks could have caused her symptoms. Denies having any throat swelling / difficulty breathing / N/V/D / abdominal pain.     Review of Systems   Constitutional:  Negative for fever.   HENT:  Negative for congestion.    Eyes:  Negative for visual disturbance.   Respiratory:  Negative for shortness of breath.    Cardiovascular:  Negative for chest pain.   Gastrointestinal:  Negative for abdominal pain, diarrhea and vomiting.   Endocrine: Negative for polyuria.   Genitourinary:  Negative for dysuria.   Musculoskeletal:  Negative for gait problem.   Skin:  Positive for rash.   Neurological:  Negative for dizziness.   All other systems reviewed and are negative.          Objective       ED Triage Vitals [01/29/25 1052]   Temperature Pulse Blood Pressure Respirations SpO2 Patient Position - Orthostatic VS   97.7 °F (36.5 °C) 93 (!) 201/110 20 98 % Sitting      Temp Source Heart Rate Source BP Location FiO2 (%) Pain Score    Temporal Monitor Left arm -- --      Vitals      Date and Time Temp Pulse SpO2 Resp BP Pain Score FACES Pain Rating User   01/29/25 1238 -- 82 100 % 20 159/99 -- -- HÉCTOR   01/29/25 1052 97.7 °F (36.5 °C) 93 98 % 20 201/110 didnt take BP meds today --  -- AS            Physical Exam  Vitals and nursing note reviewed.   Constitutional:       Appearance: Normal appearance.   HENT:      Head: Normocephalic and atraumatic.      Mouth/Throat:      Mouth: Mucous membranes are moist.   Eyes:      Conjunctiva/sclera: Conjunctivae normal.   Cardiovascular:      Rate and Rhythm: Normal rate and regular rhythm.      Pulses: Normal pulses.      Heart sounds: Normal heart sounds.   Pulmonary:      Effort: Pulmonary effort is normal.      Breath sounds: Normal breath sounds.   Abdominal:      Palpations: Abdomen is soft.      Tenderness: There is no abdominal tenderness.   Musculoskeletal:         General: No tenderness.      Cervical back: Neck supple.   Skin:     General: Skin is warm and dry.      Capillary Refill: Capillary refill takes less than 2 seconds.      Comments: Hives diffusely to arms, trunk, face. Periorbital swelling / edema, no oropharyngeal edema   Neurological:      General: No focal deficit present.      Mental Status: She is alert. Mental status is at baseline.   Psychiatric:         Mood and Affect: Mood normal.         Results Reviewed       None            No orders to display       Procedures    ED Medication and Procedure Management   Prior to Admission Medications   Prescriptions Last Dose Informant Patient Reported? Taking?   TiZANidine (ZANAFLEX) 2 MG capsule   No No   Sig: Take 1 capsule (2 mg total) by mouth 3 (three) times a day as needed for muscle spasms   diphenhydrAMINE (BENADRYL) 25 mg tablet   No No   Sig: Take 1 tablet (25 mg total) by mouth every 6 (six) hours   diphenhydrAMINE (SOMINEX) 25 MG tablet   Yes No   Sig: Take 25 mg by mouth every 6 (six) hours   gabapentin (NEURONTIN) 300 mg capsule   No No   Sig: Take 1 capsule (300 mg total) by mouth 3 (three) times a day   hydrOXYzine HCL (ATARAX) 25 mg tablet   No No   Sig: Take 1 tablet (25 mg total) by mouth every 6 (six) hours as needed for itching   hydroCHLOROthiazide 25 mg tablet    No No   Sig: Take 1 tablet (25 mg total) by mouth daily   hydroCHLOROthiazide 25 mg tablet   No No   Sig: Take 1 tablet (25 mg total) by mouth daily   ketorolac (TORADOL) 10 mg tablet   No No   Sig: Take 1 tablet (10 mg total) by mouth every 6 (six) hours as needed for severe pain   meloxicam (MOBIC) 15 mg tablet   Yes No   Sig: Take 15 mg by mouth daily as needed   naproxen (Naprosyn) 500 mg tablet   No No   Sig: Take 1 tablet (500 mg total) by mouth 2 (two) times a day as needed for mild pain   ondansetron (ZOFRAN-ODT) 4 mg disintegrating tablet   No No   Sig: Take 1 tablet (4 mg total) by mouth every 6 (six) hours as needed for nausea or vomiting   oxyCODONE (ROXICODONE) 5 immediate release tablet   No No   Sig: Take 1 tablet (5 mg total) by mouth every 4 (four) hours as needed for severe pain Max Daily Amount: 30 mg   Patient not taking: Reported on 2024   predniSONE 10 mg tablet   No No   Si tabs x 2 days, 3 tabs x 2 days, 2 tabs x 2 days, 1 tab x 2 days   rizatriptan (MAXALT) 10 mg tablet   No No   Sig: Take 1 tablet (10 mg total) by mouth once as needed for migraine for up to 1 dose May repeat in 2 hours if needed   triamcinolone (KENALOG) 0.5 % cream   No No   Sig: Apply topically 2 (two) times a day      Facility-Administered Medications: None     Discharge Medication List as of 2025  1:01 PM        CONTINUE these medications which have NOT CHANGED    Details   diphenhydrAMINE (BENADRYL) 25 mg tablet Take 1 tablet (25 mg total) by mouth every 6 (six) hours, Starting Fri 2024, Normal      diphenhydrAMINE (SOMINEX) 25 MG tablet Take 25 mg by mouth every 6 (six) hours, Starting Sat 2024, Historical Med      gabapentin (NEURONTIN) 300 mg capsule Take 1 capsule (300 mg total) by mouth 3 (three) times a day, Starting Wed 4/3/2024, Normal      hydroCHLOROthiazide 25 mg tablet Take 1 tablet (25 mg total) by mouth daily, Starting Mon 2024, Normal      hydrOXYzine HCL (ATARAX) 25 mg  tablet Take 1 tablet (25 mg total) by mouth every 6 (six) hours as needed for itching, Starting Wed 4/24/2024, Normal      ketorolac (TORADOL) 10 mg tablet Take 1 tablet (10 mg total) by mouth every 6 (six) hours as needed for severe pain, Starting Wed 10/25/2023, Normal      meloxicam (MOBIC) 15 mg tablet Take 15 mg by mouth daily as needed, Starting Tue 8/22/2023, Historical Med      naproxen (Naprosyn) 500 mg tablet Take 1 tablet (500 mg total) by mouth 2 (two) times a day as needed for mild pain, Starting Thu 11/7/2024, Normal      ondansetron (ZOFRAN-ODT) 4 mg disintegrating tablet Take 1 tablet (4 mg total) by mouth every 6 (six) hours as needed for nausea or vomiting, Starting Sun 12/29/2024, Normal      oxyCODONE (ROXICODONE) 5 immediate release tablet Take 1 tablet (5 mg total) by mouth every 4 (four) hours as needed for severe pain Max Daily Amount: 30 mg, Starting Sun 3/31/2024, Normal      predniSONE 10 mg tablet 4 tabs x 2 days, 3 tabs x 2 days, 2 tabs x 2 days, 1 tab x 2 days, Normal      rizatriptan (MAXALT) 10 mg tablet Take 1 tablet (10 mg total) by mouth once as needed for migraine for up to 1 dose May repeat in 2 hours if needed, Starting Wed 4/3/2024, Normal      TiZANidine (ZANAFLEX) 2 MG capsule Take 1 capsule (2 mg total) by mouth 3 (three) times a day as needed for muscle spasms, Starting Wed 4/3/2024, Normal      triamcinolone (KENALOG) 0.5 % cream Apply topically 2 (two) times a day, Starting Wed 4/24/2024, Normal           No discharge procedures on file.  ED SEPSIS DOCUMENTATION   Time reflects when diagnosis was documented in both MDM as applicable and the Disposition within this note       Time User Action Codes Description Comment    1/29/2025 12:59 PM Elisa Aguilar Add [T78.40XA] Allergic reaction, initial encounter                  Elisa Aguilar DO  01/30/25 5531

## 2025-01-29 NOTE — TELEPHONE ENCOUNTER
1ST ATTEMPT,     Called pt no answer, LMOM.    Thank you,            HFU/SLB/Stroke-like episode     DC-128/2025/Stable         ----- Message from Bijal Cisneros MD sent at 1/28/2025  4:51 PM EST -----  Regarding: KEERTHI Lanier will need follow-up in in 6 weeks with headache team for Migraine in 60 minute appointment. They will not require outpatient neurological testing

## 2025-01-30 LAB
ATRIAL RATE: 68 BPM
P AXIS: 28 DEGREES
PR INTERVAL: 106 MS
QRS AXIS: 39 DEGREES
QRSD INTERVAL: 88 MS
QT INTERVAL: 390 MS
QTC INTERVAL: 414 MS
T WAVE AXIS: 27 DEGREES
VENTRICULAR RATE: 68 BPM

## 2025-04-14 PROBLEM — M54.30 SCIATICA: Status: ACTIVE | Noted: 2025-04-14

## 2025-04-14 PROBLEM — Z20.2 POSSIBLE EXPOSURE TO STD: Status: RESOLVED | Noted: 2023-09-12 | Resolved: 2025-04-14

## 2025-06-04 ENCOUNTER — HOSPITAL ENCOUNTER (EMERGENCY)
Facility: HOSPITAL | Age: 45
Discharge: HOME | End: 2025-06-04
Attending: EMERGENCY MEDICINE
Payer: COMMERCIAL

## 2025-06-04 ENCOUNTER — APPOINTMENT (EMERGENCY)
Dept: RADIOLOGY | Facility: HOSPITAL | Age: 45
End: 2025-06-04
Payer: COMMERCIAL

## 2025-06-04 VITALS
RESPIRATION RATE: 18 BRPM | OXYGEN SATURATION: 97 % | SYSTOLIC BLOOD PRESSURE: 148 MMHG | HEART RATE: 89 BPM | TEMPERATURE: 98.3 F | DIASTOLIC BLOOD PRESSURE: 100 MMHG

## 2025-06-04 DIAGNOSIS — R05.9 COUGH: Primary | ICD-10-CM

## 2025-06-04 DIAGNOSIS — J02.9 SORE THROAT: ICD-10-CM

## 2025-06-04 LAB
ALBUMIN SERPL BCG-MCNC: 4 G/DL (ref 3.5–5)
ALP SERPL-CCNC: 53 U/L (ref 34–104)
ALT SERPL W P-5'-P-CCNC: 11 U/L (ref 7–52)
ANION GAP SERPL CALCULATED.3IONS-SCNC: 3 MMOL/L (ref 4–13)
AST SERPL W P-5'-P-CCNC: 11 U/L (ref 13–39)
ATRIAL RATE: 88 BPM
BASOPHILS # BLD AUTO: 0.03 THOUSANDS/ÂΜL (ref 0–0.1)
BASOPHILS NFR BLD AUTO: 1 % (ref 0–1)
BILIRUB SERPL-MCNC: 0.62 MG/DL (ref 0.2–1)
BUN SERPL-MCNC: 6 MG/DL (ref 5–25)
CALCIUM SERPL-MCNC: 8.8 MG/DL (ref 8.4–10.2)
CARDIAC TROPONIN I PNL SERPL HS: <2 NG/L (ref ?–50)
CHLORIDE SERPL-SCNC: 107 MMOL/L (ref 96–108)
CO2 SERPL-SCNC: 27 MMOL/L (ref 21–32)
CREAT SERPL-MCNC: 0.92 MG/DL (ref 0.6–1.3)
EOSINOPHIL # BLD AUTO: 0.05 THOUSAND/ÂΜL (ref 0–0.61)
EOSINOPHIL NFR BLD AUTO: 1 % (ref 0–6)
ERYTHROCYTE [DISTWIDTH] IN BLOOD BY AUTOMATED COUNT: 12.2 % (ref 11.6–15.1)
FLUAV AG UPPER RESP QL IA.RAPID: NEGATIVE
FLUBV AG UPPER RESP QL IA.RAPID: NEGATIVE
GFR SERPL CREATININE-BSD FRML MDRD: 74 ML/MIN/1.73SQ M
GLUCOSE SERPL-MCNC: 98 MG/DL (ref 65–140)
HCT VFR BLD AUTO: 41.4 % (ref 34.8–46.1)
HGB BLD-MCNC: 14.1 G/DL (ref 11.5–15.4)
IMM GRANULOCYTES # BLD AUTO: 0.01 THOUSAND/UL (ref 0–0.2)
IMM GRANULOCYTES NFR BLD AUTO: 0 % (ref 0–2)
LYMPHOCYTES # BLD AUTO: 0.77 THOUSANDS/ÂΜL (ref 0.6–4.47)
LYMPHOCYTES NFR BLD AUTO: 21 % (ref 14–44)
MCH RBC QN AUTO: 29.9 PG (ref 26.8–34.3)
MCHC RBC AUTO-ENTMCNC: 34.1 G/DL (ref 31.4–37.4)
MCV RBC AUTO: 88 FL (ref 82–98)
MONOCYTES # BLD AUTO: 0.47 THOUSAND/ÂΜL (ref 0.17–1.22)
MONOCYTES NFR BLD AUTO: 13 % (ref 4–12)
NEUTROPHILS # BLD AUTO: 2.34 THOUSANDS/ÂΜL (ref 1.85–7.62)
NEUTS SEG NFR BLD AUTO: 64 % (ref 43–75)
NRBC BLD AUTO-RTO: 0 /100 WBCS
P AXIS: 67 DEGREES
PLATELET # BLD AUTO: 165 THOUSANDS/UL (ref 149–390)
PMV BLD AUTO: 10 FL (ref 8.9–12.7)
POTASSIUM SERPL-SCNC: 3.7 MMOL/L (ref 3.5–5.3)
PR INTERVAL: 126 MS
PROT SERPL-MCNC: 7 G/DL (ref 6.4–8.4)
QRS AXIS: 32 DEGREES
QRSD INTERVAL: 84 MS
QT INTERVAL: 356 MS
QTC INTERVAL: 430 MS
RBC # BLD AUTO: 4.71 MILLION/UL (ref 3.81–5.12)
S PYO DNA THROAT QL NAA+PROBE: NOT DETECTED
SARS-COV+SARS-COV-2 AG RESP QL IA.RAPID: NEGATIVE
SODIUM SERPL-SCNC: 137 MMOL/L (ref 135–147)
T WAVE AXIS: 10 DEGREES
TSH SERPL DL<=0.05 MIU/L-ACNC: 0.26 UIU/ML (ref 0.45–4.5)
VENTRICULAR RATE: 88 BPM
WBC # BLD AUTO: 3.67 THOUSAND/UL (ref 4.31–10.16)

## 2025-06-04 PROCEDURE — 84484 ASSAY OF TROPONIN QUANT: CPT

## 2025-06-04 PROCEDURE — 87651 STREP A DNA AMP PROBE: CPT

## 2025-06-04 PROCEDURE — 99285 EMERGENCY DEPT VISIT HI MDM: CPT | Performed by: EMERGENCY MEDICINE

## 2025-06-04 PROCEDURE — 99284 EMERGENCY DEPT VISIT MOD MDM: CPT

## 2025-06-04 PROCEDURE — 93005 ELECTROCARDIOGRAM TRACING: CPT

## 2025-06-04 PROCEDURE — 94640 AIRWAY INHALATION TREATMENT: CPT

## 2025-06-04 PROCEDURE — 93010 ELECTROCARDIOGRAM REPORT: CPT | Performed by: INTERNAL MEDICINE

## 2025-06-04 PROCEDURE — 87811 SARS-COV-2 COVID19 W/OPTIC: CPT

## 2025-06-04 PROCEDURE — 87804 INFLUENZA ASSAY W/OPTIC: CPT

## 2025-06-04 PROCEDURE — 36415 COLL VENOUS BLD VENIPUNCTURE: CPT

## 2025-06-04 PROCEDURE — 80053 COMPREHEN METABOLIC PANEL: CPT

## 2025-06-04 PROCEDURE — 85025 COMPLETE CBC W/AUTO DIFF WBC: CPT

## 2025-06-04 PROCEDURE — 71045 X-RAY EXAM CHEST 1 VIEW: CPT

## 2025-06-04 PROCEDURE — 84443 ASSAY THYROID STIM HORMONE: CPT

## 2025-06-04 RX ORDER — ACETAMINOPHEN 325 MG/1
975 TABLET ORAL ONCE
Status: COMPLETED | OUTPATIENT
Start: 2025-06-04 | End: 2025-06-04

## 2025-06-04 RX ORDER — HYDROCHLOROTHIAZIDE 25 MG/1
25 TABLET ORAL DAILY
Status: DISCONTINUED | OUTPATIENT
Start: 2025-06-04 | End: 2025-06-04 | Stop reason: HOSPADM

## 2025-06-04 RX ORDER — IPRATROPIUM BROMIDE AND ALBUTEROL SULFATE 2.5; .5 MG/3ML; MG/3ML
3 SOLUTION RESPIRATORY (INHALATION) ONCE
Status: COMPLETED | OUTPATIENT
Start: 2025-06-04 | End: 2025-06-04

## 2025-06-04 RX ADMIN — HYDROCHLOROTHIAZIDE 25 MG: 25 TABLET ORAL at 08:00

## 2025-06-04 RX ADMIN — IPRATROPIUM BROMIDE AND ALBUTEROL SULFATE 3 ML: 2.5; .5 SOLUTION RESPIRATORY (INHALATION) at 09:12

## 2025-06-04 RX ADMIN — ACETAMINOPHEN 975 MG: 325 TABLET ORAL at 07:59

## 2025-06-04 NOTE — Clinical Note
Glenna Lanier was seen and treated in our emergency department on 6/4/2025.                Diagnosis:     Glenna  may return to work on return date.    She may return on this date: 06/07/2025         If you have any questions or concerns, please don't hesitate to call.      Moreno Bowers MD    ______________________________           _______________          _______________  Hospital Representative                              Date                                Time

## 2025-06-04 NOTE — ED PROVIDER NOTES
Time reflects when diagnosis was documented in both MDM as applicable and the Disposition within this note       Time User Action Codes Description Comment    6/4/2025  9:21 AM Moreno Bowers [R05.9] Cough     6/4/2025  9:21 AM Moreno Bowers [J02.9] Sore throat           ED Disposition       ED Disposition   Discharge    Condition   Stable    Date/Time   Wed Jun 4, 2025  9:35 AM    Comment   Glenna Yannick discharge to home/self care.                   Assessment & Plan       Medical Decision Making  Patient is a 45-year-old female with history of hypertension who presented to the ED for sore throat, cough.  She stated that she started feeling sick before going to bed last night and today has had a sore throat along with a dry cough but has been frequent and some mild chest discomfort that is much worse when she coughs.  She states that she has also had malaise.  Denies shortness of breath.  She also endorses recently high blood pressure readings and is scheduled to see her doctor soon for changing her blood pressure management strategy.    Differential diagnosis includes but is not limited to viral pharyngitis, strep pharyngitis, viral URI, pneumonia, asymptomatic hypertension, MSK related chest pain due to cough, doubt ACS, doubt hypertensive emergency.  Cardiac workup including EKG and troponin grossly unremarkable.  CBC and CMP grossly unremarkable with the exception of mildly reduced WBC count.  Patient does tend to have lower WBC count at baseline.  Strep and COVID/flu swabs negative.  CXR without acute process.  Patient given DuoNeb for symptomatic treatment and stated he did not seem to make a difference.  She was given Tylenol and stated that she felt much better.  She is likely experiencing a viral URI and was counseled on this as well as conservative measures at home.  Patient's BP came down after I gave her her home BP meds.  She already has an appointment she stated with her doctor to change  up her hypertensive management strategy. Patient was counseled extensively and discharged with explicit return precautions.      Amount and/or Complexity of Data Reviewed  Labs: ordered.  Radiology: ordered and independent interpretation performed.    Risk  OTC drugs.  Prescription drug management.             Medications   acetaminophen (TYLENOL) tablet 975 mg (975 mg Oral Given 6/4/25 4433)   ipratropium-albuterol (DUO-NEB) 0.5-2.5 mg/3 mL inhalation solution 3 mL (3 mL Nebulization Given 6/4/25 0912)       ED Risk Strat Scores   HEART Risk Score      Flowsheet Row Most Recent Value   Heart Score Risk Calculator    History 0 Filed at: 06/04/2025 1648   ECG 1 Filed at: 06/04/2025 1648   Age 1 Filed at: 06/04/2025 1648   Risk Factors 1 Filed at: 06/04/2025 1648   Troponin 0 Filed at: 06/04/2025 1648   HEART Score 3 Filed at: 06/04/2025 1648          HEART Risk Score      Flowsheet Row Most Recent Value   Heart Score Risk Calculator    History 0 Filed at: 06/04/2025 1648   ECG 1 Filed at: 06/04/2025 1648   Age 1 Filed at: 06/04/2025 1648   Risk Factors 1 Filed at: 06/04/2025 1648   Troponin 0 Filed at: 06/04/2025 1648   HEART Score 3 Filed at: 06/04/2025 1648                      No data recorded        SBIRT 22yo+      Flowsheet Row Most Recent Value   Initial Alcohol Screen: US AUDIT-C     1. How often do you have a drink containing alcohol? 0 Filed at: 06/04/2025 0734   2. How many drinks containing alcohol do you have on a typical day you are drinking?  0 Filed at: 06/04/2025 0734   3a. Male UNDER 65: How often do you have five or more drinks on one occasion? 0 Filed at: 06/04/2025 0734   3b. FEMALE Any Age, or MALE 65+: How often do you have 4 or more drinks on one occassion? 0 Filed at: 06/04/2025 0734   Audit-C Score 0 Filed at: 06/04/2025 0734   [place]: How many times in the past year have you...    Used an illegal drug or used a prescription medication for non-medical reasons? Never Filed at: 06/04/2025 0734  "                           History of Present Illness       Chief Complaint   Patient presents with    Cold Like Symptoms     Arrives with c/o sore throat, cough, chest tightness. Started feeling \"off\" last night then woke up feeling unwell. BP elevated on arrival.       Past Medical History[1]   Past Surgical History[2]   Family History[3]   Social History[4]   E-Cigarette/Vaping    E-Cigarette Use Never User       E-Cigarette/Vaping Substances    Nicotine No     THC No     CBD No     Flavoring No     Other No     Unknown No       I have reviewed and agree with the history as documented.     Patient is a 45-year-old female with history of hypertension who presented to the ED for sore throat, cough.  She stated that she started feeling sick before going to bed last night and today has had a sore throat along with a dry cough but has been frequent and some mild chest discomfort that is much worse when she coughs.  She states that she has also had malaise.  Denies shortness of breath.  She also endorses recently high blood pressure readings and is scheduled to see her doctor soon for changing her blood pressure management strategy.        Review of Systems   Constitutional:  Positive for fatigue. Negative for chills and fever.   HENT:  Positive for congestion, sore throat and voice change. Negative for trouble swallowing.    Respiratory:  Positive for cough. Negative for shortness of breath.    Cardiovascular:  Positive for chest pain. Negative for palpitations and leg swelling.   Gastrointestinal:  Negative for abdominal pain, nausea and vomiting.   Genitourinary: Negative.    Skin:  Negative for color change, pallor and rash.   Neurological:  Negative for dizziness and syncope.   All other systems reviewed and are negative.          Objective       ED Triage Vitals [06/04/25 0728]   Temperature Pulse Blood Pressure Respirations SpO2 Patient Position - Orthostatic VS   98.3 °F (36.8 °C) 97 (!) 187/122 18 97 % Sitting "      Temp Source Heart Rate Source BP Location FiO2 (%) Pain Score    Oral Monitor Right arm -- --      Vitals      Date and Time Temp Pulse SpO2 Resp BP Pain Score FACES Pain Rating User   06/04/25 0915 -- 89 97 % -- 148/100 -- -- LAB   06/04/25 0854 -- 92 96 % 18 159/106 -- -- LAB   06/04/25 0800 -- 94 96 % -- 181/105 -- -- LAB   06/04/25 0728 98.3 °F (36.8 °C) 97 97 % 18 187/122 -- -- LR            Physical Exam  On examination:  The patient is awake, alert and oriented  HEENT: Normocephalic/atraumatic  Oropharynx with erythema but no exudates  No LAD.  External examination of the ears is unremarkable  Pupils are equal round and reactive to light, there is no conjunctival injection or scleral icterus noted  Nares are patent without rhinorrhea.  The neck is supple  Lungs: Clear to auscultation bilaterally  Heart: Regular without murmurs rubs or gallops  Abdomen: Soft and nontender. There are positive bowel sounds. there is no rebound or guarding  Musculoskeletal: Normal range of motion with grossly normal strength  Neuro: Cranial nerves II through XII grossly intact. Nonfocal exam  Skin: No rash noted  Psych: Mood and affect normal    Results Reviewed       Procedure Component Value Units Date/Time    HS Troponin 0hr (reflex protocol) [[id number]]  (Normal) Collected: 06/04/25 0805    Lab Status: Final result Specimen: Blood from Arm, Left Updated: 06/04/25 0912     hs TnI 0hr <2 ng/L     TSH [[id number]]  (Abnormal) Collected: 06/04/25 0805    Lab Status: Final result Specimen: Blood from Arm, Left Updated: 06/04/25 0912     TSH 3RD GENERATON 0.255 uIU/mL     Strep A PCR [[id number]]  (Normal) Collected: 06/04/25 0805    Lab Status: Final result Specimen: Throat Updated: 06/04/25 0857     STREP A PCR Not Detected    Comprehensive metabolic panel [[id number]]  (Abnormal) Collected: 06/04/25 0805    Lab Status: Final result Specimen: Blood from Arm, Left Updated: 06/04/25 0839     Sodium 137 mmol/L      Potassium 3.7  mmol/L      Chloride 107 mmol/L      CO2 27 mmol/L      ANION GAP 3 mmol/L      BUN 6 mg/dL      Creatinine 0.92 mg/dL      Glucose 98 mg/dL      Calcium 8.8 mg/dL      AST 11 U/L      ALT 11 U/L      Alkaline Phosphatase 53 U/L      Total Protein 7.0 g/dL      Albumin 4.0 g/dL      Total Bilirubin 0.62 mg/dL      eGFR 74 ml/min/1.73sq m     Narrative:      National Kid[employer] guidelines for Chronic Kidney Disease (CKD):     Stage 1 with normal or high GFR (GFR > 90 mL/min/1.73 square meters)    Stage 2 Mild CKD (GFR = 60-89 mL/min/1.73 square meters)    Stage 3A Moderate CKD (GFR = 45-59 mL/min/1.73 square meters)    Stage 3B Moderate CKD (GFR = 30-44 mL/min/1.73 square meters)    Stage 4 Severe CKD (GFR = 15-29 mL/min/1.73 square meters)    Stage 5 End Stage CKD (GFR <15 mL/min/1.73 square meters)  Note: GFR calculation is accurate only with a steady state creatinine    FLU/COVID Rapid Antigen (30 min. TAT) - Preferred screening test in ED [[id number]]  (Normal) Collected: 06/04/25 0805    Lab Status: Final result Specimen: Nares from Nose Updated: 06/04/25 0838     SARS COV Rapid Antigen Negative     Influenza A Rapid Antigen Negative     Influenza B Rapid Antigen Negative    Narrative:      This test has been performed using the Quidel Cristal 2 FLU+SARS Antigen test under the Emergency Use Authorization (EUA). This test has been validated by the  and verified by the performing laboratory. The Cristal uses lateral flow immunofluorescent sandwich assay to detect SARS-COV, Influenza A and Influenza B Antigen.     The Quidel Cristal 2 SARS Antigen test does not differentiate between SARS-CoV and SARS-CoV-2.     Negative results are presumptive and may be confirmed with a molecular assay, if necessary, for patient management. Negative results do not rule out SARS-CoV-2 or influenza infection and should not be used as the sole basis for treatment or patient management decisions. A negative test  result may occur if the level of antigen in a sample is below the limit of detection of this test.     Positive results are indicative of the presence of viral antigens, but do not rule out bacterial infection or co-infection with other viruses.     All test results should be used as an adjunct to clinical observations and other information available to the provider.    FOR PEDIATRIC PATIENTS - copy/paste COVID Guidelines URL to browser: [email or web address]    CBC and differential [[id number]]  (Abnormal) Collected: 06/04/25 0805    Lab Status: Final result Specimen: Blood from Arm, Left Updated: 06/04/25 0821     WBC 3.67 Thousand/uL      RBC 4.71 Million/uL      Hemoglobin 14.1 g/dL      Hematocrit 41.4 %      MCV 88 fL      MCH 29.9 pg      MCHC 34.1 g/dL      RDW 12.2 %      MPV 10.0 fL      Platelets 165 Thousands/uL      nRBC 0 /100 WBCs      Segmented % 64 %      Immature Grans % 0 %      Lymphocytes % 21 %      Monocytes % 13 %      Eosinophils Relative 1 %      Basophils Relative 1 %      Absolute Neutrophils 2.34 Thousands/µL      Absolute Immature Grans 0.01 Thousand/uL      Absolute Lymphocytes 0.77 Thousands/µL      Absolute Monocytes 0.47 Thousand/µL      Eosinophils Absolute 0.05 Thousand/µL      Basophils Absolute 0.03 Thousands/µL             XR chest 1 view portable   ED Interpretation by Nancie Gill MD (06/04 0844)   NAD      Final Interpretation by Gilberto Leonard MD (06/04 0947)      No acute cardiopulmonary disease.            Workstation performed: [id number]             ECG 12 Lead Documentation Only    Date/Time: 6/4/2025 7:48 AM    Performed by: Moreno Bowers MD  Authorized by: Moreno Bowers MD    Indications / Diagnosis:  Chest discomfort  ECG reviewed by me, the ED Provider: yes    Patient location:  ED  Previous ECG:     Comparison to cardiac monitor: Yes    Interpretation:     Interpretation: non-specific    Rate:      ECG rate:  88    ECG rate assessment: normal    Ectopy:     Ectopy: none    QRS:     QRS axis:  Normal    QRS intervals:  Normal  Conduction:     Conduction: normal    ST segments:     ST segments:  Normal  T waves:     T waves: non-specific        ED Medication and Procedure Management   Prior to Admission Medications   Prescriptions Last Dose Informant Patient Reported? Taking?   TiZANidine (ZANAFLEX) 2 MG capsule   No No   Sig: Take 1 capsule (2 mg total) by mouth 3 (three) times a day as needed for muscle spasms   diphenhydrAMINE (BENADRYL) 25 mg tablet   No No   Sig: Take 1 tablet (25 mg total) by mouth every 6 (six) hours   diphenhydrAMINE (SOMINEX) 25 MG tablet   Yes No   Sig: Take 25 mg by mouth every 6 (six) hours   gabapentin (NEURONTIN) 300 mg capsule   No No   Sig: Take 1 capsule (300 mg total) by mouth 3 (three) times a day   hydrOXYzine HCL (ATARAX) 25 mg tablet   No No   Sig: Take 1 tablet (25 mg total) by mouth every 6 (six) hours as needed for itching   hydroCHLOROthiazide 25 mg tablet   No No   Sig: Take 1 tablet (25 mg total) by mouth daily   hydroCHLOROthiazide 25 mg tablet   No No   Sig: Take 1 tablet (25 mg total) by mouth daily   ketorolac (TORADOL) 10 mg tablet   No No   Sig: Take 1 tablet (10 mg total) by mouth every 6 (six) hours as needed for severe pain   meloxicam (MOBIC) 15 mg tablet   Yes No   Sig: Take 15 mg by mouth daily as needed   naproxen (Naprosyn) 500 mg tablet   No No   Sig: Take 1 tablet (500 mg total) by mouth 2 (two) times a day as needed for mild pain   ondansetron (ZOFRAN-ODT) 4 mg disintegrating tablet   No No   Sig: Take 1 tablet (4 mg total) by mouth every 6 (six) hours as needed for nausea or vomiting   oxyCODONE (ROXICODONE) 5 immediate release tablet   No No   Sig: Take 1 tablet (5 mg total) by mouth every 4 (four) hours as needed for severe pain Max Daily Amount: 30 mg   Patient not taking: Reported on 2024   predniSONE 10 mg tablet   No No   Si tabs x 2  days, 3 tabs x 2 days, 2 tabs x 2 days, 1 tab x 2 days   rizatriptan (MAXALT) 10 mg tablet   No No   Sig: Take 1 tablet (10 mg total) by mouth once as needed for migraine for up to 1 dose May repeat in 2 hours if needed   triamcinolone (KENALOG) 0.5 % cream   No No   Sig: Apply topically 2 (two) times a day      Facility-Administered Medications: None     Discharge Medication List as of 6/4/2025  9:36 AM        CONTINUE these medications which have NOT CHANGED    Details   diphenhydrAMINE (BENADRYL) 25 mg tablet Take 1 tablet (25 mg total) by mouth every 6 (six) hours, Starting Fri 4/12/2024, Normal      diphenhydrAMINE (SOMINEX) 25 MG tablet Take 25 mg by mouth every 6 (six) hours, Starting Sat 4/13/2024, Historical Med      gabapentin (NEURONTIN) 300 mg capsule Take 1 capsule (300 mg total) by mouth 3 (three) times a day, Starting Wed 4/3/2024, Normal      hydroCHLOROthiazide 25 mg tablet Take 1 tablet (25 mg total) by mouth daily, Starting Mon 7/29/2024, Normal      hydrOXYzine HCL (ATARAX) 25 mg tablet Take 1 tablet (25 mg total) by mouth every 6 (six) hours as needed for itching, Starting Wed 4/24/2024, Normal      ketorolac (TORADOL) 10 mg tablet Take 1 tablet (10 mg total) by mouth every 6 (six) hours as needed for severe pain, Starting Wed 10/25/2023, Normal      meloxicam (MOBIC) 15 mg tablet Take 15 mg by mouth daily as needed, Starting Tue 8/22/2023, Historical Med      naproxen (Naprosyn) 500 mg tablet Take 1 tablet (500 mg total) by mouth 2 (two) times a day as needed for mild pain, Starting Thu 11/7/2024, Normal      ondansetron (ZOFRAN-ODT) 4 mg disintegrating tablet Take 1 tablet (4 mg total) by mouth every 6 (six) hours as needed for nausea or vomiting, Starting Sun 12/29/2024, Normal      oxyCODONE (ROXICODONE) 5 immediate release tablet Take 1 tablet (5 mg total) by mouth every 4 (four) hours as needed for severe pain Max Daily Amount: 30 mg, Starting Sun 3/31/2024, Normal      predniSONE 10 mg  tablet 4 tabs x 2 days, 3 tabs x 2 days, 2 tabs x 2 days, 1 tab x 2 days, Normal      rizatriptan (MAXALT) 10 mg tablet Take 1 tablet (10 mg total) by mouth once as needed for migraine for up to 1 dose May repeat in 2 hours if needed, Starting Wed 4/3/2024, Normal      TiZANidine (ZANAFLEX) 2 MG capsule Take 1 capsule (2 mg total) by mouth 3 (three) times a day as needed for muscle spasms, Starting Wed 4/3/2024, Normal      triamcinolone (KENALOG) 0.5 % cream Apply topically 2 (two) times a day, Starting 2024, Normal           No discharge procedures on file.  ED SEPSIS DOCUMENTATION   Time reflects when diagnosis was documented in both MDM as applicable and the Disposition within this note       Time User Action Codes Description Comment    2025  9:21 AM Moreno Bowers [R05.9] Cough     2025  9:21 AM Moreno Bowers [J02.9] Sore throat                      [1]   Past Medical History:  Diagnosis Date    Fetal growth problem suspected but not found     RESOLVED: 3/1/17    First trimester bleeding     RESOLVED:3/1/17    Granulation tissue of vaginal cuff 2018    Iron deficiency anemia due to chronic blood loss 2018    Miscarriage     Missed      RESOLVED:3/1/17    Poor fetal growth affecting management of mother     RESOLVED:3/1/17    Recurrent pregnancy loss, antepartum condition or complication     RESOLVED:3/1/17    Spontaneous      RESOLVED:3/1/17    Supraventricular tachycardia (HCC)     by history   [2]   Past Surgical History:  Procedure Laterality Date    COLONOSCOPY      DILATION AND CURETTAGE OF UTERUS      ,     DILATION AND CURETTAGE OF UTERUS      FOR SPONTANEOUS ; X5 [id number]    HYSTERECTOMY  2018    Dr Singh    INDUCED   2010    LAPAROSCOPIC TUBAL LIGATION Right 2016    Procedure: LAPAROSCOPIC TUBAL LIGATION ;  Surgeon: Jacque Singh MD;  Location: [facility];  Service:     LA LAPS W/VAG HYSTERECT 250 GM/&RMVL  TUBE&/OVARIES N/A 2018    Procedure: HYSTERECTOMY LAPAROSCOPIC ASSISTED VAGINAL (LAVH); RIGHT LAPAROSCOPIC SALPINGECTOMY;  Surgeon: Jacque Singh MD;  Location: [facility];  Service: Gynecology    SC TX MISSED  FIRST TRIMESTER SURGICAL N/A 2016    Procedure: DILATATION AND EVACUATION (D&E) (MISSED AB);  Surgeon: Jacque Singh MD;  Location: [facility];  Service: Gynecology    SALPINGOOPHORECTOMY Left     TUBAL LIGATION Right    [3]   Family History  Problem Relation Name Age of Onset    Arthritis Mother      Depression Mother      Hypertension Mother      Cancer Father      Breast cancer Neg Hx      Ovarian cancer Neg Hx      Colon cancer Neg Hx     [4]   Social History  Tobacco Use    Smoking status: Never    Smokeless tobacco: Never   Vaping Use    Vaping status: Never Used   Substance Use Topics    Alcohol use: Yes     Comment: social    Drug use: Never        Moreno Bowers MD  [phone]

## 2025-06-04 NOTE — ED ATTENDING ATTESTATION
6/4/2025  I, Nancie Gill MD, saw and evaluated the patient. I have discussed the patient with the resident/non-physician practitioner and agree with the resident's/non-physician practitioner's findings, Plan of Care, and MDM as documented in the resident's/non-physician practitioner's note, except where noted. All available labs and Radiology studies were reviewed.  I was present for key portions of any procedure(s) performed by the resident/non-physician practitioner and I was immediately available to provide assistance.       At this point I agree with the current assessment done in the Emergency Department.  I have conducted an independent evaluation of this patient a history and physical is as follows:    46 yo female with h/o HTN presents with fatigue, sore throat, dry cough, chest discomfort.  Symptoms started yesterday evening, worse this am.  NO f/c/sick contacts.  Pt has been noted increased Bps at home, recently stopped previous regimen at request of PCP.  Chest discomfort mostly associated with cough, no exertional component.  NO swelling of the legs/orthopnea/PNA. Denies LE pain/swelling/recent travel/exogenous hormone/personal or familial history of VTE. Able to eat/drink.  No n/v/d/, muscle aches?       ED Course         Critical Care Time  Procedures       leukocypenia reassuring diff, normal h/h and plts   0843 Reassuring, no end organ damage, no AG, normal bicarb.       0843 FLU/COVID Rapid Antigen (30 min. TAT) - Preferred screening test in ED  wnl   0927 STREP A PCR: Not Detected  wnl   0931 hs TnI 0hr: <2  wnl   0931 TSH 3RD GENERATON(!): 0.255  noted   0931 hs TnI 0hr: <2         Critical Care Time  Procedures

## 2025-06-04 NOTE — DISCHARGE INSTRUCTIONS
Come back to the ER if you are having new or worsening symptoms.  Follow-up with your primary doctor.

## 2025-06-13 ENCOUNTER — OFFICE VISIT (OUTPATIENT)
Dept: URGENT CARE | Age: 45
End: 2025-06-13
Payer: COMMERCIAL

## 2025-06-13 VITALS
SYSTOLIC BLOOD PRESSURE: 151 MMHG | DIASTOLIC BLOOD PRESSURE: 94 MMHG | HEART RATE: 84 BPM | OXYGEN SATURATION: 96 % | TEMPERATURE: 97.6 F | RESPIRATION RATE: 16 BRPM

## 2025-06-13 DIAGNOSIS — J06.9 UPPER RESPIRATORY TRACT INFECTION, UNSPECIFIED TYPE: Primary | ICD-10-CM

## 2025-06-13 PROCEDURE — 99213 OFFICE O/P EST LOW 20 MIN: CPT

## 2025-06-13 RX ORDER — ALBUTEROL SULFATE 90 UG/1
2 INHALANT RESPIRATORY (INHALATION) EVERY 6 HOURS PRN
Qty: 8.5 G | Refills: 0 | Status: SHIPPED | OUTPATIENT
Start: 2025-06-13

## 2025-06-13 RX ORDER — PREDNISONE 20 MG/1
20 TABLET ORAL 2 TIMES DAILY WITH MEALS
Qty: 10 TABLET | Refills: 0 | Status: SHIPPED | OUTPATIENT
Start: 2025-06-13 | End: 2025-06-18

## 2025-06-13 RX ORDER — AZITHROMYCIN 250 MG/1
TABLET, FILM COATED ORAL
Qty: 6 TABLET | Refills: 0 | Status: SHIPPED | OUTPATIENT
Start: 2025-06-13 | End: 2025-06-17

## 2025-06-13 NOTE — PATIENT INSTRUCTIONS
Please begin Azithromycin, short course of prednisone and albuterol inhaler as directed.   Follow up with PCP as soon as possible for evaluation of blood pressure management. Attempted to reach out to Dr. Castro via Epic,  however currently unavailable.   Go to ED for worsening symptoms.

## 2025-06-13 NOTE — LETTER
June 13, 2025     Patient: Glenna Lanier   YOB: 1980   Date of Visit: 6/13/2025       To Whom it May Concern:    Glenna Lanier was seen in my clinic on 6/13/2025. She may return on 06/15/2025.     If you have any questions or concerns, please don't hesitate to call.         Sincerely,          MELODIE Ang        CC: No Recipients

## 2025-06-13 NOTE — PROGRESS NOTES
[facility]  Name: Glenna Lanier      : 1980      MRN: [id number]  Encounter Provider: MELODIE Ang  Encounter Date: 2025   Encounter department: [facility]  :  Please begin Azithromycin, short course of prednisone and albuterol inhaler as directed.   Follow up with PCP as soon as possible for evaluation of blood pressure management. Attempted to reach out to Dr. Castro via Epic,  however currently unavailable.   Go to ED for worsening symptoms.   Assessment & Plan  Upper respiratory tract infection, unspecified type    Orders:    azithromycin (ZITHROMAX) 250 mg tablet; Take 2 tablets today then 1 tablet daily x 4 days    predniSONE 20 mg tablet; Take 1 tablet (20 mg total) by mouth 2 (two) times a day with meals for 5 days    albuterol (ProAir HFA) 90 mcg/act inhaler; Inhale 2 puffs every 6 (six) hours as needed for wheezing or shortness of breath        Patient Instructions  Patient Education     Upper Respiratory Infection ED   General Information   You came to the Emergency Department (ED) for an upper respiratory infection or URI. A URI can affect your nose, throat, ears, and sinuses. A virus is the cause of almost all URIs and antibiotics will not help you feel better more quickly. The common cold is an example of a viral URI.  URIs are easy to spread from person to person, most often through coughing or sneezing. A URI will almost always get better in a week or two without any treatment.  What care is needed at home?   Call your regular doctor to let them know you were in the ED. Make a follow-up appointment if you were told to.  If you smoke, try to quit. Your doctor or nurse can help.  Drink lots of fluids like water, juice, or broth. This will help replace any fluids lost if you have a runny nose or fever. Warm tea or soup can help soothe a sore throat.  If the air in your home feels dry, use a cool mist humidifier. This can help a stuffy nose and make it easier  to breathe.  You can also use saline nose drops or spray to relieve stuffiness.  If you decide to take over-the-counter cough or cold medicines, follow the directions on the label carefully. Be sure you do not take more than 1 medicine that contains acetaminophen. Also, if you have a heart problem or high blood pressure, check with your doctor before you take any of these medicines.  Wash your hands often. Cough or sneeze into a tissue or your elbow instead of your hands. When around others, you might also want to wear a face mask. These steps can help keep others around you healthy.  When do I need to get emergency help?   Return to the ED if:   You have trouble breathing when talking or sitting still.  When do I need to call the doctor?   You have a fever of 100.4°F (38°C) or higher for several days, chills, a very bad sore throat, or ear or sinus pain.  You develop a new fever after several days of feeling the same or improving.  You develop chest pain when you cough.  You have a cough that lasts more than 10 days.  You cough up blood.  You have new or worsening symptoms.  Last Reviewed Date   2022-02-01  Consumer Information Use and Disclaimer   This generalized information is a limited summary of diagnosis, treatment, and/or medication information. It is not meant to be comprehensive and should be used as a tool to help the user understand and/or assess potential diagnostic and treatment options. It does NOT include all information about conditions, treatments, medications, side effects, or risks that may apply to a specific patient. It is not intended to be medical advice or a substitute for the medical advice, diagnosis, or treatment of a health care provider based on the health care provider's examination and assessment of a patient’s specific and unique circumstances. Patients must speak with a health care provider for complete information about their health, medical questions, and treatment options,  "including any risks or benefits regarding use of medications. This information does not endorse any treatments or medications as safe, effective, or approved for treating a specific patient. [employer] and its affiliates disclaim any warranty or liability relating to this information or the use thereof. The use of this information is governed by the Terms of Use, available at https://www.[employer]/en/know/clinical-effectiveness-terms   Copyright   Follow up with PCP in 3-5 days.  Proceed to  ER if symptoms worsen.    If tests are performed, our office will contact you with results only if changes need to made to the care plan discussed with you at the visit. You can review your full results on [facility] MyChart.    Chief Complaint:   Chief Complaint   Patient presents with    Cough     Patient reports was seen in [facility] for cough and elevated BP. Patient states she was given a nebulizer treatment in Ed and medication to reduce BP was not prescribed any medication for home use. Now reporting sob , cough, wheezing and sternal  pain which is constant and increases with cough.  Patient states she does have a pcp , but has not been able to get an appt.     Wheezing     History of Present Illness   45 year old female with PMH significant for HTN presents for evaluation of cough over the past 10-14 days. Per record review, she was seen in the ED on 06/04/2025, where labs, cardiac workup and CXR were performed, all of which were grossly normal. She was diagnosed with viral URI and given a one-time dose of her BP medication HCTZ, as she had completed her 90 day supply. She reports continued/worsening intermittently productive cough, with occasional wheezing. She also notes concerns about her blood pressure, reporting that the HCTZ was \"not doing anything\" and making her feel \"weird\", despite taking it routinely. She has found difficulty establishing an appointment with her PCP. She denies fever, " palpitations, dizziness, vomiting. She notes substernal chest pain only with cough.     Cough  This is a new problem. The current episode started 1 to 4 weeks ago (x 10-14 days). The problem has been gradually worsening. The problem occurs every few hours. The cough is Productive of sputum. Associated symptoms include wheezing. Pertinent negatives include no chest pain, chills, ear congestion, ear pain, eye redness, fever, headaches, heartburn, hemoptysis, myalgias, nasal congestion, postnasal drip, rash, rhinorrhea, sore throat, shortness of breath, sweats or weight loss. Nothing aggravates the symptoms. She has tried OTC cough suppressant for the symptoms. The treatment provided no relief. There is no history of asthma, bronchiectasis, bronchitis, COPD, emphysema, environmental allergies or pneumonia.   Wheezing  Associated symptoms include coughing and wheezing. Pertinent negatives include no chest pain, dizziness, fatigue, palpitations, rhinorrhea, sore throat, stridor or sweats. There is no history of asthma.         Review of Systems   Constitutional:  Negative for chills, fatigue, fever and weight loss.   HENT:  Positive for congestion. Negative for ear discharge, ear pain, postnasal drip, rhinorrhea, sinus pressure, sinus pain, sneezing and sore throat.    Eyes: Negative.  Negative for pain, discharge, redness and itching.   Respiratory:  Positive for cough and wheezing. Negative for apnea, hemoptysis, choking, chest tightness, shortness of breath and stridor.    Cardiovascular: Negative.  Negative for chest pain and palpitations.   Gastrointestinal: Negative.  Negative for diarrhea, heartburn, nausea and vomiting.   Endocrine: Negative.  Negative for polydipsia, polyphagia and polyuria.   Genitourinary: Negative.  Negative for decreased urine volume and flank pain.   Musculoskeletal: Negative.  Negative for arthralgias, back pain, myalgias, neck pain and neck stiffness.   Skin: Negative.  Negative for color  change and rash.   Allergic/Immunologic: Negative.  Negative for environmental allergies.   Neurological: Negative.  Negative for dizziness, facial asymmetry, light-headedness, numbness and headaches.   Hematological: Negative.  Negative for adenopathy.   Psychiatric/Behavioral: Negative.       Past Medical History   Past Medical History[1]  Past Surgical History[2]  Family History[3]  she reports that she has never smoked. She has never used smokeless tobacco. She reports current alcohol use. She reports that she does not use drugs.  Current Outpatient Medications   Medication Instructions    albuterol (ProAir HFA) 90 mcg/act inhaler 2 puffs, Inhalation, Every 6 hours PRN    azithromycin (ZITHROMAX) 250 mg tablet Take 2 tablets today then 1 tablet daily x 4 days    diphenhydrAMINE (BENADRYL) 25 mg, Oral, Every 6 hours    diphenhydrAMINE (SOMINEX) 25 mg, Every 6 hours    gabapentin (NEURONTIN) 300 mg, Oral, 3 times daily    hydroCHLOROthiazide 25 mg, Oral, Daily    hydroCHLOROthiazide 25 mg, Oral, Daily    hydrOXYzine HCL (ATARAX) 25 mg, Oral, Every 6 hours PRN    ketorolac (TORADOL) 10 mg, Oral, Every 6 hours PRN    meloxicam (MOBIC) 15 mg, Daily PRN    naproxen (NAPROSYN) 500 mg, Oral, 2 times daily PRN    ondansetron (ZOFRAN-ODT) 4 mg, Oral, Every 6 hours PRN    oxyCODONE (ROXICODONE) 5 mg, Oral, Every 4 hours PRN    predniSONE 20 mg, Oral, 2 times daily with meals    rizatriptan (MAXALT) 10 mg, Oral, Once as needed, May repeat in 2 hours if needed    TiZANidine (ZANAFLEX) 2 mg, Oral, 3 times daily PRN    triamcinolone (KENALOG) 0.5 % cream Topical, 2 times daily   Allergies[4]     Objective   /97 (Patient Position: Sitting, Cuff Size: Adult)   Pulse 84   Temp 97.6 °F (36.4 °C) (Temporal)   Resp 16   LMP  (LMP Unknown)   SpO2 96%      Physical Exam  Vitals and nursing note reviewed.   Constitutional:       General: She is not in acute distress.     Appearance: She is well-developed. She is not  "ill-appearing, toxic-appearing or diaphoretic.      Interventions: She is not intubated.  HENT:      Head: Normocephalic and atraumatic.     Eyes:      Conjunctiva/sclera: Conjunctivae normal.       Cardiovascular:      Rate and Rhythm: Normal rate and regular rhythm.      Heart sounds: Normal heart sounds, S1 normal and S2 normal. Heart sounds not distant. No murmur heard.  Pulmonary:      Effort: Pulmonary effort is normal. No tachypnea, bradypnea, accessory muscle usage, prolonged expiration, respiratory distress or retractions. She is not intubated.      Breath sounds: Normal breath sounds. No stridor, decreased air movement or transmitted upper airway sounds. No decreased breath sounds, wheezing, rhonchi or rales.   Abdominal:      Palpations: Abdomen is soft.      Tenderness: There is no abdominal tenderness.     Musculoskeletal:         General: No swelling.      Cervical back: Neck supple.     Skin:     General: Skin is warm and dry.      Capillary Refill: Capillary refill takes less than 2 seconds.     Neurological:      Mental Status: She is alert.     Psychiatric:         Mood and Affect: Mood normal.         Portions of the record may have been created with voice recognition software.  Occasional wrong word or \"sound a like\" substitutions may have occurred due to the inherent limitations of voice recognition software.  Read the chart carefully and recognize, using context, where substitutions have occurred.       [1]   Past Medical History:  Diagnosis Date    Fetal growth problem suspected but not found     RESOLVED: 3/1/17    First trimester bleeding     RESOLVED:3/1/17    Granulation tissue of vaginal cuff 2018    Iron deficiency anemia due to chronic blood loss 2018    Miscarriage     Missed      RESOLVED:3/1/17    Poor fetal growth affecting management of mother     RESOLVED:3/1/17    Recurrent pregnancy loss, antepartum condition or complication     RESOLVED:3/1/17    Spontaneous "      RESOLVED:3/1/17    Supraventricular tachycardia (HCC)     by history   [2]   Past Surgical History:  Procedure Laterality Date    COLONOSCOPY      DILATION AND CURETTAGE OF UTERUS      ,     DILATION AND CURETTAGE OF UTERUS      FOR SPONTANEOUS ; X5 [id number]    HYSTERECTOMY  2018    Dr Singh    INDUCED       LAPAROSCOPIC TUBAL LIGATION Right 2016    Procedure: LAPAROSCOPIC TUBAL LIGATION ;  Surgeon: Jacque Singh MD;  Location: [facility];  Service:     AR LAPS W/VAG HYSTERECT 250 GM/&RMVL TUBE&/OVARIES N/A 2018    Procedure: HYSTERECTOMY LAPAROSCOPIC ASSISTED VAGINAL (LAVH); RIGHT LAPAROSCOPIC SALPINGECTOMY;  Surgeon: Jacque Singh MD;  Location: [facility];  Service: Gynecology    AR TX MISSED  FIRST TRIMESTER SURGICAL N/A 2016    Procedure: DILATATION AND EVACUATION (D&E) (MISSED AB);  Surgeon: Jacque Singh MD;  Location: [facility];  Service: Gynecology    SALPINGOOPHORECTOMY Left     TUBAL LIGATION Right    [3]   Family History  Problem Relation Name Age of Onset    Arthritis Mother      Depression Mother      Hypertension Mother      Cancer Father      Breast cancer Neg Hx      Ovarian cancer Neg Hx      Colon cancer Neg Hx     [4]   Allergies  Allergen Reactions    Iodinated Contrast Media Rash

## 2025-06-16 ENCOUNTER — DOCUMENTATION (OUTPATIENT)
Dept: ADMINISTRATIVE | Facility: OTHER | Age: 45
End: 2025-06-16

## 2025-06-16 NOTE — PROGRESS NOTES
Yi Gutierrez, CHRISTEN  P Patient Reported Team         Blood pressure elevated  Appointment department: [facility]  Appointment provider: MELODIE Ang  Blood pressure  06/13/25 1850 151/94  06/13/25 1828 151/97  06/16/25 12:17 PM    Patient was called after the Urgent Care visit ; there was no answer/ a message could not be left.    Thank you.  Siddharth Batres MA  [facility]

## 2025-08-08 ENCOUNTER — OFFICE VISIT (OUTPATIENT)
Dept: FAMILY MEDICINE CLINIC | Facility: CLINIC | Age: 45
End: 2025-08-08
Payer: COMMERCIAL

## 2025-08-08 VITALS
BODY MASS INDEX: 36.14 KG/M2 | DIASTOLIC BLOOD PRESSURE: 70 MMHG | SYSTOLIC BLOOD PRESSURE: 160 MMHG | WEIGHT: 204 LBS | HEIGHT: 63 IN | TEMPERATURE: 98.1 F | HEART RATE: 88 BPM | OXYGEN SATURATION: 98 % | RESPIRATION RATE: 17 BRPM

## 2025-08-08 DIAGNOSIS — G43.009 MIGRAINE WITHOUT AURA AND WITHOUT STATUS MIGRAINOSUS, NOT INTRACTABLE: ICD-10-CM

## 2025-08-08 DIAGNOSIS — M54.9 BACK PAIN: ICD-10-CM

## 2025-08-08 DIAGNOSIS — Z12.31 ENCOUNTER FOR SCREENING MAMMOGRAM FOR BREAST CANCER: ICD-10-CM

## 2025-08-08 DIAGNOSIS — G43.701 CHRONIC MIGRAINE WITHOUT AURA WITH STATUS MIGRAINOSUS, NOT INTRACTABLE: ICD-10-CM

## 2025-08-08 DIAGNOSIS — E05.90 HYPERTHYROIDISM: ICD-10-CM

## 2025-08-08 DIAGNOSIS — M54.31 RIGHT SIDED SCIATICA: ICD-10-CM

## 2025-08-08 DIAGNOSIS — F41.9 ANXIETY: Primary | ICD-10-CM

## 2025-08-08 DIAGNOSIS — I10 PRIMARY HYPERTENSION: ICD-10-CM

## 2025-08-08 PROCEDURE — 99214 OFFICE O/P EST MOD 30 MIN: CPT | Performed by: FAMILY MEDICINE

## 2025-08-08 RX ORDER — RIZATRIPTAN BENZOATE 10 MG/1
10 TABLET ORAL ONCE AS NEEDED
Qty: 9 TABLET | Refills: 0 | Status: SHIPPED | OUTPATIENT
Start: 2025-08-08

## 2025-08-08 RX ORDER — HYDROXYZINE HYDROCHLORIDE 25 MG/1
25 TABLET, FILM COATED ORAL EVERY 6 HOURS PRN
Qty: 30 TABLET | Refills: 0 | Status: SHIPPED | OUTPATIENT
Start: 2025-08-08

## 2025-08-08 RX ORDER — TIZANIDINE HYDROCHLORIDE 2 MG/1
2 CAPSULE, GELATIN COATED ORAL 3 TIMES DAILY PRN
Qty: 30 CAPSULE | Refills: 0 | Status: SHIPPED | OUTPATIENT
Start: 2025-08-08

## 2025-08-08 RX ORDER — LISINOPRIL 10 MG/1
10 TABLET ORAL DAILY
Qty: 100 TABLET | Refills: 3 | Status: SHIPPED | OUTPATIENT
Start: 2025-08-08

## 2025-08-08 RX ORDER — GABAPENTIN 300 MG/1
300 CAPSULE ORAL 3 TIMES DAILY
Qty: 90 CAPSULE | Refills: 0 | Status: SHIPPED | OUTPATIENT
Start: 2025-08-08

## 2025-08-08 RX ORDER — NAPROXEN 500 MG/1
500 TABLET ORAL 2 TIMES DAILY PRN
Qty: 30 TABLET | Refills: 0 | Status: SHIPPED | OUTPATIENT
Start: 2025-08-08

## 2025-08-11 ENCOUNTER — TELEPHONE (OUTPATIENT)
Age: 45
End: 2025-08-11

## 2025-08-20 ENCOUNTER — HOSPITAL ENCOUNTER (EMERGENCY)
Facility: HOSPITAL | Age: 45
Discharge: HOME | End: 2025-08-20
Attending: EMERGENCY MEDICINE | Admitting: EMERGENCY MEDICINE
Payer: COMMERCIAL

## 2025-08-20 ENCOUNTER — APPOINTMENT (EMERGENCY)
Dept: RADIOLOGY | Facility: HOSPITAL | Age: 45
End: 2025-08-20
Payer: COMMERCIAL

## 2025-08-20 VITALS
RESPIRATION RATE: 18 BRPM | DIASTOLIC BLOOD PRESSURE: 102 MMHG | OXYGEN SATURATION: 100 % | SYSTOLIC BLOOD PRESSURE: 174 MMHG | TEMPERATURE: 98.1 F | HEART RATE: 99 BPM

## 2025-08-20 DIAGNOSIS — M25.473 ANKLE SWELLING: ICD-10-CM

## 2025-08-20 DIAGNOSIS — M25.579 ANKLE PAIN: Primary | ICD-10-CM

## 2025-08-20 PROCEDURE — 99284 EMERGENCY DEPT VISIT MOD MDM: CPT | Performed by: EMERGENCY MEDICINE

## 2025-08-20 PROCEDURE — 99283 EMERGENCY DEPT VISIT LOW MDM: CPT

## 2025-08-20 PROCEDURE — 73610 X-RAY EXAM OF ANKLE: CPT

## 2025-08-22 ENCOUNTER — TELEPHONE (OUTPATIENT)
Dept: OBGYN CLINIC | Facility: CLINIC | Age: 45
End: 2025-08-22

## (undated) DEVICE — INSUFLATION TUBING INSUFLOW (LEXION)

## (undated) DEVICE — ENSEAL LAPAROSCOPIC TISSUE SEALER G2 CURVED JAW FOR USE WITH G2 GENERATOR 5MM DIAMETER 35CM SHAFT LENGTH: Brand: ENSEAL

## (undated) DEVICE — ENDOPATH XCEL BLADELESS TROCARS WITH STABILITY SLEEVES: Brand: ENDOPATH XCEL

## (undated) DEVICE — 3000CC GUARDIAN II: Brand: GUARDIAN

## (undated) DEVICE — IRRIG ENDO FLO TUBING

## (undated) DEVICE — GLOVE PI ULTRA TOUCH SZ.6.5

## (undated) DEVICE — GLOVE INDICATOR PI UNDERGLOVE SZ 6.5 BLUE

## (undated) DEVICE — SUT VICRYL 4-0 PS-2 18 IN J496G

## (undated) DEVICE — NEEDLE 25G X 1 1/2

## (undated) DEVICE — 1840 FOAM BLOCK NEEDLE COUNTER: Brand: DEVON

## (undated) DEVICE — UNDYED BRAIDED (POLYGLACTIN 910), SYNTHETIC ABSORBABLE SUTURE: Brand: COATED VICRYL

## (undated) DEVICE — INTENDED FOR TISSUE SEPARATION, AND OTHER PROCEDURES THAT REQUIRE A SHARP SURGICAL BLADE TO PUNCTURE OR CUT.: Brand: BARD-PARKER SAFETY BLADES SIZE 11, STERILE

## (undated) DEVICE — ADHESIVE SKN CLSR HISTOACRYL FLEX 0.5ML LF

## (undated) DEVICE — CHLORHEXIDINE 4PCT 4 OZ

## (undated) DEVICE — DRAPE SHEET THREE QUARTER

## (undated) DEVICE — TUBING SUCTION 5MM X 12 FT

## (undated) DEVICE — X-RAY DETECTABLE SPONGES,16 PLY: Brand: VISTEC

## (undated) DEVICE — SUT VICRYL 1 CT-1 CR/8 27 IN JJ40G

## (undated) DEVICE — FLOSEAL MATRIX IS INDICATED IN SURGICAL PROCEDURES (OTHER THAN IN OPHTHALMIC) AS AN ADJUNCT TO HEMOSTASIS WHEN CONTROL OF BLEEDING BY LIGATURE OR CONVENTIONALPROCEDURES IS INEFFECTIVE OR IMPRACTICAL.: Brand: FLOSEAL HEMOSTATIC MATRIX

## (undated) DEVICE — INTENDED FOR TISSUE SEPARATION, AND OTHER PROCEDURES THAT REQUIRE A SHARP SURGICAL BLADE TO PUNCTURE OR CUT.: Brand: BARD-PARKER SAFETY BLADES SIZE 10, STERILE

## (undated) DEVICE — BETHLEHEM UNIVERSAL GYN LAP PK: Brand: CARDINAL HEALTH

## (undated) DEVICE — MEDI-VAC YANK SUCT HNDL W/TPRD BULBOUS TIP: Brand: CARDINAL HEALTH

## (undated) DEVICE — SCD SEQUENTIAL COMPRESSION COMFORT SLEEVE MEDIUM KNEE LENGTH: Brand: KENDALL SCD

## (undated) DEVICE — CHLORAPREP HI-LITE 26ML ORANGE

## (undated) DEVICE — 3M™ STERI-STRIP™ REINFORCED ADHESIVE SKIN CLOSURES, R1547, 1/2 IN X 4 IN (12 MM X 100 MM), 6 STRIPS/ENVELOPE: Brand: 3M™ STERI-STRIP™

## (undated) DEVICE — TRAY FOLEY 16FR URIMETER SILICONE SURESTEP

## (undated) DEVICE — ALL PURPOSE SPONGES,NONWOVEN, 4 PLY: Brand: CURITY

## (undated) DEVICE — GLOVE INDICATOR PI UNDERGLOVE SZ 8 BLUE

## (undated) DEVICE — ENDOPATH XCEL UNIVERSAL TROCAR STABLILITY SLEEVES: Brand: ENDOPATH XCEL